# Patient Record
Sex: FEMALE | Race: WHITE | NOT HISPANIC OR LATINO | URBAN - METROPOLITAN AREA
[De-identification: names, ages, dates, MRNs, and addresses within clinical notes are randomized per-mention and may not be internally consistent; named-entity substitution may affect disease eponyms.]

---

## 2016-06-28 RX ORDER — QUETIAPINE FUMARATE 200 MG/1
1 TABLET, FILM COATED ORAL
Qty: 0 | Refills: 0 | DISCHARGE
Start: 2016-06-28

## 2018-04-11 ENCOUNTER — EMERGENCY (EMERGENCY)
Facility: HOSPITAL | Age: 48
LOS: 1 days | End: 2018-04-11
Payer: MEDICARE

## 2018-04-11 PROCEDURE — 12002 RPR S/N/AX/GEN/TRNK2.6-7.5CM: CPT

## 2018-04-11 PROCEDURE — 72125 CT NECK SPINE W/O DYE: CPT | Mod: 26

## 2018-04-11 PROCEDURE — 70450 CT HEAD/BRAIN W/O DYE: CPT | Mod: 26

## 2018-04-11 PROCEDURE — 99284 EMERGENCY DEPT VISIT MOD MDM: CPT | Mod: 25

## 2020-06-09 ENCOUNTER — EMERGENCY (EMERGENCY)
Facility: HOSPITAL | Age: 50
LOS: 1 days | End: 2020-06-09
Payer: MEDICAID

## 2020-06-09 ENCOUNTER — INPATIENT (INPATIENT)
Facility: HOSPITAL | Age: 50
LOS: 9 days | Discharge: HOME | DRG: 896 | End: 2020-06-19
Attending: INTERNAL MEDICINE | Admitting: HOSPITALIST
Payer: MEDICARE

## 2020-06-09 VITALS
HEART RATE: 143 BPM | SYSTOLIC BLOOD PRESSURE: 168 MMHG | RESPIRATION RATE: 28 BRPM | OXYGEN SATURATION: 100 % | DIASTOLIC BLOOD PRESSURE: 112 MMHG

## 2020-06-09 DIAGNOSIS — G40.901 EPILEPSY, UNSPECIFIED, NOT INTRACTABLE, WITH STATUS EPILEPTICUS: ICD-10-CM

## 2020-06-09 LAB
ALBUMIN SERPL ELPH-MCNC: 3.6 G/DL — SIGNIFICANT CHANGE UP (ref 3.3–5.2)
ALBUMIN SERPL ELPH-MCNC: 4.1 G/DL — SIGNIFICANT CHANGE UP (ref 3.3–5.2)
ALP SERPL-CCNC: 141 U/L — HIGH (ref 40–120)
ALP SERPL-CCNC: 154 U/L — HIGH (ref 40–120)
ALT FLD-CCNC: 50 U/L — HIGH
ALT FLD-CCNC: 56 U/L — HIGH
AMPHET UR-MCNC: NEGATIVE — SIGNIFICANT CHANGE UP
ANION GAP SERPL CALC-SCNC: 18 MMOL/L — HIGH (ref 5–17)
ANION GAP SERPL CALC-SCNC: 25 MMOL/L — HIGH (ref 5–17)
ANISOCYTOSIS BLD QL: SLIGHT — SIGNIFICANT CHANGE UP
APAP SERPL-MCNC: <7.5 UG/ML — LOW (ref 10–26)
APPEARANCE UR: CLEAR — SIGNIFICANT CHANGE UP
AST SERPL-CCNC: 167 U/L — HIGH
AST SERPL-CCNC: 168 U/L — HIGH
BACTERIA # UR AUTO: ABNORMAL
BARBITURATES UR SCN-MCNC: NEGATIVE — SIGNIFICANT CHANGE UP
BASOPHILS # BLD AUTO: 0.14 K/UL — SIGNIFICANT CHANGE UP (ref 0–0.2)
BASOPHILS NFR BLD AUTO: 0.9 % — SIGNIFICANT CHANGE UP (ref 0–2)
BENZODIAZ UR-MCNC: NEGATIVE — SIGNIFICANT CHANGE UP
BILIRUB SERPL-MCNC: 0.5 MG/DL — SIGNIFICANT CHANGE UP (ref 0.4–2)
BILIRUB SERPL-MCNC: 0.6 MG/DL — SIGNIFICANT CHANGE UP (ref 0.4–2)
BILIRUB UR-MCNC: NEGATIVE — SIGNIFICANT CHANGE UP
BUN SERPL-MCNC: 13 MG/DL — SIGNIFICANT CHANGE UP (ref 8–20)
BUN SERPL-MCNC: 15 MG/DL — SIGNIFICANT CHANGE UP (ref 8–20)
CALCIUM SERPL-MCNC: 8.2 MG/DL — LOW (ref 8.6–10.2)
CALCIUM SERPL-MCNC: 9.6 MG/DL — SIGNIFICANT CHANGE UP (ref 8.6–10.2)
CHLORIDE SERPL-SCNC: 86 MMOL/L — LOW (ref 98–107)
CHLORIDE SERPL-SCNC: 91 MMOL/L — LOW (ref 98–107)
CK SERPL-CCNC: 89 U/L — SIGNIFICANT CHANGE UP (ref 25–170)
CO2 SERPL-SCNC: 17 MMOL/L — LOW (ref 22–29)
CO2 SERPL-SCNC: 19 MMOL/L — LOW (ref 22–29)
COCAINE METAB.OTHER UR-MCNC: POSITIVE
COLOR SPEC: YELLOW — SIGNIFICANT CHANGE UP
CREAT SERPL-MCNC: 1 MG/DL — SIGNIFICANT CHANGE UP (ref 0.5–1.3)
CREAT SERPL-MCNC: 1.28 MG/DL — SIGNIFICANT CHANGE UP (ref 0.5–1.3)
DIFF PNL FLD: ABNORMAL
EOSINOPHIL # BLD AUTO: 0.42 K/UL — SIGNIFICANT CHANGE UP (ref 0–0.5)
EOSINOPHIL NFR BLD AUTO: 2.7 % — SIGNIFICANT CHANGE UP (ref 0–6)
EPI CELLS # UR: ABNORMAL
ETHANOL SERPL-MCNC: <10 MG/DL — SIGNIFICANT CHANGE UP
GLUCOSE SERPL-MCNC: 205 MG/DL — HIGH (ref 70–99)
GLUCOSE SERPL-MCNC: 216 MG/DL — HIGH (ref 70–99)
GLUCOSE UR QL: 100 MG/DL
HCG SERPL-ACNC: <4 MIU/ML — SIGNIFICANT CHANGE UP
HCT VFR BLD CALC: 35.6 % — SIGNIFICANT CHANGE UP (ref 34.5–45)
HGB BLD-MCNC: 11.2 G/DL — LOW (ref 11.5–15.5)
HYALINE CASTS # UR AUTO: ABNORMAL /LPF
KETONES UR-MCNC: ABNORMAL
LEUKOCYTE ESTERASE UR-ACNC: ABNORMAL
LYMPHOCYTES # BLD AUTO: 34.2 % — SIGNIFICANT CHANGE UP (ref 13–44)
LYMPHOCYTES # BLD AUTO: 5.34 K/UL — HIGH (ref 1–3.3)
MACROCYTES BLD QL: SLIGHT — SIGNIFICANT CHANGE UP
MANUAL SMEAR VERIFICATION: SIGNIFICANT CHANGE UP
MCHC RBC-ENTMCNC: 28 PG — SIGNIFICANT CHANGE UP (ref 27–34)
MCHC RBC-ENTMCNC: 31.5 GM/DL — LOW (ref 32–36)
MCV RBC AUTO: 89 FL — SIGNIFICANT CHANGE UP (ref 80–100)
METHADONE UR-MCNC: NEGATIVE — SIGNIFICANT CHANGE UP
MONOCYTES # BLD AUTO: 1.12 K/UL — HIGH (ref 0–0.9)
MONOCYTES NFR BLD AUTO: 7.2 % — SIGNIFICANT CHANGE UP (ref 2–14)
NEUTROPHILS # BLD AUTO: 8.59 K/UL — HIGH (ref 1.8–7.4)
NEUTROPHILS NFR BLD AUTO: 55 % — SIGNIFICANT CHANGE UP (ref 43–77)
NITRITE UR-MCNC: NEGATIVE — SIGNIFICANT CHANGE UP
OPIATES UR-MCNC: NEGATIVE — SIGNIFICANT CHANGE UP
OVALOCYTES BLD QL SMEAR: SLIGHT — SIGNIFICANT CHANGE UP
PCP SPEC-MCNC: SIGNIFICANT CHANGE UP
PCP UR-MCNC: NEGATIVE — SIGNIFICANT CHANGE UP
PH UR: 6 — SIGNIFICANT CHANGE UP (ref 5–8)
PLAT MORPH BLD: NORMAL — SIGNIFICANT CHANGE UP
PLATELET # BLD AUTO: 285 K/UL — SIGNIFICANT CHANGE UP (ref 150–400)
POIKILOCYTOSIS BLD QL AUTO: SLIGHT — SIGNIFICANT CHANGE UP
POLYCHROMASIA BLD QL SMEAR: SLIGHT — SIGNIFICANT CHANGE UP
POTASSIUM SERPL-MCNC: 3.3 MMOL/L — LOW (ref 3.5–5.3)
POTASSIUM SERPL-MCNC: 6.1 MMOL/L — CRITICAL HIGH (ref 3.5–5.3)
POTASSIUM SERPL-SCNC: 3.3 MMOL/L — LOW (ref 3.5–5.3)
POTASSIUM SERPL-SCNC: 6.1 MMOL/L — CRITICAL HIGH (ref 3.5–5.3)
PROT SERPL-MCNC: 6.6 G/DL — SIGNIFICANT CHANGE UP (ref 6.6–8.7)
PROT SERPL-MCNC: 7.8 G/DL — SIGNIFICANT CHANGE UP (ref 6.6–8.7)
PROT UR-MCNC: 100 MG/DL
RBC # BLD: 4 M/UL — SIGNIFICANT CHANGE UP (ref 3.8–5.2)
RBC # FLD: 21.6 % — HIGH (ref 10.3–14.5)
RBC BLD AUTO: ABNORMAL
RBC CASTS # UR COMP ASSIST: ABNORMAL /HPF (ref 0–4)
SALICYLATES SERPL-MCNC: <0.6 MG/DL — LOW (ref 10–20)
SARS-COV-2 RNA SPEC QL NAA+PROBE: SIGNIFICANT CHANGE UP
SMUDGE CELLS # BLD: PRESENT — SIGNIFICANT CHANGE UP
SODIUM SERPL-SCNC: 128 MMOL/L — LOW (ref 135–145)
SODIUM SERPL-SCNC: 128 MMOL/L — LOW (ref 135–145)
SP GR SPEC: 1.02 — SIGNIFICANT CHANGE UP (ref 1.01–1.02)
THC UR QL: NEGATIVE — SIGNIFICANT CHANGE UP
UROBILINOGEN FLD QL: 1 MG/DL
WBC # BLD: 15.62 K/UL — HIGH (ref 3.8–10.5)
WBC # FLD AUTO: 15.62 K/UL — HIGH (ref 3.8–10.5)
WBC UR QL: ABNORMAL

## 2020-06-09 PROCEDURE — 99223 1ST HOSP IP/OBS HIGH 75: CPT

## 2020-06-09 PROCEDURE — 99222 1ST HOSP IP/OBS MODERATE 55: CPT

## 2020-06-09 PROCEDURE — 99291 CRITICAL CARE FIRST HOUR: CPT | Mod: CS,GC

## 2020-06-09 PROCEDURE — ZZZZZ: CPT

## 2020-06-09 PROCEDURE — 70450 CT HEAD/BRAIN W/O DYE: CPT | Mod: 26

## 2020-06-09 PROCEDURE — 71045 X-RAY EXAM CHEST 1 VIEW: CPT | Mod: 26

## 2020-06-09 RX ORDER — SODIUM CHLORIDE 9 MG/ML
1000 INJECTION INTRAMUSCULAR; INTRAVENOUS; SUBCUTANEOUS ONCE
Refills: 0 | Status: COMPLETED | OUTPATIENT
Start: 2020-06-09 | End: 2020-06-09

## 2020-06-09 RX ORDER — DIAZEPAM 5 MG
10 TABLET ORAL ONCE
Refills: 0 | Status: DISCONTINUED | OUTPATIENT
Start: 2020-06-09 | End: 2020-06-09

## 2020-06-09 RX ORDER — CEFTRIAXONE 500 MG/1
1000 INJECTION, POWDER, FOR SOLUTION INTRAMUSCULAR; INTRAVENOUS ONCE
Refills: 0 | Status: COMPLETED | OUTPATIENT
Start: 2020-06-09 | End: 2020-06-09

## 2020-06-09 RX ORDER — LEVETIRACETAM 250 MG/1
1000 TABLET, FILM COATED ORAL ONCE
Refills: 0 | Status: COMPLETED | OUTPATIENT
Start: 2020-06-09 | End: 2020-06-09

## 2020-06-09 RX ADMIN — CEFTRIAXONE 100 MILLIGRAM(S): 500 INJECTION, POWDER, FOR SOLUTION INTRAMUSCULAR; INTRAVENOUS at 20:20

## 2020-06-09 RX ADMIN — LEVETIRACETAM 1000 MILLIGRAM(S): 250 TABLET, FILM COATED ORAL at 17:48

## 2020-06-09 RX ADMIN — Medication 100 MILLIGRAM(S): at 20:35

## 2020-06-09 RX ADMIN — SODIUM CHLORIDE 1000 MILLILITER(S): 9 INJECTION INTRAMUSCULAR; INTRAVENOUS; SUBCUTANEOUS at 20:20

## 2020-06-09 RX ADMIN — Medication 2 MILLIGRAM(S): at 16:20

## 2020-06-09 RX ADMIN — SODIUM CHLORIDE 1000 MILLILITER(S): 9 INJECTION INTRAMUSCULAR; INTRAVENOUS; SUBCUTANEOUS at 19:14

## 2020-06-09 RX ADMIN — SODIUM CHLORIDE 1000 MILLILITER(S): 9 INJECTION INTRAMUSCULAR; INTRAVENOUS; SUBCUTANEOUS at 17:45

## 2020-06-09 RX ADMIN — Medication 2 MILLIGRAM(S): at 16:19

## 2020-06-09 RX ADMIN — SODIUM CHLORIDE 1000 MILLILITER(S): 9 INJECTION INTRAMUSCULAR; INTRAVENOUS; SUBCUTANEOUS at 16:41

## 2020-06-09 RX ADMIN — Medication 10 MILLIGRAM(S): at 19:45

## 2020-06-09 RX ADMIN — Medication 10 MILLIGRAM(S): at 17:36

## 2020-06-09 RX ADMIN — LEVETIRACETAM 400 MILLIGRAM(S): 250 TABLET, FILM COATED ORAL at 16:25

## 2020-06-09 NOTE — CONSULT NOTE ADULT - SUBJECTIVE AND OBJECTIVE BOX
Patient is a 50y old  Female who presents with a chief complaint of     HPI/BRIEF HOSPITAL COURSE: ***    Events last 24 hours: ***    PAST MEDICAL & SURGICAL HISTORY:  Seizure      Review of Systems:  CONSTITUTIONAL: No fever, chills, or fatigue  EYES: No eye pain, visual disturbances, or discharge  ENMT:  No difficulty hearing, tinnitus, vertigo; No sinus or throat pain  NECK: No pain or stiffness  RESPIRATORY: No cough, wheezing, chills or hemoptysis; No shortness of breath  CARDIOVASCULAR: No chest pain, palpitations, dizziness, or leg swelling  GASTROINTESTINAL: No abdominal or epigastric pain. No nausea, vomiting, or hematemesis; No diarrhea or constipation. No melena or hematochezia.  GENITOURINARY: No dysuria, frequency, hematuria, or incontinence  NEUROLOGICAL: No headaches, memory loss, loss of strength, numbness, or tremors  SKIN: No itching, burning, rashes, or lesions   MUSCULOSKELETAL: No joint pain or swelling; No muscle, back, or extremity pain  PSYCHIATRIC: No depression, anxiety, mood swings, or difficulty sleeping        Physical Examination:    ICU Vital Signs Last 24 Hrs  T(C): 37.4 (09 Jun 2020 17:46), Max: 37.8 (09 Jun 2020 16:29)  T(F): 99.3 (09 Jun 2020 17:46), Max: 100 (09 Jun 2020 16:29)  HR: 111 (09 Jun 2020 19:28) (111 - 143)  BP: 136/98 (09 Jun 2020 19:28) (130/90 - 168/112)  BP(mean): --  ABP: --  ABP(mean): --  RR: 18 (09 Jun 2020 19:28) (18 - 28)  SpO2: 95% (09 Jun 2020 19:28) (94% - 100%)      General: No acute distress during my asse     Neuro: AAO*3, No motor, sensory, or cranial nerve deficit    HEENT: Pupils equal, reactive to light, Oral mucosa    PULM: Clear to auscultation bilaterally, no significant adventitious breath sounds     CVS: Regular rhythm and controlled rate, no murmurs, rubs, or gallops    ABD: Soft, nondistended, nontender, normoactive bowel sounds, no CVA tenderness    EXT: No b/l LE edema, nontender with pedal pulse palpable     SKIN: Warm and well perfused, no acute rashes       Medications:    I&O's Detail        RADIOLOGY/ Microbiology/ Labs: reviewed     I have personally provided  70 minutes of critical care time excluding time spent on separate procedures Patient is a 50y old  Female who presents with a chief complaint of     HPI/BRIEF HOSPITAL COURSE: Information from EMs/ED  49 y/o female with hx of etoh abuse and withdrawal seizure had witnessed seizure (? with a friend) and EMS called-> had 6 more GTC seizure and more in ED-> subsided with 4 mg of Ativan, 10 of Valium and 1 gm of Keppra-> ICU called for eval   No fever, stable O2 and hemodynamics         PAST MEDICAL & SURGICAL HISTORY:  Seizure    Could not obtain ROS due to underlying mentation    Physical Examination:    ICU Vital Signs Last 24 Hrs  T(C): 37.4 (09 Jun 2020 17:46), Max: 37.8 (09 Jun 2020 16:29)  T(F): 99.3 (09 Jun 2020 17:46), Max: 100 (09 Jun 2020 16:29)  HR: 111 (09 Jun 2020 19:28) (111 - 143)  BP: 136/98 (09 Jun 2020 19:28) (130/90 - 168/112)  BP(mean): --  ABP: --  ABP(mean): --  RR: 18 (09 Jun 2020 19:28) (18 - 28)  SpO2: 95% (09 Jun 2020 19:28) (94% - 100%)      General: No acute distress during my assessment     Neuro: post ictal lethargic, opened eyes on verbal commands and withdrawing ext to painful stimuli     HEENT: Pupils equal, reactive to light, lips covered with dry blood, could not assess oral mucosa and tongue    PULM: Clear to auscultation bilaterally, no significant adventitious breath sounds     CVS: Regular rhythm and increased rate, no murmurs, rubs, or gallops    ABD: Soft, nondistended, nontender, normoactive bowel sounds, no CVA tenderness    EXT: No b/l LE edema, nontender with pedal pulse palpable     SKIN: Warm and well perfused, no acute rashes       Medications:    I&O's Detail        RADIOLOGY/ Microbiology/ Labs: reviewed     I have personally provided  70 minutes of critical care time excluding time spent on separate procedures

## 2020-06-09 NOTE — CONSULT NOTE ADULT - ASSESSMENT
1: Acute encephalopathy: post ictal   2: Status epilepticus: mostly secondary to alcohol withdrawal with withdrawal seizure    3: Acute hypoxic resp failure   4: CECIL with metabolic acidosis   5: Hyponatremia   6: AG acidosis   7: Transaminitis   8: Hyperglycemia     Patient seen and examined   Patient is currently post-ictal  Needs admission to step-down unit (does not need MICU for now) for q2 neuro-check, aspiration precautions, c EEG, CIWA monitoring with ativan PRN  CTH wo contrast with no acute pathology   Would c/w Keppra until further Neuro recs/cEEG done   Needs aggressive hydration (got one l in ED, needs 2 more L of bolus IVF), Te[place]ada for 24 hour  q6 CMP, would get NH4, hemoglobin A1C   aspiration, seizure and fall precautions   MVI, Thiamine FA  On supplemental oxygen-> oxygenating and ventilating fine for now 1: Acute encephalopathy: post ictal   2: Status epilepticus: mostly secondary to alcohol withdrawal with withdrawal seizure    3: Acute hypoxic resp failure   4: CECIL with metabolic acidosis   5: Hyponatremia   6: AG acidosis   7: Transaminitis   8: Hyperglycemia     Patient seen and examined   Patient is currently post-ictal  Needs admission to step-down unit (does not need MICU for now) for q2 neuro-check, aspiration precautions, c EEG, CIWA monitoring with ativan PRN  CTH wo contrast with no acute pathology   Would c/w Keppra until further Neuro recs/cEEG done   Needs aggressive hydration (got one l in ED, needs 2 more L of bolus IVF), [place] for 24 hour  q6 CMP, would get NH4, hemoglobin A1C   aspiration, seizure and fall precautions   MVI, Thiamine FA  On supplemental oxygen with VM-> oxygenating and ventilating fine for now with saturation > 95%  POC d/w ED, EEg tech

## 2020-06-09 NOTE — ED ADULT TRIAGE NOTE - CHIEF COMPLAINT QUOTE
BIBA c/o seizures. Patient is actively seizing brought straight to critical with critical team and MD CARRIE Rachel

## 2020-06-09 NOTE — H&P ADULT - PROBLEM SELECTOR PLAN 4
patient and spouse unable to clarify.   will check CTA chest to evaluate   please call patient pharmacy for medication list.

## 2020-06-09 NOTE — ED PROVIDER NOTE - OBJECTIVE STATEMENT
Pt is a 40yo F presenting actively seizing. As per EMS patient was with  and started seizing. 6 seizures reported with no return to baseline in between. Patient unable to provide any history. No family present.
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
## 2020-06-09 NOTE — H&P ADULT - ASSESSMENT
Pharmacy: [facility] : [phone]. Pharmacy: [facility] : [phone].  [facility] medication list  Seroquel 400mg  Lexapro  thiamine 100mg  Escitalopram 10mg   Quetiapine 100mg  Rivaroxaban 15mg (dated 2019, with only 15pills given)  folic acid.     Medical history   Iron Def Anemia  Convulsions  Anxiety  ADHA disorder  Obsessive-comp disorder.

## 2020-06-09 NOTE — H&P ADULT - PROBLEM SELECTOR PLAN 1
suspected secondary to non compliance, patient reports not taking her medication as per her PCP, unable to recall name of medication or last time she took it.   Admit to step down  Q2hr neurological checks  Ativan PRN for seizure like activity  Seizure precautions.   F/U Neurology consult - Dr. Ochoa group notified by ED  Concerns for aspiration pneumonia noted on CXR, will cont Rocephin IV,   Albuterol PRN  Supplemental oxygen as needed suspected secondary to non compliance, patient reports not taking her medication as per her PCP, unable to recall name of medication or last time she took it.   Admit to step down  Q2hr neurological checks  Ativan PRN for seizure like activity  Seizure precautions.   F/U Neurology consult - Dr. Ochoa group notified by ED  Concerns for aspiration pneumonia noted on CXR, will cont Rocephin IV,   Albuterol PRN  Supplemental oxygen as needed  ABG: pending.   Hyponatremia noted, baseline unknown. possible dehydration induced vs ETOH vs medication side effect. status post IV hydration while in ED (3L) repeat Sodium up trending within goal rang. will cont normal saline at 100cc. cont to trend sodium closely.   f/.u urine lytes

## 2020-06-09 NOTE — ED PROVIDER NOTE - PHYSICAL EXAMINATION
General: actively seizing female patient   HEENT: bright red blood around oropharynx  Eyes: Pupils 4mm equal b/l. No scleral icterus.  Neck:. Full ROM.   Cardiac: Tachycardic rate and regular rhythm. No murmurs, rubs, gallops.   Resp: Lungs CTAB. No wheezes, rales or rhonchi.  Abd: Soft, non-distended. No scars, masses, or lesions.  Back: Spine midline.  Skin: No rashes, abrasions, or lacerations.  Neuro: Patient actively seizing, unresponsive.

## 2020-06-09 NOTE — ED PROVIDER NOTE - ATTENDING CONTRIBUTION TO CARE
AJM: pt with status epipilepticus. seizures controlled. returning to baseline, possible substance abuse component vs wd. considerably long postictal period neuro consulted who reccs continued benzos. icu consulted who notes pot is stable for floor. concern fro aspiration pna given cx and hypoxia (satting well on supplemental O2). will give abx and admit to floor

## 2020-06-09 NOTE — H&P ADULT - NSHPPHYSICALEXAM_GEN_ALL_CORE
Vital Signs Last 24 Hrs  T(C): 36.6 (09 Jun 2020 23:45), Max: 37.8 (09 Jun 2020 16:29)  T(F): 97.8 (09 Jun 2020 23:45), Max: 100 (09 Jun 2020 16:29)  HR: 51 (09 Jun 2020 23:45) (51 - 143)  BP: 144/87 (09 Jun 2020 23:45) (130/90 - 168/112)  BP(mean): --  RR: 19 (09 Jun 2020 23:45) (18 - 28)  SpO2: 96% (09 Jun 2020 23:45) (94% - 100%)    Constitutional/General: Well developed, well nourished, vitals reviewed  EYE: PERRL, conjunctiva clear  ENT: Good dentition, oropharynx clear  NECK: No masses, thyroid normal  RESP: CTAB, no wheezes, no rhonchi, normal effort  CV: RRR, normal S1S2, no murmur, 2+ DP pulses, no JVD, no edema  ABDOMEN: Soft, nontender, no HSM  LYMPH: No cervical or supraclavicular adenopathy  SKIN: Warm, dry, no rashes  NEURO: CN II-XII intact grossly, sensation intact  PSYCHIATRIC: Oriented x1, labile mood and affect, post ictal

## 2020-06-09 NOTE — ED PROVIDER NOTE - CLINICAL SUMMARY MEDICAL DECISION MAKING FREE TEXT BOX
Patient presenting in active seizures. Aborted with Ativan. Patient continuing to be post-ictal. Labwork and CTH ordered. Patient to be admitted for continued management and improvement of post-ictal state.

## 2020-06-09 NOTE — ED PROVIDER NOTE - PROGRESS NOTE DETAILS
AJM: HR improving but still tachycardic. afebrile. no more seizure activity but pt is still not at baseline. spoke with dr fenton of [facility] who reccs benzos. icu aware and will see pt for concern for status epilepticus Spoke with , Obi Segura ([phone]) states that patient is Emerita Segura. States that patient drinks 1 bottle of vodka q2d but has not drank in maybe 3 weeks, although is unsure. He rpeorts that he has seen her seize in the past and is not on antiepileptic medications. He states that today they were sitting on a bench, she became unresponsive, leaned over, eyes rolled back, and hands were clenched. AJM:pt requiring O2 support 2/2 hypoxia. cxr notes RLL infiltrate. concern for aspiration. abx ordered. satting well on supplemental \O2 AJM:pt requiring O2 support 2/2 hypoxia. cxr notes RLL infiltrate. concern for aspiration. abx ordered. satting well on supplemental \O2. cleared by ICU for floor

## 2020-06-09 NOTE — ED ADULT NURSE REASSESSMENT NOTE - NS ED NURSE REASSESS COMMENT FT1
MD Garcia at bedside for eval.
Patient becoming more aware of surrounding. O2 in place via NRB @15LPM. SpO2: 100%. Cardiac monitor in place, sinus tach. Saline lock in place, patent, negative s/s phlebitis or infiltration. Indwelling catheter in place, urine draining. All labs sent per order, including COVID 19 nasal swab, Rapid result requested for ICU admission. Remains in critical care area, CCRN monitoring.

## 2020-06-09 NOTE — H&P ADULT - NSHPSOCIALHISTORY_GEN_ALL_CORE
Tobacco abuse: 1.5 pack per day for 20 yrs.   ETOH abuse: 1.5 bottle hard liquor for 30 yrs  Lives with  Dago.

## 2020-06-09 NOTE — ED ADULT NURSE NOTE - OBJECTIVE STATEMENT
EMS reports patient had 6 witnessed seizures while sitting with . Is on anticoagulants, unknown what type. Patient received in critical care area activity seizing, received 2mg Ativan IM by Dr. Madrid, additional 2mg Ativan IVP. Seizure activity ceased. Cardiac monitor in place. Patient had dried blood to mouth, airway patent. O2 in place via NRB. Unknown when last seizure occurred.   Name & : Bhumika Segura 1970

## 2020-06-10 DIAGNOSIS — F10.10 ALCOHOL ABUSE, UNCOMPLICATED: ICD-10-CM

## 2020-06-10 DIAGNOSIS — Z98.890 OTHER SPECIFIED POSTPROCEDURAL STATES: Chronic | ICD-10-CM

## 2020-06-10 DIAGNOSIS — F17.210 NICOTINE DEPENDENCE, CIGARETTES, UNCOMPLICATED: ICD-10-CM

## 2020-06-10 DIAGNOSIS — T40.5X1A POISONING BY COCAINE, ACCIDENTAL (UNINTENTIONAL), INITIAL ENCOUNTER: ICD-10-CM

## 2020-06-10 DIAGNOSIS — R56.9 UNSPECIFIED CONVULSIONS: ICD-10-CM

## 2020-06-10 DIAGNOSIS — J96.01 ACUTE RESPIRATORY FAILURE WITH HYPOXIA: ICD-10-CM

## 2020-06-10 DIAGNOSIS — Z86.711 PERSONAL HISTORY OF PULMONARY EMBOLISM: ICD-10-CM

## 2020-06-10 DIAGNOSIS — G40.901 EPILEPSY, UNSPECIFIED, NOT INTRACTABLE, WITH STATUS EPILEPTICUS: ICD-10-CM

## 2020-06-10 LAB
ANION GAP SERPL CALC-SCNC: 18 MMOL/L — HIGH (ref 5–17)
BASE EXCESS BLDA CALC-SCNC: -4.2 MMOL/L — LOW (ref -2–2)
BLOOD GAS COMMENTS ARTERIAL: SIGNIFICANT CHANGE UP
BUN SERPL-MCNC: 11 MG/DL — SIGNIFICANT CHANGE UP (ref 8–20)
CALCIUM SERPL-MCNC: 8.3 MG/DL — LOW (ref 8.6–10.2)
CHLORIDE SERPL-SCNC: 92 MMOL/L — LOW (ref 98–107)
CO2 SERPL-SCNC: 20 MMOL/L — LOW (ref 22–29)
CREAT SERPL-MCNC: 0.78 MG/DL — SIGNIFICANT CHANGE UP (ref 0.5–1.3)
GAS PNL BLDA: SIGNIFICANT CHANGE UP
GAS PNL BLDA: SIGNIFICANT CHANGE UP
GLUCOSE SERPL-MCNC: 148 MG/DL — HIGH (ref 70–99)
GRAM STN FLD: SIGNIFICANT CHANGE UP
HCO3 BLDA-SCNC: 21 MMOL/L — SIGNIFICANT CHANGE UP (ref 20–26)
HOROWITZ INDEX BLDA+IHG-RTO: SIGNIFICANT CHANGE UP
PCO2 BLDA: 29 MMHG — LOW (ref 35–45)
PH BLDA: 7.43 — SIGNIFICANT CHANGE UP (ref 7.35–7.45)
PO2 BLDA: 84 MMHG — SIGNIFICANT CHANGE UP (ref 83–108)
POTASSIUM SERPL-MCNC: 4 MMOL/L — SIGNIFICANT CHANGE UP (ref 3.5–5.3)
POTASSIUM SERPL-SCNC: 4 MMOL/L — SIGNIFICANT CHANGE UP (ref 3.5–5.3)
SAO2 % BLDA: 97 % — SIGNIFICANT CHANGE UP (ref 95–99)
SODIUM SERPL-SCNC: 130 MMOL/L — LOW (ref 135–145)
SPECIMEN SOURCE: SIGNIFICANT CHANGE UP

## 2020-06-10 PROCEDURE — 71045 X-RAY EXAM CHEST 1 VIEW: CPT | Mod: 26

## 2020-06-10 PROCEDURE — 71045 X-RAY EXAM CHEST 1 VIEW: CPT | Mod: 26,77

## 2020-06-10 PROCEDURE — 71275 CT ANGIOGRAPHY CHEST: CPT | Mod: 26

## 2020-06-10 PROCEDURE — 99223 1ST HOSP IP/OBS HIGH 75: CPT

## 2020-06-10 RX ORDER — QUETIAPINE FUMARATE 200 MG/1
400 TABLET, FILM COATED ORAL AT BEDTIME
Refills: 0 | Status: DISCONTINUED | OUTPATIENT
Start: 2020-06-10 | End: 2020-06-10

## 2020-06-10 RX ORDER — ESCITALOPRAM OXALATE 10 MG/1
10 TABLET, FILM COATED ORAL DAILY
Refills: 0 | Status: DISCONTINUED | OUTPATIENT
Start: 2020-06-10 | End: 2020-06-19

## 2020-06-10 RX ORDER — VANCOMYCIN HCL 1 G
1250 VIAL (EA) INTRAVENOUS EVERY 12 HOURS
Refills: 0 | Status: DISCONTINUED | OUTPATIENT
Start: 2020-06-10 | End: 2020-06-10

## 2020-06-10 RX ORDER — ENOXAPARIN SODIUM 100 MG/ML
30 INJECTION SUBCUTANEOUS DAILY
Refills: 0 | Status: DISCONTINUED | OUTPATIENT
Start: 2020-06-10 | End: 2020-06-13

## 2020-06-10 RX ORDER — PROPOFOL 10 MG/ML
20 INJECTION, EMULSION INTRAVENOUS
Qty: 1000 | Refills: 0 | Status: DISCONTINUED | OUTPATIENT
Start: 2020-06-10 | End: 2020-06-11

## 2020-06-10 RX ORDER — QUETIAPINE FUMARATE 200 MG/1
100 TABLET, FILM COATED ORAL AT BEDTIME
Refills: 0 | Status: DISCONTINUED | OUTPATIENT
Start: 2020-06-10 | End: 2020-06-15

## 2020-06-10 RX ORDER — LEVETIRACETAM 250 MG/1
1000 TABLET, FILM COATED ORAL ONCE
Refills: 0 | Status: COMPLETED | OUTPATIENT
Start: 2020-06-10 | End: 2020-06-10

## 2020-06-10 RX ORDER — FENTANYL CITRATE 50 UG/ML
50 INJECTION INTRAVENOUS
Refills: 0 | Status: DISCONTINUED | OUTPATIENT
Start: 2020-06-10 | End: 2020-06-12

## 2020-06-10 RX ORDER — PIPERACILLIN AND TAZOBACTAM 4; .5 G/20ML; G/20ML
3.38 INJECTION, POWDER, LYOPHILIZED, FOR SOLUTION INTRAVENOUS ONCE
Refills: 0 | Status: COMPLETED | OUTPATIENT
Start: 2020-06-10 | End: 2020-06-10

## 2020-06-10 RX ORDER — THIAMINE MONONITRATE (VIT B1) 100 MG
100 TABLET ORAL DAILY
Refills: 0 | Status: COMPLETED | OUTPATIENT
Start: 2020-06-10 | End: 2020-06-12

## 2020-06-10 RX ORDER — PANTOPRAZOLE SODIUM 20 MG/1
40 TABLET, DELAYED RELEASE ORAL DAILY
Refills: 0 | Status: DISCONTINUED | OUTPATIENT
Start: 2020-06-10 | End: 2020-06-12

## 2020-06-10 RX ORDER — DEXMEDETOMIDINE HYDROCHLORIDE IN 0.9% SODIUM CHLORIDE 4 UG/ML
0.2 INJECTION INTRAVENOUS
Qty: 200 | Refills: 0 | Status: DISCONTINUED | OUTPATIENT
Start: 2020-06-10 | End: 2020-06-12

## 2020-06-10 RX ORDER — NICOTINE POLACRILEX 2 MG
1 GUM BUCCAL DAILY
Refills: 0 | Status: DISCONTINUED | OUTPATIENT
Start: 2020-06-10 | End: 2020-06-19

## 2020-06-10 RX ORDER — SODIUM CHLORIDE 9 MG/ML
1000 INJECTION INTRAMUSCULAR; INTRAVENOUS; SUBCUTANEOUS
Refills: 0 | Status: DISCONTINUED | OUTPATIENT
Start: 2020-06-10 | End: 2020-06-11

## 2020-06-10 RX ORDER — ALBUTEROL 90 UG/1
2 AEROSOL, METERED ORAL EVERY 6 HOURS
Refills: 0 | Status: DISCONTINUED | OUTPATIENT
Start: 2020-06-10 | End: 2020-06-19

## 2020-06-10 RX ORDER — FOLIC ACID 0.8 MG
1 TABLET ORAL DAILY
Refills: 0 | Status: DISCONTINUED | OUTPATIENT
Start: 2020-06-10 | End: 2020-06-13

## 2020-06-10 RX ORDER — SODIUM CHLORIDE 9 MG/ML
1000 INJECTION, SOLUTION INTRAVENOUS ONCE
Refills: 0 | Status: COMPLETED | OUTPATIENT
Start: 2020-06-10 | End: 2020-06-10

## 2020-06-10 RX ORDER — VANCOMYCIN HCL 1 G
VIAL (EA) INTRAVENOUS
Refills: 0 | Status: DISCONTINUED | OUTPATIENT
Start: 2020-06-10 | End: 2020-06-10

## 2020-06-10 RX ORDER — LEVETIRACETAM 250 MG/1
1000 TABLET, FILM COATED ORAL EVERY 12 HOURS
Refills: 0 | Status: DISCONTINUED | OUTPATIENT
Start: 2020-06-11 | End: 2020-06-11

## 2020-06-10 RX ORDER — PIPERACILLIN AND TAZOBACTAM 4; .5 G/20ML; G/20ML
3.38 INJECTION, POWDER, LYOPHILIZED, FOR SOLUTION INTRAVENOUS EVERY 8 HOURS
Refills: 0 | Status: COMPLETED | OUTPATIENT
Start: 2020-06-10 | End: 2020-06-14

## 2020-06-10 RX ORDER — VANCOMYCIN HCL 1 G
1000 VIAL (EA) INTRAVENOUS
Refills: 0 | Status: DISCONTINUED | OUTPATIENT
Start: 2020-06-10 | End: 2020-06-11

## 2020-06-10 RX ORDER — VANCOMYCIN HCL 1 G
1250 VIAL (EA) INTRAVENOUS ONCE
Refills: 0 | Status: COMPLETED | OUTPATIENT
Start: 2020-06-10 | End: 2020-06-10

## 2020-06-10 RX ORDER — CEFTRIAXONE 500 MG/1
1000 INJECTION, POWDER, FOR SOLUTION INTRAMUSCULAR; INTRAVENOUS EVERY 24 HOURS
Refills: 0 | Status: DISCONTINUED | OUTPATIENT
Start: 2020-06-10 | End: 2020-06-10

## 2020-06-10 RX ORDER — CHLORHEXIDINE GLUCONATE 213 G/1000ML
15 SOLUTION TOPICAL EVERY 12 HOURS
Refills: 0 | Status: DISCONTINUED | OUTPATIENT
Start: 2020-06-10 | End: 2020-06-12

## 2020-06-10 RX ADMIN — Medication 1 MILLIGRAM(S): at 11:57

## 2020-06-10 RX ADMIN — PROPOFOL 6 MICROGRAM(S)/KG/MIN: 10 INJECTION, EMULSION INTRAVENOUS at 10:49

## 2020-06-10 RX ADMIN — PANTOPRAZOLE SODIUM 40 MILLIGRAM(S): 20 TABLET, DELAYED RELEASE ORAL at 11:57

## 2020-06-10 RX ADMIN — SODIUM CHLORIDE 100 MILLILITER(S): 9 INJECTION INTRAMUSCULAR; INTRAVENOUS; SUBCUTANEOUS at 06:35

## 2020-06-10 RX ADMIN — Medication 1 PATCH: at 12:08

## 2020-06-10 RX ADMIN — FENTANYL CITRATE 50 MICROGRAM(S): 50 INJECTION INTRAVENOUS at 11:57

## 2020-06-10 RX ADMIN — FENTANYL CITRATE 50 MICROGRAM(S): 50 INJECTION INTRAVENOUS at 14:25

## 2020-06-10 RX ADMIN — LEVETIRACETAM 400 MILLIGRAM(S): 250 TABLET, FILM COATED ORAL at 19:00

## 2020-06-10 RX ADMIN — Medication 166.67 MILLIGRAM(S): at 10:39

## 2020-06-10 RX ADMIN — SODIUM CHLORIDE 2000 MILLILITER(S): 9 INJECTION, SOLUTION INTRAVENOUS at 16:58

## 2020-06-10 RX ADMIN — PROPOFOL 6 MICROGRAM(S)/KG/MIN: 10 INJECTION, EMULSION INTRAVENOUS at 06:34

## 2020-06-10 RX ADMIN — PIPERACILLIN AND TAZOBACTAM 200 GRAM(S): 4; .5 INJECTION, POWDER, LYOPHILIZED, FOR SOLUTION INTRAVENOUS at 10:18

## 2020-06-10 RX ADMIN — ENOXAPARIN SODIUM 30 MILLIGRAM(S): 100 INJECTION SUBCUTANEOUS at 11:57

## 2020-06-10 RX ADMIN — Medication 1 PATCH: at 11:30

## 2020-06-10 RX ADMIN — DEXMEDETOMIDINE HYDROCHLORIDE IN 0.9% SODIUM CHLORIDE 3.25 MICROGRAM(S)/KG/HR: 4 INJECTION INTRAVENOUS at 15:47

## 2020-06-10 RX ADMIN — CHLORHEXIDINE GLUCONATE 15 MILLILITER(S): 213 SOLUTION TOPICAL at 17:45

## 2020-06-10 RX ADMIN — Medication 1 PATCH: at 19:24

## 2020-06-10 RX ADMIN — CHLORHEXIDINE GLUCONATE 15 MILLILITER(S): 213 SOLUTION TOPICAL at 06:34

## 2020-06-10 RX ADMIN — Medication 100 MILLIGRAM(S): at 11:57

## 2020-06-10 RX ADMIN — ESCITALOPRAM OXALATE 10 MILLIGRAM(S): 10 TABLET, FILM COATED ORAL at 12:08

## 2020-06-10 RX ADMIN — Medication 250 MILLIGRAM(S): at 22:27

## 2020-06-10 RX ADMIN — Medication 1 TABLET(S): at 11:57

## 2020-06-10 RX ADMIN — SODIUM CHLORIDE 1000 MILLILITER(S): 9 INJECTION, SOLUTION INTRAVENOUS at 06:34

## 2020-06-10 RX ADMIN — PIPERACILLIN AND TAZOBACTAM 25 GRAM(S): 4; .5 INJECTION, POWDER, LYOPHILIZED, FOR SOLUTION INTRAVENOUS at 17:45

## 2020-06-10 RX ADMIN — PROPOFOL 6 MICROGRAM(S)/KG/MIN: 10 INJECTION, EMULSION INTRAVENOUS at 15:19

## 2020-06-10 RX ADMIN — Medication 2 MILLIGRAM(S): at 00:51

## 2020-06-10 RX ADMIN — QUETIAPINE FUMARATE 100 MILLIGRAM(S): 200 TABLET, FILM COATED ORAL at 22:23

## 2020-06-10 RX ADMIN — Medication 50 MILLIGRAM(S): at 17:44

## 2020-06-10 NOTE — DIETITIAN INITIAL EVALUATION ADULT. - PERTINENT MEDS FT
MEDICATIONS  (STANDING):  chlorhexidine 0.12% Liquid 15 milliLiter(s) Oral Mucosa every 12 hours  enoxaparin Injectable 30 milliGRAM(s) SubCutaneous daily  escitalopram 10 milliGRAM(s) Oral daily  folic acid 1 milliGRAM(s) Oral daily  multivitamin 1 Tablet(s) Oral daily  nicotine - 21 mG/24Hr(s) Patch 1 Patch Transdermal daily  pantoprazole  Injectable 40 milliGRAM(s) IV Push daily  piperacillin/tazobactam IVPB.. 3.375 Gram(s) IV Intermittent every 8 hours  propofol Infusion 20 MICROgram(s)/kG/Min (6 mL/Hr) IV Continuous <Continuous>  QUEtiapine 100 milliGRAM(s) Oral at bedtime  sodium chloride 0.9%. 1000 milliLiter(s) (100 mL/Hr) IV Continuous <Continuous>  thiamine 100 milliGRAM(s) Oral daily  vancomycin  IVPB 1000 milliGRAM(s) IV Intermittent <User Schedule>    MEDICATIONS  (PRN):  ALBUTerol    90 MICROgram(s) HFA Inhaler 2 Puff(s) Inhalation every 6 hours PRN Shortness of Breath and/or Wheezing  fentaNYL    Injectable 50 MICROGram(s) IV Push every 2 hours PRN vent synchrony/dyspnea  LORazepam   Injectable 2 milliGRAM(s) IV Push every 2 hours PRN seizures
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
## 2020-06-10 NOTE — PROGRESS NOTE ADULT - SUBJECTIVE AND OBJECTIVE BOX
Patient and  reports that patient was on Anticoagulation, which she has stopped taking. Patient reports that she has a blood clot in her lungs. Will completed CTA to further evaluate need for AC at this time.   patient currently unable to sign due to current medical condition. Benefit currently outweighs the risk.

## 2020-06-10 NOTE — DIETITIAN INITIAL EVALUATION ADULT. - PERTINENT LABORATORY DATA
06-10 Na130 mmol/L<L> Glu 148 mg/dL<H> K+ 4.0 mmol/L Cr  0.78 mg/dL BUN 11.0 mg/dL Phos n/a   Alb n/a   PAB n/a

## 2020-06-10 NOTE — DIETITIAN INITIAL EVALUATION ADULT. - PROBLEM SELECTOR PLAN 1
suspected secondary to non compliance, patient reports not taking her medication as per her PCP, unable to recall name of medication or last time she took it.   Admit to step down  Q2hr neurological checks  Ativan PRN for seizure like activity  Seizure precautions.   F/U Neurology consult - Dr. Ochoa group notified by ED  Concerns for aspiration pneumonia noted on CXR, will cont Rocephin IV,   Albuterol PRN  Supplemental oxygen as needed  ABG: pending.   Hyponatremia noted, baseline unknown. possible dehydration induced vs ETOH vs medication side effect. status post IV hydration while in ED (3L) repeat Sodium up trending within goal rang. will cont normal saline at 100cc. cont to trend sodium closely.   f/.u urine lytes

## 2020-06-10 NOTE — CONSULT NOTE ADULT - SUBJECTIVE AND OBJECTIVE BOX
[facility]                                     Neurology at [place]                                 Abhinav Nair, & Don                                  [address]                                        [place], [place], [address]                                             [phone]    CC: seizure  HPI:  The patient is a 50y Female who presented with seizures/status epilepticus.  Unable to obtain history as she is intubated and sedated.  She apparently has a history of seizure, not on any AEDs, history of alcohol abuse, reported last drink 3 weeks ago, but not clear how accurate that is, and use of cocaine 3 days prior to admission. Her seizures have stopped.  She had decline in status and was intubated and put on propofol.  There was suspicion for alcohol withdrawal.  Neurology is asked to evaluate.    PAST MEDICAL & SURGICAL HISTORY:  Tobacco dependence  ETOH abuse  Seizure  H/O tracheostomy      MEDICATIONS  (STANDING):  chlorhexidine 0.12% Liquid 15 milliLiter(s) Oral Mucosa every 12 hours  enoxaparin Injectable 30 milliGRAM(s) SubCutaneous daily  escitalopram 10 milliGRAM(s) Oral daily  folic acid 1 milliGRAM(s) Oral daily  multivitamin 1 Tablet(s) Oral daily  nicotine - 21 mG/24Hr(s) Patch 1 Patch Transdermal daily  pantoprazole  Injectable 40 milliGRAM(s) IV Push daily  piperacillin/tazobactam IVPB.. 3.375 Gram(s) IV Intermittent every 8 hours  propofol Infusion 20 MICROgram(s)/kG/Min (6 mL/Hr) IV Continuous <Continuous>  QUEtiapine 100 milliGRAM(s) Oral at bedtime  sodium chloride 0.9%. 1000 milliLiter(s) (100 mL/Hr) IV Continuous <Continuous>  thiamine 100 milliGRAM(s) Oral daily  vancomycin  IVPB 1000 milliGRAM(s) IV Intermittent <User Schedule>    MEDICATIONS  (PRN):  ALBUTerol    90 MICROgram(s) HFA Inhaler 2 Puff(s) Inhalation every 6 hours PRN Shortness of Breath and/or Wheezing  fentaNYL    Injectable 50 MICROGram(s) IV Push every 2 hours PRN vent synchrony/dyspnea  LORazepam   Injectable 2 milliGRAM(s) IV Push every 2 hours PRN seizures      Allergies    Allergy Status Unknown    Intolerances        SOCIAL HISTORY:  (+) tob,   (+) alcohol abuse  (+) drugs-  cocaine    FAMILY HISTORY:  unable to obtain    ROS: intubated, sedated- unable to obtain    Vital Signs Last 24 Hrs  T(C): 36.6 (10 Rui 2020 11:43), Max: 37.8 (09 Jun 2020 16:29)  T(F): 97.8 (10 Rui 2020 11:43), Max: 100 (09 Jun 2020 16:29)  HR: 111 (10 Rui 2020 12:37) (51 - 143)  BP: 103/88 (10 Rui 2020 12:00) (96/18 - 168/112)  BP(mean): 91 (10 Rui 2020 12:00) (34 - 106)  RR: 15 (10 Rui 2020 12:00) (15 - 99)  SpO2: 98% (10 Rui 2020 12:37) (90% - 100%)    Detailed Neurologic Exam:    Mental status: The patient is intubated and sedated.  Unable to assess orientation or language.    Cranial nerves: Pupils equal and react symmetrically to light. There is no visual field deficit to threat. Extraocular motion is full with dolls eyes maneuvers. Nurse reports frequent right gaze preference.  There is no ptosis. Facial sensation is not able to be assessed. Facial musculature is grossly symmetric.  Unable to assess palate, shoulder and tongue.    Motor: There is normal bulk and tone.  There is no tremor.  moves weakly but symmetrically to pinch    Sensation: Grimace to pinch in 4 extremities    Reflexes: muted throughout and plantar responses are flexor.    Cerebellar: unable to assess  dysmetria on finger to nose testing.    Gait: unable to assess due to condition    LABS:                         11.2   15.62 )-----------( 285      ( 09 Jun 2020 16:33 )             35.6       06-10    130<L>  |  92<L>  |  11.0  ----------------------------<  148<H>  4.0   |  20.0<L>  |  0.78    Ca    8.3<L>      10 Rui 2020 00:54    TPro  6.6  /  Alb  3.6  /  TBili  0.5  /  DBili  x   /  AST  167<H>  /  ALT  50<H>  /  AlkPhos  141<H>  06-09    RADIOLOGY & ADDITIONAL STUDIES (independently reviewed unless otherwise noted):  CT head- no acute CVA, mass or blood

## 2020-06-10 NOTE — DIETITIAN INITIAL EVALUATION ADULT. - MALNUTRITION
limited NFPE: mild muscle loss of temples, clavicles, shoulders, mild fat loss of orbitals moderate, acute on chronic
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
## 2020-06-10 NOTE — DIETITIAN INITIAL EVALUATION ADULT. - PROBLEM SELECTOR PLAN 2
patient and family reports last drink was 3 weeks ago.   [facility] protocol

## 2020-06-10 NOTE — DIETITIAN INITIAL EVALUATION ADULT. - ETIOLOGY
related to inability to meet sufficient protein-energy in setting of alcohol abuse with withdrawal seizure and crack pneumonitis

## 2020-06-10 NOTE — DIETITIAN INITIAL EVALUATION ADULT. - ENTERAL
increase tube feeds to goal rate of 60 ml/hr (x20 hrs) to provide 1200 ml, 1800 kcal, 77g protein, 912 ml free water, and >100% of RDIs for vitamins/minerals. Additional free water pre MD discretion.

## 2020-06-10 NOTE — CHART NOTE - NSCHARTNOTEFT_GEN_A_CORE
PRELIMINARY EEG REPORT    EEG reviewed from 15:01 - 17:57  Date: 06-10-20      - Abundant very brief to brief runs of fluctuating or evolving 0.5-1 Hz right frontotemporal (max F4/T8) lateralized periodic discharges with rhythmicity (LPD+R)   - Abundant very brief to brief runs of fluctuating or evolving 1-2 Hz right anterior-mid temporal (F8/T8) lateralized rhythmic delta activity (LRDA).  - Intermittent / continuous focal slowing in the right hemisphere.  - Mild to moderate generalized slowing.  - No seizure seen.  - Diffuse beta activity.    Findings were discussed with Neurology service.     ____________________________________    Ildefonso Connors MD  Attending Physician, [facility] [place] PRELIMINARY EEG REPORT    EEG reviewed from 15:01 - 17:57  Date: 06-10-20      - Abundant very brief to brief runs of fluctuating or evolving 0.5-1 Hz right frontotemporal (max F4/T8) lateralized periodic discharges with rhythmicity (LPD+R)   - Abundant very brief to brief runs of fluctuating or evolving 1-2 Hz right anterior-mid temporal (F8/T8) lateralized rhythmic delta activity (LRDA).  - Mild to moderate generalized slowing.  - No seizure seen.  - Diffuse beta activity.    Findings were discussed with Neurology service.     ____________________________________    Ildefonso Connors MD  Attending Physician, [facility] [place]
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
## 2020-06-10 NOTE — CHART NOTE - NSCHARTNOTEFT_GEN_A_CORE
Upon Nutritional Assessment by the Registered Dietitian your patient was determined to meet criteria / has evidence of the following diagnosis/diagnoses:          [ ]  Mild Protein Calorie Malnutrition        [ x]  Moderate Protein Calorie Malnutrition        [ ] Severe Protein Calorie Malnutrition        [ ] Unspecified Protein Calorie Malnutrition        [ ] Underweight / BMI <19        [ ] Morbid Obesity / BMI > 40    Pt presents at high nutrition risk secondary to malnutrition (moderate, acute on chronic) related to inability to meet sufficient protein-energy in setting of alcohol abuse with withdrawal seizure and crack pneumonitis as evidenced by likely meeting <75% nutrient needs >1 month, mild muscle loss of temples, clavicles, shoulders, mild fat loss of orbitals.    Findings as based on:  •  Comprehensive nutrition assessment and consultation  •  Calorie counts (nutrient intake analysis)  •  Food acceptance and intake status from observations by staff  •  Follow up  •  Patient education  •  Intervention secondary to interdisciplinary rounds  •   concerns      Treatment:    The following has been recommended:  1) Increase tube feeds to goal rate of 60 ml/hr (x20 hrs) to provide 1200 ml, 1800 kcal, 77g protein, 912 ml free water, and >100% of RDIs for vitamins/minerals. Additional free water pre MD discretion.  2) Continue MVI, thiamine, and folic acid supplementation.   3) Obtain daily weights to monitor trends.      PROVIDER Section:     By signing this assessment you are acknowledging and agree with the diagnosis/diagnoses assigned by the Registered Dietitian    Comments:

## 2020-06-11 LAB
ALBUMIN SERPL ELPH-MCNC: 2.3 G/DL — LOW (ref 3.3–5.2)
ALBUMIN SERPL ELPH-MCNC: 2.3 G/DL — LOW (ref 3.3–5.2)
ALP SERPL-CCNC: 85 U/L — SIGNIFICANT CHANGE UP (ref 40–120)
ALP SERPL-CCNC: 85 U/L — SIGNIFICANT CHANGE UP (ref 40–120)
ALT FLD-CCNC: 25 U/L — SIGNIFICANT CHANGE UP
ALT FLD-CCNC: 27 U/L — SIGNIFICANT CHANGE UP
ANION GAP SERPL CALC-SCNC: 11 MMOL/L — SIGNIFICANT CHANGE UP (ref 5–17)
ANION GAP SERPL CALC-SCNC: 12 MMOL/L — SIGNIFICANT CHANGE UP (ref 5–17)
AST SERPL-CCNC: 48 U/L — HIGH
AST SERPL-CCNC: 49 U/L — HIGH
BASOPHILS # BLD AUTO: 0.03 K/UL — SIGNIFICANT CHANGE UP (ref 0–0.2)
BASOPHILS NFR BLD AUTO: 0.3 % — SIGNIFICANT CHANGE UP (ref 0–2)
BILIRUB DIRECT SERPL-MCNC: 0.1 MG/DL — SIGNIFICANT CHANGE UP (ref 0–0.3)
BILIRUB INDIRECT FLD-MCNC: 0.2 MG/DL — SIGNIFICANT CHANGE UP (ref 0.2–1)
BILIRUB SERPL-MCNC: 0.2 MG/DL — LOW (ref 0.4–2)
BILIRUB SERPL-MCNC: 0.3 MG/DL — LOW (ref 0.4–2)
BUN SERPL-MCNC: 9 MG/DL — SIGNIFICANT CHANGE UP (ref 8–20)
BUN SERPL-MCNC: 9 MG/DL — SIGNIFICANT CHANGE UP (ref 8–20)
CALCIUM SERPL-MCNC: 7.2 MG/DL — LOW (ref 8.6–10.2)
CALCIUM SERPL-MCNC: 7.2 MG/DL — LOW (ref 8.6–10.2)
CHLORIDE SERPL-SCNC: 95 MMOL/L — LOW (ref 98–107)
CHLORIDE SERPL-SCNC: 99 MMOL/L — SIGNIFICANT CHANGE UP (ref 98–107)
CO2 SERPL-SCNC: 19 MMOL/L — LOW (ref 22–29)
CO2 SERPL-SCNC: 20 MMOL/L — LOW (ref 22–29)
CREAT SERPL-MCNC: 0.68 MG/DL — SIGNIFICANT CHANGE UP (ref 0.5–1.3)
CREAT SERPL-MCNC: 0.7 MG/DL — SIGNIFICANT CHANGE UP (ref 0.5–1.3)
EOSINOPHIL # BLD AUTO: 0.07 K/UL — SIGNIFICANT CHANGE UP (ref 0–0.5)
EOSINOPHIL NFR BLD AUTO: 0.7 % — SIGNIFICANT CHANGE UP (ref 0–6)
GAS PNL BLDA: SIGNIFICANT CHANGE UP
GLUCOSE SERPL-MCNC: 146 MG/DL — HIGH (ref 70–99)
GLUCOSE SERPL-MCNC: 148 MG/DL — HIGH (ref 70–99)
HCT VFR BLD CALC: 25.6 % — LOW (ref 34.5–45)
HGB BLD-MCNC: 8.8 G/DL — LOW (ref 11.5–15.5)
IMM GRANULOCYTES NFR BLD AUTO: 0.5 % — SIGNIFICANT CHANGE UP (ref 0–1.5)
LYMPHOCYTES # BLD AUTO: 1.54 K/UL — SIGNIFICANT CHANGE UP (ref 1–3.3)
LYMPHOCYTES # BLD AUTO: 14.4 % — SIGNIFICANT CHANGE UP (ref 13–44)
MAGNESIUM SERPL-MCNC: 1.1 MG/DL — LOW (ref 1.6–2.6)
MAGNESIUM SERPL-MCNC: 1.1 MG/DL — LOW (ref 1.8–2.6)
MCHC RBC-ENTMCNC: 28.9 PG — SIGNIFICANT CHANGE UP (ref 27–34)
MCHC RBC-ENTMCNC: 34.4 GM/DL — SIGNIFICANT CHANGE UP (ref 32–36)
MCV RBC AUTO: 84.2 FL — SIGNIFICANT CHANGE UP (ref 80–100)
MONOCYTES # BLD AUTO: 0.52 K/UL — SIGNIFICANT CHANGE UP (ref 0–0.9)
MONOCYTES NFR BLD AUTO: 4.8 % — SIGNIFICANT CHANGE UP (ref 2–14)
NEUTROPHILS # BLD AUTO: 8.52 K/UL — HIGH (ref 1.8–7.4)
NEUTROPHILS NFR BLD AUTO: 79.3 % — HIGH (ref 43–77)
PHOSPHATE SERPL-MCNC: 1.7 MG/DL — LOW (ref 2.4–4.7)
PHOSPHATE SERPL-MCNC: 1.9 MG/DL — LOW (ref 2.4–4.7)
PLATELET # BLD AUTO: 172 K/UL — SIGNIFICANT CHANGE UP (ref 150–400)
POTASSIUM SERPL-MCNC: 3 MMOL/L — LOW (ref 3.5–5.3)
POTASSIUM SERPL-MCNC: 3.3 MMOL/L — LOW (ref 3.5–5.3)
POTASSIUM SERPL-SCNC: 3 MMOL/L — LOW (ref 3.5–5.3)
POTASSIUM SERPL-SCNC: 3.3 MMOL/L — LOW (ref 3.5–5.3)
PROT SERPL-MCNC: 5 G/DL — LOW (ref 6.6–8.7)
PROT SERPL-MCNC: 5.2 G/DL — LOW (ref 6.6–8.7)
RBC # BLD: 3.04 M/UL — LOW (ref 3.8–5.2)
RBC # FLD: 21 % — HIGH (ref 10.3–14.5)
SARS-COV-2 RNA SPEC QL NAA+PROBE: SIGNIFICANT CHANGE UP
SODIUM SERPL-SCNC: 127 MMOL/L — LOW (ref 135–145)
SODIUM SERPL-SCNC: 129 MMOL/L — LOW (ref 135–145)
WBC # BLD: 10.73 K/UL — HIGH (ref 3.8–10.5)
WBC # FLD AUTO: 10.73 K/UL — HIGH (ref 3.8–10.5)

## 2020-06-11 PROCEDURE — 99232 SBSQ HOSP IP/OBS MODERATE 35: CPT

## 2020-06-11 PROCEDURE — 99291 CRITICAL CARE FIRST HOUR: CPT

## 2020-06-11 PROCEDURE — 95720 EEG PHY/QHP EA INCR W/VEEG: CPT

## 2020-06-11 RX ORDER — SODIUM CHLORIDE 9 MG/ML
500 INJECTION INTRAMUSCULAR; INTRAVENOUS; SUBCUTANEOUS ONCE
Refills: 0 | Status: COMPLETED | OUTPATIENT
Start: 2020-06-11 | End: 2020-06-11

## 2020-06-11 RX ORDER — LEVETIRACETAM 250 MG/1
1500 TABLET, FILM COATED ORAL
Refills: 0 | Status: DISCONTINUED | OUTPATIENT
Start: 2020-06-11 | End: 2020-06-11

## 2020-06-11 RX ORDER — SODIUM CHLORIDE 9 MG/ML
500 INJECTION, SOLUTION INTRAVENOUS ONCE
Refills: 0 | Status: DISCONTINUED | OUTPATIENT
Start: 2020-06-11 | End: 2020-06-11

## 2020-06-11 RX ORDER — POTASSIUM CHLORIDE 20 MEQ
40 PACKET (EA) ORAL ONCE
Refills: 0 | Status: COMPLETED | OUTPATIENT
Start: 2020-06-11 | End: 2020-06-11

## 2020-06-11 RX ORDER — MAGNESIUM SULFATE 500 MG/ML
2 VIAL (ML) INJECTION ONCE
Refills: 0 | Status: COMPLETED | OUTPATIENT
Start: 2020-06-11 | End: 2020-06-11

## 2020-06-11 RX ORDER — NOREPINEPHRINE BITARTRATE/D5W 8 MG/250ML
0.05 PLASTIC BAG, INJECTION (ML) INTRAVENOUS
Qty: 8 | Refills: 0 | Status: DISCONTINUED | OUTPATIENT
Start: 2020-06-11 | End: 2020-06-12

## 2020-06-11 RX ORDER — IPRATROPIUM/ALBUTEROL SULFATE 18-103MCG
3 AEROSOL WITH ADAPTER (GRAM) INHALATION EVERY 6 HOURS
Refills: 0 | Status: DISCONTINUED | OUTPATIENT
Start: 2020-06-11 | End: 2020-06-14

## 2020-06-11 RX ORDER — LEVETIRACETAM 250 MG/1
1500 TABLET, FILM COATED ORAL EVERY 12 HOURS
Refills: 0 | Status: DISCONTINUED | OUTPATIENT
Start: 2020-06-11 | End: 2020-06-17

## 2020-06-11 RX ORDER — POTASSIUM PHOSPHATE, MONOBASIC POTASSIUM PHOSPHATE, DIBASIC 236; 224 MG/ML; MG/ML
30 INJECTION, SOLUTION INTRAVENOUS ONCE
Refills: 0 | Status: COMPLETED | OUTPATIENT
Start: 2020-06-11 | End: 2020-06-11

## 2020-06-11 RX ORDER — ACETYLCYSTEINE 200 MG/ML
4 VIAL (ML) MISCELLANEOUS EVERY 6 HOURS
Refills: 0 | Status: DISCONTINUED | OUTPATIENT
Start: 2020-06-11 | End: 2020-06-13

## 2020-06-11 RX ORDER — LEVETIRACETAM 250 MG/1
1000 TABLET, FILM COATED ORAL
Refills: 0 | Status: DISCONTINUED | OUTPATIENT
Start: 2020-06-11 | End: 2020-06-11

## 2020-06-11 RX ADMIN — LEVETIRACETAM 400 MILLIGRAM(S): 250 TABLET, FILM COATED ORAL at 17:17

## 2020-06-11 RX ADMIN — Medication 2 MILLIGRAM(S): at 15:20

## 2020-06-11 RX ADMIN — PIPERACILLIN AND TAZOBACTAM 25 GRAM(S): 4; .5 INJECTION, POWDER, LYOPHILIZED, FOR SOLUTION INTRAVENOUS at 09:18

## 2020-06-11 RX ADMIN — CHLORHEXIDINE GLUCONATE 15 MILLILITER(S): 213 SOLUTION TOPICAL at 17:17

## 2020-06-11 RX ADMIN — Medication 50 MILLIGRAM(S): at 01:18

## 2020-06-11 RX ADMIN — Medication 2 MILLIGRAM(S): at 18:43

## 2020-06-11 RX ADMIN — PIPERACILLIN AND TAZOBACTAM 25 GRAM(S): 4; .5 INJECTION, POWDER, LYOPHILIZED, FOR SOLUTION INTRAVENOUS at 17:17

## 2020-06-11 RX ADMIN — Medication 1 MILLIGRAM(S): at 11:18

## 2020-06-11 RX ADMIN — Medication 1 TABLET(S): at 11:18

## 2020-06-11 RX ADMIN — LEVETIRACETAM 400 MILLIGRAM(S): 250 TABLET, FILM COATED ORAL at 06:20

## 2020-06-11 RX ADMIN — PIPERACILLIN AND TAZOBACTAM 25 GRAM(S): 4; .5 INJECTION, POWDER, LYOPHILIZED, FOR SOLUTION INTRAVENOUS at 02:06

## 2020-06-11 RX ADMIN — DEXMEDETOMIDINE HYDROCHLORIDE IN 0.9% SODIUM CHLORIDE 3.25 MICROGRAM(S)/KG/HR: 4 INJECTION INTRAVENOUS at 08:11

## 2020-06-11 RX ADMIN — Medication 3 MILLILITER(S): at 20:22

## 2020-06-11 RX ADMIN — POTASSIUM PHOSPHATE, MONOBASIC POTASSIUM PHOSPHATE, DIBASIC 83.33 MILLIMOLE(S): 236; 224 INJECTION, SOLUTION INTRAVENOUS at 08:10

## 2020-06-11 RX ADMIN — PANTOPRAZOLE SODIUM 40 MILLIGRAM(S): 20 TABLET, DELAYED RELEASE ORAL at 11:18

## 2020-06-11 RX ADMIN — FENTANYL CITRATE 50 MICROGRAM(S): 50 INJECTION INTRAVENOUS at 20:32

## 2020-06-11 RX ADMIN — PROPOFOL 6 MICROGRAM(S)/KG/MIN: 10 INJECTION, EMULSION INTRAVENOUS at 01:10

## 2020-06-11 RX ADMIN — Medication 50 GRAM(S): at 06:38

## 2020-06-11 RX ADMIN — Medication 1 PATCH: at 11:18

## 2020-06-11 RX ADMIN — Medication 6.09 MICROGRAM(S)/KG/MIN: at 13:57

## 2020-06-11 RX ADMIN — Medication 50 MILLIGRAM(S): at 06:20

## 2020-06-11 RX ADMIN — Medication 1 PATCH: at 07:25

## 2020-06-11 RX ADMIN — SODIUM CHLORIDE 1000 MILLILITER(S): 9 INJECTION INTRAMUSCULAR; INTRAVENOUS; SUBCUTANEOUS at 05:30

## 2020-06-11 RX ADMIN — Medication 2 MILLIGRAM(S): at 21:13

## 2020-06-11 RX ADMIN — Medication 250 MILLIGRAM(S): at 09:18

## 2020-06-11 RX ADMIN — Medication 40 MILLIEQUIVALENT(S): at 06:38

## 2020-06-11 RX ADMIN — Medication 100 MILLIGRAM(S): at 11:18

## 2020-06-11 RX ADMIN — Medication 1 PATCH: at 20:32

## 2020-06-11 RX ADMIN — ESCITALOPRAM OXALATE 10 MILLIGRAM(S): 10 TABLET, FILM COATED ORAL at 11:18

## 2020-06-11 RX ADMIN — Medication 1 MILLIGRAM(S): at 11:00

## 2020-06-11 RX ADMIN — CHLORHEXIDINE GLUCONATE 15 MILLILITER(S): 213 SOLUTION TOPICAL at 06:20

## 2020-06-11 RX ADMIN — Medication 50 MILLIGRAM(S): at 11:18

## 2020-06-11 RX ADMIN — ENOXAPARIN SODIUM 30 MILLIGRAM(S): 100 INJECTION SUBCUTANEOUS at 11:18
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
## 2020-06-11 NOTE — PROGRESS NOTE ADULT - SUBJECTIVE AND OBJECTIVE BOX
[facility]                                     Neurology at [place]                                 Abhinav Nair, & Don                                  [address]                                        [place], [place], [address]                                             [phone]    CC: seizure  HPI:  The patient is a 50y Female who presented with seizures/status epilepticus.  Unable to obtain history as she is intubated and sedated.  She apparently has a history of seizure, not on any AEDs, history of alcohol abuse, reported last drink 3 weeks ago, but not clear how accurate that is, and use of cocaine 3 days prior to admission. Her seizures have stopped.  She had decline in status and was intubated and put on propofol.  There was suspicion for alcohol withdrawal.  Neurology is asked to evaluate.    Interval history: On cEEG, has epileptogenic focus, keppra started and loaded, propofol d/c, on precedex    ROS neurology: unable to obtain    MEDICATIONS  (STANDING):  chlordiazePOXIDE 50 milliGRAM(s) Oral every 6 hours  chlorhexidine 0.12% Liquid 15 milliLiter(s) Oral Mucosa every 12 hours  dexMEDEtomidine Infusion 0.2 MICROgram(s)/kG/Hr (3.25 mL/Hr) IV Continuous <Continuous>  enoxaparin Injectable 30 milliGRAM(s) SubCutaneous daily  escitalopram 10 milliGRAM(s) Oral daily  folic acid 1 milliGRAM(s) Oral daily  levETIRAcetam  Solution 1000 milliGRAM(s) Oral two times a day  multivitamin 1 Tablet(s) Oral daily  nicotine - 21 mG/24Hr(s) Patch 1 Patch Transdermal daily  pantoprazole  Injectable 40 milliGRAM(s) IV Push daily  piperacillin/tazobactam IVPB.. 3.375 Gram(s) IV Intermittent every 8 hours  QUEtiapine 100 milliGRAM(s) Oral at bedtime  thiamine 100 milliGRAM(s) Oral daily    MEDICATIONS  (PRN):  ALBUTerol    90 MICROgram(s) HFA Inhaler 2 Puff(s) Inhalation every 6 hours PRN Shortness of Breath and/or Wheezing  fentaNYL    Injectable 50 MICROGram(s) IV Push every 2 hours PRN vent synchrony/dyspnea  LORazepam   Injectable 2 milliGRAM(s) IV Push every 2 hours PRN seizures/agitation/withdrawal      Vital Signs Last 24 Hrs  T(C): 37 (11 Jun 2020 12:14), Max: 37.1 (11 Jun 2020 00:00)  T(F): 98.6 (11 Jun 2020 12:14), Max: 98.7 (11 Jun 2020 00:00)  HR: 84 (11 Jun 2020 13:19) (84 - 115)  BP: 78/58 (11 Jun 2020 13:19) (68/49 - 121/82)  BP(mean): 63 (11 Jun 2020 13:19) (53 - 93)  RR: 29 (11 Jun 2020 13:19) (22 - 50)  SpO2: 99% (11 Jun 2020 13:19) (88% - 100%)    Detailed neuro exam    Mental status: The patient is intubated and sedated.  Unable to assess orientation or language.    Cranial nerves: Pupils equal and react symmetrically to light. There is no blinkto threat. Extraocular motion is full with dolls eyes maneuvers.  There is no ptosis. Facial sensation is not able to be assessed. Facial musculature is grossly symmetric.  Unable to assess palate, shoulder and tongue.    Motor: There is normal bulk and tone.  There is no tremor.  moves weakly but symmetrically to pinch    Sensation: Grimace to pinch in 4 extremities    Reflexes: muted throughout and plantar responses are flexor.    Cerebellar: unable to assess  dysmetria on finger to nose testing.    Gait: unable to assess due to condition    LABS:                                      8.8    10.73 )-----------( 172      ( 11 Jun 2020 04:56 )             25.6     06-11    127<L>  |  95<L>  |  9.0  ----------------------------<  146<H>  3.0<L>   |  20.0<L>  |  0.70    Ca    7.2<L>      11 Jun 2020 04:56  Phos  1.7     06-11  Mg     1.1     06-11    TPro  5.0<L>  /  Alb  2.3<L>  /  TBili  0.2<L>  /  DBili  x   /  AST  48<H>  /  ALT  25  /  AlkPhos  85  06-11    LIVER FUNCTIONS - ( 11 Jun 2020 04:56 )  Alb: 2.3 g/dL / Pro: 5.0 g/dL / ALK PHOS: 85 U/L / ALT: 25 U/L / AST: 48 U/L / GGT: x           _____________________________________________________________  EEG SUMMARY/CLASSIFICATION    Abnormal EEG in a sedated patient.  - Near continuous interchanging between fluctuating or evolving runs of 0.5-1 Hz right frontotemporal (max F4/T8) lateralized periodic discharges with rhythmicity (LPD+R) and 1-2 Hz right anterior-mid temporal (F8/T8) lateralized rhythmic delta activity (LRDA).  - Moderate generalized slowing.    _____________________________________________________________  EEG IMPRESSION/CLINICAL CORRELATE    Abnormal EEG study.  1. Potential epileptogenic focus in the right frontotemporal region.   2. Moderate nonspecific diffuse or multifocal cerebral dysfunction.   3. No seizure seen.  4. Diffuse excess beta activity may be seen with medication use such as benzodiazepines or barbiturates.    _____________________________________________________________    RADIOLOGY & ADDITIONAL STUDIES (independently reviewed unless otherwise noted):  CT head- no acute CVA, mass or blood

## 2020-06-11 NOTE — EEG REPORT - NS EEG TEXT BOX
[facility]   COMPREHENSIVE EPILEPSY CENTER   REPORT OF LONG-TERM VIDEO EEG     [facility]: [address], [place] [address], Ph#: [phone]  [facility]: [id number] [place] [address], Ph#: [phone]  [facility]: [address] [place] [address], Ph#: [phone]    Patient Name: JAVIER WOODRUFF  Age and : 50y ([birth year])  MRN #: [id number]  Location: [place] [id number]  Referring Physician: Ct White    Start Time/Date: 15:01 on 06-10-20  End Time/Date: 08:00 on 20  Duration: 17hr    _____________________________________________________________  STUDY INFORMATION    EEG Recording Technique:  The patient underwent continuous Video-EEG monitoring, using Telemetry System hardware on the XLTek Digital System. EEG and video data were stored on a computer hard drive with important events saved in digital archive files. The material was reviewed by a physician (electroencephalographer / epileptologist) on a daily basis. Kieran and seizure detection algorithms were utilized and reviewed. An EEG Technician attended to the patient, and was available throughout daytime work hours.  The epilepsy center neurologist was available in person or on call 24-hours per day.    EEG Placement and Labeling of Electrodes:  The EEG was performed utilizing 20 channel referential EEG connections (coronal over temporal over parasagittal montage) using all standard 10-20 electrode placements with EKG, with additional electrodes placed in the inferior temporal region using the modified 10-10 montage electrode placements for elective admissions, or if deemed necessary. Recording was at a sampling rate of 256 samples per second per channel. Time synchronized digital video recording was done simultaneously with EEG recording. A low light infrared camera was used for low light recording.     _____________________________________________________________  HISTORY    Patient is a 50y old  Female who presents with a chief complaint of Status epilepticus (10 Rui 2020 12:42)      PERTINENT MEDICATION:  levETIRAcetam  IVPB 1000 milliGRAM(s) IV Intermittent every 12 hours  propofol Infusion 20 MICROgram(s)/kG/Min (6 mL/Hr) IV Continuous <Continuous>    _____________________________________________________________  STUDY INTERPRETATION    Findings: The background was near continuous with intermittent 1-3s of diffuse attenuation, spontaneously variable and reactive. No posterior dominant rhythm seen.    Background Slowing:  Background predominantly consisted of theta, delta and faster activities.    Focal Slowing:   None were present.    Sleep Background:  Stage II sleep transients were not recorded.    Other Non-Epileptiform Findings:  Diffuse beta activity.    Interictal Epileptiform Activity:   Near continuous interchanging between fluctuating or evolving runs of 0.5-1 Hz right frontotemporal (max F4/T8) lateralized periodic discharges with rhythmicity (LPD+R) and 1-2 Hz right anterior-mid temporal (F8/T8) lateralized rhythmic delta activity (LRDA).    Events:  Clinical events: None recorded.  Seizures: None recorded.    Activation Procedures:   Hyperventilation was not performed.    Photic stimulation was not performed.     Artifacts:  Intermittent myogenic and movement artifacts were noted.    ECG:  The heart rate on single channel ECG was predominantly between 80-90 BPM.    _____________________________________________________________  EEG SUMMARY/CLASSIFICATION    Abnormal EEG in a sedated patient.  - Near continuous interchanging between fluctuating or evolving runs of 0.5-1 Hz right frontotemporal (max F4/T8) lateralized periodic discharges with rhythmicity (LPD+R) and 1-2 Hz right anterior-mid temporal (F8/T8) lateralized rhythmic delta activity (LRDA).  - Moderate generalized slowing.    _____________________________________________________________  EEG IMPRESSION/CLINICAL CORRELATE    Abnormal EEG study.  1. Potential epileptogenic focus in the right frontotemporal region.   2. Moderate nonspecific diffuse or multifocal cerebral dysfunction.   3. No seizure seen.  4. Diffuse excess beta activity may be seen with medication use such as benzodiazepines or barbiturates.    _____________________________________________________________    Ildefonso Connors MD  Attending Physician, [facility] Epilepsy [place]

## 2020-06-12 LAB
ALBUMIN SERPL ELPH-MCNC: 2.7 G/DL — LOW (ref 3.3–5.2)
ALP SERPL-CCNC: 94 U/L — SIGNIFICANT CHANGE UP (ref 40–120)
ALT FLD-CCNC: 24 U/L — SIGNIFICANT CHANGE UP
ANION GAP SERPL CALC-SCNC: 12 MMOL/L — SIGNIFICANT CHANGE UP (ref 5–17)
AST SERPL-CCNC: 48 U/L — HIGH
BILIRUB SERPL-MCNC: 0.6 MG/DL — SIGNIFICANT CHANGE UP (ref 0.4–2)
BUN SERPL-MCNC: 7 MG/DL — LOW (ref 8–20)
CALCIUM SERPL-MCNC: 7.6 MG/DL — LOW (ref 8.6–10.2)
CHLORIDE SERPL-SCNC: 101 MMOL/L — SIGNIFICANT CHANGE UP (ref 98–107)
CO2 SERPL-SCNC: 19 MMOL/L — LOW (ref 22–29)
CREAT SERPL-MCNC: 0.52 MG/DL — SIGNIFICANT CHANGE UP (ref 0.5–1.3)
CULTURE RESULTS: SIGNIFICANT CHANGE UP
GLUCOSE SERPL-MCNC: 70 MG/DL — SIGNIFICANT CHANGE UP (ref 70–99)
MAGNESIUM SERPL-MCNC: 1.6 MG/DL — SIGNIFICANT CHANGE UP (ref 1.6–2.6)
MRSA PCR RESULT.: DETECTED
PHOSPHATE SERPL-MCNC: 2.4 MG/DL — SIGNIFICANT CHANGE UP (ref 2.4–4.7)
POTASSIUM SERPL-MCNC: 4 MMOL/L — SIGNIFICANT CHANGE UP (ref 3.5–5.3)
POTASSIUM SERPL-SCNC: 4 MMOL/L — SIGNIFICANT CHANGE UP (ref 3.5–5.3)
PROT SERPL-MCNC: 5.3 G/DL — LOW (ref 6.6–8.7)
S AUREUS DNA NOSE QL NAA+PROBE: DETECTED
SODIUM SERPL-SCNC: 132 MMOL/L — LOW (ref 135–145)
SPECIMEN SOURCE: SIGNIFICANT CHANGE UP

## 2020-06-12 PROCEDURE — 95720 EEG PHY/QHP EA INCR W/VEEG: CPT

## 2020-06-12 PROCEDURE — 99233 SBSQ HOSP IP/OBS HIGH 50: CPT

## 2020-06-12 PROCEDURE — 99232 SBSQ HOSP IP/OBS MODERATE 35: CPT

## 2020-06-12 PROCEDURE — 71045 X-RAY EXAM CHEST 1 VIEW: CPT | Mod: 26,CS

## 2020-06-12 PROCEDURE — 99223 1ST HOSP IP/OBS HIGH 75: CPT

## 2020-06-12 RX ORDER — FUROSEMIDE 40 MG
40 TABLET ORAL
Refills: 0 | Status: COMPLETED | OUTPATIENT
Start: 2020-06-12 | End: 2020-06-15

## 2020-06-12 RX ORDER — ASPIRIN/CALCIUM CARB/MAGNESIUM 324 MG
81 TABLET ORAL DAILY
Refills: 0 | Status: DISCONTINUED | OUTPATIENT
Start: 2020-06-12 | End: 2020-06-19

## 2020-06-12 RX ORDER — FUROSEMIDE 40 MG
40 TABLET ORAL ONCE
Refills: 0 | Status: COMPLETED | OUTPATIENT
Start: 2020-06-12 | End: 2020-06-12

## 2020-06-12 RX ORDER — MUPIROCIN 20 MG/G
1 OINTMENT TOPICAL
Refills: 0 | Status: COMPLETED | OUTPATIENT
Start: 2020-06-12 | End: 2020-06-17

## 2020-06-12 RX ORDER — MAGNESIUM SULFATE 500 MG/ML
4 VIAL (ML) INJECTION ONCE
Refills: 0 | Status: COMPLETED | OUTPATIENT
Start: 2020-06-12 | End: 2020-06-12

## 2020-06-12 RX ORDER — CHLORHEXIDINE GLUCONATE 213 G/1000ML
1 SOLUTION TOPICAL DAILY
Refills: 0 | Status: COMPLETED | OUTPATIENT
Start: 2020-06-12 | End: 2020-06-16

## 2020-06-12 RX ADMIN — Medication 2 MILLIGRAM(S): at 00:03

## 2020-06-12 RX ADMIN — PIPERACILLIN AND TAZOBACTAM 25 GRAM(S): 4; .5 INJECTION, POWDER, LYOPHILIZED, FOR SOLUTION INTRAVENOUS at 10:10

## 2020-06-12 RX ADMIN — Medication 3 MILLILITER(S): at 20:24

## 2020-06-12 RX ADMIN — LEVETIRACETAM 400 MILLIGRAM(S): 250 TABLET, FILM COATED ORAL at 17:11

## 2020-06-12 RX ADMIN — Medication 3 MILLILITER(S): at 15:33

## 2020-06-12 RX ADMIN — Medication 40 MILLIGRAM(S): at 08:52

## 2020-06-12 RX ADMIN — CHLORHEXIDINE GLUCONATE 1 APPLICATION(S): 213 SOLUTION TOPICAL at 12:20

## 2020-06-12 RX ADMIN — Medication 3 MILLILITER(S): at 09:51

## 2020-06-12 RX ADMIN — Medication 4 MILLILITER(S): at 09:50

## 2020-06-12 RX ADMIN — Medication 4 MILLILITER(S): at 15:34

## 2020-06-12 RX ADMIN — CHLORHEXIDINE GLUCONATE 15 MILLILITER(S): 213 SOLUTION TOPICAL at 05:06

## 2020-06-12 RX ADMIN — Medication 3 MILLILITER(S): at 04:05

## 2020-06-12 RX ADMIN — PIPERACILLIN AND TAZOBACTAM 25 GRAM(S): 4; .5 INJECTION, POWDER, LYOPHILIZED, FOR SOLUTION INTRAVENOUS at 02:08

## 2020-06-12 RX ADMIN — Medication 1 PATCH: at 09:00

## 2020-06-12 RX ADMIN — Medication 1 PATCH: at 10:07

## 2020-06-12 RX ADMIN — PIPERACILLIN AND TAZOBACTAM 25 GRAM(S): 4; .5 INJECTION, POWDER, LYOPHILIZED, FOR SOLUTION INTRAVENOUS at 17:11

## 2020-06-12 RX ADMIN — LEVETIRACETAM 400 MILLIGRAM(S): 250 TABLET, FILM COATED ORAL at 05:36

## 2020-06-12 RX ADMIN — Medication 40 MILLIGRAM(S): at 17:11

## 2020-06-12 RX ADMIN — Medication 4 MILLILITER(S): at 20:24

## 2020-06-12 RX ADMIN — Medication 2 MILLIGRAM(S): at 03:50

## 2020-06-12 RX ADMIN — MUPIROCIN 1 APPLICATION(S): 20 OINTMENT TOPICAL at 17:11

## 2020-06-12 RX ADMIN — ENOXAPARIN SODIUM 30 MILLIGRAM(S): 100 INJECTION SUBCUTANEOUS at 12:20

## 2020-06-12 RX ADMIN — Medication 2 MILLIGRAM(S): at 21:08

## 2020-06-12 RX ADMIN — Medication 4 MILLILITER(S): at 04:07

## 2020-06-12 RX ADMIN — Medication 100 GRAM(S): at 08:59

## 2020-06-12 RX ADMIN — Medication 1 PATCH: at 12:20

## 2020-06-12 RX ADMIN — Medication 1 PATCH: at 19:18
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
## 2020-06-12 NOTE — PROGRESS NOTE ADULT - SUBJECTIVE AND OBJECTIVE BOX
[facility]                                        Neu[facility] [place]                                 [facility]                                  [address]                                        [place], [place], [address]                                             [phone]        CC: seizure     HPI:   The patient is a 50y Female who presented with seizures/status epilepticus.  Unable to obtain history as she is intubated and sedated.  She apparently has a history of seizure, not on any AEDs, history of alcohol abuse, reported last drink 3 weeks ago, but not clear how accurate that is, and use of cocaine 3 days prior to admission. Her seizures have stopped.  She had decline in status and was intubated and put on propofol.  There was suspicion for alcohol withdrawal.     Interim history:  Now extubated.     ROS:   Denies headache or dizziness.  Denies chest pain.  Denies shortness of breath.    MEDICATIONS  (STANDING):  acetylcysteine 20%  Inhalation 4 milliLiter(s) Inhalation every 6 hours  albuterol/ipratropium for Nebulization 3 milliLiter(s) Nebulizer every 6 hours  aspirin enteric coated 81 milliGRAM(s) Oral daily  chlorhexidine 0.12% Liquid 15 milliLiter(s) Oral Mucosa every 12 hours  chlorhexidine 2% Cloths 1 Application(s) Topical daily  enoxaparin Injectable 30 milliGRAM(s) SubCutaneous daily  escitalopram 10 milliGRAM(s) Oral daily  folic acid 1 milliGRAM(s) Oral daily  furosemide   Injectable 40 milliGRAM(s) IV Push two times a day  levETIRAcetam  IVPB 1500 milliGRAM(s) IV Intermittent every 12 hours  multivitamin 1 Tablet(s) Oral daily  mupirocin 2% Nasal 1 Application(s) Nasal two times a day  nicotine - 21 mG/24Hr(s) Patch 1 Patch Transdermal daily  piperacillin/tazobactam IVPB.. 3.375 Gram(s) IV Intermittent every 8 hours  QUEtiapine 100 milliGRAM(s) Oral at bedtime  thiamine 100 milliGRAM(s) Oral daily      Vital Signs Last 24 Hrs  T(C): 36.7 (12 Jun 2020 08:00), Max: 37.2 (11 Jun 2020 23:40)  T(F): 98.1 (12 Jun 2020 08:00), Max: 98.9 (11 Jun 2020 23:40)  HR: 81 (12 Jun 2020 11:00) (81 - 116)  BP: 93/72 (12 Jun 2020 11:00) (75/56 - 134/103)  BP(mean): 76 (12 Jun 2020 11:00) (60 - 106)  RR: 35 (12 Jun 2020 11:00) (26 - 50)  SpO2: 96% (12 Jun 2020 11:00) (86% - 100%)    Detailed Neurologic Exam:    Mental status: The patient is awake but slow to answer questions and follow instructions.     Cranial nerves: Pupils equal and react symmetrically to light. There is no visual field deficit to threat. Extraocular motion is full with no nystagmus. There is no ptosis. Facial sensation is intact. Facial musculature is symmetric. Palate elevates symmetrically. Tongue is midline.    Motor: There is normal bulk and tone.  There is no tremor.  Strength symmetric in upper extremities and lower extremities.    Sensation: Grossly intact to light touch and pin.    Reflexes: 2+ throughout and plantar responses are flexor.    Cerebellar: No dysmetria on finger nose testing.    Labs:     06-12    132<L>  |  101  |  7.0<L>  ----------------------------<  70  4.0   |  19.0<L>  |  0.52    Ca    7.6<L>      12 Jun 2020 05:56  Phos  2.4     06-12  Mg     1.6     06-12    TPro  5.3<L>  /  Alb  2.7<L>  /  TBili  0.6  /  DBili  x   /  AST  48<H>  /  ALT  24  /  AlkPhos  94  06-12                            8.8    10.73 )-----------( 172      ( 11 Jun 2020 04:56 )             25.6       EEG with right fronto-temporal discharges.

## 2020-06-12 NOTE — EEG REPORT - NS EEG TEXT BOX
[facility]   COMPREHENSIVE EPILEPSY CENTER   REPORT OF LONG-TERM VIDEO EEG     [facility]: [address], [place] [address], Ph#: [phone]  [facility]: [address] [place] [address], Ph#: [phone]  [facility]: [address] [place] [address], Ph#: [phone]    Patient Name: JAVIER WOODRUFF  Age and : 50y ([birth year])  MRN #: [id number]  Location: [place] [address]  Referring Physician: Ct White    Start Time/Date: 08:00 on 20  End Time/Date: 08:00 on 20  Duration: 24hr    _____________________________________________________________  STUDY INFORMATION    EEG Recording Technique:  The patient underwent continuous Video-EEG monitoring, using Telemetry System hardware on the XLTek Digital System. EEG and video data were stored on a computer hard drive with important events saved in digital archive files. The material was reviewed by a physician (electroencephalographer / epileptologist) on a daily basis. Kieran and seizure detection algorithms were utilized and reviewed. An EEG Technician attended to the patient, and was available throughout daytime work hours.  The epilepsy center neurologist was available in person or on call 24-hours per day.    EEG Placement and Labeling of Electrodes:  The EEG was performed utilizing 20 channel referential EEG connections (coronal over temporal over parasagittal montage) using all standard 10-20 electrode placements with EKG, with additional electrodes placed in the inferior temporal region using the modified 10-10 montage electrode placements for elective admissions, or if deemed necessary. Recording was at a sampling rate of 256 samples per second per channel. Time synchronized digital video recording was done simultaneously with EEG recording. A low light infrared camera was used for low light recording.     _____________________________________________________________  HISTORY    Patient is a 50y old  Female who presents with a chief complaint of Status epilepticus (10 Rui 2020 12:42)      PERTINENT MEDICATION:  levETIRAcetam  IVPB 1500 milliGRAM(s) IV Intermittent every 12 hours    _____________________________________________________________  STUDY INTERPRETATION    Findings: The background was continuous, spontaneously variable and reactive. No posterior dominant rhythm seen.    Background Slowing:  Background predominantly consisted of theta, delta and faster activities.    Focal Slowing:   None were present.    Sleep Background:  Stage II sleep transients were not recorded.    Other Non-Epileptiform Findings:  Diffuse beta activity.    Interictal Epileptiform Activity:   Near continuous interchanging between fluctuating or evolving runs of 0.5-1 Hz right frontotemporal (max F4/T8) lateralized periodic discharges with rhythmicity (LPD+R) and 1-1.5 Hz right anterior-mid temporal (F8/T8) lateralized rhythmic delta activity (LRDA).    Events:  Clinical events: None recorded.  Seizures: None recorded.    Activation Procedures:   Hyperventilation was not performed.    Photic stimulation was not performed.     Artifacts:  Intermittent myogenic and movement artifacts were noted.    ECG:  The heart rate on single channel ECG was predominantly between 80-90 BPM.    _____________________________________________________________  EEG SUMMARY/CLASSIFICATION    Abnormal EEG in a sedated patient.  - Near continuous interchanging between fluctuating or evolving runs of 0.5-1 Hz right frontotemporal (max F4/T8) lateralized periodic discharges with rhythmicity (LPD+R) and 1-1.5 Hz right anterior-mid temporal (F8/T8) lateralized rhythmic delta activity (LRDA).  - Moderate generalized slowing.    _____________________________________________________________  EEG IMPRESSION/CLINICAL CORRELATE    Abnormal EEG study.  1. Potential epileptogenic focus in the right frontotemporal region.   2. Moderate nonspecific diffuse or multifocal cerebral dysfunction.   3. No seizure seen.  4. Diffuse excess beta activity may be seen with medication use such as benzodiazepines or barbiturates.    _____________________________________________________________    Ildefonso Connors MD  Attending Physician, [facility] Epilepsy [place]
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
## 2020-06-12 NOTE — CHART NOTE - NSCHARTNOTEFT_GEN_A_CORE
Source: Patient [ ]  Family [ ]   other [ x]    Current Diet: Diet, NPO with Tube Feed:   Tube Feeding Modality: Orogastric  Jevity 1.5 Devon  Continuous  Starting Tube Feed Rate {mL per Hour}: 20  Increase Tube Feed Rate by (mL): 10     Every 6 hours  Until Goal Tube Feed Rate (mL per Hour): 50  Tube Feed Duration (in Hours): 24  Tube Feed Start Time: 10:00 (06-10-20 @ 09:45)    Enteral /Parenteral Nutrition: Tube feeds not running as pt extubated.    Current Weight:   (6/12) 162 lbs  (6/10) 143.3 lbs  ? accuracy of weights, no edema documented, continue to trend and maintain strict Is&Os     Pertinent Medications: MEDICATIONS  (STANDING):  acetylcysteine 20%  Inhalation 4 milliLiter(s) Inhalation every 6 hours  albuterol/ipratropium for Nebulization 3 milliLiter(s) Nebulizer every 6 hours  chlorhexidine 0.12% Liquid 15 milliLiter(s) Oral Mucosa every 12 hours  dexMEDEtomidine Infusion 0.2 MICROgram(s)/kG/Hr (3.25 mL/Hr) IV Continuous <Continuous>  enoxaparin Injectable 30 milliGRAM(s) SubCutaneous daily  escitalopram 10 milliGRAM(s) Oral daily  folic acid 1 milliGRAM(s) Oral daily  furosemide   Injectable 40 milliGRAM(s) IV Push once  levETIRAcetam  IVPB 1500 milliGRAM(s) IV Intermittent every 12 hours  magnesium sulfate  IVPB 4 Gram(s) IV Intermittent once  multivitamin 1 Tablet(s) Oral daily  nicotine - 21 mG/24Hr(s) Patch 1 Patch Transdermal daily  norepinephrine Infusion 0.05 MICROgram(s)/kG/Min (6.09 mL/Hr) IV Continuous <Continuous>  pantoprazole  Injectable 40 milliGRAM(s) IV Push daily  piperacillin/tazobactam IVPB.. 3.375 Gram(s) IV Intermittent every 8 hours  QUEtiapine 100 milliGRAM(s) Oral at bedtime  thiamine 100 milliGRAM(s) Oral daily    MEDICATIONS  (PRN):  ALBUTerol    90 MICROgram(s) HFA Inhaler 2 Puff(s) Inhalation every 6 hours PRN Shortness of Breath and/or Wheezing  fentaNYL    Injectable 50 MICROGram(s) IV Push every 2 hours PRN vent synchrony/dyspnea  LORazepam   Injectable 2 milliGRAM(s) IV Push every 2 hours PRN seizures/agitation/withdrawal    Pertinent Labs: CBC Full  -  ( 11 Jun 2020 04:56 )  WBC Count : 10.73 K/uL  RBC Count : 3.04 M/uL  Hemoglobin : 8.8 g/dL  Hematocrit : 25.6 %  Platelet Count - Automated : 172 K/uL  Mean Cell Volume : 84.2 fl  Mean Cell Hemoglobin : 28.9 pg  Mean Cell Hemoglobin Concentration : 34.4 gm/dL  Auto Neutrophil # : 8.52 K/uL  Auto Lymphocyte # : 1.54 K/uL  Auto Monocyte # : 0.52 K/uL  Auto Eosinophil # : 0.07 K/uL  Auto Basophil # : 0.03 K/uL  Auto Neutrophil % : 79.3 %  Auto Lymphocyte % : 14.4 %  Auto Monocyte % : 4.8 %  Auto Eosinophil % : 0.7 %  Auto Basophil % : 0.3 %    06-12 Na132 mmol/L<L> Glu 70 mg/dL K+ 4.0 mmol/L Cr  0.52 mg/dL BUN 7.0 mg/dL<L> Phos 2.4 mg/dL Alb 2.7 g/dL<L> PAB n/a       Skin: No breakdown noted    Nutrition focused physical exam previously conducted - found signs of malnutrition [ ]absent [x ]present    Subcutaneous fat loss: [x ] Orbital fat pads region, [ ]Buccal fat region, [ ]Triceps region,  [ ]Ribs region    Muscle wasting: [x ]Temples region, [x ]Clavicle region, [ x]Shoulder region, [ ]Scapula region, [ ]Interosseous region,  [ ]thigh region, [ ]Calf region    Estimated Needs:   [ x] no change since previous assessment  [ ] recalculated:     Current Nutrition Diagnosis: Pt remains at high nutrition risk secondary to malnutrition (moderate, acute on chronic) related to inability to meet sufficient protein-energy in setting of alcohol abuse with withdrawal seizure and crack pneumonitis as evidenced by likely meeting <75% nutrient needs >1 month, mild muscle loss of temples, clavicles, shoulders, mild fat loss of orbitals. Pt now extubated per documentation. NPO at this time. Last BM 6/11 per documentation.    Recommendations:   1) SLP swallow evaluation as medically feasible.  2) Continue MVI, thiamine, and folic acid supplementation.  3) Obtain daily weights to monitor trends.    Monitoring and Evaluation:  [ ] PO intake [ ] Tolerance to diet prescription [X] Weights  [X] Follow up per protocol [X] Labs
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
## 2020-06-13 LAB
A1C WITH ESTIMATED AVERAGE GLUCOSE RESULT: 4.6 % — SIGNIFICANT CHANGE UP (ref 4–5.6)
ANION GAP SERPL CALC-SCNC: 14 MMOL/L — SIGNIFICANT CHANGE UP (ref 5–17)
BUN SERPL-MCNC: 6 MG/DL — LOW (ref 8–20)
CALCIUM SERPL-MCNC: 8.8 MG/DL — SIGNIFICANT CHANGE UP (ref 8.6–10.2)
CHLORIDE SERPL-SCNC: 98 MMOL/L — SIGNIFICANT CHANGE UP (ref 98–107)
CHOLEST SERPL-MCNC: 137 MG/DL — SIGNIFICANT CHANGE UP (ref 110–199)
CO2 SERPL-SCNC: 24 MMOL/L — SIGNIFICANT CHANGE UP (ref 22–29)
CREAT SERPL-MCNC: 0.61 MG/DL — SIGNIFICANT CHANGE UP (ref 0.5–1.3)
ESTIMATED AVERAGE GLUCOSE: 85 MG/DL — SIGNIFICANT CHANGE UP (ref 68–114)
GLUCOSE BLDC GLUCOMTR-MCNC: 84 MG/DL — SIGNIFICANT CHANGE UP (ref 70–99)
GLUCOSE SERPL-MCNC: 71 MG/DL — SIGNIFICANT CHANGE UP (ref 70–99)
HCT VFR BLD CALC: 30.6 % — LOW (ref 34.5–45)
HDLC SERPL-MCNC: 54 MG/DL — SIGNIFICANT CHANGE UP
HGB BLD-MCNC: 10.1 G/DL — LOW (ref 11.5–15.5)
LIPID PNL WITH DIRECT LDL SERPL: 68 MG/DL — SIGNIFICANT CHANGE UP
MAGNESIUM SERPL-MCNC: 1.4 MG/DL — LOW (ref 1.6–2.6)
MCHC RBC-ENTMCNC: 28.4 PG — SIGNIFICANT CHANGE UP (ref 27–34)
MCHC RBC-ENTMCNC: 33 GM/DL — SIGNIFICANT CHANGE UP (ref 32–36)
MCV RBC AUTO: 86 FL — SIGNIFICANT CHANGE UP (ref 80–100)
PHOSPHATE SERPL-MCNC: 3 MG/DL — SIGNIFICANT CHANGE UP (ref 2.4–4.7)
PLATELET # BLD AUTO: 221 K/UL — SIGNIFICANT CHANGE UP (ref 150–400)
POTASSIUM SERPL-MCNC: 3 MMOL/L — LOW (ref 3.5–5.3)
POTASSIUM SERPL-SCNC: 3 MMOL/L — LOW (ref 3.5–5.3)
RBC # BLD: 3.56 M/UL — LOW (ref 3.8–5.2)
RBC # FLD: 22.3 % — HIGH (ref 10.3–14.5)
SODIUM SERPL-SCNC: 136 MMOL/L — SIGNIFICANT CHANGE UP (ref 135–145)
TOTAL CHOLESTEROL/HDL RATIO MEASUREMENT: 3 RATIO — LOW (ref 3.3–7.1)
TRIGL SERPL-MCNC: 75 MG/DL — SIGNIFICANT CHANGE UP (ref 10–200)
WBC # BLD: 6.34 K/UL — SIGNIFICANT CHANGE UP (ref 3.8–10.5)
WBC # FLD AUTO: 6.34 K/UL — SIGNIFICANT CHANGE UP (ref 3.8–10.5)

## 2020-06-13 PROCEDURE — 99233 SBSQ HOSP IP/OBS HIGH 50: CPT

## 2020-06-13 PROCEDURE — 70551 MRI BRAIN STEM W/O DYE: CPT | Mod: 26

## 2020-06-13 RX ORDER — FOLIC ACID 0.8 MG
1 TABLET ORAL DAILY
Refills: 0 | Status: DISCONTINUED | OUTPATIENT
Start: 2020-06-13 | End: 2020-06-15

## 2020-06-13 RX ORDER — CARVEDILOL PHOSPHATE 80 MG/1
3.12 CAPSULE, EXTENDED RELEASE ORAL EVERY 12 HOURS
Refills: 0 | Status: DISCONTINUED | OUTPATIENT
Start: 2020-06-13 | End: 2020-06-16

## 2020-06-13 RX ORDER — PANTOPRAZOLE SODIUM 20 MG/1
40 TABLET, DELAYED RELEASE ORAL ONCE
Refills: 0 | Status: COMPLETED | OUTPATIENT
Start: 2020-06-13 | End: 2020-06-13

## 2020-06-13 RX ORDER — THIAMINE MONONITRATE (VIT B1) 100 MG
200 TABLET ORAL DAILY
Refills: 0 | Status: DISCONTINUED | OUTPATIENT
Start: 2020-06-13 | End: 2020-06-15

## 2020-06-13 RX ORDER — MAGNESIUM SULFATE 500 MG/ML
2 VIAL (ML) INJECTION ONCE
Refills: 0 | Status: COMPLETED | OUTPATIENT
Start: 2020-06-13 | End: 2020-06-13

## 2020-06-13 RX ORDER — LOSARTAN POTASSIUM 100 MG/1
25 TABLET, FILM COATED ORAL DAILY
Refills: 0 | Status: DISCONTINUED | OUTPATIENT
Start: 2020-06-13 | End: 2020-06-16

## 2020-06-13 RX ORDER — POTASSIUM CHLORIDE 20 MEQ
10 PACKET (EA) ORAL
Refills: 0 | Status: COMPLETED | OUTPATIENT
Start: 2020-06-13 | End: 2020-06-13

## 2020-06-13 RX ORDER — ENOXAPARIN SODIUM 100 MG/ML
40 INJECTION SUBCUTANEOUS DAILY
Refills: 0 | Status: DISCONTINUED | OUTPATIENT
Start: 2020-06-13 | End: 2020-06-19

## 2020-06-13 RX ADMIN — Medication 100 MILLIEQUIVALENT(S): at 06:29

## 2020-06-13 RX ADMIN — Medication 3 MILLILITER(S): at 14:51

## 2020-06-13 RX ADMIN — LEVETIRACETAM 400 MILLIGRAM(S): 250 TABLET, FILM COATED ORAL at 06:21

## 2020-06-13 RX ADMIN — Medication 40 MILLIGRAM(S): at 17:29

## 2020-06-13 RX ADMIN — Medication 3 MILLILITER(S): at 20:34

## 2020-06-13 RX ADMIN — Medication 100 MILLIEQUIVALENT(S): at 09:40

## 2020-06-13 RX ADMIN — PANTOPRAZOLE SODIUM 40 MILLIGRAM(S): 20 TABLET, DELAYED RELEASE ORAL at 08:56

## 2020-06-13 RX ADMIN — Medication 2 MILLIGRAM(S): at 05:57

## 2020-06-13 RX ADMIN — Medication 100 MILLIEQUIVALENT(S): at 08:21

## 2020-06-13 RX ADMIN — ENOXAPARIN SODIUM 40 MILLIGRAM(S): 100 INJECTION SUBCUTANEOUS at 11:20

## 2020-06-13 RX ADMIN — Medication 1 PATCH: at 19:00

## 2020-06-13 RX ADMIN — PIPERACILLIN AND TAZOBACTAM 25 GRAM(S): 4; .5 INJECTION, POWDER, LYOPHILIZED, FOR SOLUTION INTRAVENOUS at 08:45

## 2020-06-13 RX ADMIN — Medication 1 PATCH: at 11:07

## 2020-06-13 RX ADMIN — MUPIROCIN 1 APPLICATION(S): 20 OINTMENT TOPICAL at 05:56

## 2020-06-13 RX ADMIN — CHLORHEXIDINE GLUCONATE 1 APPLICATION(S): 213 SOLUTION TOPICAL at 11:20

## 2020-06-13 RX ADMIN — Medication 1 PATCH: at 08:20

## 2020-06-13 RX ADMIN — Medication 40 MILLIGRAM(S): at 06:13

## 2020-06-13 RX ADMIN — Medication 1 MILLIGRAM(S): at 11:19

## 2020-06-13 RX ADMIN — Medication 2 MILLIGRAM(S): at 11:19

## 2020-06-13 RX ADMIN — Medication 2 MILLIGRAM(S): at 05:09

## 2020-06-13 RX ADMIN — MUPIROCIN 1 APPLICATION(S): 20 OINTMENT TOPICAL at 17:44

## 2020-06-13 RX ADMIN — Medication 1 PATCH: at 11:19

## 2020-06-13 RX ADMIN — Medication 3 MILLILITER(S): at 09:25

## 2020-06-13 RX ADMIN — LEVETIRACETAM 400 MILLIGRAM(S): 250 TABLET, FILM COATED ORAL at 17:29

## 2020-06-13 RX ADMIN — Medication 50 GRAM(S): at 06:28

## 2020-06-13 RX ADMIN — PIPERACILLIN AND TAZOBACTAM 25 GRAM(S): 4; .5 INJECTION, POWDER, LYOPHILIZED, FOR SOLUTION INTRAVENOUS at 02:15

## 2020-06-13 RX ADMIN — PIPERACILLIN AND TAZOBACTAM 25 GRAM(S): 4; .5 INJECTION, POWDER, LYOPHILIZED, FOR SOLUTION INTRAVENOUS at 17:29

## 2020-06-13 RX ADMIN — Medication 200 MILLIGRAM(S): at 11:20

## 2020-06-13 RX ADMIN — Medication 4 MILLILITER(S): at 09:27
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
## 2020-06-13 NOTE — PROGRESS NOTE ADULT - SUBJECTIVE AND OBJECTIVE BOX
seen for multifocal pneumonia, seizures    lethargic as received ativan pre mri but responsive  no acute complaints  ros negative gen weakness    MEDICATIONS  (STANDING):  albuterol/ipratropium for Nebulization 3 milliLiter(s) Nebulizer every 6 hours  aspirin enteric coated 81 milliGRAM(s) Oral daily  carvedilol 3.125 milliGRAM(s) Oral every 12 hours  chlorhexidine 2% Cloths 1 Application(s) Topical daily  enoxaparin Injectable 40 milliGRAM(s) SubCutaneous daily  escitalopram 10 milliGRAM(s) Oral daily  folic acid Injectable 1 milliGRAM(s) IV Push daily  furosemide   Injectable 40 milliGRAM(s) IV Push two times a day  levETIRAcetam  IVPB 1500 milliGRAM(s) IV Intermittent every 12 hours  losartan 25 milliGRAM(s) Oral daily  multivitamin 1 Tablet(s) Oral daily  mupirocin 2% Nasal 1 Application(s) Nasal two times a day  nicotine - 21 mG/24Hr(s) Patch 1 Patch Transdermal daily  piperacillin/tazobactam IVPB.. 3.375 Gram(s) IV Intermittent every 8 hours  QUEtiapine 100 milliGRAM(s) Oral at bedtime  thiamine Injectable 200 milliGRAM(s) IV Push daily    MEDICATIONS  (PRN):  ALBUTerol    90 MICROgram(s) HFA Inhaler 2 Puff(s) Inhalation every 6 hours PRN Shortness of Breath and/or Wheezing  LORazepam   Injectable 2 milliGRAM(s) IV Push every 2 hours PRN CIWA >8      Vital Signs Last 24 Hrs  T(C): 36.8 (13 Jun 2020 11:21), Max: 36.8 (12 Jun 2020 16:20)  T(F): 98.2 (13 Jun 2020 11:21), Max: 98.2 (12 Jun 2020 16:20)  HR: 77 (13 Jun 2020 13:00) (68 - 87)  BP: 107/79 (13 Jun 2020 13:00) (102/75 - 118/86)  BP(mean): 85 (13 Jun 2020 13:00) (80 - 100)  RR: 23 (13 Jun 2020 13:00) (12 - 35)  SpO2: 100% (13 Jun 2020 13:00) (94% - 100%)    PHYSICAL EXAM:    GENERAL: NAD  CHEST/LUNG: crackles at bases with poor breath sounds  HEART: Regular rate and rhythm; S1 S2  ABDOMEN: Soft, Nontender, Bowel sounds present  EXTREMITIES: no edema   NERVOUS SYSTEM:  Alert & Oriented X3 gen weakness, nonfocal    LABS:                        10.1   6.34  )-----------( 221      ( 13 Jun 2020 04:07 )             30.6     06-13    136  |  98  |  6.0<L>  ----------------------------<  71  3.0<L>   |  24.0  |  0.61    Ca    8.8      13 Jun 2020 04:07  Phos  3.0     06-13  Mg     1.4     06-13    TPro  5.3<L>  /  Alb  2.7<L>  /  TBili  0.6  /  DBili  x   /  AST  48<H>  /  ALT  24  /  AlkPhos  94  06-12          CAPILLARY BLOOD GLUCOSE            RADIOLOGY & ADDITIONAL TESTS:

## 2020-06-13 NOTE — SWALLOW BEDSIDE ASSESSMENT ADULT - SLP GENERAL OBSERVATIONS
pt received awake in bed, lethargic appearing, grossly 0x3, however reduced cognition with inconsistent command following, questionable historian, slow responses to questions,  low vocal volume and mildly hoarse vocal quality, +02 via nc, Spo2 100%, RR 18 baseline and throughout,RN Angela present at bedside, pain reported as 0/10 pre/post evaluation

## 2020-06-13 NOTE — PROGRESS NOTE ADULT - ASSESSMENT
50y Female who is followed by neurology because of seizure    Seizure  Possible alcohol withdrawal- continue ativan prn/CIWA, propofol.  History of head trauma, reported seizure history not on antiseizure drug previously.  EEG shows epileptogenic focus in right frontotemporal region. ? sequela of prior head trauma.  MRI negative for any acute pathology.   Continue Keppra 1500 mg bid.    Alcohol/cocaine abuse  Cessation advised.    Case discussed with MICU team (Dr Stapleton attending) and with Dr Booker.

## 2020-06-13 NOTE — SWALLOW BEDSIDE ASSESSMENT ADULT - ORAL PREPARATORY PHASE
reduced attention to bolus, varied oral holding requiring cues to improve attention and bolus manipuation/Reduced oral grading reduced attention to bolus/Reduced oral grading/Lateral loss of bolus
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
## 2020-06-13 NOTE — PROGRESS NOTE ADULT - SUBJECTIVE AND OBJECTIVE BOX
[facility]                                                       [facility] [place]                                                      Office: [address] [place] [address]                                                       Telephone: [phone]. Fax:[phone]    Subjective: Patient minimally responsive to verbal stimuli. Does not answer questions.    TELE: NSR    MEDICATIONS  (STANDING):  albuterol/ipratropium for Nebulization 3 milliLiter(s) Nebulizer every 6 hours  aspirin enteric coated 81 milliGRAM(s) Oral daily  carvedilol 3.125 milliGRAM(s) Oral every 12 hours  chlorhexidine 2% Cloths 1 Application(s) Topical daily  enoxaparin Injectable 40 milliGRAM(s) SubCutaneous daily  escitalopram 10 milliGRAM(s) Oral daily  folic acid Injectable 1 milliGRAM(s) IV Push daily  furosemide   Injectable 40 milliGRAM(s) IV Push two times a day  levETIRAcetam  IVPB 1500 milliGRAM(s) IV Intermittent every 12 hours  losartan 25 milliGRAM(s) Oral daily  multivitamin 1 Tablet(s) Oral daily  mupirocin 2% Nasal 1 Application(s) Nasal two times a day  nicotine - 21 mG/24Hr(s) Patch 1 Patch Transdermal daily  piperacillin/tazobactam IVPB.. 3.375 Gram(s) IV Intermittent every 8 hours  QUEtiapine 100 milliGRAM(s) Oral at bedtime  thiamine Injectable 200 milliGRAM(s) IV Push daily    MEDICATIONS  (PRN):  ALBUTerol    90 MICROgram(s) HFA Inhaler 2 Puff(s) Inhalation every 6 hours PRN Shortness of Breath and/or Wheezing  LORazepam   Injectable 2 milliGRAM(s) IV Push every 2 hours PRN CIWA >8    Allergies  Allergy Status Unknown      Vital Signs Last 24 Hrs  T(C): 36.9 (13 Jun 2020 16:04), Max: 36.9 (13 Jun 2020 16:04)  T(F): 98.4 (13 Jun 2020 16:04), Max: 98.4 (13 Jun 2020 16:04)  HR: 73 (13 Jun 2020 17:00) (68 - 82)  BP: 104/75 (13 Jun 2020 17:00) (103/91 - 118/86)  BP(mean): 85 (13 Jun 2020 13:00) (83 - 100)  RR: 24 (13 Jun 2020 17:00) (18 - 35)  SpO2: 100% (13 Jun 2020 17:00) (95% - 100%)    Physical Exam:  Constitutional: NAD, asleep, not cooperative with answering questions  Cardiovascular: +S1S2 RRR  Pulmonary: CTA b/l anteriorly, unlabored  GI: soft NTND +BS  Extremities: no pedal edema  Neuro: non focal, BARRETT x4    LABS:                        10.1   6.34  )-----------( 221      ( 13 Jun 2020 04:07 )             30.6     06-13    136  |  98  |  6.0<L>  ----------------------------<  71  3.0<L>   |  24.0  |  0.61    Ca    8.8      13 Jun 2020 04:07  Phos  3.0     06-13  Mg     1.4     06-13    TPro  5.3<L>  /  Alb  2.7<L>  /  TBili  0.6  /  DBili  x   /  AST  48<H>  /  ALT  24  /  AlkPhos  94  06-12

## 2020-06-13 NOTE — SWALLOW BEDSIDE ASSESSMENT ADULT - PHARYNGEAL PHASE
+report of globus sensation/Delayed pharyngeal swallow/Wet vocal quality post oral intake/Delayed cough post oral intake Delayed pharyngeal swallow/Wet vocal quality post oral intake/Delayed cough post oral intake
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
## 2020-06-13 NOTE — PROGRESS NOTE ADULT - SUBJECTIVE AND OBJECTIVE BOX
[facility]                                        Neurology at [place]                                 Abhinav Nair, & Don                                  [address]                                        [place], [place], [address]                                             [phone]        CC: seizure     HPI:   The patient is a 50y Female who presented with seizures/status epilepticus.  Unable to obtain history as she is intubated and sedated.  She apparently has a history of seizure, not on any AEDs, history of alcohol abuse, reported last drink 3 weeks ago, but not clear how accurate that is, and use of cocaine 3 days prior to admission. Her seizures have stopped.  She had decline in status and was intubated and put on propofol.  There was suspicion for alcohol withdrawal.     Interim history:  Remains on 4 Cuca.   Awaiting medical bed.     ROS:   Denies headache or dizziness.  Denies chest pain.  Denies shortness of breath.    MEDICATIONS  (STANDING):  albuterol/ipratropium for Nebulization 3 milliLiter(s) Nebulizer every 6 hours  aspirin enteric coated 81 milliGRAM(s) Oral daily  carvedilol 3.125 milliGRAM(s) Oral every 12 hours  chlorhexidine 2% Cloths 1 Application(s) Topical daily  enoxaparin Injectable 40 milliGRAM(s) SubCutaneous daily  escitalopram 10 milliGRAM(s) Oral daily  folic acid Injectable 1 milliGRAM(s) IV Push daily  furosemide   Injectable 40 milliGRAM(s) IV Push two times a day  levETIRAcetam  IVPB 1500 milliGRAM(s) IV Intermittent every 12 hours  losartan 25 milliGRAM(s) Oral daily  multivitamin 1 Tablet(s) Oral daily  mupirocin 2% Nasal 1 Application(s) Nasal two times a day  nicotine - 21 mG/24Hr(s) Patch 1 Patch Transdermal daily  piperacillin/tazobactam IVPB.. 3.375 Gram(s) IV Intermittent every 8 hours  QUEtiapine 100 milliGRAM(s) Oral at bedtime  thiamine Injectable 200 milliGRAM(s) IV Push daily      Vital Signs Last 24 Hrs  T(C): 36.8 (13 Jun 2020 11:21), Max: 36.8 (12 Jun 2020 16:20)  T(F): 98.2 (13 Jun 2020 11:21), Max: 98.2 (12 Jun 2020 16:20)  HR: 77 (13 Jun 2020 13:00) (68 - 87)  BP: 107/79 (13 Jun 2020 13:00) (102/75 - 118/86)  BP(mean): 85 (13 Jun 2020 13:00) (80 - 100)  RR: 23 (13 Jun 2020 13:00) (12 - 35)  SpO2: 100% (13 Jun 2020 13:00) (94% - 100%)    Detailed Neurologic Exam:    Mental status: The patient is awake and alert. There is no aphasia. There is no dysarthria.     Cranial nerves: Pupils equal and react symmetrically to light. There is no visual field deficit to threat. Extraocular motion is full with no nystagmus. There is no ptosis. Facial sensation is intact. Facial musculature is symmetric. Palate elevates symmetrically. Tongue is midline.    Motor: There is normal bulk and tone.  There is no tremor.  Strength grossly 5/5 bilaterally.    Sensation: Grossly intact to light touch and pin.    Reflexes: 2+ throughout and plantar responses are flexor.    Cerebellar: No dysmetria on finger nose testing.    Labs:     06-13    136  |  98  |  6.0<L>  ----------------------------<  71  3.0<L>   |  24.0  |  0.61    Ca    8.8      13 Jun 2020 04:07  Phos  3.0     06-13  Mg     1.4     06-13    TPro  5.3<L>  /  Alb  2.7<L>  /  TBili  0.6  /  DBili  x   /  AST  48<H>  /  ALT  24  /  AlkPhos  94  06-12                            10.1   6.34  )-----------( 221      ( 13 Jun 2020 04:07 )             30.6       Rad:   MRI brain images reviewed (and concur with report): There is no acute pathology.
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
## 2020-06-13 NOTE — SWALLOW BEDSIDE ASSESSMENT ADULT - ORAL PHASE
Decreased anterior-posterior movement of the bolus/Delayed oral transit time suspect posterior loss negatively impacted by reduced cognition/attention to bolus/Decreased anterior-posterior movement of the bolus

## 2020-06-13 NOTE — SWALLOW BEDSIDE ASSESSMENT ADULT - SWALLOW EVAL: DIAGNOSIS
Moderate oral dysphagia negatively impacted by reduced cognition and marked by reduced overall attention to bolus, intermittent oral holding and overall poor bolus formation/posterior propulsion. Suspect pharyngeal dysphagia with administered consistencies with overt s/s aspiration post swallow.

## 2020-06-14 LAB
ANION GAP SERPL CALC-SCNC: 19 MMOL/L — HIGH (ref 5–17)
BASOPHILS # BLD AUTO: 0.03 K/UL — SIGNIFICANT CHANGE UP (ref 0–0.2)
BASOPHILS NFR BLD AUTO: 0.4 % — SIGNIFICANT CHANGE UP (ref 0–2)
BUN SERPL-MCNC: 5 MG/DL — LOW (ref 8–20)
CALCIUM SERPL-MCNC: 8.7 MG/DL — SIGNIFICANT CHANGE UP (ref 8.6–10.2)
CHLORIDE SERPL-SCNC: 93 MMOL/L — LOW (ref 98–107)
CO2 SERPL-SCNC: 24 MMOL/L — SIGNIFICANT CHANGE UP (ref 22–29)
CREAT SERPL-MCNC: 0.65 MG/DL — SIGNIFICANT CHANGE UP (ref 0.5–1.3)
EOSINOPHIL # BLD AUTO: 0.26 K/UL — SIGNIFICANT CHANGE UP (ref 0–0.5)
EOSINOPHIL NFR BLD AUTO: 3.4 % — SIGNIFICANT CHANGE UP (ref 0–6)
GLUCOSE SERPL-MCNC: 83 MG/DL — SIGNIFICANT CHANGE UP (ref 70–99)
HCT VFR BLD CALC: 32.9 % — LOW (ref 34.5–45)
HGB BLD-MCNC: 10.6 G/DL — LOW (ref 11.5–15.5)
IMM GRANULOCYTES NFR BLD AUTO: 0.4 % — SIGNIFICANT CHANGE UP (ref 0–1.5)
LYMPHOCYTES # BLD AUTO: 1.05 K/UL — SIGNIFICANT CHANGE UP (ref 1–3.3)
LYMPHOCYTES # BLD AUTO: 13.9 % — SIGNIFICANT CHANGE UP (ref 13–44)
MAGNESIUM SERPL-MCNC: 1.1 MG/DL — LOW (ref 1.6–2.6)
MCHC RBC-ENTMCNC: 28.2 PG — SIGNIFICANT CHANGE UP (ref 27–34)
MCHC RBC-ENTMCNC: 32.2 GM/DL — SIGNIFICANT CHANGE UP (ref 32–36)
MCV RBC AUTO: 87.5 FL — SIGNIFICANT CHANGE UP (ref 80–100)
MONOCYTES # BLD AUTO: 1.21 K/UL — HIGH (ref 0–0.9)
MONOCYTES NFR BLD AUTO: 16 % — HIGH (ref 2–14)
NEUTROPHILS # BLD AUTO: 4.96 K/UL — SIGNIFICANT CHANGE UP (ref 1.8–7.4)
NEUTROPHILS NFR BLD AUTO: 65.9 % — SIGNIFICANT CHANGE UP (ref 43–77)
PHOSPHATE SERPL-MCNC: 4.1 MG/DL — SIGNIFICANT CHANGE UP (ref 2.4–4.7)
PLATELET # BLD AUTO: 248 K/UL — SIGNIFICANT CHANGE UP (ref 150–400)
POTASSIUM SERPL-MCNC: 3.2 MMOL/L — LOW (ref 3.5–5.3)
POTASSIUM SERPL-SCNC: 3.2 MMOL/L — LOW (ref 3.5–5.3)
RBC # BLD: 3.76 M/UL — LOW (ref 3.8–5.2)
RBC # FLD: 22.5 % — HIGH (ref 10.3–14.5)
SODIUM SERPL-SCNC: 136 MMOL/L — SIGNIFICANT CHANGE UP (ref 135–145)
WBC # BLD: 7.54 K/UL — SIGNIFICANT CHANGE UP (ref 3.8–10.5)
WBC # FLD AUTO: 7.54 K/UL — SIGNIFICANT CHANGE UP (ref 3.8–10.5)

## 2020-06-14 PROCEDURE — 99232 SBSQ HOSP IP/OBS MODERATE 35: CPT

## 2020-06-14 RX ORDER — DEXTROSE MONOHYDRATE, SODIUM CHLORIDE, AND POTASSIUM CHLORIDE 50; .745; 4.5 G/1000ML; G/1000ML; G/1000ML
1000 INJECTION, SOLUTION INTRAVENOUS
Refills: 0 | Status: DISCONTINUED | OUTPATIENT
Start: 2020-06-14 | End: 2020-06-14

## 2020-06-14 RX ORDER — POTASSIUM CHLORIDE 20 MEQ
40 PACKET (EA) ORAL ONCE
Refills: 0 | Status: COMPLETED | OUTPATIENT
Start: 2020-06-14 | End: 2020-06-15

## 2020-06-14 RX ORDER — POTASSIUM CHLORIDE 20 MEQ
10 PACKET (EA) ORAL
Refills: 0 | Status: COMPLETED | OUTPATIENT
Start: 2020-06-14 | End: 2020-06-14

## 2020-06-14 RX ORDER — IPRATROPIUM/ALBUTEROL SULFATE 18-103MCG
3 AEROSOL WITH ADAPTER (GRAM) INHALATION EVERY 6 HOURS
Refills: 0 | Status: DISCONTINUED | OUTPATIENT
Start: 2020-06-14 | End: 2020-06-19

## 2020-06-14 RX ORDER — MAGNESIUM SULFATE 500 MG/ML
2 VIAL (ML) INJECTION EVERY 4 HOURS
Refills: 0 | Status: COMPLETED | OUTPATIENT
Start: 2020-06-14 | End: 2020-06-14

## 2020-06-14 RX ADMIN — Medication 40 MILLIGRAM(S): at 05:44

## 2020-06-14 RX ADMIN — Medication 3 MILLILITER(S): at 03:30

## 2020-06-14 RX ADMIN — Medication 1 PATCH: at 11:55

## 2020-06-14 RX ADMIN — Medication 50 GRAM(S): at 08:00

## 2020-06-14 RX ADMIN — Medication 3 MILLILITER(S): at 07:45

## 2020-06-14 RX ADMIN — ENOXAPARIN SODIUM 40 MILLIGRAM(S): 100 INJECTION SUBCUTANEOUS at 14:53

## 2020-06-14 RX ADMIN — PIPERACILLIN AND TAZOBACTAM 25 GRAM(S): 4; .5 INJECTION, POWDER, LYOPHILIZED, FOR SOLUTION INTRAVENOUS at 15:34

## 2020-06-14 RX ADMIN — Medication 81 MILLIGRAM(S): at 14:51

## 2020-06-14 RX ADMIN — Medication 50 GRAM(S): at 10:26

## 2020-06-14 RX ADMIN — Medication 200 MILLIGRAM(S): at 14:59

## 2020-06-14 RX ADMIN — Medication 1 PATCH: at 19:30

## 2020-06-14 RX ADMIN — Medication 100 MILLIEQUIVALENT(S): at 11:25

## 2020-06-14 RX ADMIN — Medication 1 TABLET(S): at 11:55

## 2020-06-14 RX ADMIN — LEVETIRACETAM 400 MILLIGRAM(S): 250 TABLET, FILM COATED ORAL at 17:05

## 2020-06-14 RX ADMIN — Medication 100 MILLIEQUIVALENT(S): at 14:59

## 2020-06-14 RX ADMIN — CHLORHEXIDINE GLUCONATE 1 APPLICATION(S): 213 SOLUTION TOPICAL at 14:51

## 2020-06-14 RX ADMIN — Medication 100 MILLIEQUIVALENT(S): at 14:21

## 2020-06-14 RX ADMIN — MUPIROCIN 1 APPLICATION(S): 20 OINTMENT TOPICAL at 17:07

## 2020-06-14 RX ADMIN — PIPERACILLIN AND TAZOBACTAM 25 GRAM(S): 4; .5 INJECTION, POWDER, LYOPHILIZED, FOR SOLUTION INTRAVENOUS at 23:04

## 2020-06-14 RX ADMIN — PIPERACILLIN AND TAZOBACTAM 25 GRAM(S): 4; .5 INJECTION, POWDER, LYOPHILIZED, FOR SOLUTION INTRAVENOUS at 00:53

## 2020-06-14 RX ADMIN — LEVETIRACETAM 400 MILLIGRAM(S): 250 TABLET, FILM COATED ORAL at 05:45

## 2020-06-14 RX ADMIN — Medication 1 MILLIGRAM(S): at 14:53

## 2020-06-14 RX ADMIN — QUETIAPINE FUMARATE 100 MILLIGRAM(S): 200 TABLET, FILM COATED ORAL at 23:04

## 2020-06-14 RX ADMIN — MUPIROCIN 1 APPLICATION(S): 20 OINTMENT TOPICAL at 05:45

## 2020-06-14 RX ADMIN — ESCITALOPRAM OXALATE 10 MILLIGRAM(S): 10 TABLET, FILM COATED ORAL at 14:53
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
## 2020-06-14 NOTE — PROGRESS NOTE ADULT - SUBJECTIVE AND OBJECTIVE BOX
seen for pneumonia, resp failure, seizures    no events/complaints.  calm, more awake per RN  still impulsive per 1:1  ROS limited due to lethargy but negative for sob/chest pain/abd symptoms    MEDICATIONS  (STANDING):  aspirin enteric coated 81 milliGRAM(s) Oral daily  carvedilol 3.125 milliGRAM(s) Oral every 12 hours  chlorhexidine 2% Cloths 1 Application(s) Topical daily  enoxaparin Injectable 40 milliGRAM(s) SubCutaneous daily  escitalopram 10 milliGRAM(s) Oral daily  folic acid Injectable 1 milliGRAM(s) IV Push daily  furosemide   Injectable 40 milliGRAM(s) IV Push two times a day  levETIRAcetam  IVPB 1500 milliGRAM(s) IV Intermittent every 12 hours  losartan 25 milliGRAM(s) Oral daily  multivitamin 1 Tablet(s) Oral daily  mupirocin 2% Nasal 1 Application(s) Nasal two times a day  nicotine - 21 mG/24Hr(s) Patch 1 Patch Transdermal daily  piperacillin/tazobactam IVPB.. 3.375 Gram(s) IV Intermittent every 8 hours  potassium chloride    Tablet ER 40 milliEquivalent(s) Oral once  potassium chloride  10 mEq/100 mL IVPB 10 milliEquivalent(s) IV Intermittent every 1 hour  QUEtiapine 100 milliGRAM(s) Oral at bedtime  thiamine Injectable 200 milliGRAM(s) IV Push daily    MEDICATIONS  (PRN):  ALBUTerol    90 MICROgram(s) HFA Inhaler 2 Puff(s) Inhalation every 6 hours PRN Shortness of Breath and/or Wheezing  albuterol/ipratropium for Nebulization 3 milliLiter(s) Nebulizer every 6 hours PRN Shortness of Breath and/or Wheezing  LORazepam   Injectable 2 milliGRAM(s) IV Push every 2 hours PRN CIWA >8      Allergies    Allergy Status Unknown    Vital Signs Last 24 Hrs  T(C): 36.4 (14 Jun 2020 10:01), Max: 36.9 (13 Jun 2020 16:04)  T(F): 97.6 (14 Jun 2020 10:01), Max: 98.4 (13 Jun 2020 16:04)  HR: 89 (14 Jun 2020 10:01) (70 - 89)  BP: 92/63 (14 Jun 2020 10:01) (92/63 - 116/85)  BP(mean): 85 (13 Jun 2020 13:00) (85 - 85)  RR: 20 (14 Jun 2020 10:01) (20 - 26)  SpO2: 100% (14 Jun 2020 10:01) (95% - 100%)    PHYSICAL EXAM:    GENERAL: NAD  CHEST/LUNG: dec bs at bases  HEART: Regular rate and rhythm; S1 S2  ABDOMEN: Soft, Nontender, Bowel sounds present  EXTREMITIES:  no edema   NERVOUS SYSTEM:  awake, alert oriented x2 self/place. gen weakness    LABS:                        10.6   7.54  )-----------( 248      ( 14 Jun 2020 05:41 )             32.9     06-14    136  |  93<L>  |  5.0<L>  ----------------------------<  83  3.2<L>   |  24.0  |  0.65    Ca    8.7      14 Jun 2020 05:41  Phos  4.1     06-14  Mg     1.1     06-14            CAPILLARY BLOOD GLUCOSE      POCT Blood Glucose.: 84 mg/dL (13 Jun 2020 15:11)        RADIOLOGY & ADDITIONAL TESTS:
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
## 2020-06-15 DIAGNOSIS — F10.14 ALCOHOL ABUSE WITH ALCOHOL-INDUCED MOOD DISORDER: ICD-10-CM

## 2020-06-15 LAB
ANION GAP SERPL CALC-SCNC: 11 MMOL/L — SIGNIFICANT CHANGE UP (ref 5–17)
BUN SERPL-MCNC: 6 MG/DL — LOW (ref 8–20)
CALCIUM SERPL-MCNC: 9.2 MG/DL — SIGNIFICANT CHANGE UP (ref 8.6–10.2)
CHLORIDE SERPL-SCNC: 94 MMOL/L — LOW (ref 98–107)
CO2 SERPL-SCNC: 27 MMOL/L — SIGNIFICANT CHANGE UP (ref 22–29)
CREAT SERPL-MCNC: 0.7 MG/DL — SIGNIFICANT CHANGE UP (ref 0.5–1.3)
GLUCOSE SERPL-MCNC: 76 MG/DL — SIGNIFICANT CHANGE UP (ref 70–99)
MAGNESIUM SERPL-MCNC: 1.8 MG/DL — SIGNIFICANT CHANGE UP (ref 1.6–2.6)
PHOSPHATE SERPL-MCNC: 3.4 MG/DL — SIGNIFICANT CHANGE UP (ref 2.4–4.7)
POTASSIUM SERPL-MCNC: 4.2 MMOL/L — SIGNIFICANT CHANGE UP (ref 3.5–5.3)
POTASSIUM SERPL-SCNC: 4.2 MMOL/L — SIGNIFICANT CHANGE UP (ref 3.5–5.3)
SODIUM SERPL-SCNC: 132 MMOL/L — LOW (ref 135–145)

## 2020-06-15 PROCEDURE — 99232 SBSQ HOSP IP/OBS MODERATE 35: CPT

## 2020-06-15 PROCEDURE — 90792 PSYCH DIAG EVAL W/MED SRVCS: CPT

## 2020-06-15 RX ORDER — PIPERACILLIN AND TAZOBACTAM 4; .5 G/20ML; G/20ML
3.38 INJECTION, POWDER, LYOPHILIZED, FOR SOLUTION INTRAVENOUS EVERY 8 HOURS
Refills: 0 | Status: DISCONTINUED | OUTPATIENT
Start: 2020-06-15 | End: 2020-06-17

## 2020-06-15 RX ORDER — FOLIC ACID 0.8 MG
1 TABLET ORAL DAILY
Refills: 0 | Status: DISCONTINUED | OUTPATIENT
Start: 2020-06-15 | End: 2020-06-19

## 2020-06-15 RX ORDER — THIAMINE MONONITRATE (VIT B1) 100 MG
100 TABLET ORAL DAILY
Refills: 0 | Status: DISCONTINUED | OUTPATIENT
Start: 2020-06-15 | End: 2020-06-19

## 2020-06-15 RX ORDER — QUETIAPINE FUMARATE 200 MG/1
50 TABLET, FILM COATED ORAL AT BEDTIME
Refills: 0 | Status: DISCONTINUED | OUTPATIENT
Start: 2020-06-15 | End: 2020-06-19

## 2020-06-15 RX ADMIN — Medication 40 MILLIEQUIVALENT(S): at 22:35

## 2020-06-15 RX ADMIN — Medication 81 MILLIGRAM(S): at 07:57

## 2020-06-15 RX ADMIN — LEVETIRACETAM 400 MILLIGRAM(S): 250 TABLET, FILM COATED ORAL at 05:19

## 2020-06-15 RX ADMIN — MUPIROCIN 1 APPLICATION(S): 20 OINTMENT TOPICAL at 05:19

## 2020-06-15 RX ADMIN — QUETIAPINE FUMARATE 50 MILLIGRAM(S): 200 TABLET, FILM COATED ORAL at 22:35

## 2020-06-15 RX ADMIN — Medication 1 MILLIGRAM(S): at 07:57

## 2020-06-15 RX ADMIN — ESCITALOPRAM OXALATE 10 MILLIGRAM(S): 10 TABLET, FILM COATED ORAL at 07:57

## 2020-06-15 RX ADMIN — Medication 100 MILLIGRAM(S): at 16:55

## 2020-06-15 RX ADMIN — PIPERACILLIN AND TAZOBACTAM 25 GRAM(S): 4; .5 INJECTION, POWDER, LYOPHILIZED, FOR SOLUTION INTRAVENOUS at 22:35

## 2020-06-15 RX ADMIN — LEVETIRACETAM 400 MILLIGRAM(S): 250 TABLET, FILM COATED ORAL at 16:51

## 2020-06-15 RX ADMIN — Medication 1 TABLET(S): at 07:58

## 2020-06-15 RX ADMIN — MUPIROCIN 1 APPLICATION(S): 20 OINTMENT TOPICAL at 16:54

## 2020-06-15 RX ADMIN — Medication 1 PATCH: at 08:00

## 2020-06-15 RX ADMIN — PIPERACILLIN AND TAZOBACTAM 25 GRAM(S): 4; .5 INJECTION, POWDER, LYOPHILIZED, FOR SOLUTION INTRAVENOUS at 15:58

## 2020-06-15 RX ADMIN — Medication 1 PATCH: at 07:57

## 2020-06-15 RX ADMIN — ENOXAPARIN SODIUM 40 MILLIGRAM(S): 100 INJECTION SUBCUTANEOUS at 22:35

## 2020-06-15 RX ADMIN — CHLORHEXIDINE GLUCONATE 1 APPLICATION(S): 213 SOLUTION TOPICAL at 09:00

## 2020-06-15 RX ADMIN — CARVEDILOL PHOSPHATE 3.12 MILLIGRAM(S): 80 CAPSULE, EXTENDED RELEASE ORAL at 05:19

## 2020-06-15 NOTE — PHYSICAL THERAPY INITIAL EVALUATION ADULT - GAIT DEVIATIONS NOTED, PT EVAL
lack of fluidity, reaching for support, lost of balance with turn to the right in sagittal plane, delay stepping reaction noted/decreased rose/decreased velocity of limb motion/decreased step length

## 2020-06-15 NOTE — OCCUPATIONAL THERAPY INITIAL EVALUATION ADULT - ADDITIONAL COMMENTS
Pt reports living with her  however unable to provide information on social history, prior level of function or prior living environment. Pt reports crawling on knees around house at home.

## 2020-06-15 NOTE — PHYSICAL THERAPY INITIAL EVALUATION ADULT - PATIENT PROFILE REVIEW, REHAB EVAL
Problem: Pressure Injury, Risk for  Intervention: Initiate/maintain additional skin protection/pressure relieving interventions for at risk patients with potential/actual tissue perfusion compromise  Initiate fluid immersion therapy bed, prophylactic sacral tissue compression prevention dressing (Mepilex), oxygen tubing protector, mobility assist device, and protection/pressure/off-loading devices during high risk periods incuding prevention prior to a procedure/surgery.  Transition to other suitable mattress as soon as able to mobilize patient.    08/30/17 1715 08/30/17 2030   OTHER   Skin Protection/Pressure Redistribution Devices Heel off loading with pillow(s) --    Devices and Equipment   Specialty Mattress/Bed --  Mattress-Stage IV equivalent - non-powered      08/30/17 2030 08/30/17 2135   OTHER   Skin Protection/Pressure Redistribution Devices --  Chair cushion - foam/gel;Heel off loading with pillow(s)   Devices and Equipment   Specialty Mattress/Bed Mattress-Stage IV equivalent - non-powered --          Problem: VTE, Risk for  Intervention: # Implement/maintain early mobilization  Early mobilization is recommended including HOB up or dangle for 20 minutes during the first 24 hours; Progress activity 2-4 times/day each subsequent day, unless contraindicated.    08/30/17 2030 08/30/17 2135   Mobility   Head of Bed Elevation 45 degrees --    Range of Motion --  Active;All extremities   Activity and Safety   Activity Ambulated in room --    Positioning Independent --          Problem: At Risk for Falls  Intervention: # Implement Fall Risk signage/clothing used by unit  Initiate unit-specific measures to identify patients who are at risk for falls    08/30/17 2135   OTHER   # Implement Fall Risk signage/clothing used by unit Done     Intervention: # Risk for falls interventions  Selectively initiate interventions based on patient-specific fall risks.      08/30/17 2030 08/30/17 2135   Risk for Falls  yes Interventions   Patient's Personal Risk Factors Problems with walking or moving;Potential for overestimating ability --    Mobility and Gait Measures Use gait belt --    Elimination Risk Interventions Utilize visual cues (e.g. yeild sign);Urinal at bedside;Offer toileting with every interaction (patient able to identify need) --    Toileting Schedule --  Toileting during night as needed   Safety Measures   Environmental Safety Measures Maintained Done --    Patient Specific Safety Measures in Use Safe lighting as appropriate --        Goal: # Takes action to control fall-related risks  Outcome: Outcome Met, Continue evaluating goal progress toward completion   08/30/17 2135   OTHER   Participates in Fall Prevention Activities Yes       Problem: Postoperative Care  Goal: Vital signs are maintained within parameters   08/30/17 2031   Vitals   /53   Pulse 86   Resp 18

## 2020-06-15 NOTE — BEHAVIORAL HEALTH ASSESSMENT NOTE - NSBHCHARTREVIEWVS_PSY_A_CORE FT
ICU Vital Signs Last 24 Hrs  T(C): 36.4 (15 Rui 2020 08:00), Max: 36.7 (14 Jun 2020 22:58)  T(F): 97.5 (15 Rui 2020 08:00), Max: 98.1 (14 Jun 2020 22:58)  HR: 72 (15 Rui 2020 08:00) (70 - 90)  BP: 90/63 (15 Rui 2020 08:00) (88/64 - 101/70)  BP(mean): --  ABP: --  ABP(mean): --  RR: 18 (15 Rui 2020 08:00) (18 - 18)  SpO2: 99% (15 Rui 2020 08:00) (99% - 100%)

## 2020-06-15 NOTE — PHYSICAL THERAPY INITIAL EVALUATION ADULT - IMPAIRMENTS FOUND, PT EVAL
cognitive impairment/gait, locomotion, and balance/muscle strength/neuromotor development and sensory integration

## 2020-06-15 NOTE — PROGRESS NOTE ADULT - SUBJECTIVE AND OBJECTIVE BOX
seen for etoh withdrawal, pneumonia, encephalopathy    still impulsiveness, trying to get out of bed, weak on feet  1:1 at bedside  ros limited but negative for chest pain/sob/abd pain.    MEDICATIONS  (STANDING):  aspirin enteric coated 81 milliGRAM(s) Oral daily  carvedilol 3.125 milliGRAM(s) Oral every 12 hours  chlorhexidine 2% Cloths 1 Application(s) Topical daily  enoxaparin Injectable 40 milliGRAM(s) SubCutaneous daily  escitalopram 10 milliGRAM(s) Oral daily  folic acid 1 milliGRAM(s) Oral daily  levETIRAcetam  IVPB 1500 milliGRAM(s) IV Intermittent every 12 hours  losartan 25 milliGRAM(s) Oral daily  multivitamin 1 Tablet(s) Oral daily  mupirocin 2% Nasal 1 Application(s) Nasal two times a day  nicotine - 21 mG/24Hr(s) Patch 1 Patch Transdermal daily  piperacillin/tazobactam IVPB.. 3.375 Gram(s) IV Intermittent every 8 hours  potassium chloride    Tablet ER 40 milliEquivalent(s) Oral once  QUEtiapine 50 milliGRAM(s) Oral at bedtime  thiamine 100 milliGRAM(s) Oral daily    MEDICATIONS  (PRN):  ALBUTerol    90 MICROgram(s) HFA Inhaler 2 Puff(s) Inhalation every 6 hours PRN Shortness of Breath and/or Wheezing  albuterol/ipratropium for Nebulization 3 milliLiter(s) Nebulizer every 6 hours PRN Shortness of Breath and/or Wheezing  LORazepam   Injectable 2 milliGRAM(s) IV Push every 2 hours PRN CIWA >8      Allergies    Allergy Status Unknown      Vital Signs Last 24 Hrs  T(C): 36.4 (15 Uri 2020 08:00), Max: 36.7 (14 Jun 2020 22:58)  T(F): 97.5 (15 Rui 2020 08:00), Max: 98.1 (14 Jun 2020 22:58)  HR: 72 (15 Rui 2020 08:00) (70 - 90)  BP: 90/63 (15 Rui 2020 08:00) (88/64 - 101/70)  BP(mean): --  RR: 18 (15 Rui 2020 08:00) (18 - 18)  SpO2: 99% (15 Rui 2020 08:00) (99% - 100%)    PHYSICAL EXAM:    GENERAL: NAD  CHEST/LUNG: dec bs at bases  HEART: Regular rate and rhythm; S1 S2  ABDOMEN: Soft,  Bowel sounds present  EXTREMITIES:  no edema   NERVOUS SYSTEM:  Alert & Oriented X1 self (year 2012, states shes  at [place]) nonfocal neuro.    LABS:                        10.6   7.54  )-----------( 248      ( 14 Jun 2020 05:41 )             32.9     06-15    132<L>  |  94<L>  |  6.0<L>  ----------------------------<  76  4.2   |  27.0  |  0.70    Ca    9.2      15 Rui 2020 05:39  Phos  3.4     06-15  Mg     1.8     06-15            CAPILLARY BLOOD GLUCOSE            RADIOLOGY & ADDITIONAL TESTS:

## 2020-06-15 NOTE — PROGRESS NOTE ADULT - SUBJECTIVE AND OBJECTIVE BOX
[facility]                                        Neurology at [place]                                 [facility]hinav [facility]air [facility]                                  [address]                                        [place], [place], [address]                                             [phone]        CC: seizure     HPI:   The patient is a 50y Female who presented with seizures/status epilepticus.  Unable to obtain history as she is intubated and sedated.  She apparently has a history of seizure, not on any AEDs, history of alcohol abuse, reported last drink 3 weeks ago, but not clear how accurate that is, and use of cocaine 3 days prior to admission. Her seizures have stopped.  She had decline in status and was intubated and put on propofol.  There was suspicion for alcohol withdrawal.     Interim history:  Now on 4 [place].   No further seizures.     ROS:   Denies headache or dizziness.  Denies chest pain.  Denies shortness of breath.    MEDICATIONS  (STANDING):  aspirin enteric coated 81 milliGRAM(s) Oral daily  carvedilol 3.125 milliGRAM(s) Oral every 12 hours  chlorhexidine 2% Cloths 1 Application(s) Topical daily  enoxaparin Injectable 40 milliGRAM(s) SubCutaneous daily  escitalopram 10 milliGRAM(s) Oral daily  folic acid Injectable 1 milliGRAM(s) IV Push daily  levETIRAcetam  IVPB 1500 milliGRAM(s) IV Intermittent every 12 hours  losartan 25 milliGRAM(s) Oral daily  multivitamin 1 Tablet(s) Oral daily  mupirocin 2% Nasal 1 Application(s) Nasal two times a day  nicotine - 21 mG/24Hr(s) Patch 1 Patch Transdermal daily  potassium chloride    Tablet ER 40 milliEquivalent(s) Oral once  QUEtiapine 100 milliGRAM(s) Oral at bedtime  thiamine Injectable 200 milliGRAM(s) IV Push daily      Vital Signs Last 24 Hrs  T(C): 36.4 (15 Rui 2020 08:00), Max: 36.7 (14 Jun 2020 22:58)  T(F): 97.5 (15 Rui 2020 08:00), Max: 98.1 (14 Jun 2020 22:58)  HR: 72 (15 Rui 2020 08:00) (70 - 90)  BP: 90/63 (15 Rui 2020 08:00) (88/64 - 101/70)  RR: 18 (15 Rui 2020 08:00) (18 - 21)  SpO2: 99% (15 Rui 2020 08:00) (87% - 100%)    Detailed Neurologic Exam:    Mental status: The patient awakens to voice. There is no aphasia. There is no dysarthria.     Cranial nerves: Pupils equal and react symmetrically to light. There is no visual field deficit to threat. Extraocular motion is full with no nystagmus. There is no ptosis. Facial sensation is intact. Facial musculature is symmetric. Palate elevates symmetrically. Tongue is midline.    Motor: There is normal bulk and tone.  There is no tremor.  Strength grossly 5/5 bilaterally.    Sensation: Grossly intact to light touch and pin.    Reflexes: 2+ throughout and plantar responses are flexor.    Cerebellar: No dysmetria on finger nose testing.    Labs:     06-15    132<L>  |  94<L>  |  6.0<L>  ----------------------------<  76  4.2   |  27.0  |  0.70    Ca    9.2      15 Rui 2020 05:39  Phos  3.4     06-15  Mg     1.8     06-15                              10.6   7.54  )-----------( 248      ( 14 Jun 2020 05:41 )             32.9
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
## 2020-06-15 NOTE — PHYSICAL THERAPY INITIAL EVALUATION ADULT - ADDITIONAL COMMENTS
as per pt. :lives with , unable to provide information about the entrance, independent with functional mobility, reports crawling around the house, at times; unknown  use of assistive device, and social support upon D/C home,  pt is not reliable historian

## 2020-06-15 NOTE — PROGRESS NOTE ADULT - ASSESSMENT
50y Female who is followed by neurology because of seizure    Seizure  Possible alcohol withdrawal- continue ativan prn/CIWA, propofol.  History of head trauma, reported seizure history not on antiseizure drug previously.  EEG shows epileptogenic focus in right frontotemporal region. ? sequela of prior head trauma.  MRI negative for any acute pathology.   Continue Keppra 1500 mg bid.    Alcohol/cocaine abuse  Cessation advised.    No further specific neurologic recommendations. Will be available as needed.     Discussed with Dr Booker.
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
## 2020-06-15 NOTE — PHYSICAL THERAPY INITIAL EVALUATION ADULT - DISCHARGE DISPOSITION, PT EVAL
rehab disposition recommendation may be change base on patient  functional progress/home w/ assist/home w/ home PT

## 2020-06-15 NOTE — OCCUPATIONAL THERAPY INITIAL EVALUATION ADULT - NS ASR FOLLOW COMMAND OT EVAL
pt requires increased time and repetition to process commands of tasks; pt with decreased attention requiring cues to redirect to task; pt requires cues to sequence and problem solve tasks/mobility/50% of the time/able to follow single-step instructions/unable to follow multi-step instructions

## 2020-06-15 NOTE — BEHAVIORAL HEALTH ASSESSMENT NOTE - NSBHCHARTREVIEWLAB_PSY_A_CORE FT
Basic Metabolic Panel in AM (06.15.20 @ 05:39)    Sodium, Serum: 132 mmol/L    Potassium, Serum: 4.2 mmol/L    Chloride, Serum: 94 mmol/L    Carbon Dioxide, Serum: 27.0 mmol/L    Anion Gap, Serum: 11 mmol/L    Blood Urea Nitrogen, Serum: 6.0 mg/dL    Creatinine, Serum: 0.70 mg/dL    Glucose, Serum: 76: Reference Range for Glucose has been amended as of 1/21/2020 mg/dL    Calcium, Total Serum: 9.2 mg/dL    eGFR if Non : 100: Interpretative comment  The units for eGFR are mL/min/1.73M2 (normalized body surface area). The  eGFR is calculated from a serum creatinine using the CKD-EPI equation.  Other variables required for calculation are race, age and sex. Among  patients with chronic kidney disease (CKD), the eGFR is useful in  determining the stage of disease according to KDOQI CKD classification.  All eGFR results are reported numerically with the following  interpretation.          GFR                    With                 Without     (ml/min/1.73 m2)    Kidney Damage       Kidney Damage        >= 90                    Stage 1                     Normal        60-89                    Stage 2                     Decreased GFR        30-59     Stage 3                     Stage 3        15-29                    Stage 4                     Stage 4        < 15                      Stage 5                     Stage 5  Each stage of CKD assumes that the associated GFR level has been in  effect for at least 3 months. Determination of stages one and two (with  eGFR > 59 ml/min/m2) requires estimation of kidney damage for at least 3  months as defined by structural or functional abnormalities.  Limitations: All estimates of GFR will be less accurate for patients at  extremes of muscle mass (including but not limited to frail elderly,  critically ill, or cancer patients), those with unusual diets, and those  with conditions associated with reduced secretion or extrarenal  elimination of creatinine. The eGFR equation is not recommended for use  in patients with unstable creatinine levels. mL/min/1.73M2    eGFR if African American: 116 mL/min/1.73M2

## 2020-06-15 NOTE — OCCUPATIONAL THERAPY INITIAL EVALUATION ADULT - GENERAL OBSERVATIONS, REHAB EVAL
Received pt seated in bedside chair, +IV lock, +telemetry, +4LNCO2, +Primafit, 1:1 supervision present. Pt agrees to OT.

## 2020-06-15 NOTE — PROGRESS NOTE ADULT - SUBJECTIVE AND OBJECTIVE BOX
[facility]                                                               Office: [address] [address]                                                              Telephone: [phone]. Fax:[phone]                                                                             PROGRESS NOTE  Reason for follow up: New HFrEF  Overnight: No new events.  Update: Patient with increased impulsivity, trying to get out of bed despite weakness. Patient currently on 1:1. [facility] deferred today due to patient's mental status. Will re-evaluate tomorrow. Patient states she still feels a little bit short of breath, but it is improving. Saturating well on nasal cannula.     Review of symptoms:   Cardiac:  No chest pain. + dyspnea. No palpitations.  Respiratory: + cough. + dyspnea  Gastrointestinal: No diarrhea. No abdominal pain. No bleeding.     Past medical history: No updates.   	  Vitals:  T(C): 36.4 (06-15-20 @ 08:00), Max: 36.7 (06-14-20 @ 22:58)  HR: 72 (06-15-20 @ 08:00) (70 - 90)  BP: 90/63 (06-15-20 @ 08:00) (88/64 - 101/70)  RR: 18 (06-15-20 @ 08:00) (18 - 18)  SpO2: 99% (06-15-20 @ 08:00) (99% - 100%)  Wt(kg): --  I&O's Summary    14 Jun 2020 07:01  -  15 Rui 2020 07:00  --------------------------------------------------------  IN: 858 mL / OUT: 100 mL / NET: 758 mL      Weight (kg): 70.3 (06-12 @ 12:00)      PHYSICAL EXAM:  Appearance: Comfortable. No acute distress  HEENT:  Head and neck: Atraumatic. Normocephalic.  Normal oral mucosa, PERRL, Neck is supple. No JVD, No carotid bruit.   Neurologic: A&Ox 3, no focal deficits. EOMI, Cranial nerves are intact.  Lymphatic: No cervical lymphadenopathy  Cardiovascular: Normal S1 S2, No murmur, rubs/gallops. No JVD, No edema  Respiratory: Lungs clear to auscultation  Gastrointestinal:  Soft, Non-tender, + BS  Lower Extremities: No edema  Psychiatry: Patient is calm. No agitation. Mood & affect appropriate  Skin: No rashes/ecchymoses/cyanosis/ulcers visualized on the face, hands or feet.      CURRENT MEDICATIONS:  carvedilol 3.125 milliGRAM(s) Oral every 12 hours  losartan 25 milliGRAM(s) Oral daily    piperacillin/tazobactam IVPB..  escitalopram  levETIRAcetam  IVPB  QUEtiapine  aspirin enteric coated  chlorhexidine 2% Cloths  enoxaparin Injectable  folic acid  multivitamin  mupirocin 2% Nasal  potassium chloride    Tablet ER  thiamine      DIAGNOSTIC TESTING:  [ ] Echocardiogram:   < from: TTE Echo Complete w/o Contrast w/ Doppler (06.10.20 @ 21:49) >     PHYSICIAN INTERPRETATION:  Left Ventricle: The left ventricular internal cavity size is normal.  Global LV systolic function was moderately to severely decreased. Left ventricular ejection fraction, by visual estimation, is 25 to 30%. Spectral Doppler shows impaired relaxation pattern of left ventricular myocardial filling (Grade I diastolic dysfunction).       LV Wall Scoring:  The mid and apical inferior wall, basal and mid inferolateral wall, apical  septal segment, and apex are akinetic.    Right Ventricle: The right ventricular size is normal. RV systolic function is normal.  Left Atrium: Normal left atrial size.  Right Atrium: Normal right atrial size.  Pericardium: There is no evidence of pericardial effusion.  Mitral Valve: Thickening of the anterior and posterior mitral valve leaflets. There is mild mitral annular calcification. Trace mitral valve regurgitation is seen.  Tricuspid Valve: Mild tricuspid regurgitation is visualized.  Aortic Valve: The aortic valve was not well visualized. Sclerotic aortic valve with normal opening. Mild aortic valve regurgitation is seen.  Pulmonic Valve: The pulmonic valve was not well visualized.  Aorta: The aortic root is normal in size and structure.  Pulmonary Artery: The pulmonary artery is not well seen.  Venous: Patient on Mechanical ventilation unable to assess Right Atrial pressure.       Summary:   1. Left ventricular ejection fraction, by visual estimation, is 25 to 30%.   2. Technically difficult study.  3. Moderately to severely decreased global left ventricular systolic function.   4. Mid and apical inferior wall, basal and mid inferolateral wall, apical septal segment, and apex are abnormal as described above.   5. Spectral Doppler shows impaired relaxation pattern of left ventricular myocardial filling (Grade I diastolic dysfunction).   6. Mild mitral annular calcification.   7. Thickening of the anterior and posterior mitral valve leaflets.   8. Trace mitral valve regurgitation.   9. Mild aortic regurgitation.  10. Sclerotic aortic valve with normal opening.    MD Jazmine Electronically signed on 6/11/2020 at 9:28:20 AM    < end of copied text >    [ ]  Catheterization: Pending    OTHER: 	      LABS:	 	                            10.6   7.54  )-----------( 248      ( 14 Jun 2020 05:41 )             32.9     06-15    132<L>  |  94<L>  |  6.0<L>  ----------------------------<  76  4.2   |  27.0  |  0.70    Ca    9.2      15 Rui 2020 05:39  Phos  3.4     06-15  Mg     1.8     06-15      proBNP:   Lipid Profile: Date: 06-13 @ 04:07  Total cholesterol 137; Direct LDL: 68; HDL: 54; Triglycerides:75    HgA1c:   TSH:       TELEMETRY: Reviewed  SR
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
## 2020-06-15 NOTE — OCCUPATIONAL THERAPY INITIAL EVALUATION ADULT - PLANNED THERAPY INTERVENTIONS, OT EVAL
toilet/ADL retraining/balance training/bed mobility training/motor coordination training/neuromuscular re-education/parent/caregiver training.../strengthening/transfer training

## 2020-06-15 NOTE — OCCUPATIONAL THERAPY INITIAL EVALUATION ADULT - SPECIAL TRAINING, OT EVAL
Functional mobility with moderate assistance due to decreased strength and decreased balance/coordination; cues for foot placement, upright postural control and safe negotiation of environment/obstacles. Pt requires increased time and cues for safety throughout mobility.

## 2020-06-15 NOTE — PHYSICAL THERAPY INITIAL EVALUATION ADULT - PRECAUTIONS/LIMITATIONS, REHAB EVAL
seizure/cardiac precautions/fall precautions seizure, 4 l via NC/cardiac precautions/fall precautions/oxygen therapy device and L/min
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
## 2020-06-15 NOTE — BEHAVIORAL HEALTH ASSESSMENT NOTE - NSBHCHARTREVIEWINVESTIGATE_PSY_A_CORE FT
Ventricular Rate 84 BPM    Atrial Rate 84 BPM    P-R Interval 164 ms    QRS Duration 72 ms    Q-T Interval 428 ms    QTC Calculation(Bezet) 505 ms    P Axis 48 degrees    R Axis 14 degrees    T Axis 171 degrees    Diagnosis Line Normal sinus rhythm  T wave abnormality, consider lateral ischemia  Prolonged QT  Abnormal ECG    Confirmed by Gamal Moore ([id number]) on 6/12/2020 8:00:10 PM

## 2020-06-15 NOTE — OCCUPATIONAL THERAPY INITIAL EVALUATION ADULT - PERTINENT HX OF CURRENT PROBLEM, REHAB EVAL
Pt presents to ED actively seizing with reports of multiple back to back seizures prior to arrival with no return to baseline in between seizures. Hospital course involved acute respiratory failure requiring intubation, multifocal pneumonia and new onset cardiomyopathy. Pt (+) cocaine.

## 2020-06-15 NOTE — OCCUPATIONAL THERAPY INITIAL EVALUATION ADULT - ORIENTATION, REHAB EVAL
pt grossly to time and reports it is "July 2020" however pt unable to identify place/location and reports "being home" and "at her in-laws"/person/time

## 2020-06-16 LAB
ANION GAP SERPL CALC-SCNC: 12 MMOL/L — SIGNIFICANT CHANGE UP (ref 5–17)
BUN SERPL-MCNC: 6 MG/DL — LOW (ref 8–20)
CALCIUM SERPL-MCNC: 9.2 MG/DL — SIGNIFICANT CHANGE UP (ref 8.6–10.2)
CHLORIDE SERPL-SCNC: 97 MMOL/L — LOW (ref 98–107)
CO2 SERPL-SCNC: 25 MMOL/L — SIGNIFICANT CHANGE UP (ref 22–29)
CREAT SERPL-MCNC: 0.57 MG/DL — SIGNIFICANT CHANGE UP (ref 0.5–1.3)
GLUCOSE SERPL-MCNC: 72 MG/DL — SIGNIFICANT CHANGE UP (ref 70–99)
HCT VFR BLD CALC: 33.3 % — LOW (ref 34.5–45)
HGB BLD-MCNC: 10.9 G/DL — LOW (ref 11.5–15.5)
MAGNESIUM SERPL-MCNC: 1.5 MG/DL — LOW (ref 1.8–2.6)
MCHC RBC-ENTMCNC: 28.5 PG — SIGNIFICANT CHANGE UP (ref 27–34)
MCHC RBC-ENTMCNC: 32.7 GM/DL — SIGNIFICANT CHANGE UP (ref 32–36)
MCV RBC AUTO: 87.2 FL — SIGNIFICANT CHANGE UP (ref 80–100)
PHOSPHATE SERPL-MCNC: 3.4 MG/DL — SIGNIFICANT CHANGE UP (ref 2.4–4.7)
PLATELET # BLD AUTO: 361 K/UL — SIGNIFICANT CHANGE UP (ref 150–400)
POTASSIUM SERPL-MCNC: 3.5 MMOL/L — SIGNIFICANT CHANGE UP (ref 3.5–5.3)
POTASSIUM SERPL-SCNC: 3.5 MMOL/L — SIGNIFICANT CHANGE UP (ref 3.5–5.3)
RBC # BLD: 3.82 M/UL — SIGNIFICANT CHANGE UP (ref 3.8–5.2)
RBC # FLD: 22.4 % — HIGH (ref 10.3–14.5)
SODIUM SERPL-SCNC: 134 MMOL/L — LOW (ref 135–145)
WBC # BLD: 8.73 K/UL — SIGNIFICANT CHANGE UP (ref 3.8–10.5)
WBC # FLD AUTO: 8.73 K/UL — SIGNIFICANT CHANGE UP (ref 3.8–10.5)

## 2020-06-16 PROCEDURE — 93010 ELECTROCARDIOGRAM REPORT: CPT

## 2020-06-16 PROCEDURE — 99232 SBSQ HOSP IP/OBS MODERATE 35: CPT

## 2020-06-16 PROCEDURE — 99233 SBSQ HOSP IP/OBS HIGH 50: CPT

## 2020-06-16 RX ORDER — MAGNESIUM SULFATE 500 MG/ML
2 VIAL (ML) INJECTION EVERY 4 HOURS
Refills: 0 | Status: COMPLETED | OUTPATIENT
Start: 2020-06-16 | End: 2020-06-16

## 2020-06-16 RX ORDER — ACETAMINOPHEN 500 MG
650 TABLET ORAL ONCE
Refills: 0 | Status: COMPLETED | OUTPATIENT
Start: 2020-06-16 | End: 2020-06-16

## 2020-06-16 RX ORDER — HALOPERIDOL DECANOATE 100 MG/ML
3 INJECTION INTRAMUSCULAR EVERY 6 HOURS
Refills: 0 | Status: DISCONTINUED | OUTPATIENT
Start: 2020-06-16 | End: 2020-06-19

## 2020-06-16 RX ORDER — CARVEDILOL PHOSPHATE 80 MG/1
3.12 CAPSULE, EXTENDED RELEASE ORAL EVERY 12 HOURS
Refills: 0 | Status: DISCONTINUED | OUTPATIENT
Start: 2020-06-16 | End: 2020-06-19

## 2020-06-16 RX ORDER — QUETIAPINE FUMARATE 200 MG/1
50 TABLET, FILM COATED ORAL DAILY
Refills: 0 | Status: DISCONTINUED | OUTPATIENT
Start: 2020-06-16 | End: 2020-06-19

## 2020-06-16 RX ADMIN — Medication 1 PATCH: at 17:25

## 2020-06-16 RX ADMIN — Medication 50 GRAM(S): at 10:49

## 2020-06-16 RX ADMIN — HALOPERIDOL DECANOATE 3 MILLIGRAM(S): 100 INJECTION INTRAMUSCULAR at 17:50

## 2020-06-16 RX ADMIN — Medication 2 MILLIGRAM(S): at 11:09

## 2020-06-16 RX ADMIN — LEVETIRACETAM 400 MILLIGRAM(S): 250 TABLET, FILM COATED ORAL at 17:24

## 2020-06-16 RX ADMIN — Medication 1 TABLET(S): at 09:10

## 2020-06-16 RX ADMIN — CHLORHEXIDINE GLUCONATE 1 APPLICATION(S): 213 SOLUTION TOPICAL at 09:11

## 2020-06-16 RX ADMIN — Medication 100 MILLIGRAM(S): at 09:10

## 2020-06-16 RX ADMIN — CARVEDILOL PHOSPHATE 3.12 MILLIGRAM(S): 80 CAPSULE, EXTENDED RELEASE ORAL at 21:45

## 2020-06-16 RX ADMIN — MUPIROCIN 1 APPLICATION(S): 20 OINTMENT TOPICAL at 09:10

## 2020-06-16 RX ADMIN — PIPERACILLIN AND TAZOBACTAM 25 GRAM(S): 4; .5 INJECTION, POWDER, LYOPHILIZED, FOR SOLUTION INTRAVENOUS at 21:45

## 2020-06-16 RX ADMIN — Medication 650 MILLIGRAM(S): at 21:45

## 2020-06-16 RX ADMIN — PIPERACILLIN AND TAZOBACTAM 25 GRAM(S): 4; .5 INJECTION, POWDER, LYOPHILIZED, FOR SOLUTION INTRAVENOUS at 05:49

## 2020-06-16 RX ADMIN — ESCITALOPRAM OXALATE 10 MILLIGRAM(S): 10 TABLET, FILM COATED ORAL at 09:11

## 2020-06-16 RX ADMIN — LEVETIRACETAM 400 MILLIGRAM(S): 250 TABLET, FILM COATED ORAL at 05:49

## 2020-06-16 RX ADMIN — Medication 81 MILLIGRAM(S): at 09:11

## 2020-06-16 RX ADMIN — Medication 1 PATCH: at 19:02

## 2020-06-16 RX ADMIN — MUPIROCIN 1 APPLICATION(S): 20 OINTMENT TOPICAL at 17:25

## 2020-06-16 RX ADMIN — QUETIAPINE FUMARATE 50 MILLIGRAM(S): 200 TABLET, FILM COATED ORAL at 21:44

## 2020-06-16 RX ADMIN — Medication 1 PATCH: at 09:10

## 2020-06-16 RX ADMIN — Medication 1 MILLIGRAM(S): at 09:11
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
## 2020-06-16 NOTE — CHART NOTE - NSCHARTNOTEFT_GEN_A_CORE
Source: Patient [ ]  Family [ ]   other [x ] pt currently sleeping- spoke with 1:1    Current Diet: Diet, Dysphagia 1 Pureed-Nectar Consistency Fluid:   DASH/TLC {Sodium & Cholesteral Restricted} (06-14-20 @ 11:53)    PO intake: Pt with decreased po intake per 1:1    Current Weight:   (6/16) 148 lbs  (6/12) 162 lbs  (6/10) 143.3 lbs    % Weight Change - question accuracy of wts, will continue to monitor.  No edema noted.    Pertinent Medications: MEDICATIONS  (STANDING):  aspirin enteric coated 81 milliGRAM(s) Oral daily  carvedilol 3.125 milliGRAM(s) Oral every 12 hours  enoxaparin Injectable 40 milliGRAM(s) SubCutaneous daily  escitalopram 10 milliGRAM(s) Oral daily  folic acid 1 milliGRAM(s) Oral daily  levETIRAcetam  IVPB 1500 milliGRAM(s) IV Intermittent every 12 hours  losartan 25 milliGRAM(s) Oral daily  magnesium sulfate  IVPB 2 Gram(s) IV Intermittent every 4 hours  multivitamin 1 Tablet(s) Oral daily  mupirocin 2% Nasal 1 Application(s) Nasal two times a day  nicotine - 21 mG/24Hr(s) Patch 1 Patch Transdermal daily  piperacillin/tazobactam IVPB.. 3.375 Gram(s) IV Intermittent every 8 hours  QUEtiapine 50 milliGRAM(s) Oral at bedtime  thiamine 100 milliGRAM(s) Oral daily    MEDICATIONS  (PRN):  ALBUTerol    90 MICROgram(s) HFA Inhaler 2 Puff(s) Inhalation every 6 hours PRN Shortness of Breath and/or Wheezing  albuterol/ipratropium for Nebulization 3 milliLiter(s) Nebulizer every 6 hours PRN Shortness of Breath and/or Wheezing  LORazepam   Injectable 2 milliGRAM(s) IV Push every 2 hours PRN CIWA >8    Pertinent Labs: CBC Full  -  ( 16 Jun 2020 06:15 )  WBC Count : 8.73 K/uL  RBC Count : 3.82 M/uL  Hemoglobin : 10.9 g/dL  Hematocrit : 33.3 %  Platelet Count - Automated : 361 K/uL  Mean Cell Volume : 87.2 fl  Mean Cell Hemoglobin : 28.5 pg  Mean Cell Hemoglobin Concentration : 32.7 gm/dL  06-16 Na134 mmol/L<L> Glu 72 mg/dL K+ 3.5 mmol/L Cr  0.57 mg/dL BUN 6.0 mg/dL<L> Phos 3.4 mg/dL Alb n/a   PAB n/a       Skin: no skin breakdown noted    Nutrition focused physical exam previously conducted - found signs of malnutrition [ ]absent [x ]present    Subcutaneous fat loss: [x ] Orbital fat pads region, [ ]Buccal fat region, [ ]Triceps region,  [ ]Ribs region    Muscle wasting: [ x]Temples region, [x ]Clavicle region, [ ]Shoulder region, [ ]Scapula region, [ ]Interosseous region,  [ ]thigh region, [ ]Calf region    Current Nutrition Diagnosis: Pt remains at high nutrition risk secondary to moderate protein calorie malnutrition (acute on chronic) related to inability to meet sufficient protein-energy in setting of alcohol abuse with withdrawal seizure and crack pneumonitis as evidenced by likely meeting <75% nutrient needs >1 month, mild muscle wasting and fat loss.   Pt now receiving puree with nectar per SLP recommendation with poor po intake at this time.    Recommendations:   1. RX: Ensure TID (nectar)  2. Continue with diet consistency per SLP  3. Continue with MVI, Thiamine and Folic Acid daily  4. Monitor daily wts     Monitoring and Evaluation:   [x ] PO intake [x ] Tolerance to diet prescription [X] Weights  [X] Follow up per protocol [X] Labs:

## 2020-06-16 NOTE — PROGRESS NOTE ADULT - SUBJECTIVE AND OBJECTIVE BOX
seen for seizures, pna    calm this am  per 1:1 and rn still with impulsiveness and agitation.  ros negative    MEDICATIONS  (STANDING):  aspirin enteric coated 81 milliGRAM(s) Oral daily  carvedilol 3.125 milliGRAM(s) Oral every 12 hours  enoxaparin Injectable 40 milliGRAM(s) SubCutaneous daily  escitalopram 10 milliGRAM(s) Oral daily  folic acid 1 milliGRAM(s) Oral daily  levETIRAcetam  IVPB 1500 milliGRAM(s) IV Intermittent every 12 hours  losartan 25 milliGRAM(s) Oral daily  magnesium sulfate  IVPB 2 Gram(s) IV Intermittent every 4 hours  multivitamin 1 Tablet(s) Oral daily  mupirocin 2% Nasal 1 Application(s) Nasal two times a day  nicotine - 21 mG/24Hr(s) Patch 1 Patch Transdermal daily  piperacillin/tazobactam IVPB.. 3.375 Gram(s) IV Intermittent every 8 hours  QUEtiapine 50 milliGRAM(s) Oral at bedtime  thiamine 100 milliGRAM(s) Oral daily    MEDICATIONS  (PRN):  ALBUTerol    90 MICROgram(s) HFA Inhaler 2 Puff(s) Inhalation every 6 hours PRN Shortness of Breath and/or Wheezing  albuterol/ipratropium for Nebulization 3 milliLiter(s) Nebulizer every 6 hours PRN Shortness of Breath and/or Wheezing      Allergies    Allergy Status Unknown      Vital Signs Last 24 Hrs  T(C): 36.7 (16 Jun 2020 09:00), Max: 36.9 (16 Jun 2020 05:43)  T(F): 98 (16 Jun 2020 09:00), Max: 98.4 (16 Jun 2020 05:43)  HR: 78 (16 Jun 2020 09:00) (74 - 80)  BP: 90/61 (16 Jun 2020 09:00) (90/61 - 109/76)  BP(mean): 71 (16 Jun 2020 09:00) (71 - 71)  RR: 18 (16 Jun 2020 09:00) (18 - 20)  SpO2: 100% (16 Jun 2020 09:00) (95% - 100%)    PHYSICAL EXAM:    GENERAL: NAD  CHEST/LUNG: Clear to percussion bilaterally  HEART: Regular rate and rhythm; S1 S2  ABDOMEN: Soft,  Bowel sounds present  EXTREMITIES: no edema   NERVOUS SYSTEM:  Alert & Oriented X3, nonfocal    LABS:                        10.9   8.73  )-----------( 361      ( 16 Jun 2020 06:15 )             33.3     06-16    134<L>  |  97<L>  |  6.0<L>  ----------------------------<  72  3.5   |  25.0  |  0.57    Ca    9.2      16 Jun 2020 06:15  Phos  3.4     06-16  Mg     1.5     06-16            CAPILLARY BLOOD GLUCOSE            RADIOLOGY & ADDITIONAL TESTS:
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
## 2020-06-16 NOTE — PROGRESS NOTE ADULT - ATTENDING COMMENTS
cc time spent titrating vent settings, IV sedatives, oxygen, antiepileptics, and other life sustaining therapies
No acute intervention at this time.   Patient not a candidate for cardiac cath at this time.   Continue medical management. Will continue to reassess for angiogram to assess for underlying CAD. (low likelihood)
Tushar Brunner MD  Clinical Cardiac Electrophysiology
Will reassess patient tomorrow for cardiac cath.   Relative hypotension- no further uptitration of meds.   Will follow.

## 2020-06-16 NOTE — PROGRESS NOTE ADULT - SUBJECTIVE AND OBJECTIVE BOX
[facility]                                                               Office: [address] [address]                                                              Telephone: [phone]. Fax:[phone]                                                                             PROGRESS NOTE  Reason for follow up: New HFrEF  Overnight: No new events.   Update: Patient still on 1:1, trying to remove her IV, and had an episode of agitation overnight with some ST. Patient's LHC deferred again today due to mental status at this time. Patient complaining of shortness of breath and nausea, but hasn't eaten this morning. Lungs are clear today, saturating well on NC.     Review of symptoms:   Cardiac:  No chest pain. + dyspnea. No palpitations.  Respiratory: No cough. + dyspnea  Gastrointestinal: No diarrhea. No abdominal pain. No bleeding.     Past medical history: No updates.   	  Vitals:  T(C): 36.7 (06-16-20 @ 09:00), Max: 36.9 (06-16-20 @ 05:43)  HR: 78 (06-16-20 @ 09:00) (74 - 80)  BP: 90/61 (06-16-20 @ 09:00) (90/61 - 109/76)  RR: 18 (06-16-20 @ 09:00) (18 - 20)  SpO2: 100% (06-16-20 @ 09:00) (95% - 100%)  Wt(kg): --  I&O's Summary    15 Rui 2020 07:01  -  16 Jun 2020 07:00  --------------------------------------------------------  IN: 100 mL / OUT: 0 mL / NET: 100 mL      Weight (kg): 70.3 (06-12 @ 12:00)    PHYSICAL EXAM:  Appearance: Comfortable. No acute distress  HEENT:  Head and neck: Atraumatic. Normocephalic.  Normal oral mucosa, PERRL, Neck is supple. No JVD, No carotid bruit.   Neurologic: A&Ox 3, no focal deficits. EOMI, Cranial nerves are intact.  Lymphatic: No cervical lymphadenopathy  Cardiovascular: Normal S1 S2, No murmur, rubs/gallops. No JVD, No edema  Respiratory: Lungs clear to auscultation  Gastrointestinal:  Soft, Non-tender, + BS  Lower Extremities: No edema  Psychiatry: Patient is calm. No agitation. Mood & affect appropriate  Skin: No rashes/ecchymoses/cyanosis/ulcers visualized on the face, hands or feet.      CURRENT MEDICATIONS:  carvedilol 3.125 milliGRAM(s) Oral every 12 hours  losartan 25 milliGRAM(s) Oral daily    piperacillin/tazobactam IVPB..  escitalopram  levETIRAcetam  IVPB  QUEtiapine  aspirin enteric coated  enoxaparin Injectable  folic acid  magnesium sulfate  IVPB  multivitamin  mupirocin 2% Nasal  thiamine      DIAGNOSTIC TESTING:  [ ] Echocardiogram:   < from: TTE Echo Complete w/o Contrast w/ Doppler (06.10.20 @ 21:49) >     PHYSICIAN INTERPRETATION:  Left Ventricle: The left ventricular internal cavity size is normal.  Global LV systolic function was moderately to severely decreased. Left ventricular ejection fraction, by visual estimation, is 25 to 30%. Spectral Doppler shows impaired relaxation pattern of left ventricular myocardial filling (Grade I diastolic dysfunction).       LV Wall Scoring:  The mid and apical inferior wall, basal and mid inferolateral wall, apical  septal segment, and apex are akinetic.    Right Ventricle: The right ventricular size is normal. RV systolic function is normal.  Left Atrium: Normal left atrial size.  Right Atrium: Normal right atrial size.  Pericardium: There is no evidence of pericardial effusion.  Mitral Valve: Thickening of the anterior and posterior mitral valve leaflets. There is mild mitral annular calcification. Trace mitral valve regurgitation is seen.  Tricuspid Valve: Mild tricuspid regurgitation is visualized.  Aortic Valve: The aortic valve was not well visualized. Sclerotic aortic valve with normal opening. Mild aortic valve regurgitation is seen.  Pulmonic Valve: The pulmonic valve was not well visualized.  Aorta: The aortic root is normal in size and structure.  Pulmonary Artery: The pulmonary artery is not well seen.  Venous: Patient on Mechanical ventilation unable to assess Right Atrial pressure.       Summary:   1. Left ventricular ejection fraction, by visual estimation, is 25 to 30%.   2. Technically difficult study.  3. Moderately to severely decreased global left ventricular systolic function.   4. Mid and apical inferior wall, basal and mid inferolateral wall, apical septal segment, and apex are abnormal as described above.   5. Spectral Doppler shows impaired relaxation pattern of left ventricular myocardial filling (Grade I diastolic dysfunction).   6. Mild mitral annular calcification.   7. Thickening of the anterior and posterior mitral valve leaflets.   8. Trace mitral valve regurgitation.   9. Mild aortic regurgitation.  10. Sclerotic aortic valve with normal opening.    MD Jazmine Electronically signed on 6/11/2020 at 9:28:20 AM    < end of copied text >    [ ]  Catheterization: Pending    OTHER: 	      LABS:	 	                            10.9   8.73  )-----------( 361      ( 16 Jun 2020 06:15 )             33.3     06-16    134<L>  |  97<L>  |  6.0<L>  ----------------------------<  72  3.5   |  25.0  |  0.57    Ca    9.2      16 Jun 2020 06:15  Phos  3.4     06-16  Mg     1.5     06-16      proBNP:   Lipid Profile: Date: 06-13 @ 04:07  Total cholesterol 137; Direct LDL: 68; HDL: 54; Triglycerides:75    HgA1c:   TSH:       TELEMETRY: Reviewed  SR

## 2020-06-17 LAB
ANION GAP SERPL CALC-SCNC: 14 MMOL/L — SIGNIFICANT CHANGE UP (ref 5–17)
BUN SERPL-MCNC: 6 MG/DL — LOW (ref 8–20)
CALCIUM SERPL-MCNC: 9.8 MG/DL — SIGNIFICANT CHANGE UP (ref 8.6–10.2)
CHLORIDE SERPL-SCNC: 98 MMOL/L — SIGNIFICANT CHANGE UP (ref 98–107)
CO2 SERPL-SCNC: 24 MMOL/L — SIGNIFICANT CHANGE UP (ref 22–29)
CREAT SERPL-MCNC: 0.81 MG/DL — SIGNIFICANT CHANGE UP (ref 0.5–1.3)
GLUCOSE SERPL-MCNC: 77 MG/DL — SIGNIFICANT CHANGE UP (ref 70–99)
HCT VFR BLD CALC: 32.4 % — LOW (ref 34.5–45)
HGB BLD-MCNC: 10.5 G/DL — LOW (ref 11.5–15.5)
MAGNESIUM SERPL-MCNC: 1.5 MG/DL — LOW (ref 1.6–2.6)
MCHC RBC-ENTMCNC: 28.5 PG — SIGNIFICANT CHANGE UP (ref 27–34)
MCHC RBC-ENTMCNC: 32.4 GM/DL — SIGNIFICANT CHANGE UP (ref 32–36)
MCV RBC AUTO: 87.8 FL — SIGNIFICANT CHANGE UP (ref 80–100)
PHOSPHATE SERPL-MCNC: 4.5 MG/DL — SIGNIFICANT CHANGE UP (ref 2.4–4.7)
PLATELET # BLD AUTO: 439 K/UL — HIGH (ref 150–400)
POTASSIUM SERPL-MCNC: 4.1 MMOL/L — SIGNIFICANT CHANGE UP (ref 3.5–5.3)
POTASSIUM SERPL-SCNC: 4.1 MMOL/L — SIGNIFICANT CHANGE UP (ref 3.5–5.3)
RBC # BLD: 3.69 M/UL — LOW (ref 3.8–5.2)
RBC # FLD: 22.4 % — HIGH (ref 10.3–14.5)
SODIUM SERPL-SCNC: 136 MMOL/L — SIGNIFICANT CHANGE UP (ref 135–145)
WBC # BLD: 6.18 K/UL — SIGNIFICANT CHANGE UP (ref 3.8–10.5)
WBC # FLD AUTO: 6.18 K/UL — SIGNIFICANT CHANGE UP (ref 3.8–10.5)

## 2020-06-17 PROCEDURE — 99232 SBSQ HOSP IP/OBS MODERATE 35: CPT

## 2020-06-17 RX ORDER — PIPERACILLIN AND TAZOBACTAM 4; .5 G/20ML; G/20ML
3.38 INJECTION, POWDER, LYOPHILIZED, FOR SOLUTION INTRAVENOUS EVERY 8 HOURS
Refills: 0 | Status: DISCONTINUED | OUTPATIENT
Start: 2020-06-17 | End: 2020-06-18

## 2020-06-17 RX ORDER — LEVETIRACETAM 250 MG/1
1500 TABLET, FILM COATED ORAL
Refills: 0 | Status: DISCONTINUED | OUTPATIENT
Start: 2020-06-17 | End: 2020-06-19

## 2020-06-17 RX ORDER — LEVOFLOXACIN 5 MG/ML
750 INJECTION, SOLUTION INTRAVENOUS EVERY 24 HOURS
Refills: 0 | Status: DISCONTINUED | OUTPATIENT
Start: 2020-06-17 | End: 2020-06-17

## 2020-06-17 RX ORDER — MAGNESIUM SULFATE 500 MG/ML
2 VIAL (ML) INJECTION ONCE
Refills: 0 | Status: COMPLETED | OUTPATIENT
Start: 2020-06-17 | End: 2020-06-17

## 2020-06-17 RX ORDER — MAGNESIUM OXIDE 400 MG ORAL TABLET 241.3 MG
400 TABLET ORAL
Refills: 0 | Status: COMPLETED | OUTPATIENT
Start: 2020-06-17 | End: 2020-06-19

## 2020-06-17 RX ADMIN — PIPERACILLIN AND TAZOBACTAM 25 GRAM(S): 4; .5 INJECTION, POWDER, LYOPHILIZED, FOR SOLUTION INTRAVENOUS at 21:56

## 2020-06-17 RX ADMIN — ESCITALOPRAM OXALATE 10 MILLIGRAM(S): 10 TABLET, FILM COATED ORAL at 12:37

## 2020-06-17 RX ADMIN — CARVEDILOL PHOSPHATE 3.12 MILLIGRAM(S): 80 CAPSULE, EXTENDED RELEASE ORAL at 21:56

## 2020-06-17 RX ADMIN — LEVOFLOXACIN 750 MILLIGRAM(S): 5 INJECTION, SOLUTION INTRAVENOUS at 16:13

## 2020-06-17 RX ADMIN — ENOXAPARIN SODIUM 40 MILLIGRAM(S): 100 INJECTION SUBCUTANEOUS at 12:36

## 2020-06-17 RX ADMIN — Medication 100 MILLIGRAM(S): at 12:37

## 2020-06-17 RX ADMIN — Medication 1 PATCH: at 19:01

## 2020-06-17 RX ADMIN — Medication 1 PATCH: at 12:36

## 2020-06-17 RX ADMIN — Medication 50 GRAM(S): at 08:53

## 2020-06-17 RX ADMIN — Medication 1 PATCH: at 09:00

## 2020-06-17 RX ADMIN — Medication 1 MILLIGRAM(S): at 12:36

## 2020-06-17 RX ADMIN — LEVETIRACETAM 1500 MILLIGRAM(S): 250 TABLET, FILM COATED ORAL at 16:13

## 2020-06-17 RX ADMIN — MAGNESIUM OXIDE 400 MG ORAL TABLET 400 MILLIGRAM(S): 241.3 TABLET ORAL at 16:13

## 2020-06-17 RX ADMIN — PIPERACILLIN AND TAZOBACTAM 25 GRAM(S): 4; .5 INJECTION, POWDER, LYOPHILIZED, FOR SOLUTION INTRAVENOUS at 06:02

## 2020-06-17 RX ADMIN — QUETIAPINE FUMARATE 50 MILLIGRAM(S): 200 TABLET, FILM COATED ORAL at 21:56

## 2020-06-17 RX ADMIN — Medication 81 MILLIGRAM(S): at 12:37

## 2020-06-17 RX ADMIN — QUETIAPINE FUMARATE 50 MILLIGRAM(S): 200 TABLET, FILM COATED ORAL at 12:36

## 2020-06-17 RX ADMIN — Medication 1 PATCH: at 08:56

## 2020-06-17 RX ADMIN — MUPIROCIN 1 APPLICATION(S): 20 OINTMENT TOPICAL at 06:01

## 2020-06-17 RX ADMIN — LEVETIRACETAM 400 MILLIGRAM(S): 250 TABLET, FILM COATED ORAL at 06:00

## 2020-06-17 RX ADMIN — CARVEDILOL PHOSPHATE 3.12 MILLIGRAM(S): 80 CAPSULE, EXTENDED RELEASE ORAL at 08:53

## 2020-06-17 RX ADMIN — Medication 1 TABLET(S): at 12:37
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
## 2020-06-17 NOTE — PROCEDURE NOTE - NSPOSTCAREGUIDE_GEN_A_CORE
Verbal/written post procedure instructions were given to patient/caregiver/Instructed patient/caregiver to follow-up with primary care physician/Instructed patient/caregiver regarding signs and symptoms of infection/Keep the cast/splint/dressing clean and dry/Care for catheter as per unit/ICU protocols
Verbal/written post procedure instructions were given to patient/caregiver

## 2020-06-17 NOTE — PROGRESS NOTE ADULT - SUBJECTIVE AND OBJECTIVE BOX
seen for etoh, seizures, pneumonia, cardiomyopathy    agitated yesterday  no events/complaints  calm per rn and 1:1  ros negative     MEDICATIONS  (STANDING):  aspirin enteric coated 81 milliGRAM(s) Oral daily  carvedilol 3.125 milliGRAM(s) Oral every 12 hours  enoxaparin Injectable 40 milliGRAM(s) SubCutaneous daily  escitalopram 10 milliGRAM(s) Oral daily  folic acid 1 milliGRAM(s) Oral daily  levETIRAcetam  IVPB 1500 milliGRAM(s) IV Intermittent every 12 hours  magnesium oxide 400 milliGRAM(s) Oral two times a day with meals  multivitamin 1 Tablet(s) Oral daily  nicotine - 21 mG/24Hr(s) Patch 1 Patch Transdermal daily  piperacillin/tazobactam IVPB.. 3.375 Gram(s) IV Intermittent every 8 hours  QUEtiapine 50 milliGRAM(s) Oral at bedtime  QUEtiapine 50 milliGRAM(s) Oral daily  thiamine 100 milliGRAM(s) Oral daily    MEDICATIONS  (PRN):  ALBUTerol    90 MICROgram(s) HFA Inhaler 2 Puff(s) Inhalation every 6 hours PRN Shortness of Breath and/or Wheezing  albuterol/ipratropium for Nebulization 3 milliLiter(s) Nebulizer every 6 hours PRN Shortness of Breath and/or Wheezing  haloperidol    Injectable 3 milliGRAM(s) IntraMuscular every 6 hours PRN Agitation      Allergies    Allergy Status Unknown    Vital Signs Last 24 Hrs  T(C): 37.4 (17 Jun 2020 09:15), Max: 37.4 (17 Jun 2020 09:15)  T(F): 99.4 (17 Jun 2020 09:15), Max: 99.4 (17 Jun 2020 09:15)  HR: 85 (17 Jun 2020 09:15) (74 - 85)  BP: 111/78 (17 Jun 2020 09:15) (99/66 - 118/88)  BP(mean): --  RR: 18 (17 Jun 2020 09:15) (16 - 18)  SpO2: 98% (17 Jun 2020 09:15) (98% - 98%)    PHYSICAL EXAM:    GENERAL: NAD  CHEST/LUNG: Clear to percussion bilaterall  HEART: Regular rate and rhythm; S1 S2  ABDOMEN: Soft, Nontender,  Bowel sounds present  EXTREMITIES:  no edema   NERVOUS SYSTEM:  Alert & Oriented X3, Motor Strength 5/5 B/L upper and lower extremities  PSYCH: normal mood, appropriate response.    LABS:                        10.5   6.18  )-----------( 439      ( 17 Jun 2020 06:30 )             32.4     06-17    136  |  98  |  6.0<L>  ----------------------------<  77  4.1   |  24.0  |  0.81    Ca    9.8      17 Jun 2020 06:30  Phos  4.5     06-17  Mg     1.5     06-17            CAPILLARY BLOOD GLUCOSE            RADIOLOGY & ADDITIONAL TESTS:

## 2020-06-17 NOTE — PROCEDURE NOTE - NSPROCDETAILS_GEN_ALL_CORE
patient pre-oxygenated, tube inserted, placement confirmed/connected to ventilator
location identified, draped/prepped, sterile technique used/sterile dressing applied/sterile technique, catheter placed/supine position/ultrasound guidance/ultrasound assessment
nasogastric/audible air bolus/placement confirmed by auscultation/gastric secretions aspirated, placement confirmed

## 2020-06-18 ENCOUNTER — TRANSCRIPTION ENCOUNTER (OUTPATIENT)
Age: 50
End: 2020-06-18

## 2020-06-18 LAB
ANION GAP SERPL CALC-SCNC: 17 MMOL/L — SIGNIFICANT CHANGE UP (ref 5–17)
BUN SERPL-MCNC: 5 MG/DL — LOW (ref 8–20)
CALCIUM SERPL-MCNC: 9.6 MG/DL — SIGNIFICANT CHANGE UP (ref 8.6–10.2)
CHLORIDE SERPL-SCNC: 99 MMOL/L — SIGNIFICANT CHANGE UP (ref 98–107)
CO2 SERPL-SCNC: 21 MMOL/L — LOW (ref 22–29)
CREAT SERPL-MCNC: 0.8 MG/DL — SIGNIFICANT CHANGE UP (ref 0.5–1.3)
GLUCOSE SERPL-MCNC: 72 MG/DL — SIGNIFICANT CHANGE UP (ref 70–99)
MAGNESIUM SERPL-MCNC: 1.3 MG/DL — LOW (ref 1.6–2.6)
POTASSIUM SERPL-MCNC: 4.2 MMOL/L — SIGNIFICANT CHANGE UP (ref 3.5–5.3)
POTASSIUM SERPL-SCNC: 4.2 MMOL/L — SIGNIFICANT CHANGE UP (ref 3.5–5.3)
SODIUM SERPL-SCNC: 137 MMOL/L — SIGNIFICANT CHANGE UP (ref 135–145)

## 2020-06-18 PROCEDURE — 93458 L HRT ARTERY/VENTRICLE ANGIO: CPT | Mod: 26

## 2020-06-18 PROCEDURE — 99152 MOD SED SAME PHYS/QHP 5/>YRS: CPT

## 2020-06-18 PROCEDURE — 99239 HOSP IP/OBS DSCHRG MGMT >30: CPT

## 2020-06-18 PROCEDURE — 99232 SBSQ HOSP IP/OBS MODERATE 35: CPT

## 2020-06-18 RX ORDER — MAGNESIUM SULFATE 500 MG/ML
2 VIAL (ML) INJECTION EVERY 4 HOURS
Refills: 0 | Status: COMPLETED | OUTPATIENT
Start: 2020-06-18 | End: 2020-06-18

## 2020-06-18 RX ORDER — LEVETIRACETAM 250 MG/1
2 TABLET, FILM COATED ORAL
Qty: 120 | Refills: 0
Start: 2020-06-18 | End: 2020-07-17

## 2020-06-18 RX ORDER — LISINOPRIL 2.5 MG/1
1 TABLET ORAL
Qty: 30 | Refills: 0
Start: 2020-06-18 | End: 2020-07-17

## 2020-06-18 RX ORDER — CARVEDILOL PHOSPHATE 80 MG/1
1 CAPSULE, EXTENDED RELEASE ORAL
Qty: 60 | Refills: 0
Start: 2020-06-18 | End: 2020-07-17

## 2020-06-18 RX ADMIN — PIPERACILLIN AND TAZOBACTAM 25 GRAM(S): 4; .5 INJECTION, POWDER, LYOPHILIZED, FOR SOLUTION INTRAVENOUS at 05:36

## 2020-06-18 RX ADMIN — Medication 1 TABLET(S): at 16:45

## 2020-06-18 RX ADMIN — Medication 50 GRAM(S): at 16:45

## 2020-06-18 RX ADMIN — CARVEDILOL PHOSPHATE 3.12 MILLIGRAM(S): 80 CAPSULE, EXTENDED RELEASE ORAL at 21:55

## 2020-06-18 RX ADMIN — Medication 100 MILLIGRAM(S): at 13:56

## 2020-06-18 RX ADMIN — MAGNESIUM OXIDE 400 MG ORAL TABLET 400 MILLIGRAM(S): 241.3 TABLET ORAL at 17:08

## 2020-06-18 RX ADMIN — Medication 50 GRAM(S): at 17:36

## 2020-06-18 RX ADMIN — MAGNESIUM OXIDE 400 MG ORAL TABLET 400 MILLIGRAM(S): 241.3 TABLET ORAL at 08:25

## 2020-06-18 RX ADMIN — QUETIAPINE FUMARATE 50 MILLIGRAM(S): 200 TABLET, FILM COATED ORAL at 21:55

## 2020-06-18 RX ADMIN — Medication 1 PATCH: at 08:25

## 2020-06-18 RX ADMIN — LEVETIRACETAM 1500 MILLIGRAM(S): 250 TABLET, FILM COATED ORAL at 09:55

## 2020-06-18 RX ADMIN — Medication 1 MILLIGRAM(S): at 17:08

## 2020-06-18 RX ADMIN — ESCITALOPRAM OXALATE 10 MILLIGRAM(S): 10 TABLET, FILM COATED ORAL at 13:56

## 2020-06-18 RX ADMIN — Medication 81 MILLIGRAM(S): at 10:39

## 2020-06-18 RX ADMIN — LEVETIRACETAM 1500 MILLIGRAM(S): 250 TABLET, FILM COATED ORAL at 17:34

## 2020-06-18 RX ADMIN — CARVEDILOL PHOSPHATE 3.12 MILLIGRAM(S): 80 CAPSULE, EXTENDED RELEASE ORAL at 09:56

## 2020-06-18 RX ADMIN — Medication 1 PATCH: at 13:57
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
## 2020-06-18 NOTE — PROGRESS NOTE ADULT - SUBJECTIVE AND OBJECTIVE BOX
seen for pna, seizures, cardiomyopathy    no acute complaints/events  ros negative     MEDICATIONS  (STANDING):  aspirin enteric coated 81 milliGRAM(s) Oral daily  carvedilol 3.125 milliGRAM(s) Oral every 12 hours  enoxaparin Injectable 40 milliGRAM(s) SubCutaneous daily  escitalopram 10 milliGRAM(s) Oral daily  folic acid 1 milliGRAM(s) Oral daily  levETIRAcetam 1500 milliGRAM(s) Oral two times a day  magnesium oxide 400 milliGRAM(s) Oral two times a day with meals  magnesium sulfate  IVPB 2 Gram(s) IV Intermittent every 4 hours  multivitamin 1 Tablet(s) Oral daily  nicotine - 21 mG/24Hr(s) Patch 1 Patch Transdermal daily  piperacillin/tazobactam IVPB.. 3.375 Gram(s) IV Intermittent every 8 hours  QUEtiapine 50 milliGRAM(s) Oral at bedtime  QUEtiapine 50 milliGRAM(s) Oral daily  thiamine 100 milliGRAM(s) Oral daily    MEDICATIONS  (PRN):  ALBUTerol    90 MICROgram(s) HFA Inhaler 2 Puff(s) Inhalation every 6 hours PRN Shortness of Breath and/or Wheezing  albuterol/ipratropium for Nebulization 3 milliLiter(s) Nebulizer every 6 hours PRN Shortness of Breath and/or Wheezing  haloperidol    Injectable 3 milliGRAM(s) IntraMuscular every 6 hours PRN Agitation      Allergies    Allergy Status Unknown        Vital Signs Last 24 Hrs  T(C): 36.6 (18 Jun 2020 10:23), Max: 36.8 (17 Jun 2020 19:30)  T(F): 97.8 (18 Jun 2020 10:23), Max: 98.3 (17 Jun 2020 19:30)  HR: 68 (18 Jun 2020 10:23) (68 - 98)  BP: 122/85 (18 Jun 2020 10:23) (104/67 - 122/85)  BP(mean): --  RR: 16 (18 Jun 2020 10:23) (16 - 18)  SpO2: 98% (18 Jun 2020 10:23) (96% - 99%)    PHYSICAL EXAM:    GENERAL: NAD  CHEST/LUNG: Clear to percussion bilaterally  HEART: Regular rate and rhythm; S1 S2  ABDOMEN: Soft, Nontender, Bowel sounds present  EXTREMITIES:  no edema   NERVOUS SYSTEM:  Alert & Oriented X3, Motor Strength 5/5 B/L upper and lower extremities  PSYCH: normal mood, appropriate response.    LABS:                        10.5   6.18  )-----------( 439      ( 17 Jun 2020 06:30 )             32.4     06-18    137  |  99  |  5.0<L>  ----------------------------<  72  4.2   |  21.0<L>  |  0.80    Ca    9.6      18 Jun 2020 09:29  Phos  4.5     06-17  Mg     1.3     06-18            CAPILLARY BLOOD GLUCOSE            RADIOLOGY & ADDITIONAL TESTS:

## 2020-06-18 NOTE — DISCHARGE NOTE PROVIDER - CARE PROVIDER_API CALL
Gilberto Vasquez  CARDIOVASCULAR DISEASE  [address] [place] [address]  Phone: [phone]  Fax: [phone]  Follow Up Time:     Prabhu Ochoa  NEUROLOGY  [address] [place] [address]  Phone: [phone]  Fax: [phone]  Follow Up Time:

## 2020-06-18 NOTE — DISCHARGE NOTE PROVIDER - CARE PROVIDERS DIRECT ADDRESSES
[email or web address]

## 2020-06-18 NOTE — DISCHARGE NOTE PROVIDER - NSDCMRMEDTOKEN_GEN_ALL_CORE_FT
escitalopram 10 mg oral tablet: 1 tab(s) orally once a day  haloperidol 5 mg oral tablet: 1 tab(s) orally once a day  QUEtiapine 100 mg oral tablet: 1 tab(s) orally once a day (at bedtime)  Xarelto 20 mg oral tablet: 1 tab(s) orally once a day carvedilol 3.125 mg oral tablet: 1 tab(s) orally every 12 hours  escitalopram 10 mg oral tablet: 1 tab(s) orally once a day  levETIRAcetam 750 mg oral tablet: 2 tab(s) orally 2 times a day  lisinopril 10 mg oral tablet: 1 tab(s) orally once a day   Multiple Vitamins oral tablet: 1 tab(s) orally once a day  QUEtiapine 100 mg oral tablet: 1 tab(s) orally once a day (at bedtime)
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
## 2020-06-18 NOTE — DISCHARGE NOTE PROVIDER - NSDCCPCAREPLAN_GEN_ALL_CORE_FT
PRINCIPAL DISCHARGE DIAGNOSIS  Diagnosis: Status epilepticus  Assessment and Plan of Treatment: resolved  continue seizure medications as prescribed  follow up with neurology in 1-2 weeks and PMD within 1 week.      SECONDARY DISCHARGE DIAGNOSES  Diagnosis: Nonischemic cardiomyopathy  Assessment and Plan of Treatment: medical management  follow up with cardiology in 1 week    Diagnosis: Cocaine use  Assessment and Plan of Treatment: avoid use    Diagnosis: Multifocal pneumonia  Assessment and Plan of Treatment: resolved after antibiotics    Diagnosis: ETOH abuse  Assessment and Plan of Treatment: avoid use    Diagnosis: Acute respiratory failure with hypoxemia  Assessment and Plan of Treatment: resolved

## 2020-06-18 NOTE — PROGRESS NOTE ADULT - SUBJECTIVE AND OBJECTIVE BOX
[employer] CARDIOLOGY-[facility]                                                               Office: [address] [address]                                                              Telephone: [phone]. Fax:[phone]                                                                             PROGRESS NOTE  Reason for follow up: New HFrEF  Overnight: No new events.   Update: Patient's mental status greatly improved, patient is now A&O x 3. Patient is eager to get her cath done as well. Will add on for today. Patient still with some dyspnea, but states it is overall improving.       Review of symptoms:   Cardiac:  No chest pain.  + dyspnea. No palpitations.  Respiratory: No cough.  + dyspnea  Gastrointestinal: No diarrhea. No abdominal pain. No bleeding.     Past medical history: No updates.   	  Vitals:  T(C): 36.8 (06-18-20 @ 08:00), Max: 37.4 (06-17-20 @ 09:15)  HR: 69 (06-18-20 @ 08:00) (69 - 98)  BP: 119/86 (06-18-20 @ 08:00) (104/67 - 119/86)  RR: 18 (06-18-20 @ 08:00) (18 - 18)  SpO2: 99% (06-17-20 @ 23:32) (96% - 99%)  Wt(kg): --  I&O's Summary          PHYSICAL EXAM:  Appearance: Comfortable. No acute distress  HEENT:  Head and neck: Atraumatic. Normocephalic.  Normal oral mucosa, PERRL, Neck is supple. No JVD, No carotid bruit.   Neurologic: A&Ox 3, no focal deficits. EOMI, Cranial nerves are intact.  Lymphatic: No cervical lymphadenopathy  Cardiovascular: Normal S1 S2, No murmur, rubs/gallops. No JVD, No edema  Respiratory: Coarse rhonchi bilaterally, improved with coughing.   Gastrointestinal:  Soft, Non-tender, + BS  Lower Extremities: No edema  Psychiatry: Patient is calm. No agitation. Mood & affect appropriate  Skin: No rashes/ecchymoses/cyanosis/ulcers visualized on the face, hands or feet.      CURRENT MEDICATIONS:  carvedilol 3.125 milliGRAM(s) Oral every 12 hours    piperacillin/tazobactam IVPB..  escitalopram  levETIRAcetam  QUEtiapine  QUEtiapine  aspirin enteric coated  enoxaparin Injectable  folic acid  magnesium oxide  multivitamin  thiamine      DIAGNOSTIC TESTING:  < from: TTE Echo Complete w/o Contrast w/ Doppler (06.10.20 @ 21:49) >     PHYSICIAN INTERPRETATION:  Left Ventricle: The left ventricular internal cavity size is normal.  Global LV systolic function was moderately to severely decreased. Left ventricular ejection fraction, by visual estimation, is 25 to 30%. Spectral Doppler shows impaired relaxation pattern of left ventricular myocardial filling (Grade I diastolic dysfunction).       LV Wall Scoring:  The mid and apical inferior wall, basal and mid inferolateral wall, apical  septal segment, and apex are akinetic.    Right Ventricle: The right ventricular size is normal. RV systolic function is normal.  Left Atrium: Normal left atrial size.  Right Atrium: Normal right atrial size.  Pericardium: There is no evidence of pericardial effusion.  Mitral Valve: Thickening of the anterior and posterior mitral valve leaflets. There is mild mitral annular calcification. Trace mitral valve regurgitation is seen.  Tricuspid Valve: Mild tricuspid regurgitation is visualized.  Aortic Valve: The aortic valve was not well visualized. Sclerotic aortic valve with normal opening. Mild aortic valve regurgitation is seen.  Pulmonic Valve: The pulmonic valve was not well visualized.  Aorta: The aortic root is normal in size and structure.  Pulmonary Artery: The pulmonary artery is not well seen.  Venous: Patient on Mechanical ventilation unable to assess Right Atrial pressure.       Summary:   1. Left ventricular ejection fraction, by visual estimation, is 25 to 30%.   2. Technically difficult study.  3. Moderately to severely decreased global left ventricular systolic function.   4. Mid and apical inferior wall, basal and mid inferolateral wall, apical septal segment, and apex are abnormal as described above.   5. Spectral Doppler shows impaired relaxation pattern of left ventricular myocardial filling (Grade I diastolic dysfunction).   6. Mild mitral annular calcification.   7. Thickening of the anterior and posterior mitral valve leaflets.   8. Trace mitral valve regurgitation.   9. Mild aortic regurgitation.  10. Sclerotic aortic valve with normal opening.    MD Jazmine Electronically signed on 6/11/2020 at 9:28:20 AM    < end of copied text >    [ ]  Catheterization: Pending    OTHER: 	      LABS:	 	                            10.5   6.18  )-----------( 439      ( 17 Jun 2020 06:30 )             32.4     06-17    136  |  98  |  6.0<L>  ----------------------------<  77  4.1   |  24.0  |  0.81    Ca    9.8      17 Jun 2020 06:30  Phos  4.5     06-17  Mg     1.5     06-17      proBNP:   Lipid Profile: Date: 06-13 @ 04:07  Total cholesterol 137; Direct LDL: 68; HDL: 54; Triglycerides:75    HgA1c:   TSH:
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
## 2020-06-19 ENCOUNTER — TRANSCRIPTION ENCOUNTER (OUTPATIENT)
Age: 50
End: 2020-06-19

## 2020-06-19 VITALS
DIASTOLIC BLOOD PRESSURE: 89 MMHG | OXYGEN SATURATION: 95 % | SYSTOLIC BLOOD PRESSURE: 124 MMHG | TEMPERATURE: 98 F | HEART RATE: 67 BPM | RESPIRATION RATE: 19 BRPM

## 2020-06-19 LAB
ANION GAP SERPL CALC-SCNC: 13 MMOL/L — SIGNIFICANT CHANGE UP (ref 5–17)
BUN SERPL-MCNC: 7 MG/DL — LOW (ref 8–20)
CALCIUM SERPL-MCNC: 9.7 MG/DL — SIGNIFICANT CHANGE UP (ref 8.6–10.2)
CHLORIDE SERPL-SCNC: 97 MMOL/L — LOW (ref 98–107)
CO2 SERPL-SCNC: 20 MMOL/L — LOW (ref 22–29)
CREAT SERPL-MCNC: 0.81 MG/DL — SIGNIFICANT CHANGE UP (ref 0.5–1.3)
GLUCOSE SERPL-MCNC: 97 MG/DL — SIGNIFICANT CHANGE UP (ref 70–99)
HCT VFR BLD CALC: 33.1 % — LOW (ref 34.5–45)
HGB BLD-MCNC: 10.5 G/DL — LOW (ref 11.5–15.5)
MAGNESIUM SERPL-MCNC: 1.8 MG/DL — SIGNIFICANT CHANGE UP (ref 1.6–2.6)
MCHC RBC-ENTMCNC: 28.1 PG — SIGNIFICANT CHANGE UP (ref 27–34)
MCHC RBC-ENTMCNC: 31.7 GM/DL — LOW (ref 32–36)
MCV RBC AUTO: 88.5 FL — SIGNIFICANT CHANGE UP (ref 80–100)
PLATELET # BLD AUTO: 398 K/UL — SIGNIFICANT CHANGE UP (ref 150–400)
POTASSIUM SERPL-MCNC: 4.8 MMOL/L — SIGNIFICANT CHANGE UP (ref 3.5–5.3)
POTASSIUM SERPL-SCNC: 4.8 MMOL/L — SIGNIFICANT CHANGE UP (ref 3.5–5.3)
RBC # BLD: 3.74 M/UL — LOW (ref 3.8–5.2)
RBC # FLD: 22.5 % — HIGH (ref 10.3–14.5)
SODIUM SERPL-SCNC: 130 MMOL/L — LOW (ref 135–145)
WBC # BLD: 5.89 K/UL — SIGNIFICANT CHANGE UP (ref 3.8–10.5)
WBC # FLD AUTO: 5.89 K/UL — SIGNIFICANT CHANGE UP (ref 3.8–10.5)

## 2020-06-19 PROCEDURE — 83605 ASSAY OF LACTIC ACID: CPT

## 2020-06-19 PROCEDURE — 93306 TTE W/DOPPLER COMPLETE: CPT

## 2020-06-19 PROCEDURE — 97530 THERAPEUTIC ACTIVITIES: CPT

## 2020-06-19 PROCEDURE — 80053 COMPREHEN METABOLIC PANEL: CPT

## 2020-06-19 PROCEDURE — 84295 ASSAY OF SERUM SODIUM: CPT

## 2020-06-19 PROCEDURE — 97163 PT EVAL HIGH COMPLEX 45 MIN: CPT

## 2020-06-19 PROCEDURE — 80061 LIPID PANEL: CPT

## 2020-06-19 PROCEDURE — 99239 HOSP IP/OBS DSCHRG MGMT >30: CPT

## 2020-06-19 PROCEDURE — 97535 SELF CARE MNGMENT TRAINING: CPT

## 2020-06-19 PROCEDURE — 87070 CULTURE OTHR SPECIMN AEROBIC: CPT

## 2020-06-19 PROCEDURE — 94002 VENT MGMT INPAT INIT DAY: CPT

## 2020-06-19 PROCEDURE — 95714 VEEG EA 12-26 HR UNMNTR: CPT

## 2020-06-19 PROCEDURE — C1887: CPT

## 2020-06-19 PROCEDURE — C1894: CPT

## 2020-06-19 PROCEDURE — U0003: CPT

## 2020-06-19 PROCEDURE — 97116 GAIT TRAINING THERAPY: CPT

## 2020-06-19 PROCEDURE — 93005 ELECTROCARDIOGRAM TRACING: CPT

## 2020-06-19 PROCEDURE — 92526 ORAL FUNCTION THERAPY: CPT

## 2020-06-19 PROCEDURE — 71045 X-RAY EXAM CHEST 1 VIEW: CPT

## 2020-06-19 PROCEDURE — 96375 TX/PRO/DX INJ NEW DRUG ADDON: CPT | Mod: XU

## 2020-06-19 PROCEDURE — 87635 SARS-COV-2 COVID-19 AMP PRB: CPT

## 2020-06-19 PROCEDURE — 84100 ASSAY OF PHOSPHORUS: CPT

## 2020-06-19 PROCEDURE — 99285 EMERGENCY DEPT VISIT HI MDM: CPT

## 2020-06-19 PROCEDURE — 83036 HEMOGLOBIN GLYCOSYLATED A1C: CPT

## 2020-06-19 PROCEDURE — 92610 EVALUATE SWALLOWING FUNCTION: CPT

## 2020-06-19 PROCEDURE — 82435 ASSAY OF BLOOD CHLORIDE: CPT

## 2020-06-19 PROCEDURE — 36415 COLL VENOUS BLD VENIPUNCTURE: CPT

## 2020-06-19 PROCEDURE — 87641 MR-STAPH DNA AMP PROBE: CPT

## 2020-06-19 PROCEDURE — 80307 DRUG TEST PRSMV CHEM ANLYZR: CPT

## 2020-06-19 PROCEDURE — 82962 GLUCOSE BLOOD TEST: CPT

## 2020-06-19 PROCEDURE — 99291 CRITICAL CARE FIRST HOUR: CPT | Mod: 25

## 2020-06-19 PROCEDURE — 99152 MOD SED SAME PHYS/QHP 5/>YRS: CPT

## 2020-06-19 PROCEDURE — 97112 NEUROMUSCULAR REEDUCATION: CPT

## 2020-06-19 PROCEDURE — 87640 STAPH A DNA AMP PROBE: CPT

## 2020-06-19 PROCEDURE — 94760 N-INVAS EAR/PLS OXIMETRY 1: CPT

## 2020-06-19 PROCEDURE — 94640 AIRWAY INHALATION TREATMENT: CPT

## 2020-06-19 PROCEDURE — 82550 ASSAY OF CK (CPK): CPT

## 2020-06-19 PROCEDURE — 82803 BLOOD GASES ANY COMBINATION: CPT

## 2020-06-19 PROCEDURE — 80076 HEPATIC FUNCTION PANEL: CPT

## 2020-06-19 PROCEDURE — 80048 BASIC METABOLIC PNL TOTAL CA: CPT

## 2020-06-19 PROCEDURE — 97167 OT EVAL HIGH COMPLEX 60 MIN: CPT

## 2020-06-19 PROCEDURE — 96365 THER/PROPH/DIAG IV INF INIT: CPT | Mod: XU

## 2020-06-19 PROCEDURE — 94003 VENT MGMT INPAT SUBQ DAY: CPT

## 2020-06-19 PROCEDURE — 85027 COMPLETE CBC AUTOMATED: CPT

## 2020-06-19 PROCEDURE — 84132 ASSAY OF SERUM POTASSIUM: CPT

## 2020-06-19 PROCEDURE — 71275 CT ANGIOGRAPHY CHEST: CPT

## 2020-06-19 PROCEDURE — 96376 TX/PRO/DX INJ SAME DRUG ADON: CPT | Mod: XU

## 2020-06-19 PROCEDURE — 93458 L HRT ARTERY/VENTRICLE ANGIO: CPT

## 2020-06-19 PROCEDURE — 83735 ASSAY OF MAGNESIUM: CPT

## 2020-06-19 PROCEDURE — 82330 ASSAY OF CALCIUM: CPT

## 2020-06-19 PROCEDURE — 97110 THERAPEUTIC EXERCISES: CPT

## 2020-06-19 PROCEDURE — 85014 HEMATOCRIT: CPT

## 2020-06-19 PROCEDURE — 82947 ASSAY GLUCOSE BLOOD QUANT: CPT

## 2020-06-19 PROCEDURE — 81001 URINALYSIS AUTO W/SCOPE: CPT

## 2020-06-19 PROCEDURE — 51702 INSERT TEMP BLADDER CATH: CPT

## 2020-06-19 PROCEDURE — C1769: CPT

## 2020-06-19 PROCEDURE — 70551 MRI BRAIN STEM W/O DYE: CPT

## 2020-06-19 PROCEDURE — 96372 THER/PROPH/DIAG INJ SC/IM: CPT | Mod: XU

## 2020-06-19 RX ADMIN — Medication 81 MILLIGRAM(S): at 11:42

## 2020-06-19 RX ADMIN — QUETIAPINE FUMARATE 50 MILLIGRAM(S): 200 TABLET, FILM COATED ORAL at 11:42

## 2020-06-19 RX ADMIN — CARVEDILOL PHOSPHATE 3.12 MILLIGRAM(S): 80 CAPSULE, EXTENDED RELEASE ORAL at 11:41

## 2020-06-19 RX ADMIN — Medication 1 TABLET(S): at 11:42

## 2020-06-19 RX ADMIN — Medication 100 MILLIGRAM(S): at 11:42

## 2020-06-19 RX ADMIN — LEVETIRACETAM 1500 MILLIGRAM(S): 250 TABLET, FILM COATED ORAL at 05:43

## 2020-06-19 RX ADMIN — ESCITALOPRAM OXALATE 10 MILLIGRAM(S): 10 TABLET, FILM COATED ORAL at 11:42

## 2020-06-19 NOTE — DISCHARGE NOTE NURSING/CASE MANAGEMENT/SOCIAL WORK - PATIENT PORTAL LINK FT
You can access the FollowMyHealth Patient Portal offered by [facility] by registering at the following website: [email or web address]. By joining [employer]’s FollowMyHealth portal, you will also be able to view your health information using other applications (apps) compatible with our system.

## 2020-06-19 NOTE — PROGRESS NOTE ADULT - SUBJECTIVE AND OBJECTIVE BOX
Department of Cardiology                                                                  [facility] [address] [facility]                                                            Telephone: [phone]. Fax:[phone]                                                                           Cardiac Catheterization Note       Subjective:  50y  Female who had a left heart catheterization which showed:   Cardiac Cath Lab - 6/18/20   CORONARY VESSELS:    LM: Normal.   LAD: Normal.  Circumflex: Normal.   RCA: Normal.               PAST MEDICAL & SURGICAL HISTORY:  Tobacco dependence  ETOH abuse  Seizure  H/O tracheostomy    )    HEALTH ISSUES - PROBLEM Dx:  Alcohol abuse with alcohol-induced mood disorder  Crack pneumonitis: Crack pneumonitis  Acute respiratory failure with hypoxemia: Acute respiratory failure with hypoxemia  Seizure: Seizure  History of pulmonary embolism: History of pulmonary embolism  Smoking greater than 20 pack years: Smoking greater than 20 pack years  ETOH abuse: ETOH abuse  Status epilepticus: Status epilepticus        ROS:  General: No fatigue, no fevers/chills  Respiratory: No dyspnea, no cough, no wheeze  CV: No chest pain, no palpitations  Abd: No nausea  Neuro: No headache, no dizziness  Allergy Status Unknown      Objective:  Vital Signs Last 24 Hrs  T(C): 36.6 (19 Jun 2020 07:16), Max: 36.7 (18 Jun 2020 15:12)  T(F): 97.9 (19 Jun 2020 07:16), Max: 98.1 (18 Jun 2020 15:12)  HR: 94 (19 Jun 2020 07:16) (18 - 94)  BP: 109/72 (19 Jun 2020 07:16) (109/72 - 136/88)    RR: 19 (19 Jun 2020 07:16) (15 - 19)  SpO2: 94% (19 Jun 2020 07:16) (91% - 98%)    CM: SR  Neuro: A&OX3, CN 2-12 intact  HEENT: NC, AT  Lungs: CTA B/L  CV: S1, S2, no murmur, RRR  Abd: Soft  Extremity: Right radial band: no bleeding, fingers warm with good cap refil  EKG:                         10.5   5.89  )-----------( 398      ( 19 Jun 2020 07:50 )             33.1     06-19    130<L>  |  97<L>  |  7.0<L>  ----------------------------<  97  4.8   |  20.0<L>  |  0.81    Ca    9.7      19 Jun 2020 07:50  Mg     1.8     06-19          Neuro: Awake and alert   Cath site R radial site + pp no evidence of bleeding fingers warm and mobile good cappillary refill   a/p Post procedure orders and observations   Wrist precautions

## 2020-06-19 NOTE — PROGRESS NOTE ADULT - REASON FOR ADMISSION
Status epilepticus
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
## 2020-07-02 PROBLEM — Z00.00 ENCOUNTER FOR PREVENTIVE HEALTH EXAMINATION: Status: ACTIVE | Noted: 2020-07-02

## 2021-05-10 ENCOUNTER — EMERGENCY (EMERGENCY)
Facility: HOSPITAL | Age: 51
LOS: 1 days | Discharge: HOME | End: 2021-05-10
Attending: EMERGENCY MEDICINE
Payer: MEDICARE

## 2021-05-10 VITALS
RESPIRATION RATE: 20 BRPM | HEIGHT: 66 IN | TEMPERATURE: 98 F | SYSTOLIC BLOOD PRESSURE: 107 MMHG | DIASTOLIC BLOOD PRESSURE: 76 MMHG | OXYGEN SATURATION: 96 % | WEIGHT: 132.06 LBS | HEART RATE: 71 BPM

## 2021-05-10 VITALS
HEART RATE: 70 BPM | TEMPERATURE: 99 F | OXYGEN SATURATION: 97 % | SYSTOLIC BLOOD PRESSURE: 129 MMHG | DIASTOLIC BLOOD PRESSURE: 90 MMHG

## 2021-05-10 DIAGNOSIS — Z98.890 OTHER SPECIFIED POSTPROCEDURAL STATES: Chronic | ICD-10-CM

## 2021-05-10 LAB
ALBUMIN SERPL ELPH-MCNC: 4.2 G/DL — SIGNIFICANT CHANGE UP (ref 3.3–5.2)
ALP SERPL-CCNC: 126 U/L — HIGH (ref 40–120)
ALT FLD-CCNC: 18 U/L — SIGNIFICANT CHANGE UP
ANION GAP SERPL CALC-SCNC: 12 MMOL/L — SIGNIFICANT CHANGE UP (ref 5–17)
APPEARANCE UR: CLEAR — SIGNIFICANT CHANGE UP
APTT BLD: 26.2 SEC — LOW (ref 27.5–35.5)
AST SERPL-CCNC: 26 U/L — SIGNIFICANT CHANGE UP
BACTERIA # UR AUTO: ABNORMAL
BASOPHILS # BLD AUTO: 0.02 K/UL — SIGNIFICANT CHANGE UP (ref 0–0.2)
BASOPHILS NFR BLD AUTO: 0.4 % — SIGNIFICANT CHANGE UP (ref 0–2)
BILIRUB SERPL-MCNC: 0.3 MG/DL — LOW (ref 0.4–2)
BILIRUB UR-MCNC: NEGATIVE — SIGNIFICANT CHANGE UP
BUN SERPL-MCNC: 30 MG/DL — HIGH (ref 8–20)
CALCIUM SERPL-MCNC: 10.2 MG/DL — SIGNIFICANT CHANGE UP (ref 8.6–10.2)
CHLORIDE SERPL-SCNC: 101 MMOL/L — SIGNIFICANT CHANGE UP (ref 98–107)
CO2 SERPL-SCNC: 22 MMOL/L — SIGNIFICANT CHANGE UP (ref 22–29)
COLOR SPEC: YELLOW — SIGNIFICANT CHANGE UP
CREAT SERPL-MCNC: 0.79 MG/DL — SIGNIFICANT CHANGE UP (ref 0.5–1.3)
DIFF PNL FLD: ABNORMAL
EOSINOPHIL # BLD AUTO: 0 K/UL — SIGNIFICANT CHANGE UP (ref 0–0.5)
EOSINOPHIL NFR BLD AUTO: 0 % — SIGNIFICANT CHANGE UP (ref 0–6)
EPI CELLS # UR: ABNORMAL
ETHANOL SERPL-MCNC: <10 MG/DL — SIGNIFICANT CHANGE UP (ref 0–9)
GLUCOSE SERPL-MCNC: 84 MG/DL — SIGNIFICANT CHANGE UP (ref 70–99)
GLUCOSE UR QL: NEGATIVE MG/DL — SIGNIFICANT CHANGE UP
HCT VFR BLD CALC: 35.7 % — SIGNIFICANT CHANGE UP (ref 34.5–45)
HGB BLD-MCNC: 11.7 G/DL — SIGNIFICANT CHANGE UP (ref 11.5–15.5)
IMM GRANULOCYTES NFR BLD AUTO: 0 % — SIGNIFICANT CHANGE UP (ref 0–1.5)
INR BLD: 0.99 RATIO — SIGNIFICANT CHANGE UP (ref 0.88–1.16)
KETONES UR-MCNC: NEGATIVE — SIGNIFICANT CHANGE UP
LEUKOCYTE ESTERASE UR-ACNC: ABNORMAL
LYMPHOCYTES # BLD AUTO: 1.41 K/UL — SIGNIFICANT CHANGE UP (ref 1–3.3)
LYMPHOCYTES # BLD AUTO: 29.6 % — SIGNIFICANT CHANGE UP (ref 13–44)
MCHC RBC-ENTMCNC: 30.9 PG — SIGNIFICANT CHANGE UP (ref 27–34)
MCHC RBC-ENTMCNC: 32.8 GM/DL — SIGNIFICANT CHANGE UP (ref 32–36)
MCV RBC AUTO: 94.2 FL — SIGNIFICANT CHANGE UP (ref 80–100)
MONOCYTES # BLD AUTO: 0.42 K/UL — SIGNIFICANT CHANGE UP (ref 0–0.9)
MONOCYTES NFR BLD AUTO: 8.8 % — SIGNIFICANT CHANGE UP (ref 2–14)
NEUTROPHILS # BLD AUTO: 2.92 K/UL — SIGNIFICANT CHANGE UP (ref 1.8–7.4)
NEUTROPHILS NFR BLD AUTO: 61.2 % — SIGNIFICANT CHANGE UP (ref 43–77)
NITRITE UR-MCNC: NEGATIVE — SIGNIFICANT CHANGE UP
PH UR: 6 — SIGNIFICANT CHANGE UP (ref 5–8)
PLATELET # BLD AUTO: 255 K/UL — SIGNIFICANT CHANGE UP (ref 150–400)
POTASSIUM SERPL-MCNC: 4 MMOL/L — SIGNIFICANT CHANGE UP (ref 3.5–5.3)
POTASSIUM SERPL-SCNC: 4 MMOL/L — SIGNIFICANT CHANGE UP (ref 3.5–5.3)
PROT SERPL-MCNC: 7.8 G/DL — SIGNIFICANT CHANGE UP (ref 6.6–8.7)
PROT UR-MCNC: 15 MG/DL
PROTHROM AB SERPL-ACNC: 11.5 SEC — SIGNIFICANT CHANGE UP (ref 10.6–13.6)
RBC # BLD: 3.79 M/UL — LOW (ref 3.8–5.2)
RBC # FLD: 13.7 % — SIGNIFICANT CHANGE UP (ref 10.3–14.5)
RBC CASTS # UR COMP ASSIST: ABNORMAL /HPF (ref 0–4)
SODIUM SERPL-SCNC: 135 MMOL/L — SIGNIFICANT CHANGE UP (ref 135–145)
SP GR SPEC: 1.02 — SIGNIFICANT CHANGE UP (ref 1.01–1.02)
UROBILINOGEN FLD QL: NEGATIVE MG/DL — SIGNIFICANT CHANGE UP
WBC # BLD: 4.77 K/UL — SIGNIFICANT CHANGE UP (ref 3.8–10.5)
WBC # FLD AUTO: 4.77 K/UL — SIGNIFICANT CHANGE UP (ref 3.8–10.5)
WBC UR QL: ABNORMAL

## 2021-05-10 PROCEDURE — 80307 DRUG TEST PRSMV CHEM ANLYZR: CPT

## 2021-05-10 PROCEDURE — 85025 COMPLETE CBC W/AUTO DIFF WBC: CPT

## 2021-05-10 PROCEDURE — 70450 CT HEAD/BRAIN W/O DYE: CPT

## 2021-05-10 PROCEDURE — 99284 EMERGENCY DEPT VISIT MOD MDM: CPT

## 2021-05-10 PROCEDURE — 93010 ELECTROCARDIOGRAM REPORT: CPT

## 2021-05-10 PROCEDURE — 81001 URINALYSIS AUTO W/SCOPE: CPT

## 2021-05-10 PROCEDURE — 85730 THROMBOPLASTIN TIME PARTIAL: CPT

## 2021-05-10 PROCEDURE — 80053 COMPREHEN METABOLIC PANEL: CPT

## 2021-05-10 PROCEDURE — 85610 PROTHROMBIN TIME: CPT

## 2021-05-10 PROCEDURE — 93005 ELECTROCARDIOGRAM TRACING: CPT

## 2021-05-10 PROCEDURE — 70450 CT HEAD/BRAIN W/O DYE: CPT | Mod: 26,MH

## 2021-05-10 PROCEDURE — 36415 COLL VENOUS BLD VENIPUNCTURE: CPT

## 2021-05-10 PROCEDURE — 72125 CT NECK SPINE W/O DYE: CPT | Mod: 26,MH

## 2021-05-10 PROCEDURE — 72125 CT NECK SPINE W/O DYE: CPT

## 2021-05-10 RX ORDER — CEFTRIAXONE 500 MG/1
1000 INJECTION, POWDER, FOR SOLUTION INTRAMUSCULAR; INTRAVENOUS ONCE
Refills: 0 | Status: DISCONTINUED | OUTPATIENT
Start: 2021-05-10 | End: 2021-05-10

## 2021-05-10 RX ORDER — CEPHALEXIN 500 MG
1 CAPSULE ORAL
Qty: 14 | Refills: 0
Start: 2021-05-10 | End: 2021-05-16

## 2021-05-10 RX ORDER — CEPHALEXIN 500 MG
500 CAPSULE ORAL ONCE
Refills: 0 | Status: DISCONTINUED | OUTPATIENT
Start: 2021-05-10 | End: 2021-05-15

## 2021-05-10 NOTE — ED PROVIDER NOTE - CLINICAL SUMMARY MEDICAL DECISION MAKING FREE TEXT BOX
pt with fall and chronic confusion. + eoth use. will obtain labs ua, ct head. likely needs SW consult to ensure proper home care has  seems to be having trouble caring for her at home.

## 2021-05-10 NOTE — ED PROVIDER NOTE - SKIN, MLM
Skin normal color for race, warm, dry and intact. No evidence of rash. + hematoma to left forehead. no skin necrosis
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
## 2021-05-10 NOTE — ED PROVIDER NOTE - PATIENT PORTAL LINK FT
You can access the FollowMyHealth Patient Portal offered by [facility] by registering at the following website: [email or web address]. By joining [employer]’s FollowMyHealth portal, you will also be able to view your health information using other applications (apps) compatible with our system.
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
## 2021-05-10 NOTE — ED PROVIDER NOTE - PROGRESS NOTE DETAILS
AJM: pt feeling improved. walking well in ED.  spoke with PMD and has figured out home meds. will add ABX for uti. pt requesting paper rx. will dc All results discussed with the patient, and a copy of results has been provided. Patient is comfortable with dc plan for home. Opportunity for questions was provided and all questions have been addressed. Patient understands when to return to ED if symptoms worsen.

## 2021-05-29 ENCOUNTER — INPATIENT (INPATIENT)
Facility: HOSPITAL | Age: 51
LOS: 10 days | Discharge: HOME | DRG: 579 | End: 2021-06-09
Attending: SURGERY | Admitting: SURGERY
Payer: MEDICARE

## 2021-05-29 DIAGNOSIS — Z98.890 OTHER SPECIFIED POSTPROCEDURAL STATES: Chronic | ICD-10-CM

## 2021-05-29 PROCEDURE — 99291 CRITICAL CARE FIRST HOUR: CPT | Mod: 25

## 2021-05-29 PROCEDURE — 31500 INSERT EMERGENCY AIRWAY: CPT

## 2021-05-29 PROCEDURE — 99292 CRITICAL CARE ADDL 30 MIN: CPT | Mod: 25

## 2021-05-29 RX ORDER — KETAMINE HYDROCHLORIDE 100 MG/ML
100 INJECTION INTRAMUSCULAR; INTRAVENOUS ONCE
Refills: 0 | Status: DISCONTINUED | OUTPATIENT
Start: 2021-05-29 | End: 2021-05-29
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
## 2021-05-30 VITALS — HEART RATE: 67 BPM | DIASTOLIC BLOOD PRESSURE: 51 MMHG | OXYGEN SATURATION: 100 % | SYSTOLIC BLOOD PRESSURE: 94 MMHG

## 2021-05-30 DIAGNOSIS — S01.01XA LACERATION WITHOUT FOREIGN BODY OF SCALP, INITIAL ENCOUNTER: ICD-10-CM

## 2021-05-30 DIAGNOSIS — Z93.1 GASTROSTOMY STATUS: Chronic | ICD-10-CM

## 2021-05-30 DIAGNOSIS — R09.89 OTHER SPECIFIED SYMPTOMS AND SIGNS INVOLVING THE CIRCULATORY AND RESPIRATORY SYSTEMS: ICD-10-CM

## 2021-05-30 DIAGNOSIS — Z98.890 OTHER SPECIFIED POSTPROCEDURAL STATES: Chronic | ICD-10-CM

## 2021-05-30 LAB
ABO RH CONFIRMATION: SIGNIFICANT CHANGE UP
ALBUMIN SERPL ELPH-MCNC: 4.2 G/DL — SIGNIFICANT CHANGE UP (ref 3.3–5.2)
ALP SERPL-CCNC: 87 U/L — SIGNIFICANT CHANGE UP (ref 40–120)
ALT FLD-CCNC: 9 U/L — SIGNIFICANT CHANGE UP
AMPHET UR-MCNC: NEGATIVE — SIGNIFICANT CHANGE UP
ANION GAP SERPL CALC-SCNC: 18 MMOL/L — HIGH (ref 5–17)
ANION GAP SERPL CALC-SCNC: 8 MMOL/L — SIGNIFICANT CHANGE UP (ref 5–17)
ANISOCYTOSIS BLD QL: SLIGHT — SIGNIFICANT CHANGE UP
APTT BLD: 22.8 SEC — LOW (ref 27.5–35.5)
AST SERPL-CCNC: 18 U/L — SIGNIFICANT CHANGE UP
BARBITURATES UR SCN-MCNC: NEGATIVE — SIGNIFICANT CHANGE UP
BASOPHILS # BLD AUTO: 0 K/UL — SIGNIFICANT CHANGE UP (ref 0–0.2)
BASOPHILS NFR BLD AUTO: 0 % — SIGNIFICANT CHANGE UP (ref 0–2)
BENZODIAZ UR-MCNC: POSITIVE
BILIRUB SERPL-MCNC: 0.2 MG/DL — LOW (ref 0.4–2)
BLD GP AB SCN SERPL QL: SIGNIFICANT CHANGE UP
BUN SERPL-MCNC: 11.2 MG/DL — SIGNIFICANT CHANGE UP (ref 8–20)
BUN SERPL-MCNC: 14 MG/DL — SIGNIFICANT CHANGE UP (ref 8–20)
CALCIUM SERPL-MCNC: 8.5 MG/DL — LOW (ref 8.6–10.2)
CALCIUM SERPL-MCNC: 9.7 MG/DL — SIGNIFICANT CHANGE UP (ref 8.6–10.2)
CHLORIDE SERPL-SCNC: 102 MMOL/L — SIGNIFICANT CHANGE UP (ref 98–107)
CHLORIDE SERPL-SCNC: 94 MMOL/L — LOW (ref 98–107)
CO2 SERPL-SCNC: 22 MMOL/L — SIGNIFICANT CHANGE UP (ref 22–29)
CO2 SERPL-SCNC: 23 MMOL/L — SIGNIFICANT CHANGE UP (ref 22–29)
COCAINE METAB.OTHER UR-MCNC: POSITIVE
CREAT SERPL-MCNC: 0.69 MG/DL — SIGNIFICANT CHANGE UP (ref 0.5–1.3)
CREAT SERPL-MCNC: 0.76 MG/DL — SIGNIFICANT CHANGE UP (ref 0.5–1.3)
EOSINOPHIL # BLD AUTO: 0 K/UL — SIGNIFICANT CHANGE UP (ref 0–0.5)
EOSINOPHIL NFR BLD AUTO: 0 % — SIGNIFICANT CHANGE UP (ref 0–6)
ETHANOL SERPL-MCNC: 267 MG/DL — HIGH (ref 0–9)
GAS PNL BLDA: SIGNIFICANT CHANGE UP
GIANT PLATELETS BLD QL SMEAR: PRESENT — SIGNIFICANT CHANGE UP
GLUCOSE SERPL-MCNC: 100 MG/DL — HIGH (ref 70–99)
GLUCOSE SERPL-MCNC: 93 MG/DL — SIGNIFICANT CHANGE UP (ref 70–99)
HCT VFR BLD CALC: 38.3 % — SIGNIFICANT CHANGE UP (ref 34.5–45)
HGB BLD-MCNC: 12.9 G/DL — SIGNIFICANT CHANGE UP (ref 11.5–15.5)
INR BLD: 1.01 RATIO — SIGNIFICANT CHANGE UP (ref 0.88–1.16)
LYMPHOCYTES # BLD AUTO: 18.4 % — SIGNIFICANT CHANGE UP (ref 13–44)
LYMPHOCYTES # BLD AUTO: 2.97 K/UL — SIGNIFICANT CHANGE UP (ref 1–3.3)
MACROCYTES BLD QL: SLIGHT — SIGNIFICANT CHANGE UP
MAGNESIUM SERPL-MCNC: 1.3 MG/DL — LOW (ref 1.6–2.6)
MANUAL SMEAR VERIFICATION: SIGNIFICANT CHANGE UP
MCHC RBC-ENTMCNC: 30.3 PG — SIGNIFICANT CHANGE UP (ref 27–34)
MCHC RBC-ENTMCNC: 33.7 GM/DL — SIGNIFICANT CHANGE UP (ref 32–36)
MCV RBC AUTO: 89.9 FL — SIGNIFICANT CHANGE UP (ref 80–100)
METHADONE UR-MCNC: NEGATIVE — SIGNIFICANT CHANGE UP
MONOCYTES # BLD AUTO: 0.56 K/UL — SIGNIFICANT CHANGE UP (ref 0–0.9)
MONOCYTES NFR BLD AUTO: 3.5 % — SIGNIFICANT CHANGE UP (ref 2–14)
NEUTROPHILS # BLD AUTO: 12.61 K/UL — HIGH (ref 1.8–7.4)
NEUTROPHILS NFR BLD AUTO: 78.1 % — HIGH (ref 43–77)
OPIATES UR-MCNC: NEGATIVE — SIGNIFICANT CHANGE UP
PCP SPEC-MCNC: SIGNIFICANT CHANGE UP
PCP UR-MCNC: NEGATIVE — SIGNIFICANT CHANGE UP
PHOSPHATE SERPL-MCNC: 2.2 MG/DL — LOW (ref 2.4–4.7)
PLAT MORPH BLD: NORMAL — SIGNIFICANT CHANGE UP
PLATELET # BLD AUTO: 131 K/UL — LOW (ref 150–400)
POLYCHROMASIA BLD QL SMEAR: SLIGHT — SIGNIFICANT CHANGE UP
POTASSIUM SERPL-MCNC: 4.4 MMOL/L — SIGNIFICANT CHANGE UP (ref 3.5–5.3)
POTASSIUM SERPL-MCNC: 5 MMOL/L — SIGNIFICANT CHANGE UP (ref 3.5–5.3)
POTASSIUM SERPL-SCNC: 4.4 MMOL/L — SIGNIFICANT CHANGE UP (ref 3.5–5.3)
POTASSIUM SERPL-SCNC: 5 MMOL/L — SIGNIFICANT CHANGE UP (ref 3.5–5.3)
PROT SERPL-MCNC: 7.6 G/DL — SIGNIFICANT CHANGE UP (ref 6.6–8.7)
PROTHROM AB SERPL-ACNC: 11.7 SEC — SIGNIFICANT CHANGE UP (ref 10.6–13.6)
RBC # BLD: 4.26 M/UL — SIGNIFICANT CHANGE UP (ref 3.8–5.2)
RBC # FLD: 14 % — SIGNIFICANT CHANGE UP (ref 10.3–14.5)
RBC BLD AUTO: ABNORMAL
SARS-COV-2 RNA SPEC QL NAA+PROBE: SIGNIFICANT CHANGE UP
SODIUM SERPL-SCNC: 133 MMOL/L — LOW (ref 135–145)
SODIUM SERPL-SCNC: 134 MMOL/L — LOW (ref 135–145)
THC UR QL: NEGATIVE — SIGNIFICANT CHANGE UP
WBC # BLD: 16.14 K/UL — HIGH (ref 3.8–10.5)
WBC # FLD AUTO: 16.14 K/UL — HIGH (ref 3.8–10.5)

## 2021-05-30 PROCEDURE — 70450 CT HEAD/BRAIN W/O DYE: CPT | Mod: 26

## 2021-05-30 PROCEDURE — 72125 CT NECK SPINE W/O DYE: CPT | Mod: 26

## 2021-05-30 PROCEDURE — 71045 X-RAY EXAM CHEST 1 VIEW: CPT | Mod: 26

## 2021-05-30 PROCEDURE — 70486 CT MAXILLOFACIAL W/O DYE: CPT | Mod: 26

## 2021-05-30 PROCEDURE — 74177 CT ABD & PELVIS W/CONTRAST: CPT | Mod: 26

## 2021-05-30 PROCEDURE — 71260 CT THORAX DX C+: CPT | Mod: 26

## 2021-05-30 RX ORDER — SODIUM CHLORIDE 9 MG/ML
1000 INJECTION INTRAMUSCULAR; INTRAVENOUS; SUBCUTANEOUS ONCE
Refills: 0 | Status: COMPLETED | OUTPATIENT
Start: 2021-05-30 | End: 2021-05-30

## 2021-05-30 RX ORDER — DEXMEDETOMIDINE HYDROCHLORIDE IN 0.9% SODIUM CHLORIDE 4 UG/ML
0.3 INJECTION INTRAVENOUS
Qty: 200 | Refills: 0 | Status: DISCONTINUED | OUTPATIENT
Start: 2021-05-30 | End: 2021-05-30

## 2021-05-30 RX ORDER — CHLORHEXIDINE GLUCONATE 213 G/1000ML
15 SOLUTION TOPICAL EVERY 12 HOURS
Refills: 0 | Status: DISCONTINUED | OUTPATIENT
Start: 2021-05-30 | End: 2021-05-30

## 2021-05-30 RX ORDER — ETOMIDATE 2 MG/ML
20 INJECTION INTRAVENOUS ONCE
Refills: 0 | Status: COMPLETED | OUTPATIENT
Start: 2021-05-30 | End: 2021-05-30

## 2021-05-30 RX ORDER — MAGNESIUM SULFATE 500 MG/ML
2 VIAL (ML) INJECTION
Refills: 0 | Status: COMPLETED | OUTPATIENT
Start: 2021-05-30 | End: 2021-05-30

## 2021-05-30 RX ORDER — CEFAZOLIN SODIUM 1 G
VIAL (EA) INJECTION
Refills: 0 | Status: DISCONTINUED | OUTPATIENT
Start: 2021-05-30 | End: 2021-05-30

## 2021-05-30 RX ORDER — MIDAZOLAM HYDROCHLORIDE 1 MG/ML
2 INJECTION, SOLUTION INTRAMUSCULAR; INTRAVENOUS ONCE
Refills: 0 | Status: DISCONTINUED | OUTPATIENT
Start: 2021-05-30 | End: 2021-05-30

## 2021-05-30 RX ORDER — ROCURONIUM BROMIDE 10 MG/ML
50 VIAL (ML) INTRAVENOUS ONCE
Refills: 0 | Status: COMPLETED | OUTPATIENT
Start: 2021-05-30 | End: 2021-05-30

## 2021-05-30 RX ORDER — TETANUS TOXOID, REDUCED DIPHTHERIA TOXOID AND ACELLULAR PERTUSSIS VACCINE, ADSORBED 5; 2.5; 8; 8; 2.5 [IU]/.5ML; [IU]/.5ML; UG/.5ML; UG/.5ML; UG/.5ML
0.5 SUSPENSION INTRAMUSCULAR ONCE
Refills: 0 | Status: COMPLETED | OUTPATIENT
Start: 2021-05-30 | End: 2021-05-30

## 2021-05-30 RX ORDER — KETAMINE HYDROCHLORIDE 100 MG/ML
100 INJECTION INTRAMUSCULAR; INTRAVENOUS ONCE
Refills: 0 | Status: DISCONTINUED | OUTPATIENT
Start: 2021-05-30 | End: 2021-05-30

## 2021-05-30 RX ORDER — SODIUM CHLORIDE 9 MG/ML
1000 INJECTION, SOLUTION INTRAVENOUS ONCE
Refills: 0 | Status: COMPLETED | OUTPATIENT
Start: 2021-05-30 | End: 2021-05-30

## 2021-05-30 RX ORDER — PROPOFOL 10 MG/ML
10 INJECTION, EMULSION INTRAVENOUS
Qty: 1000 | Refills: 0 | Status: DISCONTINUED | OUTPATIENT
Start: 2021-05-30 | End: 2021-05-30

## 2021-05-30 RX ORDER — CHLORHEXIDINE GLUCONATE 213 G/1000ML
1 SOLUTION TOPICAL DAILY
Refills: 0 | Status: DISCONTINUED | OUTPATIENT
Start: 2021-05-30 | End: 2021-05-30

## 2021-05-30 RX ORDER — QUETIAPINE FUMARATE 200 MG/1
200 TABLET, FILM COATED ORAL AT BEDTIME
Refills: 0 | Status: DISCONTINUED | OUTPATIENT
Start: 2021-05-30 | End: 2021-05-31

## 2021-05-30 RX ORDER — CEFAZOLIN SODIUM 1 G
2000 VIAL (EA) INJECTION EVERY 8 HOURS
Refills: 0 | Status: DISCONTINUED | OUTPATIENT
Start: 2021-05-30 | End: 2021-05-30

## 2021-05-30 RX ORDER — ENOXAPARIN SODIUM 100 MG/ML
40 INJECTION SUBCUTANEOUS DAILY
Refills: 0 | Status: DISCONTINUED | OUTPATIENT
Start: 2021-05-30 | End: 2021-06-09

## 2021-05-30 RX ORDER — QUETIAPINE FUMARATE 200 MG/1
100 TABLET, FILM COATED ORAL DAILY
Refills: 0 | Status: DISCONTINUED | OUTPATIENT
Start: 2021-05-30 | End: 2021-05-31

## 2021-05-30 RX ORDER — CEFAZOLIN SODIUM 1 G
2000 VIAL (EA) INJECTION ONCE
Refills: 0 | Status: COMPLETED | OUTPATIENT
Start: 2021-05-30 | End: 2021-05-30

## 2021-05-30 RX ORDER — FENTANYL CITRATE 50 UG/ML
50 INJECTION INTRAVENOUS ONCE
Refills: 0 | Status: DISCONTINUED | OUTPATIENT
Start: 2021-05-30 | End: 2021-05-30

## 2021-05-30 RX ORDER — SODIUM CHLORIDE 9 MG/ML
1000 INJECTION INTRAMUSCULAR; INTRAVENOUS; SUBCUTANEOUS
Refills: 0 | Status: DISCONTINUED | OUTPATIENT
Start: 2021-05-30 | End: 2021-05-30

## 2021-05-30 RX ORDER — FENTANYL CITRATE 50 UG/ML
1 INJECTION INTRAVENOUS
Qty: 2500 | Refills: 0 | Status: DISCONTINUED | OUTPATIENT
Start: 2021-05-30 | End: 2021-05-30

## 2021-05-30 RX ORDER — CALCIUM GLUCONATE 100 MG/ML
2 VIAL (ML) INTRAVENOUS ONCE
Refills: 0 | Status: COMPLETED | OUTPATIENT
Start: 2021-05-30 | End: 2021-05-30

## 2021-05-30 RX ADMIN — CHLORHEXIDINE GLUCONATE 15 MILLILITER(S): 213 SOLUTION TOPICAL at 06:56

## 2021-05-30 RX ADMIN — SODIUM CHLORIDE 1000 MILLILITER(S): 9 INJECTION INTRAMUSCULAR; INTRAVENOUS; SUBCUTANEOUS at 01:01

## 2021-05-30 RX ADMIN — FENTANYL CITRATE 50 MICROGRAM(S): 50 INJECTION INTRAVENOUS at 02:55

## 2021-05-30 RX ADMIN — Medication 100 MILLIGRAM(S): at 02:32

## 2021-05-30 RX ADMIN — Medication 200 GRAM(S): at 04:45

## 2021-05-30 RX ADMIN — TETANUS TOXOID, REDUCED DIPHTHERIA TOXOID AND ACELLULAR PERTUSSIS VACCINE, ADSORBED 0.5 MILLILITER(S): 5; 2.5; 8; 8; 2.5 SUSPENSION INTRAMUSCULAR at 03:06

## 2021-05-30 RX ADMIN — Medication 62.5 MILLIMOLE(S): at 08:33

## 2021-05-30 RX ADMIN — Medication 50 MILLIGRAM(S): at 01:44

## 2021-05-30 RX ADMIN — KETAMINE HYDROCHLORIDE 100 MILLIGRAM(S): 100 INJECTION INTRAMUSCULAR; INTRAVENOUS at 01:43

## 2021-05-30 RX ADMIN — QUETIAPINE FUMARATE 200 MILLIGRAM(S): 200 TABLET, FILM COATED ORAL at 21:02

## 2021-05-30 RX ADMIN — FENTANYL CITRATE 50 MICROGRAM(S): 50 INJECTION INTRAVENOUS at 03:10

## 2021-05-30 RX ADMIN — Medication 50 GRAM(S): at 11:39

## 2021-05-30 RX ADMIN — MIDAZOLAM HYDROCHLORIDE 2 MILLIGRAM(S): 1 INJECTION, SOLUTION INTRAMUSCULAR; INTRAVENOUS at 01:43

## 2021-05-30 RX ADMIN — CHLORHEXIDINE GLUCONATE 1 APPLICATION(S): 213 SOLUTION TOPICAL at 11:39

## 2021-05-30 RX ADMIN — ENOXAPARIN SODIUM 40 MILLIGRAM(S): 100 INJECTION SUBCUTANEOUS at 11:39

## 2021-05-30 RX ADMIN — PROPOFOL 4.2 MICROGRAM(S)/KG/MIN: 10 INJECTION, EMULSION INTRAVENOUS at 02:02

## 2021-05-30 RX ADMIN — QUETIAPINE FUMARATE 100 MILLIGRAM(S): 200 TABLET, FILM COATED ORAL at 15:08

## 2021-05-30 RX ADMIN — SODIUM CHLORIDE 125 MILLILITER(S): 9 INJECTION INTRAMUSCULAR; INTRAVENOUS; SUBCUTANEOUS at 03:15

## 2021-05-30 RX ADMIN — Medication 50 GRAM(S): at 06:55

## 2021-05-30 RX ADMIN — Medication 50 MILLIGRAM(S): at 01:45

## 2021-05-30 RX ADMIN — SODIUM CHLORIDE 1000 MILLILITER(S): 9 INJECTION INTRAMUSCULAR; INTRAVENOUS; SUBCUTANEOUS at 01:45

## 2021-05-30 RX ADMIN — PROPOFOL 4.2 MICROGRAM(S)/KG/MIN: 10 INJECTION, EMULSION INTRAVENOUS at 02:45

## 2021-05-30 RX ADMIN — SODIUM CHLORIDE 125 MILLILITER(S): 9 INJECTION INTRAMUSCULAR; INTRAVENOUS; SUBCUTANEOUS at 11:40

## 2021-05-30 RX ADMIN — SODIUM CHLORIDE 1000 MILLILITER(S): 9 INJECTION INTRAMUSCULAR; INTRAVENOUS; SUBCUTANEOUS at 02:01

## 2021-05-30 RX ADMIN — MIDAZOLAM HYDROCHLORIDE 2 MILLIGRAM(S): 1 INJECTION, SOLUTION INTRAMUSCULAR; INTRAVENOUS at 01:44

## 2021-05-30 RX ADMIN — SODIUM CHLORIDE 1000 MILLILITER(S): 9 INJECTION, SOLUTION INTRAVENOUS at 03:37

## 2021-05-30 RX ADMIN — ETOMIDATE 20 MILLIGRAM(S): 2 INJECTION INTRAVENOUS at 01:44

## 2021-05-30 RX ADMIN — FENTANYL CITRATE 7 MICROGRAM(S)/KG/HR: 50 INJECTION INTRAVENOUS at 03:00

## 2021-05-30 NOTE — ED PROVIDER NOTE - CRITICAL CARE ATTENDING CONTRIBUTION TO CARE
I have personally provided __80_ minutes of critical care time exclusive of time spent on separately billable procedures. Time includes review of laboratory data, radiology results, discussion with consultants, and monitoring for potential decompensation. Interventions were performed as documented above
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
## 2021-05-30 NOTE — SBIRT NOTE ADULT - NSSBIRTALCPASSREFTXDET_GEN_A_CORE
gave substance abuse counseling to pt. Pt reports she was involved with AA but stopped due to the pandemic. Pt told worker she has the support of her sponsor that she speaks to daily. Pt admits to wanting to get better.  Pt declined referral to substance abuse treatment at this time. Worker encouraged pt to look into virtual AA meetings (due to the pandemic); pt stated they are not the same.  Reports 20 years of sobriety but has relapsed a few times within the last 10 years.

## 2021-05-30 NOTE — PATIENT PROFILE ADULT - NSPRESCRALCAMT_GEN_A_NUR
Pt. is intubated & sedated, unable to answer questions at this time. Pt. is intubated & sedated, unable to answer questions at this time./5 or 6

## 2021-05-30 NOTE — ED PROVIDER NOTE - CARE PLAN
Principal Discharge DX:	Laceration of scalp   Principal Discharge DX:	Laceration of scalp  Secondary Diagnosis:	Respiratory failure  Secondary Diagnosis:	Alcohol intoxication   Principal Discharge DX:	Laceration of scalp  Secondary Diagnosis:	Respiratory failure  Secondary Diagnosis:	Alcohol intoxication  Secondary Diagnosis:	Agitation requiring sedation protocol

## 2021-05-30 NOTE — H&P ADULT - NSICDXPASTMEDICALHX_GEN_ALL_CORE_FT
PAST MEDICAL HISTORY:  ETOH abuse     HLD (hyperlipidemia)     Hypothyroid     TBI (traumatic brain injury)

## 2021-05-30 NOTE — PATIENT PROFILE ADULT - NSPRESCRALCFREQ_GEN_A_NUR
Pt. is intubated & sedated, unable to answer questions at this time, but ETOH level high above 200's Pt. is intubated & sedated, unable to answer questions at this time, but ETOH level high above 200's/2-3 times a week
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
## 2021-05-30 NOTE — CHART NOTE - NSCHARTNOTEFT_GEN_A_CORE
Tertiary survey performed now that pt is extubated and off sedation.      A&O x3, pt can not recall events leading up to her admission.  Per pt "I think I had a seizure".  Pt endorses she does not remember what antiseizure medication she takes at home because she stopped taking it now that she  has returned to drinking ETOH.  Per pt memory, she "has a few seizures a week".  She has not seen her Neurologist recently.    Pt c/p left shoulder pain.  Left ant shoulder with TTP, worse tenderness with palpation to proximal humerus.  Some pain with supination of left hand, however no LROM.    Left elbow, wrist and hand WNL    RUE no effusions or ecchymosis, FROM without pain.    BL LE AFROM without pain, no effusions or ecchymosis noted    Abd soft, NTND    Ant chest wall without tenderness.      XR left shoulder and humerus ordered.  SW ordered for SBIRT and VOV
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
## 2021-05-30 NOTE — H&P ADULT - NSHPLABSRESULTS_GEN_ALL_CORE
LABS:                        12.9   16.14 )-----------( 131      ( 30 May 2021 00:19 )             38.3     05-30    134<L>  |  94<L>  |  11.2  ----------------------------<  93  5.0   |  22.0  |  0.76    Ca    9.7      30 May 2021 00:19    TPro  7.6  /  Alb  4.2  /  TBili  0.2<L>  /  DBili  x   /  AST  18  /  ALT  9   /  AlkPhos  87  05-30    PT/INR - ( 30 May 2021 00:19 )   PT: 11.7 sec;   INR: 1.01 ratio         PTT - ( 30 May 2021 00:19 )  PTT:22.8 sec

## 2021-05-30 NOTE — SBIRT NOTE ADULT - NSSBIRTDRGBRIEFINTDET_GEN_A_CORE
Pt at first declined any other substance use with worker. Pt then admitted to doing "1 line of coke" before admission. Pt reports she does not use substances other than ETOH daily.
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
## 2021-05-31 DIAGNOSIS — S01.01XA LACERATION WITHOUT FOREIGN BODY OF SCALP, INITIAL ENCOUNTER: ICD-10-CM

## 2021-05-31 LAB
ANION GAP SERPL CALC-SCNC: 9 MMOL/L — SIGNIFICANT CHANGE UP (ref 5–17)
BUN SERPL-MCNC: 11.7 MG/DL — SIGNIFICANT CHANGE UP (ref 8–20)
CALCIUM SERPL-MCNC: 8.9 MG/DL — SIGNIFICANT CHANGE UP (ref 8.6–10.2)
CHLORIDE SERPL-SCNC: 107 MMOL/L — SIGNIFICANT CHANGE UP (ref 98–107)
CO2 SERPL-SCNC: 24 MMOL/L — SIGNIFICANT CHANGE UP (ref 22–29)
COVID-19 SPIKE DOMAIN AB INTERP: POSITIVE
COVID-19 SPIKE DOMAIN ANTIBODY RESULT: >250 U/ML — HIGH
CREAT SERPL-MCNC: 0.72 MG/DL — SIGNIFICANT CHANGE UP (ref 0.5–1.3)
GLUCOSE SERPL-MCNC: 84 MG/DL — SIGNIFICANT CHANGE UP (ref 70–99)
HCT VFR BLD CALC: 25.6 % — LOW (ref 34.5–45)
HGB BLD-MCNC: 9.2 G/DL — LOW (ref 11.5–15.5)
MAGNESIUM SERPL-MCNC: 1.8 MG/DL — SIGNIFICANT CHANGE UP (ref 1.6–2.6)
MCHC RBC-ENTMCNC: 33.2 PG — SIGNIFICANT CHANGE UP (ref 27–34)
MCHC RBC-ENTMCNC: 35.9 GM/DL — SIGNIFICANT CHANGE UP (ref 32–36)
MCV RBC AUTO: 92.4 FL — SIGNIFICANT CHANGE UP (ref 80–100)
PHOSPHATE SERPL-MCNC: 2.6 MG/DL — SIGNIFICANT CHANGE UP (ref 2.4–4.7)
PLATELET # BLD AUTO: 206 K/UL — SIGNIFICANT CHANGE UP (ref 150–400)
POTASSIUM SERPL-MCNC: 4.1 MMOL/L — SIGNIFICANT CHANGE UP (ref 3.5–5.3)
POTASSIUM SERPL-SCNC: 4.1 MMOL/L — SIGNIFICANT CHANGE UP (ref 3.5–5.3)
RBC # BLD: 2.77 M/UL — LOW (ref 3.8–5.2)
RBC # FLD: 14.4 % — SIGNIFICANT CHANGE UP (ref 10.3–14.5)
SARS-COV-2 IGG+IGM SERPL QL IA: >250 U/ML — HIGH
SARS-COV-2 IGG+IGM SERPL QL IA: POSITIVE
SODIUM SERPL-SCNC: 140 MMOL/L — SIGNIFICANT CHANGE UP (ref 135–145)
WBC # BLD: 6.79 K/UL — SIGNIFICANT CHANGE UP (ref 3.8–10.5)
WBC # FLD AUTO: 6.79 K/UL — SIGNIFICANT CHANGE UP (ref 3.8–10.5)

## 2021-05-31 PROCEDURE — 95720 EEG PHY/QHP EA INCR W/VEEG: CPT

## 2021-05-31 PROCEDURE — 99221 1ST HOSP IP/OBS SF/LOW 40: CPT

## 2021-05-31 PROCEDURE — 99231 SBSQ HOSP IP/OBS SF/LOW 25: CPT

## 2021-05-31 PROCEDURE — 99222 1ST HOSP IP/OBS MODERATE 55: CPT

## 2021-05-31 RX ORDER — QUETIAPINE FUMARATE 200 MG/1
50 TABLET, FILM COATED ORAL EVERY 6 HOURS
Refills: 0 | Status: DISCONTINUED | OUTPATIENT
Start: 2021-05-31 | End: 2021-06-09

## 2021-05-31 RX ORDER — BACITRACIN ZINC 500 UNIT/G
1 OINTMENT IN PACKET (EA) TOPICAL DAILY
Refills: 0 | Status: DISCONTINUED | OUTPATIENT
Start: 2021-05-31 | End: 2021-06-09

## 2021-05-31 RX ORDER — TRAZODONE HCL 50 MG
50 TABLET ORAL AT BEDTIME
Refills: 0 | Status: DISCONTINUED | OUTPATIENT
Start: 2021-05-31 | End: 2021-06-09

## 2021-05-31 RX ORDER — QUETIAPINE FUMARATE 200 MG/1
200 TABLET, FILM COATED ORAL
Refills: 0 | Status: DISCONTINUED | OUTPATIENT
Start: 2021-05-31 | End: 2021-06-09

## 2021-05-31 RX ADMIN — Medication 50 MILLIGRAM(S): at 21:38

## 2021-05-31 RX ADMIN — QUETIAPINE FUMARATE 100 MILLIGRAM(S): 200 TABLET, FILM COATED ORAL at 12:19

## 2021-05-31 RX ADMIN — Medication 1 APPLICATION(S): at 12:19

## 2021-05-31 RX ADMIN — ENOXAPARIN SODIUM 40 MILLIGRAM(S): 100 INJECTION SUBCUTANEOUS at 12:19

## 2021-05-31 RX ADMIN — Medication 1 PATCH: at 12:19

## 2021-05-31 RX ADMIN — QUETIAPINE FUMARATE 200 MILLIGRAM(S): 200 TABLET, FILM COATED ORAL at 21:38

## 2021-05-31 RX ADMIN — Medication 1 PATCH: at 20:14

## 2021-05-31 NOTE — CHART NOTE - NSCHARTNOTEFT_GEN_A_CORE
EEG preliminary read (not final) on the initial recording hour(s) = x 2     No seizures recorded.  Right posterior quadrant repetitive spiking noted intermittently without evolution indicating risk of focal onset seizures.  Final report to follow tomorrow morning after completion of study.    [facility] EEG Reading Room Ph#: [phone]  Epilepsy Answering Service after 5PM and before 8:30AM: Ph#: [phone]
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
## 2021-05-31 NOTE — PROGRESS NOTE ADULT - ATTENDING COMMENTS
The patient was seen and examined  Events noted  No new problems  Neurologically okay  Tox screen reviewed--will start a low-dose catapres patch  Wound looks okay  Will apply bacitracin  Psychiatry consult pending  Neurology work-up  DVT prophylaxis    Discharge planning

## 2021-06-01 ENCOUNTER — TRANSCRIPTION ENCOUNTER (OUTPATIENT)
Age: 51
End: 2021-06-01

## 2021-06-01 PROCEDURE — 73060 X-RAY EXAM OF HUMERUS: CPT | Mod: 26,LT

## 2021-06-01 PROCEDURE — 99233 SBSQ HOSP IP/OBS HIGH 50: CPT

## 2021-06-01 PROCEDURE — 73030 X-RAY EXAM OF SHOULDER: CPT | Mod: 26,LT

## 2021-06-01 PROCEDURE — 99232 SBSQ HOSP IP/OBS MODERATE 35: CPT

## 2021-06-01 RX ORDER — LEVETIRACETAM 250 MG/1
1000 TABLET, FILM COATED ORAL ONCE
Refills: 0 | Status: COMPLETED | OUTPATIENT
Start: 2021-06-01 | End: 2021-06-01

## 2021-06-01 RX ORDER — POLYETHYLENE GLYCOL 3350 17 G/17G
17 POWDER, FOR SOLUTION ORAL DAILY
Refills: 0 | Status: DISCONTINUED | OUTPATIENT
Start: 2021-06-01 | End: 2021-06-09

## 2021-06-01 RX ORDER — ACETAMINOPHEN 500 MG
975 TABLET ORAL EVERY 8 HOURS
Refills: 0 | Status: DISCONTINUED | OUTPATIENT
Start: 2021-06-01 | End: 2021-06-09

## 2021-06-01 RX ORDER — LEVETIRACETAM 250 MG/1
750 TABLET, FILM COATED ORAL
Refills: 0 | Status: DISCONTINUED | OUTPATIENT
Start: 2021-06-01 | End: 2021-06-09

## 2021-06-01 RX ORDER — SENNA PLUS 8.6 MG/1
2 TABLET ORAL AT BEDTIME
Refills: 0 | Status: DISCONTINUED | OUTPATIENT
Start: 2021-06-01 | End: 2021-06-09

## 2021-06-01 RX ADMIN — QUETIAPINE FUMARATE 200 MILLIGRAM(S): 200 TABLET, FILM COATED ORAL at 17:50

## 2021-06-01 RX ADMIN — Medication 975 MILLIGRAM(S): at 23:05

## 2021-06-01 RX ADMIN — Medication 975 MILLIGRAM(S): at 12:38

## 2021-06-01 RX ADMIN — Medication 50 MILLIGRAM(S): at 22:05

## 2021-06-01 RX ADMIN — Medication 1 PATCH: at 17:51

## 2021-06-01 RX ADMIN — Medication 1 APPLICATION(S): at 12:28

## 2021-06-01 RX ADMIN — Medication 975 MILLIGRAM(S): at 22:05

## 2021-06-01 RX ADMIN — Medication 1 PATCH: at 08:02

## 2021-06-01 RX ADMIN — LEVETIRACETAM 750 MILLIGRAM(S): 250 TABLET, FILM COATED ORAL at 17:50

## 2021-06-01 RX ADMIN — ENOXAPARIN SODIUM 40 MILLIGRAM(S): 100 INJECTION SUBCUTANEOUS at 12:28

## 2021-06-01 RX ADMIN — LEVETIRACETAM 400 MILLIGRAM(S): 250 TABLET, FILM COATED ORAL at 12:28

## 2021-06-01 RX ADMIN — QUETIAPINE FUMARATE 200 MILLIGRAM(S): 200 TABLET, FILM COATED ORAL at 05:40
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
## 2021-06-01 NOTE — PHYSICAL THERAPY INITIAL EVALUATION ADULT - ADDITIONAL COMMENTS
Pt lives in an Apt with 0  steps to enter and 0 stairs inside.  Pt owns medical equipment: SAC, RW, W/C  Pt lives with: Spouse  Someone is always available to provide assist.

## 2021-06-01 NOTE — DISCHARGE NOTE PROVIDER - NSDCACTIVITY_GEN_ALL_CORE
Showering allowed Do not drive or operate machinery/Showering allowed/Do not make important decisions/No heavy lifting/straining
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
## 2021-06-01 NOTE — PROGRESS NOTE ADULT - SUBJECTIVE AND OBJECTIVE BOX
HPI/OVERNIGHT EVENTS: NAEON. Evaluated by Behavioral Health, who provided recommendations. Undergoing vEEG study, with prelim report showing right posterior quadrant spikes without progression. Patient feels well overall and has no new complaints, although she continues to note episodes of anxiety. Pain controlled. Tolerating PO, voiding and passing flatus + BMs without issue. No f/c/n/v, chest pain, SOB, urinary symptoms, hematochezia, melena.    MEDICATIONS  (STANDING):  BACItracin   Ointment 1 Application(s) Topical daily  cloNIDine Patch 0.1 mG/24Hr(s) 1 patch Transdermal every 7 days  enoxaparin Injectable 40 milliGRAM(s) SubCutaneous daily  QUEtiapine 200 milliGRAM(s) Oral two times a day  traZODone 50 milliGRAM(s) Oral at bedtime    MEDICATIONS  (PRN):  QUEtiapine 50 milliGRAM(s) Oral every 6 hours PRN Agitation/Anxiety      Vital Signs Last 24 Hrs  T(C): 36.9 (31 May 2021 22:32), Max: 37 (31 May 2021 18:05)  T(F): 98.5 (31 May 2021 22:32), Max: 98.6 (31 May 2021 18:05)  HR: 78 (31 May 2021 22:32) (60 - 78)  BP: 113/75 (31 May 2021 22:32) (102/65 - 138/99)  BP(mean): --  RR: 18 (31 May 2021 22:32) (18 - 18)  SpO2: 96% (31 May 2021 22:32) (93% - 98%)    Gen: patient laying in bed, A&Ox3, NAD  HEENT: 20cm scalp laceration with suture sites c/d/i. Kerlix wrap on head in place with no strikethrough. PERRL b/l  Pulm: regular respiratory rate, no distress  CV: RRR  GI: Abdomen soft, NTND, without rebound or guarding  Neurological: no gross sensory / motor deficits  Psych: Appropriate affect, anxious mood  Ext: Warm, pink, no edema or lesions noted      I&O's Detail    30 May 2021 07:01  -  31 May 2021 07:00  --------------------------------------------------------  IN:    Dexmedetomidine: 24.9 mL    IV PiggyBack: 250 mL    Oral Fluid: 720 mL    sodium chloride 0.9%: 1000 mL  Total IN: 1994.9 mL    OUT:    Indwelling Catheter - Urethral (mL): 1075 mL    Voided (mL): 1400 mL  Total OUT: 2475 mL    Total NET: -480.1 mL      31 May 2021 07:01  -  01 Jun 2021 00:54  --------------------------------------------------------  IN:    Oral Fluid: 480 mL  Total IN: 480 mL    OUT:    Voided (mL): 700 mL  Total OUT: 700 mL    Total NET: -220 mL          LABS:                        9.2    6.79  )-----------( 206      ( 31 May 2021 07:58 )             25.6     05-31    140  |  107  |  11.7  ----------------------------<  84  4.1   |  24.0  |  0.72    Ca    8.9      31 May 2021 07:58  Phos  2.6     05-31  Mg     1.8     05-31

## 2021-06-01 NOTE — OCCUPATIONAL THERAPY INITIAL EVALUATION ADULT - SPECIAL TRAINING, OT EVAL
Functional mobility around room and bathroom with supervision with no assistive device due to decreased strength, decreased postural control and decreased balance; cues for foot placement, body positioning and safe negotiation on enviu environment/obstacles. Pt requires increased time and verbal/tactile cues throughout mobility for safety.

## 2021-06-01 NOTE — DISCHARGE NOTE PROVIDER - CARE PROVIDER_API CALL
Leona Henderson (DO)  EEGEpilepsy; Neurology  [address] [place] [address]  Phone: [phone]  Fax: [phone]  Follow Up Time: 1 month

## 2021-06-01 NOTE — PROGRESS NOTE ADULT - ATTENDING COMMENTS
seen and examined 06-01-21 @ 1015    c/o right frontal headache    dressing removed from scalp  no evidence of wound infection    scalp laceration  -f/u social work for polysubstance abuse and to assure safe discharge    seizure disorder  -f/u neurology    left shoulder pain  -entire left shoulder and proximal humerus are seen on CT chest (axial images), and there is no fracture seen and examined 06-01-21 @ 1015    c/o right frontal headache    dressing removed from scalp  no evidence of wound infection    scalp laceration  -ok to remove sutures this weekend    polysubstance abuse  -f/u social work to assure safe discharge    seizure disorder  -f/u neurology    left shoulder pain  -entire left shoulder and proximal humerus are seen on CT chest (axial images), and there is no fracture
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
## 2021-06-01 NOTE — DISCHARGE NOTE PROVIDER - NSDCMRMEDTOKEN_GEN_ALL_CORE_FT
acetaminophen 325 mg oral tablet: 3 tab(s) orally every 8 hours, As needed, Mild Pain (1 - 3)  bacitracin 500 units/g topical ointment: 1 application topically once a day  bisacodyl 10 mg rectal suppository: 1 suppository(ies) rectal once a day, As Needed for constipation  cloNIDine 0.1 mg/24 hr transdermal film, extended release: 1 patch transdermal every 7 days  levETIRAcetam 750 mg oral tablet: 1 tab(s) orally 2 times a day  polyethylene glycol 3350 oral powder for reconstitution: 17 gram(s) orally once a day, As needed, Constipation  QUEtiapine 200 mg oral tablet: 1 tab(s) orally 2 times a day  QUEtiapine 50 mg oral tablet: 1 tab(s) orally every 6 hours, As needed, Agitation/Anxiety  senna oral tablet: 2 tab(s) orally once a day (at bedtime)  traZODone 50 mg oral tablet: 1 tab(s) orally once a day (at bedtime)   acetaminophen 325 mg oral tablet: 3 tab(s) orally every 8 hours, As needed, Mild Pain (1 - 3)  bacitracin 500 units/g topical ointment: 1 application topically once a day  bisacodyl 10 mg rectal suppository: 1 suppository(ies) rectal once a day, As Needed for constipation  cloNIDine 0.1 mg/24 hr transdermal film, extended release: 1 patch transdermal every 7 days  levETIRAcetam 750 mg oral tablet: 1 tab(s) orally 2 times a day  ocular lubricant ophthalmic solution: 2 drop(s) to each affected eye 3 times a day, As needed, Dry Eyes  polyethylene glycol 3350 oral powder for reconstitution: 17 gram(s) orally once a day, As needed, Constipation  QUEtiapine 200 mg oral tablet: 1 tab(s) orally 2 times a day  QUEtiapine 50 mg oral tablet: 1 tab(s) orally every 6 hours, As needed, Agitation/Anxiety  senna oral tablet: 2 tab(s) orally once a day (at bedtime)  traZODone 50 mg oral tablet: 1 tab(s) orally once a day (at bedtime)
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
## 2021-06-01 NOTE — DISCHARGE NOTE PROVIDER - NSFOLLOWUPCLINICS_GEN_ALL_ED_FT
[facility] Acute Care Surgery  Acute Care Surgery  [address] [place] [address]  Phone: [phone]  Fax:

## 2021-06-01 NOTE — OCCUPATIONAL THERAPY INITIAL EVALUATION ADULT - ASSISTIVE DEVICE FOR TRANSFER: SIT/STAND, REHAB EVAL
71 y.o. male with past medical history significant for HTN, DM, NVST, and s/p ICD who presents from work via EMS with chief complaint of facial asymmetry. Pt reports onset this morning of R sided facial droop. Pt exhibits decreased sensation to R side of face. Per EMS, pt reports he stopped taking his BP medication approximately \"1 week ago\" because he \"felt better\". Per EMS, pt also reports he did not take his insulin this morning because \"he forgot\"; pt's BGL was 512 en route. Pt reports previous history of symptoms and states, \"this happened to me in November 2018 and I had to be admitted\". Pt reports he \"did not have a stroke\" then, but was diagnosed with brugada syndrome and had defibrillator placed while admitted. Pt denies taking blood thinners. Pt denies any chest pain, SOB or gait problem. There are no other acute medical concerns at this time. Old Chart Review:  Pt was last admitted to hospital from 11/18/18 to 11/22/18 for syncope and dizziness. Pt's EKG suggested possible Brugada syndrome. Pt's troponin was negative, cardiac MRI revealed L ventricular EF of 60% w/ LVH, and echo revelead EF of 55-60% w/ an increase in wall thickness. Cardic cath report reveleave a midly calcified left main. EP studies showed pt had easily inducible tach/lab which resulted in pt having defibrillator placed. Pt was discharged home and recommended to f/u with cardiology w/in 1-2 weeks. Social hx: Non smoker; No EtOH use    PCP: Klever Kumar MD    Note written by Holger Luz, as dictated by pAoorva Starr MD 12:15 PM        The history is provided by the patient, medical records and the EMS personnel. No  was used.         Past Medical History:   Diagnosis Date    Diabetes ([address]     Hypertension     ICD (implantable cardioverter-defibrillator) in place     NSVT (nonsustained ventricular tachycardia) ([address] 11/21/2018    EPS 11/21/2018 VT/VF        Past Surgical History:   Procedure Laterality Date    COLONOSCOPY  1/2/2015         HX HEART CATHETERIZATION      HX HEENT      l cararact removal         Family History:   Problem Relation Age of Onset    Diabetes Mother     Hypertension Mother     Stroke Mother        Social History     Socioeconomic History    Marital status: SINGLE     Spouse name: Not on file    Number of children: 0    Years of education: Not on file    Highest education level: Not on file   Social Needs    Financial resource strain: Not on file    Food insecurity - worry: Not on file    Food insecurity - inability: Not on file   Sept[employer] needs - medical: Not on file   [employer] needs - non-medical: Not on file   Occupational History    Occupation: [employer]   Tobacco Use    Smoking status: Never Smoker    Smokeless tobacco: Never Used   Substance and Sexual Activity    Alcohol use: No    Drug use: No    Sexual activity: Yes     Partners: Female     Birth control/protection: None   Other Topics Concern    Not on file   Social History Narrative    Not on file         ALLERGIES: Patient has no known allergies. Review of Systems   Constitutional: Negative for activity change, chills and fever. HENT: Negative for nosebleeds, sore throat, trouble swallowing and voice change. Eyes: Negative for visual disturbance. Respiratory: Negative for shortness of breath. Cardiovascular: Negative for chest pain and palpitations. Gastrointestinal: Negative for abdominal pain, constipation, diarrhea and nausea. Genitourinary: Negative for difficulty urinating, dysuria, hematuria and urgency. Musculoskeletal: Negative for back pain, gait problem, neck pain and neck stiffness. Skin: Negative for color change. Allergic/Immunologic: Negative for immunocompromised state. Neurological: Positive for facial asymmetry. Negative for dizziness, seizures, syncope, weakness, light-headedness, numbness and headaches.    Psychiatric/Behavioral: Negative for no assistive device behavioral problems, confusion, hallucinations, self-injury and suicidal ideas. All other systems reviewed and are negative. There were no vitals filed for this visit. Physical Exam   Constitutional: He is oriented to person, place, and time. He appears well-developed and well-nourished. No distress. HENT:   Head: Normocephalic and atraumatic. Eyes: Pupils are equal, round, and reactive to light. Neck: Normal range of motion. Neck supple. Cardiovascular: Normal rate, regular rhythm and normal heart sounds. Exam reveals no gallop and no friction rub. No murmur heard. Pulmonary/Chest: Effort normal and breath sounds normal. No respiratory distress. He has no wheezes. Abdominal: Soft. Bowel sounds are normal. He exhibits no distension. There is no tenderness. There is no rebound and no guarding. Musculoskeletal: Normal range of motion. Neurological: He is alert and oriented to person, place, and time. A sensory deficit is present. Pt exhibits right sided facial droop with decreased sensation. Skin: Skin is warm. No rash noted. He is not diaphoretic. Psychiatric: He has a normal mood and affect. His behavior is normal. Judgment and thought content normal.   Nursing note and vitals reviewed. Note written by Holger Fish, as dictated by Niko Skinner MD 12:15 PM    MDM     This is a 30-year-old male with past medical history, review of systems, physical exam as above, presenting with complaints of onset right-sided facial droop, approximately 6 PM yesterday evening. Patient denies other focal weakness, also endorses right facial decrease sensation. EMS reports the patient also has ST changes on his EKG, initiating a STEMI alert. On arrival patient awake, alert, with physical exam findings as above, concerning for acute infarction.  Cardiology at bedside cancels STEMI alert, plan proceed with a UA showing for CVA versus TIA, including CT imaging, and consultation with tele-neurology. We will make a disposition based the patient's diagnostics and response to therapy. Procedures  PROGRESS NOTE:  12:20 PM  Cath Lab with pt; Prior to pt's arrival Code S and Code STEMI called. 12:02  Dr. Reed Crum and Dr. Lee at bedside. 12:04  Dr. Loli Mckenzie at bedside. 12:09  Dr. Loli Mckenzie canceled code STEMI.    1:37 PM  Was informed that pt has to be transferred to [facility] for admission for MRI.     1:42 PM  Talked with family about transferring pt; family agrees with care plan. CONSULT NOTE:  12:25 PM Jeferson Rodriguez MD spoke with Dr. Natalia Holcomb, Consult for [facility] neurology. Discussed available diagnostic tests and clinical findings. Dr. Natalia Holcomb will see and evaluate the pt. 12:59 PM Jeferson Rodriguez MD spoke with Dr. Natalia Holcomb, Consult for [facility] neurology. Discussed available diagnostic tests and clinical findings. Dr. Natalia Holcomb suspects bells palsy and recommends pt get MRI in ED to avoid admission. 2:00 PM Jeferson Rodriguez MD spoke with Dr. Franki Edgar at [facility]. Discussed available diagnostic tests and clinical findings. Dr. Franki Edgar will accept pt at [facility] ED.    2:38 PM Jeferson Rodriguez MD spoke with Radiology Department at [facility]. Pt has appointment for MRI tomorrow afternoon. 2:43 PM Jeferson Rodriguez MD communicated with Dr. Cash Fish, Consult for Hospitalist via [facility] Text. Discussed available diagnostic tests and clinical findings. Dr. Cash Fish accepted admission to [facility].     3:30 PM  Jeferson Rodriguez MD have spent 60 minutes of critical care time involved in lab review, consultations with specialist, family decision-making, and documentation. During this entire length of time I was immediately available to the patient. Critical Care:   The reason for providing this level of medical care for this critically ill patient was due a critical illness that impaired one or more vital organ systems such that there was a high probability of imminent or life threatening deterioration in the patients condition. This care involved high complexity decision making to assess, manipulate, and support vital system functions, to treat this degreee vital organ system failure and to prevent further life threatening deterioration of the patients condition.
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
## 2021-06-01 NOTE — OCCUPATIONAL THERAPY INITIAL EVALUATION ADULT - ADDITIONAL COMMENTS
Pt lives in first floor, single level apartment with no [address] and no steps inside. Bathroom has bathtub with doors. Pt owns RW, cane, wheelchair. Pt is right handed. Pt does not drive. Pt's  is able and available to assist upon discharge.

## 2021-06-01 NOTE — DISCHARGE NOTE PROVIDER - NSDCCPCAREPLAN_GEN_ALL_CORE_FT
PRINCIPAL DISCHARGE DIAGNOSIS  Diagnosis: Laceration of scalp  Assessment and Plan of Treatment: Follow up: Please call and make an appointment with the [facility] 10-14 days after discharge so that wound can be re-evaluated and sutures removed at that time. Also, please call and make an appointment with your primary care physician as per your usual schedule.   Activity: May return to normal activities as tolerated.  Diet: May continue regular diet.  Medications: Please take all medications listed on your discharge paperwork as prescribed. Tylenol for pain relief, as needed.  Wound Care: Please, keep wound site clean and dry. You may shower, but do not bathe. ** WOUND SHOULD BE RE-EVALUATED IN APPROXIMATLEY 2 WEEKS AT THE SURGERY CLINIC and sutures likely will be removed at that time ** Continue to place bacitracin on your wounds, daily.   Patient is advised to RETURN TO THE EMERGENCY DEPARTMENT for any of the following - worsening pain, fever/chills, nausea/vomiting, altered mental status, chest pain, shortness of breath, or any other new / worsening symptom.      SECONDARY DISCHARGE DIAGNOSES  Diagnosis: Seizure disorder  Assessment and Plan of Treatment:      PRINCIPAL DISCHARGE DIAGNOSIS  Diagnosis: Seizure disorder  Assessment and Plan of Treatment: Follow up: Please call and make an appointment with the epilepsy physician Dr. Henderson ONE MONTH after discharge. Also, please call and make an appointment with your primary care physician as per your usual schedule.   Activity: No driving. Please adhere to seizure precautions as you were instructed by Dr. Henderson.  Diet: May continue regular diet. Refrain from all alcohol use.  Medications: Please take all medications listed on your discharge paperwork as prescribed. Tylenol for pain relief.   Patient is advised to RETURN TO THE EMERGENCY DEPARTMENT for any of the following - worsening pain, fever/chills, nausea/vomiting, altered mental status, chest pain, shortness of breath, or any other new / worsening symptom.      SECONDARY DISCHARGE DIAGNOSES  Diagnosis: Laceration of scalp  Assessment and Plan of Treatment: Please, keep wound site clean and dry. You may shower, but do not bathe. ** Sutures on your scalp should be removed between the dates of 6/9-6/13. ** This can be done by your primary care physician or at the Acute [facility] Surgery Clinic - call and make a follow-up appointment within these dates if your primary physician is unable to see you / unable to evaluate wound & remove sutures. Bacitracin to assist with wound healing.    Diagnosis: Anxiety  Assessment and Plan of Treatment: Please continue medications that were prescribed by the psychiatrist and outpatient follow-up.     PRINCIPAL DISCHARGE DIAGNOSIS  Diagnosis: Seizure disorder  Assessment and Plan of Treatment: Follow up: Please call and make an appointment with the epilepsy physician Dr. Henderson ONE MONTH after discharge. Also, please call and make an appointment with your primary care physician as per your usual schedule.   Activity: No driving. Please adhere to seizure precautions as you were instructed by Dr. Henderson.  Diet: May continue regular diet. Refrain from all alcohol use.  Medications: Please take all medications listed on your discharge paperwork as prescribed. Tylenol for pain relief.   Patient is advised to RETURN TO THE EMERGENCY DEPARTMENT for any of the following - worsening pain, fever/chills, nausea/vomiting, altered mental status, chest pain, shortness of breath, or any other new / worsening symptom.      SECONDARY DISCHARGE DIAGNOSES  Diagnosis: Laceration of scalp  Assessment and Plan of Treatment: Please, keep wound site clean and dry. You may shower, but do not bathe. ** Sutures on your scalp should be removed approximately 1 WEEK AFTER DISCHARGE ** This can be done by your primary care physician or at the Acute [facility] Surgery Clinic - call and make a follow-up appointment within these dates if your primary physician is unable to see you / unable to evaluate wound & remove sutures. Bacitracin to assist with wound healing.    Diagnosis: Anxiety  Assessment and Plan of Treatment: Please continue medications that were prescribed by the psychiatrist and outpatient follow-up.

## 2021-06-01 NOTE — PHYSICAL THERAPY INITIAL EVALUATION ADULT - WORK/LEISURE ACTIVITY, REHAB EVAL
independent
Normal rate, regular rhythm.  Heart sounds S1, S2.  No murmurs, rubs or gallops. PAin not reproducible.

## 2021-06-02 PROCEDURE — 70551 MRI BRAIN STEM W/O DYE: CPT | Mod: 26

## 2021-06-02 PROCEDURE — 99232 SBSQ HOSP IP/OBS MODERATE 35: CPT

## 2021-06-02 PROCEDURE — 76377 3D RENDER W/INTRP POSTPROCES: CPT | Mod: 26

## 2021-06-02 PROCEDURE — 99233 SBSQ HOSP IP/OBS HIGH 50: CPT

## 2021-06-02 RX ADMIN — LEVETIRACETAM 750 MILLIGRAM(S): 250 TABLET, FILM COATED ORAL at 05:27

## 2021-06-02 RX ADMIN — SENNA PLUS 2 TABLET(S): 8.6 TABLET ORAL at 21:34

## 2021-06-02 RX ADMIN — QUETIAPINE FUMARATE 200 MILLIGRAM(S): 200 TABLET, FILM COATED ORAL at 17:32

## 2021-06-02 RX ADMIN — ENOXAPARIN SODIUM 40 MILLIGRAM(S): 100 INJECTION SUBCUTANEOUS at 11:54

## 2021-06-02 RX ADMIN — Medication 50 MILLIGRAM(S): at 21:34

## 2021-06-02 RX ADMIN — Medication 1 PATCH: at 07:51

## 2021-06-02 RX ADMIN — Medication 975 MILLIGRAM(S): at 21:36

## 2021-06-02 RX ADMIN — Medication 1 PATCH: at 19:56

## 2021-06-02 RX ADMIN — Medication 1 APPLICATION(S): at 11:54

## 2021-06-02 RX ADMIN — LEVETIRACETAM 750 MILLIGRAM(S): 250 TABLET, FILM COATED ORAL at 17:31

## 2021-06-02 RX ADMIN — QUETIAPINE FUMARATE 50 MILLIGRAM(S): 200 TABLET, FILM COATED ORAL at 17:31

## 2021-06-02 RX ADMIN — QUETIAPINE FUMARATE 200 MILLIGRAM(S): 200 TABLET, FILM COATED ORAL at 05:27

## 2021-06-02 RX ADMIN — Medication 975 MILLIGRAM(S): at 22:36
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
## 2021-06-02 NOTE — CHART NOTE - NSCHARTNOTEFT_GEN_A_CORE
Pt adm though ICU, now downgraded and tolerating regular diet. RD to f/u with full assessment per protocol.

## 2021-06-02 NOTE — PROGRESS NOTE ADULT - ATTENDING COMMENTS
seen and examined 06-02-21 @ 0800    c/o right frontal headache    scalp/forehead laceration sutured without evidence of wound infection    scalp laceration  -ok to remove sutures this weekend    polysubstance abuse  -f/u social work to assure safe discharge    seizure disorder  -MRI brain    left shoulder pain  -entire left shoulder and proximal humerus are seen on CT chest (axial images), and there is no fracture  -X-ray left shoulder / humerus (6/1) - no fracture
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
## 2021-06-02 NOTE — CHART NOTE - NSCHARTNOTESELECT_GEN_ALL_CORE
EEG prelim
Event Note
Nutrition Services
Transfer Note
transfer note sicu to floor/Event Note
Event Note
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
## 2021-06-03 PROCEDURE — 99232 SBSQ HOSP IP/OBS MODERATE 35: CPT

## 2021-06-03 RX ORDER — LEVETIRACETAM 250 MG/1
1 TABLET, FILM COATED ORAL
Qty: 20 | Refills: 0
Start: 2021-06-03

## 2021-06-03 RX ADMIN — QUETIAPINE FUMARATE 200 MILLIGRAM(S): 200 TABLET, FILM COATED ORAL at 17:22

## 2021-06-03 RX ADMIN — Medication 50 MILLIGRAM(S): at 21:51

## 2021-06-03 RX ADMIN — Medication 1 PATCH: at 08:01

## 2021-06-03 RX ADMIN — ENOXAPARIN SODIUM 40 MILLIGRAM(S): 100 INJECTION SUBCUTANEOUS at 11:20

## 2021-06-03 RX ADMIN — QUETIAPINE FUMARATE 200 MILLIGRAM(S): 200 TABLET, FILM COATED ORAL at 05:11

## 2021-06-03 RX ADMIN — QUETIAPINE FUMARATE 50 MILLIGRAM(S): 200 TABLET, FILM COATED ORAL at 17:21

## 2021-06-03 RX ADMIN — LEVETIRACETAM 750 MILLIGRAM(S): 250 TABLET, FILM COATED ORAL at 17:21

## 2021-06-03 RX ADMIN — Medication 1 APPLICATION(S): at 11:20

## 2021-06-03 RX ADMIN — SENNA PLUS 2 TABLET(S): 8.6 TABLET ORAL at 21:51

## 2021-06-03 RX ADMIN — LEVETIRACETAM 750 MILLIGRAM(S): 250 TABLET, FILM COATED ORAL at 05:11
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
## 2021-06-03 NOTE — PROGRESS NOTE ADULT - ATTENDING COMMENTS
seen and examined 06-03-21 @ 0900    scalp/forehead laceration sutured without evidence of wound infection    scalp laceration  -ok to remove sutures this weekend    polysubstance abuse  -f/u social work to assure safe discharge    seizure disorder  -f/u neurology    left shoulder pain  -entire left shoulder and proximal humerus are seen on CT chest (axial images), and there is no fracture  -X-ray left shoulder / humerus (6/1) - no fracture

## 2021-06-04 LAB
ANION GAP SERPL CALC-SCNC: 11 MMOL/L — SIGNIFICANT CHANGE UP (ref 5–17)
ANISOCYTOSIS BLD QL: SLIGHT — SIGNIFICANT CHANGE UP
BASOPHILS # BLD AUTO: 0.07 K/UL — SIGNIFICANT CHANGE UP (ref 0–0.2)
BASOPHILS NFR BLD AUTO: 1.7 % — SIGNIFICANT CHANGE UP (ref 0–2)
BUN SERPL-MCNC: 15.6 MG/DL — SIGNIFICANT CHANGE UP (ref 8–20)
CALCIUM SERPL-MCNC: 9.3 MG/DL — SIGNIFICANT CHANGE UP (ref 8.6–10.2)
CHLORIDE SERPL-SCNC: 96 MMOL/L — LOW (ref 98–107)
CO2 SERPL-SCNC: 21 MMOL/L — LOW (ref 22–29)
CREAT SERPL-MCNC: 0.84 MG/DL — SIGNIFICANT CHANGE UP (ref 0.5–1.3)
EOSINOPHIL # BLD AUTO: 0.11 K/UL — SIGNIFICANT CHANGE UP (ref 0–0.5)
EOSINOPHIL NFR BLD AUTO: 2.6 % — SIGNIFICANT CHANGE UP (ref 0–6)
GIANT PLATELETS BLD QL SMEAR: PRESENT — SIGNIFICANT CHANGE UP
GLUCOSE SERPL-MCNC: 95 MG/DL — SIGNIFICANT CHANGE UP (ref 70–99)
HCT VFR BLD CALC: 30.3 % — LOW (ref 34.5–45)
HGB BLD-MCNC: 10 G/DL — LOW (ref 11.5–15.5)
LYMPHOCYTES # BLD AUTO: 1.39 K/UL — SIGNIFICANT CHANGE UP (ref 1–3.3)
LYMPHOCYTES # BLD AUTO: 33.6 % — SIGNIFICANT CHANGE UP (ref 13–44)
MACROCYTES BLD QL: SLIGHT — SIGNIFICANT CHANGE UP
MAGNESIUM SERPL-MCNC: 1.5 MG/DL — LOW (ref 1.6–2.6)
MANUAL SMEAR VERIFICATION: SIGNIFICANT CHANGE UP
MCHC RBC-ENTMCNC: 30.5 PG — SIGNIFICANT CHANGE UP (ref 27–34)
MCHC RBC-ENTMCNC: 33 GM/DL — SIGNIFICANT CHANGE UP (ref 32–36)
MCV RBC AUTO: 92.4 FL — SIGNIFICANT CHANGE UP (ref 80–100)
MONOCYTES # BLD AUTO: 0.39 K/UL — SIGNIFICANT CHANGE UP (ref 0–0.9)
MONOCYTES NFR BLD AUTO: 9.5 % — SIGNIFICANT CHANGE UP (ref 2–14)
NEUTROPHILS # BLD AUTO: 2.18 K/UL — SIGNIFICANT CHANGE UP (ref 1.8–7.4)
NEUTROPHILS NFR BLD AUTO: 52.6 % — SIGNIFICANT CHANGE UP (ref 43–77)
OVALOCYTES BLD QL SMEAR: SLIGHT — SIGNIFICANT CHANGE UP
PHOSPHATE SERPL-MCNC: 3.6 MG/DL — SIGNIFICANT CHANGE UP (ref 2.4–4.7)
PLAT MORPH BLD: ABNORMAL
PLATELET # BLD AUTO: SIGNIFICANT CHANGE UP K/UL (ref 150–400)
PLATELET CLUMP BLD QL SMEAR: SLIGHT
POIKILOCYTOSIS BLD QL AUTO: SLIGHT — SIGNIFICANT CHANGE UP
POLYCHROMASIA BLD QL SMEAR: SLIGHT — SIGNIFICANT CHANGE UP
POTASSIUM SERPL-MCNC: 5 MMOL/L — SIGNIFICANT CHANGE UP (ref 3.5–5.3)
POTASSIUM SERPL-SCNC: 5 MMOL/L — SIGNIFICANT CHANGE UP (ref 3.5–5.3)
RBC # BLD: 3.28 M/UL — LOW (ref 3.8–5.2)
RBC # FLD: 14.4 % — SIGNIFICANT CHANGE UP (ref 10.3–14.5)
RBC BLD AUTO: ABNORMAL
SMUDGE CELLS # BLD: PRESENT — SIGNIFICANT CHANGE UP
SODIUM SERPL-SCNC: 128 MMOL/L — LOW (ref 135–145)
WBC # BLD: 4.15 K/UL — SIGNIFICANT CHANGE UP (ref 3.8–10.5)
WBC # FLD AUTO: 4.15 K/UL — SIGNIFICANT CHANGE UP (ref 3.8–10.5)

## 2021-06-04 PROCEDURE — 99232 SBSQ HOSP IP/OBS MODERATE 35: CPT

## 2021-06-04 RX ADMIN — ENOXAPARIN SODIUM 40 MILLIGRAM(S): 100 INJECTION SUBCUTANEOUS at 12:26

## 2021-06-04 RX ADMIN — QUETIAPINE FUMARATE 200 MILLIGRAM(S): 200 TABLET, FILM COATED ORAL at 05:24

## 2021-06-04 RX ADMIN — LEVETIRACETAM 750 MILLIGRAM(S): 250 TABLET, FILM COATED ORAL at 05:24

## 2021-06-04 RX ADMIN — LEVETIRACETAM 750 MILLIGRAM(S): 250 TABLET, FILM COATED ORAL at 18:19

## 2021-06-04 RX ADMIN — SENNA PLUS 2 TABLET(S): 8.6 TABLET ORAL at 22:55

## 2021-06-04 RX ADMIN — Medication 50 MILLIGRAM(S): at 22:55

## 2021-06-04 RX ADMIN — Medication 1 APPLICATION(S): at 13:39

## 2021-06-04 RX ADMIN — QUETIAPINE FUMARATE 200 MILLIGRAM(S): 200 TABLET, FILM COATED ORAL at 18:19

## 2021-06-04 NOTE — PROGRESS NOTE ADULT - SUBJECTIVE AND OBJECTIVE BOX
INTERVAL HPI/OVERNIGHT EVENTS: " my stiches hurt", no other complaints or events        MEDICATIONS  (STANDING):  BACItracin   Ointment 1 Application(s) Topical daily  cloNIDine Patch 0.1 mG/24Hr(s) 1 patch Transdermal every 7 days  enoxaparin Injectable 40 milliGRAM(s) SubCutaneous daily  levETIRAcetam 750 milliGRAM(s) Oral two times a day  QUEtiapine 200 milliGRAM(s) Oral two times a day  senna 2 Tablet(s) Oral at bedtime  traZODone 50 milliGRAM(s) Oral at bedtime    MEDICATIONS  (PRN):  acetaminophen   Tablet .. 975 milliGRAM(s) Oral every 8 hours PRN Mild Pain (1 - 3)  polyethylene glycol 3350 17 Gram(s) Oral daily PRN Constipation  QUEtiapine 50 milliGRAM(s) Oral every 6 hours PRN Agitation/Anxiety      Vital Signs Last 24 Hrs  T(C): 36.7 (03 Jun 2021 19:28), Max: 37 (03 Jun 2021 17:09)  T(F): 98.1 (03 Jun 2021 19:28), Max: 98.6 (03 Jun 2021 17:09)  HR: 76 (03 Jun 2021 19:28) (62 - 76)  BP: 98/62 (03 Jun 2021 19:28) (92/65 - 102/65)  BP(mean): --  RR: 18 (03 Jun 2021 19:28) (18 - 18)  SpO2: 95% (03 Jun 2021 19:28) (95% - 100%)    PHYSICAL EXAM:      Constitutional: NAD    Respiratory: no accessory muscle use or conversational dyspnea     Cardiovascular: S1S2    Gastrointestinal: soft, NT/ND    Extremities: BARRETT          I&O's Detail      LABS:                RADIOLOGY & ADDITIONAL STUDIES:

## 2021-06-04 NOTE — PROGRESS NOTE ADULT - ATTENDING COMMENTS
seen and examined 06-04-21 @ 6912    she no longer desires inpatient rehab for chemical dependence    scalp/forehead laceration sutured without evidence of wound infection    scalp laceration  -ok to remove sutures this weekend    polysubstance abuse  -f/u social work to assure safe discharge    seizure disorder  -f/u neurology    left shoulder pain  -entire left shoulder and proximal humerus are seen on CT chest (axial images), and there is no fracture  -X-ray left shoulder / humerus (6/1) - no fracture

## 2021-06-04 NOTE — PROGRESS NOTE ADULT - ASSESSMENT
51F PMHx EtOH abuse and seizure disorder, admitted with extensive scalp lac of unclear cause    - continue bacitracin to scalp wound  - appreciate Neuro input, cont Keppra and NO DRIVING on discharge until follow up with Neuro  - await inpatient alcohol rehab placement

## 2021-06-05 DIAGNOSIS — F10.10 ALCOHOL ABUSE, UNCOMPLICATED: ICD-10-CM

## 2021-06-05 DIAGNOSIS — G40.909 EPILEPSY, UNSPECIFIED, NOT INTRACTABLE, WITHOUT STATUS EPILEPTICUS: ICD-10-CM

## 2021-06-05 DIAGNOSIS — S01.01XA LACERATION WITHOUT FOREIGN BODY OF SCALP, INITIAL ENCOUNTER: ICD-10-CM

## 2021-06-05 PROCEDURE — 99231 SBSQ HOSP IP/OBS SF/LOW 25: CPT | Mod: GC

## 2021-06-05 RX ORDER — LACTULOSE 10 G/15ML
SOLUTION ORAL
Refills: 0 | Status: DISCONTINUED | OUTPATIENT
Start: 2021-06-05 | End: 2021-06-09

## 2021-06-05 RX ORDER — MULTIVIT WITH MIN/MFOLATE/K2 340-15/3 G
1 POWDER (GRAM) ORAL ONCE
Refills: 0 | Status: COMPLETED | OUTPATIENT
Start: 2021-06-05 | End: 2021-06-05

## 2021-06-05 RX ORDER — LACTULOSE 10 G/15ML
20 SOLUTION ORAL DAILY
Refills: 0 | Status: DISCONTINUED | OUTPATIENT
Start: 2021-06-06 | End: 2021-06-09

## 2021-06-05 RX ORDER — LACTULOSE 10 G/15ML
20 SOLUTION ORAL ONCE
Refills: 0 | Status: COMPLETED | OUTPATIENT
Start: 2021-06-05 | End: 2021-06-05

## 2021-06-05 RX ADMIN — Medication 10 MILLIGRAM(S): at 23:29

## 2021-06-05 RX ADMIN — Medication 1 BOTTLE: at 17:14

## 2021-06-05 RX ADMIN — LEVETIRACETAM 750 MILLIGRAM(S): 250 TABLET, FILM COATED ORAL at 17:14

## 2021-06-05 RX ADMIN — Medication 1 APPLICATION(S): at 12:08

## 2021-06-05 RX ADMIN — SENNA PLUS 2 TABLET(S): 8.6 TABLET ORAL at 23:29

## 2021-06-05 RX ADMIN — QUETIAPINE FUMARATE 200 MILLIGRAM(S): 200 TABLET, FILM COATED ORAL at 05:36

## 2021-06-05 RX ADMIN — ENOXAPARIN SODIUM 40 MILLIGRAM(S): 100 INJECTION SUBCUTANEOUS at 12:08

## 2021-06-05 RX ADMIN — Medication 50 MILLIGRAM(S): at 23:28

## 2021-06-05 RX ADMIN — QUETIAPINE FUMARATE 50 MILLIGRAM(S): 200 TABLET, FILM COATED ORAL at 23:29

## 2021-06-05 RX ADMIN — QUETIAPINE FUMARATE 200 MILLIGRAM(S): 200 TABLET, FILM COATED ORAL at 17:14

## 2021-06-05 RX ADMIN — LEVETIRACETAM 750 MILLIGRAM(S): 250 TABLET, FILM COATED ORAL at 05:36

## 2021-06-05 RX ADMIN — QUETIAPINE FUMARATE 50 MILLIGRAM(S): 200 TABLET, FILM COATED ORAL at 12:09

## 2021-06-05 RX ADMIN — LACTULOSE 20 GRAM(S): 10 SOLUTION ORAL at 23:29
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
## 2021-06-05 NOTE — PROGRESS NOTE ADULT - ASSESSMENT
51F PMHx EtOH abuse and seizure disorder, admitted with extensive scalp lac of unclear cause    - continue bacitracin to scalp wound  - appreciate Neuro input, cont Keppra 750 BID and NO DRIVING on discharge until follow up with Neuro in 1 month  - regular diet  - pulmonary toliet   - await inpatient alcohol rehab placement vs home with assist

## 2021-06-05 NOTE — PROGRESS NOTE ADULT - ATTENDING COMMENTS
Agree with above assessment.  The patient was seen and examined by me.  The patient is with no complaints.  Scalp wound is clean with sutures intact and no signs of infection.  Continue Keppra for seizure disorder.  Awaiting rehab placement.  Trauma stable.

## 2021-06-06 PROCEDURE — 99231 SBSQ HOSP IP/OBS SF/LOW 25: CPT | Mod: GC

## 2021-06-06 RX ADMIN — LEVETIRACETAM 750 MILLIGRAM(S): 250 TABLET, FILM COATED ORAL at 05:28

## 2021-06-06 RX ADMIN — LACTULOSE 20 GRAM(S): 10 SOLUTION ORAL at 13:22

## 2021-06-06 RX ADMIN — SENNA PLUS 2 TABLET(S): 8.6 TABLET ORAL at 22:24

## 2021-06-06 RX ADMIN — ENOXAPARIN SODIUM 40 MILLIGRAM(S): 100 INJECTION SUBCUTANEOUS at 13:24

## 2021-06-06 RX ADMIN — QUETIAPINE FUMARATE 200 MILLIGRAM(S): 200 TABLET, FILM COATED ORAL at 05:28

## 2021-06-06 RX ADMIN — POLYETHYLENE GLYCOL 3350 17 GRAM(S): 17 POWDER, FOR SOLUTION ORAL at 05:28

## 2021-06-06 RX ADMIN — Medication 10 MILLIGRAM(S): at 22:24

## 2021-06-06 RX ADMIN — Medication 1 APPLICATION(S): at 13:25

## 2021-06-06 RX ADMIN — LEVETIRACETAM 750 MILLIGRAM(S): 250 TABLET, FILM COATED ORAL at 18:29

## 2021-06-06 RX ADMIN — QUETIAPINE FUMARATE 200 MILLIGRAM(S): 200 TABLET, FILM COATED ORAL at 18:30

## 2021-06-06 RX ADMIN — Medication 50 MILLIGRAM(S): at 22:23

## 2021-06-06 RX ADMIN — QUETIAPINE FUMARATE 50 MILLIGRAM(S): 200 TABLET, FILM COATED ORAL at 08:59

## 2021-06-06 NOTE — PROGRESS NOTE ADULT - ATTENDING COMMENTS
Agree with above assessment.  The patient was seen and examined by me.  The patient is without new complaints.  The patient is with no signs of infection of the scalp wound, sutures remain intact and clean.  Awaiting discharge planning for rehab placement. Trauma stable.

## 2021-06-06 NOTE — PROGRESS NOTE ADULT - PROBLEM SELECTOR PLAN 1
local wound care  psych to clarify home medication regimen  neurology consult to verify appropriate seizure medication regimen  regular diet  CIWA for etoh abuse  f/u LUE x-ray 2/2 to complaints of pain   monitor labs   DVT ppx  PT/OT for dispo
local wound care  suture removal 6/9
continue Keppra  will not be able to drive at present given seizure history

## 2021-06-06 NOTE — PROGRESS NOTE ADULT - SUBJECTIVE AND OBJECTIVE BOX
no acute events overnight. tolerating diet      MEDICATIONS  (STANDING):  BACItracin   Ointment 1 Application(s) Topical daily  bisacodyl Suppository 10 milliGRAM(s) Rectal daily  cloNIDine Patch 0.1 mG/24Hr(s) 1 patch Transdermal every 7 days  enoxaparin Injectable 40 milliGRAM(s) SubCutaneous daily  lactulose Syrup      lactulose Syrup 20 Gram(s) Oral daily  levETIRAcetam 750 milliGRAM(s) Oral two times a day  QUEtiapine 200 milliGRAM(s) Oral two times a day  senna 2 Tablet(s) Oral at bedtime  traZODone 50 milliGRAM(s) Oral at bedtime    MEDICATIONS  (PRN):  acetaminophen   Tablet .. 975 milliGRAM(s) Oral every 8 hours PRN Mild Pain (1 - 3)  polyethylene glycol 3350 17 Gram(s) Oral daily PRN Constipation  QUEtiapine 50 milliGRAM(s) Oral every 6 hours PRN Agitation/Anxiety      Vital Signs Last 24 Hrs  T(C): 36.8 (06 Jun 2021 05:00), Max: 37.1 (05 Jun 2021 20:00)  T(F): 98.2 (06 Jun 2021 05:00), Max: 98.7 (05 Jun 2021 20:00)  HR: 67 (06 Jun 2021 05:00) (64 - 85)  BP: 108/64 (06 Jun 2021 05:00) (98/62 - 115/79)  BP(mean): --  RR: 17 (06 Jun 2021 05:00) (17 - 18)  SpO2: 100% (06 Jun 2021 05:00) (98% - 100%)    Physical Exam:    Neurological:  No sensory/motor deficits    HEENT: PERRLA, EOMI, no drainage or redness    Neck: No bruits; no thyromegaly or nodules,  No JVD    Back: Normal spine flexure, No CVA tenderness, No deformity or limitation of movement    Respiratory: Breath Sounds equal & clear to auscultation, no accessory muscle use    Cardiovascular: Regular rate & rhythm, normal S1, S2; no murmurs, gallops or rubs    Gastrointestinal: Soft, non-tender, normal bowel sounds    Extremities: No peripheral edema, No cyanosis, clubbing     Vascular: Equal and normal pulses: 2+ peripheral pulses throughout    Musculoskeletal: No joint pain, swelling or deformity; no limitation of movement    Skin: No rashes      I&O's Detail      LABS:                        10.0   4.15  )-----------( CLUMPED    ( 04 Jun 2021 12:42 )             30.3     06-04    128<L>  |  96<L>  |  15.6  ----------------------------<  95  5.0   |  21.0<L>  |  0.84    Ca    9.3      04 Jun 2021 12:42  Phos  3.6     06-04  Mg     1.5     06-04            RADIOLOGY & ADDITIONAL STUDIES: no acute events overnight. tolerating diet  pain controlled  denies headaches, fevers, chills, chest pain, shortness of breath     MEDICATIONS  (STANDING):  BACItracin   Ointment 1 Application(s) Topical daily  bisacodyl Suppository 10 milliGRAM(s) Rectal daily  cloNIDine Patch 0.1 mG/24Hr(s) 1 patch Transdermal every 7 days  enoxaparin Injectable 40 milliGRAM(s) SubCutaneous daily  lactulose Syrup      lactulose Syrup 20 Gram(s) Oral daily  levETIRAcetam 750 milliGRAM(s) Oral two times a day  QUEtiapine 200 milliGRAM(s) Oral two times a day  senna 2 Tablet(s) Oral at bedtime  traZODone 50 milliGRAM(s) Oral at bedtime    MEDICATIONS  (PRN):  acetaminophen   Tablet .. 975 milliGRAM(s) Oral every 8 hours PRN Mild Pain (1 - 3)  polyethylene glycol 3350 17 Gram(s) Oral daily PRN Constipation  QUEtiapine 50 milliGRAM(s) Oral every 6 hours PRN Agitation/Anxiety      Vital Signs Last 24 Hrs  T(C): 36.8 (06 Jun 2021 05:00), Max: 37.1 (05 Jun 2021 20:00)  T(F): 98.2 (06 Jun 2021 05:00), Max: 98.7 (05 Jun 2021 20:00)  HR: 67 (06 Jun 2021 05:00) (64 - 85)  BP: 108/64 (06 Jun 2021 05:00) (98/62 - 115/79)  BP(mean): --  RR: 17 (06 Jun 2021 05:00) (17 - 18)  SpO2: 100% (06 Jun 2021 05:00) (98% - 100%)    Physical Exam:    general: no acute distress, aox3  HEENT: normocephalic, well healing extensive scalp laceration. no cellulitis/ecchymosis. no drainage    Respiratory: Breath Sounds equal & clear to auscultation, no accessory muscle use    Cardiovascular: Regular rate & rhythm, normal S1, S2; no murmurs, gallops or rubs    Gastrointestinal: Soft, non-tender, normal bowel sounds    Extremities: No peripheral edema, No cyanosis, clubbing     Vascular: Equal and normal pulses: 2+ peripheral pulses throughout    Musculoskeletal: No joint pain, swelling or deformity; no limitation of movement    Skin: No rashes      I&O's Detail      LABS:                        10.0   4.15  )-----------( CLUMPED    ( 04 Jun 2021 12:42 )             30.3     06-04    128<L>  |  96<L>  |  15.6  ----------------------------<  95  5.0   |  21.0<L>  |  0.84    Ca    9.3      04 Jun 2021 12:42  Phos  3.6     06-04  Mg     1.5     06-04            RADIOLOGY & ADDITIONAL STUDIES:

## 2021-06-06 NOTE — PROGRESS NOTE ADULT - ASSESSMENT
56 year old female with etoh abuse and seizure disorder with extensive scalp laceration    etoh abuse  -no evidence of withdrawals  -awaiting alcohol rehab placement    seizure disorder  -continue keppra 750mg BID  -unable to drive upon discharge until follow up with neurologist    continue regular diet  no activitiy restrictions  dvt ppx

## 2021-06-07 LAB — SARS-COV-2 RNA SPEC QL NAA+PROBE: SIGNIFICANT CHANGE UP

## 2021-06-07 RX ORDER — QUETIAPINE FUMARATE 200 MG/1
1 TABLET, FILM COATED ORAL
Qty: 0 | Refills: 0 | DISCHARGE
Start: 2021-06-07

## 2021-06-07 RX ORDER — SENNA PLUS 8.6 MG/1
2 TABLET ORAL
Qty: 0 | Refills: 0 | DISCHARGE
Start: 2021-06-07

## 2021-06-07 RX ORDER — LEVETIRACETAM 250 MG/1
1 TABLET, FILM COATED ORAL
Qty: 0 | Refills: 0 | DISCHARGE
Start: 2021-06-07

## 2021-06-07 RX ORDER — TRAZODONE HCL 50 MG
200 TABLET ORAL
Qty: 0 | Refills: 0 | DISCHARGE
Start: 2021-06-07

## 2021-06-07 RX ORDER — POLYETHYLENE GLYCOL 3350 17 G/17G
17 POWDER, FOR SOLUTION ORAL
Qty: 0 | Refills: 0 | DISCHARGE
Start: 2021-06-07

## 2021-06-07 RX ORDER — ACETAMINOPHEN 500 MG
3 TABLET ORAL
Qty: 0 | Refills: 0 | DISCHARGE
Start: 2021-06-07

## 2021-06-07 RX ORDER — TRAZODONE HCL 50 MG
1 TABLET ORAL
Qty: 0 | Refills: 0 | DISCHARGE
Start: 2021-06-07

## 2021-06-07 RX ORDER — BACITRACIN ZINC 500 UNIT/G
1 OINTMENT IN PACKET (EA) TOPICAL
Qty: 0 | Refills: 0 | DISCHARGE
Start: 2021-06-07

## 2021-06-07 RX ADMIN — Medication 1 PATCH: at 13:33

## 2021-06-07 RX ADMIN — LEVETIRACETAM 750 MILLIGRAM(S): 250 TABLET, FILM COATED ORAL at 05:55

## 2021-06-07 RX ADMIN — POLYETHYLENE GLYCOL 3350 17 GRAM(S): 17 POWDER, FOR SOLUTION ORAL at 13:32

## 2021-06-07 RX ADMIN — Medication 1 PATCH: at 13:34

## 2021-06-07 RX ADMIN — QUETIAPINE FUMARATE 200 MILLIGRAM(S): 200 TABLET, FILM COATED ORAL at 17:15

## 2021-06-07 RX ADMIN — SENNA PLUS 2 TABLET(S): 8.6 TABLET ORAL at 21:40

## 2021-06-07 RX ADMIN — LEVETIRACETAM 750 MILLIGRAM(S): 250 TABLET, FILM COATED ORAL at 17:15

## 2021-06-07 RX ADMIN — Medication 1 APPLICATION(S): at 13:31

## 2021-06-07 RX ADMIN — Medication 1 PATCH: at 19:40

## 2021-06-07 RX ADMIN — QUETIAPINE FUMARATE 200 MILLIGRAM(S): 200 TABLET, FILM COATED ORAL at 05:55

## 2021-06-07 RX ADMIN — Medication 50 MILLIGRAM(S): at 21:40

## 2021-06-07 RX ADMIN — Medication 1 PATCH: at 14:48

## 2021-06-07 RX ADMIN — Medication 10 MILLIGRAM(S): at 13:31

## 2021-06-07 RX ADMIN — ENOXAPARIN SODIUM 40 MILLIGRAM(S): 100 INJECTION SUBCUTANEOUS at 13:32

## 2021-06-07 RX ADMIN — LACTULOSE 20 GRAM(S): 10 SOLUTION ORAL at 13:33

## 2021-06-07 NOTE — PROGRESS NOTE ADULT - ATTENDING COMMENTS
Patient was seen on 6/7/2021.  Afebrile with stable vital signs.  Scalp laceration is healing well.  No complaints.  Neurology note appreciated.  Patient to be continued on Keppra.  DC planning.

## 2021-06-07 NOTE — PROGRESS NOTE ADULT - SUBJECTIVE AND OBJECTIVE BOX
Subjective/Overnight event: Evaluated at bedside found laying down in no distress AM. No overnight events.  Patient doing well. Still awaiting placement on alcohol rehab facility. Other alternatives found for patient. Denies any fever/chills, nausea/vomiting, dizziness, SOB chest pain, constipation/diarrhea, weakness, numbness.       MEDICATIONS  (STANDING):  BACItracin   Ointment 1 Application(s) Topical daily  bisacodyl Suppository 10 milliGRAM(s) Rectal daily  cloNIDine Patch 0.1 mG/24Hr(s) 1 patch Transdermal every 7 days  enoxaparin Injectable 40 milliGRAM(s) SubCutaneous daily  lactulose Syrup      lactulose Syrup 20 Gram(s) Oral daily  levETIRAcetam 750 milliGRAM(s) Oral two times a day  QUEtiapine 200 milliGRAM(s) Oral two times a day  senna 2 Tablet(s) Oral at bedtime  traZODone 50 milliGRAM(s) Oral at bedtime    MEDICATIONS  (PRN):  acetaminophen   Tablet .. 975 milliGRAM(s) Oral every 8 hours PRN Mild Pain (1 - 3)  polyethylene glycol 3350 17 Gram(s) Oral daily PRN Constipation  QUEtiapine 50 milliGRAM(s) Oral every 6 hours PRN Agitation/Anxiety      Vital Signs:  Vital Signs Last 24 Hrs  T(C): 36.5 (07 Jun 2021 05:06), Max: 36.8 (06 Jun 2021 17:23)  T(F): 97.7 (07 Jun 2021 05:06), Max: 98.3 (06 Jun 2021 17:23)  HR: 77 (07 Jun 2021 05:06) (64 - 77)  BP: 99/66 (07 Jun 2021 05:06) (91/54 - 101/63)  BP(mean): --  RR: 18 (07 Jun 2021 05:06) (18 - 19)  SpO2: 96% (07 Jun 2021 05:06) (96% - 98%)    Physical Examination:  General: alert, oriented x 3 in no acute distress   CV: normal S1/S2, RRR, no gallops, murmurs or rubs   Lungs: clear to auscultation b/l, symmetric chest movement, no rales, rhonchi or wheezing   Abdomen: soft, non-tender, non-distended, +BS  EXT: sensation intact, full ROM
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
## 2021-06-08 RX ADMIN — Medication 1 ENEMA: at 10:48

## 2021-06-08 RX ADMIN — LACTULOSE 20 GRAM(S): 10 SOLUTION ORAL at 12:42

## 2021-06-08 RX ADMIN — SENNA PLUS 2 TABLET(S): 8.6 TABLET ORAL at 21:16

## 2021-06-08 RX ADMIN — Medication 50 MILLIGRAM(S): at 21:19

## 2021-06-08 RX ADMIN — QUETIAPINE FUMARATE 50 MILLIGRAM(S): 200 TABLET, FILM COATED ORAL at 21:16

## 2021-06-08 RX ADMIN — LEVETIRACETAM 750 MILLIGRAM(S): 250 TABLET, FILM COATED ORAL at 17:48

## 2021-06-08 RX ADMIN — POLYETHYLENE GLYCOL 3350 17 GRAM(S): 17 POWDER, FOR SOLUTION ORAL at 12:43

## 2021-06-08 RX ADMIN — Medication 10 MILLIGRAM(S): at 12:42

## 2021-06-08 RX ADMIN — Medication 1 PATCH: at 19:44

## 2021-06-08 RX ADMIN — LEVETIRACETAM 750 MILLIGRAM(S): 250 TABLET, FILM COATED ORAL at 05:59

## 2021-06-08 RX ADMIN — Medication 1 PATCH: at 07:20

## 2021-06-08 RX ADMIN — ENOXAPARIN SODIUM 40 MILLIGRAM(S): 100 INJECTION SUBCUTANEOUS at 12:42

## 2021-06-08 RX ADMIN — QUETIAPINE FUMARATE 200 MILLIGRAM(S): 200 TABLET, FILM COATED ORAL at 05:59

## 2021-06-08 RX ADMIN — QUETIAPINE FUMARATE 200 MILLIGRAM(S): 200 TABLET, FILM COATED ORAL at 17:48

## 2021-06-08 NOTE — DIETITIAN INITIAL EVALUATION ADULT. - OTHER INFO
51F PMHx EtOH abuse and seizure disorder, admitted with extensive scalp lac of unclear cause. Awaiting ETOH rehab facility.

## 2021-06-08 NOTE — PROGRESS NOTE ADULT - ASSESSMENT
51F PMHx EtOH abuse and seizure disorder, admitted with extensive scalp lac of unclear cause    - continue bacitracin to scalp wound  - Neuro input, cont Keppra 750 BID and NO DRIVING on discharge until follow up with Neuro in 1 month  - regular diet  - pulmonary toliet   - await inpatient alcohol rehab placement vs home with assist

## 2021-06-08 NOTE — DIETITIAN INITIAL EVALUATION ADULT. - ORAL INTAKE PTA/DIET HISTORY
Pt on a regular diet. Pt eating good. No weight loss noted. Pt c/o constipation so afraid to eat at the moment. Likes to eat small meals throughout day. Encouraged high fiber foods.

## 2021-06-08 NOTE — PROGRESS NOTE ADULT - SUBJECTIVE AND OBJECTIVE BOX
SUBJECTIVE/24 hour events:   Evaluated at bedside found laying down in no distress AM. No overnight events.  Patient doing well. Still awaiting placement on alcohol rehab facility. Other alternatives found for patient. Denies any fever/chills, nausea/vomiting, dizziness, SOB chest pain, constipation/diarrhea, weakness, numbness.         Vital Signs Last 24 Hrs  T(C): 36.7 (08 Jun 2021 04:07), Max: 36.7 (07 Jun 2021 16:24)  T(F): 98.1 (08 Jun 2021 04:07), Max: 98.1 (08 Jun 2021 04:07)  HR: 71 (08 Jun 2021 04:07) (63 - 76)  BP: 104/67 (08 Jun 2021 04:07) (104/67 - 115/76)  BP(mean): --  RR: 18 (08 Jun 2021 04:07) (18 - 20)  SpO2: 96% (08 Jun 2021 04:07) (96% - 100%)  Drug Dosing Weight  Height (cm): 167.6 (30 May 2021 02:45)  Weight (kg): 66.451 (30 May 2021 02:45)  BMI (kg/m2): 23.7 (30 May 2021 02:45)  BSA (m2): 1.75 (30 May 2021 02:45)  I&O's Detail    Allergies    Allergy Status Unknown    Intolerances                ROS:    PHYSICAL EXAM:    Constitutional: resting comfortably     Respiratory: in no respiratory distress, no dyspnea, no supplemental o2 needed at this time    Gastrointestinal: abdomen soft, non-tender, atraumatic     Genitourinary: voiding spontaneously     Neurological: A&OX3, GCS 15    Skin: scalp laceration with sutures well seated, no purulent drainage, no active bleeding           MEDICATIONS  (STANDING):  BACItracin   Ointment 1 Application(s) Topical daily  bisacodyl Suppository 10 milliGRAM(s) Rectal daily  cloNIDine Patch 0.1 mG/24Hr(s) 1 patch Transdermal every 7 days  enoxaparin Injectable 40 milliGRAM(s) SubCutaneous daily  lactulose Syrup      lactulose Syrup 20 Gram(s) Oral daily  levETIRAcetam 750 milliGRAM(s) Oral two times a day  QUEtiapine 200 milliGRAM(s) Oral two times a day  senna 2 Tablet(s) Oral at bedtime  traZODone 50 milliGRAM(s) Oral at bedtime    MEDICATIONS  (PRN):  acetaminophen   Tablet .. 975 milliGRAM(s) Oral every 8 hours PRN Mild Pain (1 - 3)  artificial tears (preservative free) Ophthalmic Solution 2 Drop(s) Both EYES three times a day PRN Dry Eyes  polyethylene glycol 3350 17 Gram(s) Oral daily PRN Constipation  QUEtiapine 50 milliGRAM(s) Oral every 6 hours PRN Agitation/Anxiety      RADIOLOGY STUDIES:    CULTURES:

## 2021-06-08 NOTE — PROGRESS NOTE ADULT - ATTENDING COMMENTS
Patient was seen on 6/8/2021.  Afebrile with stable vital signs.  No complaints.  PT/OT.  Discharge planning.

## 2021-06-09 ENCOUNTER — TRANSCRIPTION ENCOUNTER (OUTPATIENT)
Age: 51
End: 2021-06-09

## 2021-06-09 VITALS
SYSTOLIC BLOOD PRESSURE: 120 MMHG | TEMPERATURE: 98 F | RESPIRATION RATE: 16 BRPM | OXYGEN SATURATION: 100 % | HEART RATE: 62 BPM | DIASTOLIC BLOOD PRESSURE: 85 MMHG

## 2021-06-09 PROCEDURE — 99239 HOSP IP/OBS DSCHRG MGMT >30: CPT

## 2021-06-09 RX ADMIN — LEVETIRACETAM 750 MILLIGRAM(S): 250 TABLET, FILM COATED ORAL at 05:05

## 2021-06-09 RX ADMIN — QUETIAPINE FUMARATE 200 MILLIGRAM(S): 200 TABLET, FILM COATED ORAL at 05:10

## 2021-06-09 RX ADMIN — Medication 1 PATCH: at 07:41

## 2021-06-09 RX ADMIN — QUETIAPINE FUMARATE 50 MILLIGRAM(S): 200 TABLET, FILM COATED ORAL at 13:06

## 2021-06-09 NOTE — PROGRESS NOTE ADULT - PROVIDER SPECIALTY LIST ADULT
Epilepsy
Trauma Surgery
Epilepsy
Trauma Surgery
Trauma Surgery
Epilepsy
Trauma Surgery
Trauma Surgery

## 2021-06-09 NOTE — PROGRESS NOTE ADULT - ATTENDING COMMENTS
Patient was seen on 6/9/2021.  Afebrile with stable vital signs.  No complaints.  Forehead sutures removed.  PT/OT.  Discharge Alcohol rehab facility.

## 2021-06-09 NOTE — PROGRESS NOTE ADULT - ASSESSMENT
51F PMHx EtOH abuse and seizure disorder, admitted with extensive scalp lac of unclear cause    - continue bacitracin to scalp wound  - Neuro input, cont Keppra 750 BID and NO DRIVING on discharge until follow up with Neuro in 1 month  - regular diet  - pulmonary toliet   - Dispo Pending bed on [place]

## 2021-06-09 NOTE — PROGRESS NOTE ADULT - NSICDXPILOT_GEN_ALL_CORE
[place]

## 2021-06-09 NOTE — DISCHARGE NOTE NURSING/CASE MANAGEMENT/SOCIAL WORK - PATIENT PORTAL LINK FT
You can access the FollowMyHealth Patient Portal offered by [facility] by registering at the following website: [email or web address]. By joining [employer]’s FollowMyHealth portal, you will also be able to view your health information using other applications (apps) compatible with our system.

## 2021-07-15 ENCOUNTER — APPOINTMENT (OUTPATIENT)
Dept: TRAUMA SURGERY | Facility: CLINIC | Age: 51
End: 2021-07-15

## 2021-08-11 ENCOUNTER — APPOINTMENT (OUTPATIENT)
Dept: NEUROLOGY | Facility: CLINIC | Age: 51
End: 2021-08-11

## 2021-10-04 ENCOUNTER — APPOINTMENT (OUTPATIENT)
Dept: NEUROLOGY | Facility: CLINIC | Age: 51
End: 2021-10-04

## 2021-11-15 ENCOUNTER — APPOINTMENT (OUTPATIENT)
Dept: NEUROLOGY | Facility: CLINIC | Age: 51
End: 2021-11-15
Payer: MEDICARE

## 2021-11-15 VITALS
BODY MASS INDEX: 20.49 KG/M2 | HEIGHT: 65 IN | SYSTOLIC BLOOD PRESSURE: 142 MMHG | WEIGHT: 123 LBS | HEART RATE: 79 BPM | OXYGEN SATURATION: 95 % | TEMPERATURE: 98 F | DIASTOLIC BLOOD PRESSURE: 94 MMHG

## 2021-11-15 DIAGNOSIS — Z86.59 PERSONAL HISTORY OF OTHER MENTAL AND BEHAVIORAL DISORDERS: ICD-10-CM

## 2021-11-15 DIAGNOSIS — F32.A DEPRESSION, UNSPECIFIED: ICD-10-CM

## 2021-11-15 DIAGNOSIS — Z83.3 FAMILY HISTORY OF DIABETES MELLITUS: ICD-10-CM

## 2021-11-15 DIAGNOSIS — Z87.898 PERSONAL HISTORY OF OTHER SPECIFIED CONDITIONS: ICD-10-CM

## 2021-11-15 DIAGNOSIS — R56.9 UNSPECIFIED CONVULSIONS: ICD-10-CM

## 2021-11-15 DIAGNOSIS — F17.200 NICOTINE DEPENDENCE, UNSPECIFIED, UNCOMPLICATED: ICD-10-CM

## 2021-11-15 DIAGNOSIS — Z83.438 FAMILY HISTORY OF OTHER DISORDER OF LIPOPROTEIN METABOLISM AND OTHER LIPIDEMIA: ICD-10-CM

## 2021-11-15 DIAGNOSIS — Z78.9 OTHER SPECIFIED HEALTH STATUS: ICD-10-CM

## 2021-11-15 PROCEDURE — 99215 OFFICE O/P EST HI 40 MIN: CPT

## 2021-12-03 ENCOUNTER — LABORATORY RESULT (OUTPATIENT)
Age: 51
End: 2021-12-03

## 2021-12-03 ENCOUNTER — APPOINTMENT (OUTPATIENT)
Dept: DISASTER EMERGENCY | Facility: CLINIC | Age: 51
End: 2021-12-03

## 2021-12-20 ENCOUNTER — INPATIENT (INPATIENT)
Facility: HOSPITAL | Age: 51
LOS: 1 days | Discharge: HOME | DRG: 101 | End: 2021-12-22
Attending: FAMILY MEDICINE | Admitting: FAMILY MEDICINE
Payer: MEDICARE

## 2021-12-20 VITALS
SYSTOLIC BLOOD PRESSURE: 132 MMHG | TEMPERATURE: 98 F | RESPIRATION RATE: 18 BRPM | HEART RATE: 69 BPM | DIASTOLIC BLOOD PRESSURE: 82 MMHG | OXYGEN SATURATION: 99 %

## 2021-12-20 DIAGNOSIS — R56.9 UNSPECIFIED CONVULSIONS: ICD-10-CM

## 2021-12-20 DIAGNOSIS — Z98.890 OTHER SPECIFIED POSTPROCEDURAL STATES: Chronic | ICD-10-CM

## 2021-12-20 DIAGNOSIS — Z93.1 GASTROSTOMY STATUS: Chronic | ICD-10-CM

## 2021-12-20 LAB
ALBUMIN SERPL ELPH-MCNC: 4.2 G/DL — SIGNIFICANT CHANGE UP (ref 3.3–5.2)
ALP SERPL-CCNC: 67 U/L — SIGNIFICANT CHANGE UP (ref 40–120)
ALT FLD-CCNC: 57 U/L — HIGH
AMPHET UR-MCNC: NEGATIVE — SIGNIFICANT CHANGE UP
ANION GAP SERPL CALC-SCNC: 13 MMOL/L — SIGNIFICANT CHANGE UP (ref 5–17)
ANISOCYTOSIS BLD QL: SLIGHT — SIGNIFICANT CHANGE UP
AST SERPL-CCNC: 80 U/L — HIGH
BARBITURATES UR SCN-MCNC: NEGATIVE — SIGNIFICANT CHANGE UP
BASOPHILS # BLD AUTO: 0.03 K/UL — SIGNIFICANT CHANGE UP (ref 0–0.2)
BASOPHILS NFR BLD AUTO: 0.7 % — SIGNIFICANT CHANGE UP (ref 0–2)
BENZODIAZ UR-MCNC: NEGATIVE — SIGNIFICANT CHANGE UP
BILIRUB SERPL-MCNC: 0.4 MG/DL — SIGNIFICANT CHANGE UP (ref 0.4–2)
BUN SERPL-MCNC: 16.8 MG/DL — SIGNIFICANT CHANGE UP (ref 8–20)
CALCIUM SERPL-MCNC: 9 MG/DL — SIGNIFICANT CHANGE UP (ref 8.6–10.2)
CHLORIDE SERPL-SCNC: 96 MMOL/L — LOW (ref 98–107)
CO2 SERPL-SCNC: 22 MMOL/L — SIGNIFICANT CHANGE UP (ref 22–29)
COCAINE METAB.OTHER UR-MCNC: NEGATIVE — SIGNIFICANT CHANGE UP
CREAT SERPL-MCNC: 0.72 MG/DL — SIGNIFICANT CHANGE UP (ref 0.5–1.3)
EOSINOPHIL # BLD AUTO: 0 K/UL — SIGNIFICANT CHANGE UP (ref 0–0.5)
EOSINOPHIL NFR BLD AUTO: 0 % — SIGNIFICANT CHANGE UP (ref 0–6)
GLUCOSE SERPL-MCNC: 100 MG/DL — HIGH (ref 70–99)
HCT VFR BLD CALC: 27.5 % — LOW (ref 34.5–45)
HGB BLD-MCNC: 8.7 G/DL — LOW (ref 11.5–15.5)
HYPOCHROMIA BLD QL: SIGNIFICANT CHANGE UP
IMM GRANULOCYTES NFR BLD AUTO: 0.5 % — SIGNIFICANT CHANGE UP (ref 0–1.5)
INR BLD: 1.01 RATIO — SIGNIFICANT CHANGE UP (ref 0.88–1.16)
LYMPHOCYTES # BLD AUTO: 1.27 K/UL — SIGNIFICANT CHANGE UP (ref 1–3.3)
LYMPHOCYTES # BLD AUTO: 30.2 % — SIGNIFICANT CHANGE UP (ref 13–44)
MACROCYTES BLD QL: SLIGHT — SIGNIFICANT CHANGE UP
MAGNESIUM SERPL-MCNC: 1.6 MG/DL — SIGNIFICANT CHANGE UP (ref 1.6–2.6)
MANUAL SMEAR VERIFICATION: SIGNIFICANT CHANGE UP
MCHC RBC-ENTMCNC: 23.3 PG — LOW (ref 27–34)
MCHC RBC-ENTMCNC: 31.6 GM/DL — LOW (ref 32–36)
MCV RBC AUTO: 73.7 FL — LOW (ref 80–100)
METHADONE UR-MCNC: NEGATIVE — SIGNIFICANT CHANGE UP
MICROCYTES BLD QL: SLIGHT — SIGNIFICANT CHANGE UP
MONOCYTES # BLD AUTO: 0.58 K/UL — SIGNIFICANT CHANGE UP (ref 0–0.9)
MONOCYTES NFR BLD AUTO: 13.8 % — SIGNIFICANT CHANGE UP (ref 2–14)
NEUTROPHILS # BLD AUTO: 2.3 K/UL — SIGNIFICANT CHANGE UP (ref 1.8–7.4)
NEUTROPHILS NFR BLD AUTO: 54.8 % — SIGNIFICANT CHANGE UP (ref 43–77)
OPIATES UR-MCNC: NEGATIVE — SIGNIFICANT CHANGE UP
OVALOCYTES BLD QL SMEAR: SLIGHT — SIGNIFICANT CHANGE UP
PCP SPEC-MCNC: SIGNIFICANT CHANGE UP
PCP UR-MCNC: NEGATIVE — SIGNIFICANT CHANGE UP
PLAT MORPH BLD: NORMAL — SIGNIFICANT CHANGE UP
PLATELET # BLD AUTO: 304 K/UL — SIGNIFICANT CHANGE UP (ref 150–400)
POIKILOCYTOSIS BLD QL AUTO: SLIGHT — SIGNIFICANT CHANGE UP
POLYCHROMASIA BLD QL SMEAR: SLIGHT — SIGNIFICANT CHANGE UP
POTASSIUM SERPL-MCNC: 4.4 MMOL/L — SIGNIFICANT CHANGE UP (ref 3.5–5.3)
POTASSIUM SERPL-SCNC: 4.4 MMOL/L — SIGNIFICANT CHANGE UP (ref 3.5–5.3)
PROT SERPL-MCNC: 7.4 G/DL — SIGNIFICANT CHANGE UP (ref 6.6–8.7)
PROTHROM AB SERPL-ACNC: 11.7 SEC — SIGNIFICANT CHANGE UP (ref 10.6–13.6)
RBC # BLD: 3.73 M/UL — LOW (ref 3.8–5.2)
RBC # FLD: 21.8 % — HIGH (ref 10.3–14.5)
RBC BLD AUTO: ABNORMAL
SODIUM SERPL-SCNC: 131 MMOL/L — LOW (ref 135–145)
TARGETS BLD QL SMEAR: SLIGHT — SIGNIFICANT CHANGE UP
THC UR QL: NEGATIVE — SIGNIFICANT CHANGE UP
WBC # BLD: 4.2 K/UL — SIGNIFICANT CHANGE UP (ref 3.8–10.5)
WBC # FLD AUTO: 4.2 K/UL — SIGNIFICANT CHANGE UP (ref 3.8–10.5)

## 2021-12-20 PROCEDURE — 95720 EEG PHY/QHP EA INCR W/VEEG: CPT

## 2021-12-20 PROCEDURE — 99222 1ST HOSP IP/OBS MODERATE 55: CPT

## 2021-12-20 RX ORDER — ACETAMINOPHEN 500 MG
650 TABLET ORAL EVERY 6 HOURS
Refills: 0 | Status: DISCONTINUED | OUTPATIENT
Start: 2021-12-20 | End: 2021-12-22

## 2021-12-20 RX ORDER — ENOXAPARIN SODIUM 100 MG/ML
40 INJECTION SUBCUTANEOUS DAILY
Refills: 0 | Status: DISCONTINUED | OUTPATIENT
Start: 2021-12-20 | End: 2021-12-22

## 2021-12-20 RX ORDER — INFLUENZA VIRUS VACCINE 15; 15; 15; 15 UG/.5ML; UG/.5ML; UG/.5ML; UG/.5ML
0.5 SUSPENSION INTRAMUSCULAR ONCE
Refills: 0 | Status: COMPLETED | OUTPATIENT
Start: 2021-12-20 | End: 2021-12-20

## 2021-12-20 RX ORDER — NICOTINE POLACRILEX 2 MG
1 GUM BUCCAL DAILY
Refills: 0 | Status: DISCONTINUED | OUTPATIENT
Start: 2021-12-20 | End: 2021-12-22

## 2021-12-20 RX ORDER — ONDANSETRON 8 MG/1
4 TABLET, FILM COATED ORAL EVERY 6 HOURS
Refills: 0 | Status: DISCONTINUED | OUTPATIENT
Start: 2021-12-20 | End: 2021-12-22

## 2021-12-20 RX ORDER — CALAMINE AND ZINC OXIDE AND PHENOL 160; 10 MG/ML; MG/ML
1 LOTION TOPICAL EVERY 6 HOURS
Refills: 0 | Status: DISCONTINUED | OUTPATIENT
Start: 2021-12-20 | End: 2021-12-22

## 2021-12-20 RX ORDER — BENZTROPINE MESYLATE 1 MG
0.5 TABLET ORAL AT BEDTIME
Refills: 0 | Status: DISCONTINUED | OUTPATIENT
Start: 2021-12-20 | End: 2021-12-22

## 2021-12-20 RX ORDER — QUETIAPINE FUMARATE 200 MG/1
50 TABLET, FILM COATED ORAL AT BEDTIME
Refills: 0 | Status: DISCONTINUED | OUTPATIENT
Start: 2021-12-20 | End: 2021-12-22

## 2021-12-20 RX ORDER — HYDROCORTISONE 1 %
1 OINTMENT (GRAM) TOPICAL EVERY 6 HOURS
Refills: 0 | Status: DISCONTINUED | OUTPATIENT
Start: 2021-12-20 | End: 2021-12-22

## 2021-12-20 RX ORDER — TRAZODONE HCL 50 MG
50 TABLET ORAL AT BEDTIME
Refills: 0 | Status: DISCONTINUED | OUTPATIENT
Start: 2021-12-20 | End: 2021-12-22

## 2021-12-20 RX ADMIN — Medication 50 MILLIGRAM(S): at 21:02

## 2021-12-20 RX ADMIN — ENOXAPARIN SODIUM 40 MILLIGRAM(S): 100 INJECTION SUBCUTANEOUS at 14:13

## 2021-12-20 RX ADMIN — CALAMINE AND ZINC OXIDE AND PHENOL 1 APPLICATION(S): 160; 10 LOTION TOPICAL at 18:40

## 2021-12-20 RX ADMIN — Medication 1 APPLICATION(S): at 18:39

## 2021-12-20 RX ADMIN — QUETIAPINE FUMARATE 50 MILLIGRAM(S): 200 TABLET, FILM COATED ORAL at 21:01

## 2021-12-20 RX ADMIN — Medication 0.5 MILLIGRAM(S): at 21:02

## 2021-12-20 RX ADMIN — Medication 650 MILLIGRAM(S): at 14:13

## 2021-12-20 RX ADMIN — Medication 650 MILLIGRAM(S): at 15:00
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
## 2021-12-20 NOTE — PROVIDER CONTACT NOTE (MEDICATION) - SITUATION
Patient states her skin is itching all over (has been at home prior to admit as well), and she is requesting medication for relief. Patient has been using hydrocortisone cream and calomine lotion. Nursing Attendant assisted with cool compresses with some relief.

## 2021-12-20 NOTE — H&P ADULT - NSHPPHYSICALEXAM_GEN_ALL_CORE
Vital Signs  HR: 74  RR: 16  General: Middle aged female sitting in bed comfortably. No acute distress  HEENT: PERRLA. EOMI. Clear conjunctivae. Moist mucus membrane  Neck: Supple.   Chest: CTA bilaterally - no wheezing, rales or rhonchi.   Heart: Normal S1 & S2 with RRR.   Abdomen: Soft. Non-tender. Non-distended. + BS  Ext: No pedal edema. No calf tenderness   Neuro: Active and alert. No focal deficit. No speech disorder.  Skin: Warm and Dry  Psychiatry: Anxious

## 2021-12-20 NOTE — H&P ADULT - NSICDXFAMILYHX_GEN_ALL_CORE_FT
FAMILY HISTORY:  Mother  Still living? Unknown  Family history of osteoarthritis, Age at diagnosis: Age Unknown
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
## 2021-12-20 NOTE — H&P ADULT - NSHPSOCIALHISTORY_GEN_ALL_CORE
Disabled.  , lives with .  Smokes 2 cigarettes daily for > 10 years.   No alcohol for 1.5 years.  History of cocaine use as teenager. Disabled.  , lives with .  Smokes 2 cigarettes daily for > 10 years.   No alcohol for 1.5 years.  History of cocaine use in past. Denies current use.

## 2021-12-20 NOTE — PATIENT PROFILE ADULT - FALL HARM RISK - HARM RISK INTERVENTIONS
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
## 2021-12-21 PROCEDURE — 99233 SBSQ HOSP IP/OBS HIGH 50: CPT

## 2021-12-21 PROCEDURE — 95720 EEG PHY/QHP EA INCR W/VEEG: CPT

## 2021-12-21 PROCEDURE — 99232 SBSQ HOSP IP/OBS MODERATE 35: CPT

## 2021-12-21 RX ORDER — POLYETHYLENE GLYCOL 3350 17 G/17G
17 POWDER, FOR SOLUTION ORAL DAILY
Refills: 0 | Status: DISCONTINUED | OUTPATIENT
Start: 2021-12-21 | End: 2021-12-22

## 2021-12-21 RX ORDER — VALPROIC ACID (AS SODIUM SALT) 250 MG/5ML
900 SOLUTION, ORAL ORAL ONCE
Refills: 0 | Status: COMPLETED | OUTPATIENT
Start: 2021-12-22 | End: 2021-12-22

## 2021-12-21 RX ORDER — SENNA PLUS 8.6 MG/1
2 TABLET ORAL AT BEDTIME
Refills: 0 | Status: DISCONTINUED | OUTPATIENT
Start: 2021-12-21 | End: 2021-12-22

## 2021-12-21 RX ORDER — DIVALPROEX SODIUM 500 MG/1
500 TABLET, DELAYED RELEASE ORAL
Refills: 0 | Status: DISCONTINUED | OUTPATIENT
Start: 2021-12-22 | End: 2021-12-22

## 2021-12-21 RX ORDER — MULTIVIT WITH MIN/MFOLATE/K2 340-15/3 G
1 POWDER (GRAM) ORAL ONCE
Refills: 0 | Status: COMPLETED | OUTPATIENT
Start: 2021-12-21 | End: 2021-12-21

## 2021-12-21 RX ADMIN — CALAMINE AND ZINC OXIDE AND PHENOL 1 APPLICATION(S): 160; 10 LOTION TOPICAL at 23:05

## 2021-12-21 RX ADMIN — Medication 1 APPLICATION(S): at 05:07

## 2021-12-21 RX ADMIN — CALAMINE AND ZINC OXIDE AND PHENOL 1 APPLICATION(S): 160; 10 LOTION TOPICAL at 00:51

## 2021-12-21 RX ADMIN — QUETIAPINE FUMARATE 50 MILLIGRAM(S): 200 TABLET, FILM COATED ORAL at 21:41

## 2021-12-21 RX ADMIN — Medication 0.5 MILLIGRAM(S): at 21:41

## 2021-12-21 RX ADMIN — Medication 1 APPLICATION(S): at 00:51

## 2021-12-21 RX ADMIN — POLYETHYLENE GLYCOL 3350 17 GRAM(S): 17 POWDER, FOR SOLUTION ORAL at 20:48

## 2021-12-21 RX ADMIN — Medication 50 MILLIGRAM(S): at 21:41

## 2021-12-21 RX ADMIN — Medication 1 APPLICATION(S): at 23:05

## 2021-12-21 RX ADMIN — CALAMINE AND ZINC OXIDE AND PHENOL 1 APPLICATION(S): 160; 10 LOTION TOPICAL at 11:14

## 2021-12-21 RX ADMIN — Medication 1 BOTTLE: at 23:19

## 2021-12-21 RX ADMIN — Medication 1 APPLICATION(S): at 16:32

## 2021-12-21 RX ADMIN — SENNA PLUS 2 TABLET(S): 8.6 TABLET ORAL at 20:48

## 2021-12-21 RX ADMIN — ENOXAPARIN SODIUM 40 MILLIGRAM(S): 100 INJECTION SUBCUTANEOUS at 11:15

## 2021-12-21 RX ADMIN — CALAMINE AND ZINC OXIDE AND PHENOL 1 APPLICATION(S): 160; 10 LOTION TOPICAL at 16:32

## 2021-12-21 RX ADMIN — CALAMINE AND ZINC OXIDE AND PHENOL 1 APPLICATION(S): 160; 10 LOTION TOPICAL at 05:06

## 2021-12-21 RX ADMIN — Medication 1 APPLICATION(S): at 11:14
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
## 2021-12-21 NOTE — PROGRESS NOTE ADULT - SUBJECTIVE AND OBJECTIVE BOX
[facility]                                                                     MD Leona Hardy DO                                              Epilepsy Consult #: [phone]-EPILEPSY ([phone])                                               Office: [address] [place] [address], [address]                                                 Phone: [phone]; Fax: [phone]                            ==============================================    EPILEPSY FOLLOW-UP NOTE      INTERVAL HISTORY:  No event overnight. EEG with right temporal epileptiform discharges but no seizure.    MEDICATIONS:   acetaminophen     Tablet .. 650 milliGRAM(s) Oral every 6 hours PRN Temp greater or equal to 38C (100.4F), Mild Pain (1 - 3), Moderate Pain (4 - 6)  benztropine 0.5 milliGRAM(s) Oral at bedtime  calamine/zinc oxide Lotion 1 Application(s) Topical every 6 hours  enoxaparin Injectable 40 milliGRAM(s) SubCutaneous daily  hydrocortisone 1% Cream 1 Application(s) Topical every 6 hours  influenza   Vaccine 0.5 milliLiter(s) IntraMuscular once  nicotine -   7 mG/24Hr(s) Patch 1 patch Transdermal daily  ondansetron Injectable 4 milliGRAM(s) IV Push every 6 hours PRN Nausea and/or Vomiting  QUEtiapine 50 milliGRAM(s) Oral at bedtime  traZODone 50 milliGRAM(s) Oral at bedtime    LAST 24-HR VITALS:  T(C): 36.6 (12-21-21 @ 05:00), Max: 36.9 (12-20-21 @ 16:54)  HR: 71 (12-21-21 @ 05:00) (67 - 71)  BP: 110/78 (12-21-21 @ 05:00) (110/78 - 143/-)  ABP: --  RR: 18 (12-21-21 @ 05:00) (18 - 18)  SpO2: 97% (12-21-21 @ 05:00) (97% - 98%)  CVP(cm H2O): --    GENERAL PHYSICAL EXAM:  GEN: no distress   HEENT: NCAT, OP clear  EYES: sclera white, conjunctiva clear, no nystagmus  NECK: supple  CV: RRR, no murmur     		  PULM: CTAB, no wheezing  ABD: soft, +BS, NT  EXT: peripheral pulse intact, no cyanosis  MSK: muscle tone and strength normal  SKIN: warm, dry    NEUROLOGICAL EXAM:  Mental Status  Orientation: alert and oriented to person, place, time, and situation   Language: clear and fluent    Cranial Nerves  II: full visual fields intact   III, IV, VI: PERRL, EOMI  V, VII: facial sensation and movement intact and symmetric   VIII: hearing intact   IX, X: uvula midline, soft palate elevates normally   XI: BL shoulder shrug intact   XII: tongue midline    Motor  5/5 BUE and BLE                 Tone and bulk are normal in upper and lower limbs  No pronator drift    Sensation  Intact to light touch and pinprick in all 4 EXTs    Reflex  2+ in BL biceps and patella                                    Plantar responses downward bilaterally    Coordination  Normal FTN bilaterally    Gait  Deferred       LABS:                          8.7    4.20  )-----------( 304      ( 20 Dec 2021 13:00 )             27.5     12-20    131<L>  |  96<L>  |  16.8  ----------------------------<  100<H>  4.4   |  22.0  |  0.72    Ca    9.0      20 Dec 2021 13:00  Mg     1.6     12-20    TPro  7.4  /  Alb  4.2  /  TBili  0.4  /  DBili  x   /  AST  80<H>  /  ALT  57<H>  /  AlkPhos  67  12-20    CAPILLARY BLOOD GLUCOSE        LIVER FUNCTIONS - ( 20 Dec 2021 13:00 )  Alb: 4.2 g/dL / Pro: 7.4 g/dL / ALK PHOS: 67 U/L / ALT: 57 U/L / AST: 80 U/L / GGT: x           PT/INR - ( 20 Dec 2021 13:00 )   PT: 11.7 sec;   INR: 1.01 ratio               OTHER IMAGING AND STUDIES:    non-elective EMU 12/20 - 12/21/21:  EEG Summary:  Abnormal EEG in the awake, drowsy and asleep states.  - Rare right temporal (F8/T8) sharp wave discharges.  - Continuous theta/delta slowing in the right temporal (max F8/T8/F10/T10) region, at times quasi-rhythmic at 3-4 Hz.     Impression/Clinical Correlate:  12/20 – 12/21: No events or seizures were recorded.    Interictal findings suggest potential epileptogenic focus and structural abnormality in the right temporal region.

## 2021-12-21 NOTE — CHART NOTE - NSCHARTNOTEFT_GEN_A_CORE
Patient with complaint of constipation x 3 days, denies N/V  +passing gas  Patient states she feels bloated  Patient ordered for Miralax and senna but patient demanding Mag citrate   Patient is AxOx3   I explained to the patient that is usually reserved for procedures but patient insistent she has taken it before without any issues  1/4 bottle of Mag citrate to be administered at patient's request  RN to notify with any acute changes Patient with complaint of constipation x 3 days, denies N/V  +passing gas  Patient states she feels bloated, abd soft, NT +BS   Patient ordered for Miralax and senna but patient demanding Mag citrate   Patient is AxOx3   I explained to the patient that is usually reserved for procedures but patient insistent she has taken it before without any issues  1/4 bottle of Mag citrate to be administered at patient's request  RN to notify with any acute changes

## 2021-12-22 ENCOUNTER — TRANSCRIPTION ENCOUNTER (OUTPATIENT)
Age: 51
End: 2021-12-22

## 2021-12-22 VITALS
RESPIRATION RATE: 18 BRPM | SYSTOLIC BLOOD PRESSURE: 101 MMHG | OXYGEN SATURATION: 97 % | HEART RATE: 69 BPM | DIASTOLIC BLOOD PRESSURE: 68 MMHG | TEMPERATURE: 98 F

## 2021-12-22 LAB
FERRITIN SERPL-MCNC: 22 NG/ML — SIGNIFICANT CHANGE UP (ref 15–150)
HCT VFR BLD CALC: 29.2 % — LOW (ref 34.5–45)
HGB BLD-MCNC: 9 G/DL — LOW (ref 11.5–15.5)
IRON SATN MFR SERPL: 17 UG/DL — LOW (ref 37–145)
IRON SATN MFR SERPL: 5 % — LOW (ref 14–50)
MCHC RBC-ENTMCNC: 23.3 PG — LOW (ref 27–34)
MCHC RBC-ENTMCNC: 30.8 GM/DL — LOW (ref 32–36)
MCV RBC AUTO: 75.5 FL — LOW (ref 80–100)
PLATELET # BLD AUTO: 359 K/UL — SIGNIFICANT CHANGE UP (ref 150–400)
RBC # BLD: 3.87 M/UL — SIGNIFICANT CHANGE UP (ref 3.8–5.2)
RBC # FLD: 21.8 % — HIGH (ref 10.3–14.5)
TIBC SERPL-MCNC: 370 UG/DL — SIGNIFICANT CHANGE UP (ref 220–430)
TRANSFERRIN SERPL-MCNC: 259 MG/DL — SIGNIFICANT CHANGE UP (ref 192–382)
WBC # BLD: 4.03 K/UL — SIGNIFICANT CHANGE UP (ref 3.8–10.5)
WBC # FLD AUTO: 4.03 K/UL — SIGNIFICANT CHANGE UP (ref 3.8–10.5)

## 2021-12-22 PROCEDURE — 99239 HOSP IP/OBS DSCHRG MGMT >30: CPT | Mod: GC

## 2021-12-22 PROCEDURE — 84466 ASSAY OF TRANSFERRIN: CPT

## 2021-12-22 PROCEDURE — 83540 ASSAY OF IRON: CPT

## 2021-12-22 PROCEDURE — 85027 COMPLETE CBC AUTOMATED: CPT

## 2021-12-22 PROCEDURE — 36415 COLL VENOUS BLD VENIPUNCTURE: CPT

## 2021-12-22 PROCEDURE — 95718 EEG PHYS/QHP 2-12 HR W/VEEG: CPT

## 2021-12-22 PROCEDURE — 85610 PROTHROMBIN TIME: CPT

## 2021-12-22 PROCEDURE — 80053 COMPREHEN METABOLIC PANEL: CPT

## 2021-12-22 PROCEDURE — 95714 VEEG EA 12-26 HR UNMNTR: CPT

## 2021-12-22 PROCEDURE — 80307 DRUG TEST PRSMV CHEM ANLYZR: CPT

## 2021-12-22 PROCEDURE — 85025 COMPLETE CBC W/AUTO DIFF WBC: CPT

## 2021-12-22 PROCEDURE — 99233 SBSQ HOSP IP/OBS HIGH 50: CPT

## 2021-12-22 PROCEDURE — 83735 ASSAY OF MAGNESIUM: CPT

## 2021-12-22 PROCEDURE — 95711 VEEG 2-12 HR UNMONITORED: CPT

## 2021-12-22 PROCEDURE — 95700 EEG CONT REC W/VID EEG TECH: CPT

## 2021-12-22 PROCEDURE — 82728 ASSAY OF FERRITIN: CPT

## 2021-12-22 PROCEDURE — 83550 IRON BINDING TEST: CPT

## 2021-12-22 RX ORDER — LACTULOSE 10 G/15ML
10 SOLUTION ORAL ONCE
Refills: 0 | Status: COMPLETED | OUTPATIENT
Start: 2021-12-22 | End: 2021-12-22

## 2021-12-22 RX ORDER — DIVALPROEX SODIUM 500 MG/1
1 TABLET, DELAYED RELEASE ORAL
Qty: 60 | Refills: 0
Start: 2021-12-22 | End: 2022-01-20

## 2021-12-22 RX ORDER — DIVALPROEX SODIUM 500 MG/1
1 TABLET, DELAYED RELEASE ORAL
Qty: 0 | Refills: 0 | DISCHARGE
Start: 2021-12-22

## 2021-12-22 RX ADMIN — LACTULOSE 10 GRAM(S): 10 SOLUTION ORAL at 08:36

## 2021-12-22 RX ADMIN — CALAMINE AND ZINC OXIDE AND PHENOL 1 APPLICATION(S): 160; 10 LOTION TOPICAL at 05:27

## 2021-12-22 RX ADMIN — Medication 29.5 MILLIGRAM(S): at 05:27

## 2021-12-22 RX ADMIN — Medication 1 APPLICATION(S): at 05:27
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
## 2021-12-22 NOTE — DISCHARGE NOTE PROVIDER - PROVIDER TOKENS
PROVIDER:[TOKEN:[[id number]],FOLLOWUP:[1 week]],FREE:[LAST:[PCP],PHONE:[(   )    -],FAX:[(   )    -],FOLLOWUP:[1 week]] PROVIDER:[TOKEN:[[id number]],FOLLOWUP:[Routine]],FREE:[LAST:[PCP - Dr. Jason],PHONE:[(   )    -],FAX:[(   )    -],FOLLOWUP:[Routine]] FREE:[LAST:[PCP - Dr. Jason],PHONE:[(   )    -],FAX:[(   )    -],FOLLOWUP:[Routine]],PROVIDER:[TOKEN:[[id number]],FOLLOWUP:[Routine]]
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
## 2021-12-22 NOTE — PROGRESS NOTE ADULT - SUBJECTIVE AND OBJECTIVE BOX
[facility]                                                                     MD Leona Hardy DO                                              Epilepsy Consult #: 83-EPILEPSY ([phone])                                               Office: [address] [place] [address], [address]                                                 Phone: [phone]; Fax: [phone]                            ==============================================    EPILEPSY FOLLOW-UP NOTE      INTERVAL HISTORY:  No event or seizure overnight. Loaded with valproic acid this morning.    MEDICATIONS  (STANDING):  benztropine 0.5 milliGRAM(s) Oral at bedtime  calamine/zinc oxide Lotion 1 Application(s) Topical every 6 hours  diVALproex  milliGRAM(s) Oral two times a day  enoxaparin Injectable 40 milliGRAM(s) SubCutaneous daily  hydrocortisone 1% Cream 1 Application(s) Topical every 6 hours  nicotine -   7 mG/24Hr(s) Patch 1 patch Transdermal daily  QUEtiapine 50 milliGRAM(s) Oral at bedtime  traZODone 50 milliGRAM(s) Oral at bedtime    Vital Signs Last 24 Hrs  T(C): 36.3 (22 Dec 2021 04:15), Max: 36.8 (21 Dec 2021 12:17)  T(F): 97.4 (22 Dec 2021 04:15), Max: 98.2 (21 Dec 2021 12:17)  HR: 62 (22 Dec 2021 04:15) (62 - 79)  BP: 102/87 (22 Dec 2021 04:15) (102/87 - 148/69)  BP(mean): --  RR: 16 (22 Dec 2021 04:15) (16 - 19)  SpO2: 97% (22 Dec 2021 04:15) (90% - 98%)    GENERAL PHYSICAL EXAM:  GEN: no distress   HEENT: NCAT, OP clear  EYES: sclera white, conjunctiva clear, no nystagmus  NECK: supple  CV: RRR, no murmur     		  PULM: CTAB, no wheezing  ABD: soft, +BS, NT  EXT: peripheral pulse intact, no cyanosis  MSK: muscle tone and strength normal  SKIN: warm, dry    NEUROLOGICAL EXAM:  Mental Status  Orientation: alert and oriented to person, place, time, and situation   Language: clear and fluent    Cranial Nerves  II: full visual fields intact   III, IV, VI: PERRL, EOMI  V, VII: facial sensation and movement intact and symmetric   VIII: hearing intact   IX, X: uvula midline, soft palate elevates normally   XI: BL shoulder shrug intact   XII: tongue midline    Motor  5/5 BUE and BLE                 Tone and bulk are normal in upper and lower limbs  No pronator drift    Sensation  Intact to light touch and pinprick in all 4 EXTs    Reflex  2+ in BL biceps and patella                                    Plantar responses downward bilaterally    Coordination  Normal FTN bilaterally    Gait  Deferred       LABS:                          8.7    4.20  )-----------( 304      ( 20 Dec 2021 13:00 )             27.5     12-20    131<L>  |  96<L>  |  16.8  ----------------------------<  100<H>  4.4   |  22.0  |  0.72    Ca    9.0      20 Dec 2021 13:00  Mg     1.6     12-20    TPro  7.4  /  Alb  4.2  /  TBili  0.4  /  DBili  x   /  AST  80<H>  /  ALT  57<H>  /  AlkPhos  67  12-20    CAPILLARY BLOOD GLUCOSE        LIVER FUNCTIONS - ( 20 Dec 2021 13:00 )  Alb: 4.2 g/dL / Pro: 7.4 g/dL / ALK PHOS: 67 U/L / ALT: 57 U/L / AST: 80 U/L / GGT: x           PT/INR - ( 20 Dec 2021 13:00 )   PT: 11.7 sec;   INR: 1.01 ratio               OTHER IMAGING AND STUDIES:    non-elective EMU 12/20 - 12/22/21:  EEG Summary:  Abnormal EEG in the awake, drowsy and asleep states.  - Rare right temporal (F8/T8) sharp wave discharges.  - Continuous theta/delta slowing in the right temporal (max F8/T8/F10/T10) region, at times quasi-rhythmic at 3-4 Hz.     Impression/Clinical Correlate:  12/20 – 12/21: No events or seizures were recorded.  12/21 – 12/22: No events or seizures were recorded.    During this EMU admission, no events or seizures were recorded. Interictal findings suggest potential epileptogenic focus and structural abnormality in the right temporal region.

## 2021-12-22 NOTE — DISCHARGE NOTE PROVIDER - NSDCCPCAREPLAN_GEN_ALL_CORE_FT
PRINCIPAL DISCHARGE DIAGNOSIS  Diagnosis: Seizure  Assessment and Plan of Treatment: - cvEEG: No events or seizures were recorded. Interictal findings suggest potential epileptogenic focus and structural abnormality in the right temporal region.  - Continue valproic acid as directed  - Follow up with epilepsy/ Neuro in 1 week       PRINCIPAL DISCHARGE DIAGNOSIS  Diagnosis: Seizure  Assessment and Plan of Treatment: - cvEEG: No events or seizures were recorded. Interictal findings suggest potential epileptogenic focus and structural abnormality in the right temporal region.  - Continue valproic acid as directed  - Follow up with epilepsy in few weeks.       PRINCIPAL DISCHARGE DIAGNOSIS  Diagnosis: Epilepsy  Assessment and Plan of Treatment: - cvEEG: No events or seizures were recorded. Interictal findings suggest potential epileptogenic focus and structural abnormality in the right temporal region.  - Continue valproic acid as directed  - Follow up with epilepsy in few weeks.

## 2021-12-22 NOTE — DISCHARGE NOTE PROVIDER - CARE PROVIDERS DIRECT ADDRESSES
[email or web address],DirectAddress_Unknown ,DirectAddress_Unknown,DirectAddress_Unknown

## 2021-12-22 NOTE — DISCHARGE NOTE PROVIDER - NSDCMRMEDTOKEN_GEN_ALL_CORE_FT
benztropine 0.5 mg oral tablet: 1 tab(s) orally once a day (at bedtime)  divalproex sodium 500 mg oral delayed release tablet: 1 tab(s) orally 2 times a day  QUEtiapine 50 mg oral tablet: 1 tab(s) orally once a day (at bedtime)  traZODone 50 mg oral tablet: 1 tab(s) orally once a day (at bedtime)
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
## 2021-12-22 NOTE — DISCHARGE NOTE NURSING/CASE MANAGEMENT/SOCIAL WORK - PATIENT PORTAL LINK FT
You can access the FollowMyHealth Patient Portal offered by [facility] by registering at the following website: [email or web address]. By joining [employer]’s FollowMyHealth portal, you will also be able to view your health information using other applications (apps) compatible with our system.

## 2021-12-22 NOTE — DISCHARGE NOTE PROVIDER - CARE PROVIDER_API CALL
Ildefonso Connors)  EEGEpilepsy; Neurology  [address] [place] [address]  Phone: [phone]  Fax: [phone]  Follow Up Time: 1 week    PCP,   Phone: (   )    -  Fax: (   )    -  Follow Up Time: 1 week   Ildefonso Connors)  EEGEpilepsy; Neurology  [address] [place] [address]  Phone: [phone]  Fax: [phone]  Follow Up Time: Routine    PCP - Dr. Jason,   Phone: (   )    -  Fax: (   )    -  Follow Up Time: Routine   PCP - Dr. Jason,   Phone: (   )    -  Fax: (   )    -  Follow Up Time: Routine    Leona Henderson (DO)  EEGEpilepsy; Neurology  [address] [place] [id number]  Phone: [phone]  Fax: [phone]  Follow Up Time: Routine

## 2021-12-22 NOTE — DISCHARGE NOTE NURSING/CASE MANAGEMENT/SOCIAL WORK - NSDCPEFALRISK_GEN_ALL_CORE
For information on Fall & Injury Prevention, visit: [email or web address] OR  [email or web address] OR  https://www.cdc.gov/steadi/patient.html

## 2022-02-10 NOTE — PATIENT PROFILE ADULT - DATE OF LAST VACCINATION
[address] [facility]   Department of Internal Medicine   Internal Medicine Residency  MICU Progress Note    Patient:  Alta Paul 78 y.o. female   MRN: [id number]       Date of Service: 2/10/2022    Allergy: Patient has no known allergies. Cc follow ARDS  Subjective     Patient was seen and examined this morning at bedside in no acute distress. Overnight, patient drop in hemoglobin to 6.9, she was transfused 1 unit and hemoglobin has since stabilized. 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 She is for trach and PEG today we will continue to follow mentation moving forward. Objective     TEMPERATURE:  Current - Temp: 100.2 °F (37.9 °C); Max - Temp  Av.2 °F (37.9 °C)  Min: 98.4 °F (36.9 °C)  Max: 101.5 °F (38.6 °C)  RESPIRATIONS RANGE: Resp  Av.3  Min: 21  Max: 34  PULSE RANGE: Pulse  Av.6  Min: 97  Max: 119  BLOOD PRESSURE RANGE:  Systolic (65SOQ), TDV:201 , Min:101 , BSR:612   ; Diastolic (25XXP), TCP:95, Min:54, Max:69    PULSE OXIMETRY RANGE: SpO2  Av.4 %  Min: 83 %  Max: 98 %    I & O - 24hr:    Intake/Output Summary (Last 24 hours) at 2/10/2022 0711  Last data filed at 2/10/2022 0600  Gross per 24 hour   Intake 2986 ml   Output 1710 ml   Net 1276 ml     I/O last 3 completed shifts: In: 5826.9 [I.V.:285.8; GB/EI:1194; IV Piggyback:407.1]  Out: 2665 [Urine:2665] No intake/output data recorded. Weight change: -1 lb 6.4 oz (-0.635 kg)    Physical Exam  Constitutional:       General: She is awake. Appearance: Normal appearance. Interventions: She is intubated. HENT:      Head: Normocephalic. Eyes:      Pupils: Pupils are equal, round, and reactive to light. Cardiovascular:      Rate and Rhythm: Normal rate and regular rhythm. Pulses: Normal pulses. Heart sounds: Normal heart sounds.    Pulmonary:      Effort: Pulmonary effort is normal. She is intubated. Breath sounds: Normal breath sounds. Abdominal:      General: Abdomen is flat. Bowel sounds are normal.      Palpations: Abdomen is soft. Musculoskeletal:         General: Normal range of motion. Cervical back: Normal range of motion. Skin:     General: Skin is warm. Capillary Refill: Capillary refill takes less than 2 seconds. Neurological:      Mental Status: She is alert.          Medications     Continuous Infusions:   sodium chloride      sodium chloride      dextrose       Scheduled Meds:   magnesium sulfate  2,000 mg IntraVENous Once    potassium chloride  10 mEq IntraVENous Q1H    fentanNYL  200 mcg IntraVENous See Admin Instructions    vecuronium  10 mg IntraVENous See Admin Instructions    midazolam  4 mg IntraVENous See Admin Instructions    ceFAZolin  2,000 mg IntraVENous See Admin Instructions    thiamine mononitrate  100 mg Oral Daily    [Held by provider] furosemide  40 mg IntraVENous Daily    lansoprazole  30 mg Per NG tube QAM AC    insulin lispro  0-6 Units SubCUTAneous Q4H    enoxaparin  30 mg SubCUTAneous BID    miconazole nitrate   Topical BID    chlorhexidine  15 mL Mouth/Throat BID    artificial tears   Both Eyes Q4H    ipratropium-albuterol  1 ampule Inhalation Q4H WA    sodium chloride flush  5-40 mL IntraVENous 2 times per day    [Held by provider] vitamin D  2,000 Units Oral Daily     PRN Meds: sodium chloride, sennosides, polyethylene glycol, fentanNYL, sodium chloride flush, sodium chloride, acetaminophen **OR** acetaminophen, glucose, dextrose, glucagon (rDNA), dextrose  Nutrition:   NG/OG tube TF type: Pulmocare/Nephro/Glucerna/Jevity        At rate: ml/h    Labs and Imaging Studies     CBC:   Recent Labs     02/08/22  0506 02/08/22  0506 02/09/22  0417 02/10/22  0410 02/10/22  0508   WBC 17.5*  --  23.1* 22.5*  --    HGB 7.1*   < > 7.2* 6.9* 6.9*   HCT 22.7*   < > 23.4* 21.9* 22.0*   MCV 95.8  --  97.1 95.2  --    PLT s  Humidification Source: Heated wire  Humidification Temp: 37  Humidification Temp Measured: 37  Circuit Condensation: Drained  Mask Type: Full face mask  Mask Size: Medium  Additional Respiratory  Assessments  Pulse: 97  Resp: 23  SpO2: 95 %  Position: Semi-Ocampo's  Humidification Source: Heated wire  Humidification Temp: 37  Circuit Condensation: Drained  Oral Care: Mouth suctioned,Mouthwash,Mouth moisturizer,Suction toothette  Subglottic Suction Done?: No  Airway Type: ET  Airway Size: 7.5  Cuff Pressure (cm H2O):  (MLT)       [REMOVED] Urethral Catheter Double-lumen 16 fr-Output (mL): 130 mL  Urethral Catheter Double-lumen 16 fr-Output (mL): 50 mL  [REMOVED] External Urinary Catheter-Output (mL): 200 mL    Imaging Studies:  XR CHEST PORTABLE   Final Result   1. Overall there is no interval change in the extent of the multifocal   bilateral pneumonia   2. Status post removal of the right internal jugular central venous catheter. RECOMMENDATION:   1.      XR CHEST PORTABLE   Final Result   No interval change in the diffuse multifocal bilateral pneumonia. XR CHEST PORTABLE   Final Result   Increase in density involvement of bilateral pulmonary opacifications right   greater than left of airspace disease/bronchopneumonia from prior         XR CHEST PORTABLE   Final Result   1. There is no interval change in the multifocal bilateral pneumonia   2. There is no pneumothorax or pneumomediastinum. XR CHEST PORTABLE   Final Result   1. Very minimal partial interval clearing of the right lung pneumonia when   compared with the study. 2. No interval change in the left lung pneumonia. 3. There is no pneumothorax. XR CHEST PORTABLE   Final Result   No interval change         XR ABDOMEN FOR NG/OG/NE TUBE PLACEMENT   Final Result   Satisfactory position of the enteric tube.          US DUP LOWER EXTREMITIES BILATERAL VENOUS   Final Result   Within the visualized vessels there is no evidence for deep venous   thrombosis               XR CHEST PORTABLE   Final Result   No significant changes since February 2nd. Persistent bilateral infiltrates. Endotracheal tube in good position. XR CHEST PORTABLE   Final Result   No pneumothorax following line placement. Right greater than left parenchymal infiltrates similar to subtly improved. Continued follow-up recommended. XR ABDOMEN (KUB) (SINGLE AP VIEW)   Final Result   No significant change. Satisfactory position of the enteric tube within the   stomach. No bowel obstruction or acute abdominal disease identified. XR CHEST PORTABLE   Final Result   1. Extensive bilateral multifocal pneumonia. No significant change. 2. Vertical linear shadow over the lateral aspect of the right chest, most   likely skin fold. XR CHEST PORTABLE   Final Result   1. There is no interval change in the multifocal bilateral pneumonia. 2. There is no pneumothorax or pneumomediastinum. XR CHEST PORTABLE   Final Result   Stable to slightly improved aeration of the lungs bilaterally with persistent   patchy bilateral airspace opacities, in keeping with the patient's known   diagnosis of COVID pneumonia. US DUP LOWER EXTREMITIES BILATERAL VENOUS   Final Result   No evidence of DVT in either lower extremity. US DUP UPPER EXTREMITIES BILATERAL VENOUS   Final Result   Occlusive thrombus in the left distal cephalic vein. No evidence of DVT. Critical results were called by Dr. Douglas Kumari to Dr. Vicente Cates On 1/29/2022   at 21:09. Please make a note of the limited nature of the study due to the above   reasons. RECOMMENDATIONS:         XR CHEST PORTABLE   Final Result   No significant interval change, when compared to the previous study performed   1 day earlier. XR CHEST PORTABLE   Final Result   Bilateral airspace disease without appreciable change.          XR ABDOMEN (KUB) (SINGLE AP VIEW)   Final Result   No active process. NG tube in the gastric lumen as before. XR CHEST PORTABLE   Final Result   Progression of bilateral airspace disease remaining midlung predominant in   the periphery of airspace disease bronchopneumonia with increased from prior         XR CHEST PORTABLE   Final Result   Mild decrease in the diffuse left lung infiltrates, when compared to the   previous study performed 1 day earlier. Diffuse right lung infiltrates   without significant interval change. XR CHEST PORTABLE   Final Result   Left internal jugular line tip in the distal SVC. No pneumothorax. The remainder of the exam is essentially stable. .         XR CHEST PORTABLE   Final Result   Stable airspace disease. See above. XR CHEST PORTABLE   Final Result   Bilateral airspace disease which appears mildly progressive. XR CHEST PORTABLE   Final Result   1. Lines and tubes are unchanged in position. 2.  Diffuse bilateral opacities are not significantly changed. Differential   includes infection, pulmonary edema, atelectasis, ARDS, and/or layering   pleural effusions. XR CHEST PORTABLE   Final Result   1. No significant change in bilateral multifocal pneumonia. Support tubes   and catheters are stable. XR CHEST PORTABLE   Final Result   1. There is no interval change in extensive multifocal bilateral pneumonia. XR CHEST PORTABLE   Final Result   Slightly worsened bilateral pulmonary infiltrates with increasing opacity in   the bilateral bases when compared to previous. Stable lines and tubes. XR CHEST PORTABLE   Final Result   1. Good position right IJ central venous line. Negative for pneumothorax. 2.  Bilateral widespread pulmonary infiltrates with interval mild worsening   on the left and compatible with bilateral pneumonia. XR CHEST PORTABLE   Final Result   Endotracheal tube tip is 3.3 cm above the alberta.       Diffuse pulmonary infiltrates are unchanged. XR ABDOMEN FOR NG/OG/NE TUBE PLACEMENT   Final Result   Nasogastric tube terminates in the stomach. XR CHEST PORTABLE   Final Result   1. Multifocal bilateral pneumonia unchanged when compared with the prior   study. XR CHEST PORTABLE   Final Result   Bilateral airspace disease likely related to pneumonia. XR CHEST PORTABLE    (Results Pending)   CTA PULMONARY W CONTRAST    (Results Pending)   XR CHEST PORTABLE    (Results Pending)   XR CHEST PORTABLE    (Results Pending)   XR CHEST PORTABLE    (Results Pending)        Resident's Assessment and Plan     Assessment and Plan:    1. Altered mental status resolved  -Patient has awake and alert  -Able to move head to track voice and nods appropriately  -Unable to follow commands due to critical care myopathy  -Continue thiamine  -Continue to monitor off sedation    2. Acute hypoxic respiratory failure secondary to ARDS 2/2 COVID-19 pneumonia  -Patient remains on pressure support  -Chest x-ray remains stable as compared to previous  -Patient has for trach and PEG today  -Wean vent settings as able  -Continue to monitor    3. Leukocytosis  -WBC of 22 today  -Cultures grew candidemia from respiratory  -We will hold off on treatment at this time most likely contamination  -We will continue on cefazolin  -Continue to monitor    4. Acute normocytic anemia 2/2 GI bleed  -Hemoglobin dropped to 6.9 this morning  -1 unit of blood transfused  -When placing PEG tube is noted that patient had spots of dried blood in stomach  -Continue Protonix  -We will hold anticoagulation at this time check with surgery morning.  -Continue to monitor      # Peptic ulcer prophylaxis: Protonix  # DVT Prophylaxis: On hold due to drop in hemoglobin  # Disposition: Cont current care     Irma Mcintosh MD, PGY-1    Attending Physician: Dr. Aleah Arce    I personally saw, examined and provided care for the patient.  Radiographs, labs and medication list were reviewed by me independently. I spoke with bedside nursing, therapists and consultants. Critical care services and times documented are independent of procedures and multidisciplinary rounds with Residents. Additionally comprehensive, multidisciplinary rounds were conducted with the MICU team. The case was discussed in detail and plans for care were established. Review of Residents documentation was conducted and revisions were made as appropriate. I agree with the above documented exam, problem list and plan of care.     For trach  Jc Hebert MD,[facility]  Pulmonary&Critical Care Medicine   Director of [address] [place] [address] Director of [address] [place] [address]   Professor Verona Cabot 20-Jul-2021

## 2022-03-18 ENCOUNTER — EMERGENCY (EMERGENCY)
Facility: HOSPITAL | Age: 52
LOS: 1 days | Discharge: HOME | End: 2022-03-18
Attending: EMERGENCY MEDICINE
Payer: MEDICARE

## 2022-03-18 VITALS
WEIGHT: 123.02 LBS | SYSTOLIC BLOOD PRESSURE: 100 MMHG | DIASTOLIC BLOOD PRESSURE: 63 MMHG | HEART RATE: 87 BPM | TEMPERATURE: 98 F | RESPIRATION RATE: 18 BRPM | HEIGHT: 68 IN | OXYGEN SATURATION: 98 %

## 2022-03-18 DIAGNOSIS — Z98.890 OTHER SPECIFIED POSTPROCEDURAL STATES: Chronic | ICD-10-CM

## 2022-03-18 DIAGNOSIS — Z93.1 GASTROSTOMY STATUS: Chronic | ICD-10-CM

## 2022-03-18 PROCEDURE — 99283 EMERGENCY DEPT VISIT LOW MDM: CPT

## 2022-03-18 PROCEDURE — 99284 EMERGENCY DEPT VISIT MOD MDM: CPT

## 2022-03-18 NOTE — ED PROVIDER NOTE - PROGRESS NOTE DETAILS
pt unable to urinate while in the ed,  states she went to the bathroom prior coming to the ed, pt will follow up with her primary care doctor

## 2022-03-18 NOTE — ED PROVIDER NOTE - PATIENT PORTAL LINK FT
You can access the FollowMyHealth Patient Portal offered by [facility] by registering at the following website: [email or web address]. By joining [employer]’s FollowMyHealth portal, you will also be able to view your health information using other applications (apps) compatible with our system.

## 2022-03-18 NOTE — ED PROVIDER NOTE - PHYSICAL EXAMINATION
Const: Awake, alert and oriented. In no acute distress. Well appearing.  HEENT: NC/AT. Moist mucous membranes.  Eyes: No scleral icterus. EOMI.  Neck:. Soft and supple. Full ROM without pain.  Cardiac: +S1/S2. No murmurs. Peripheral pulses 2+ and symmetric. No LE edema.  Resp: Speaking in full sentences. No evidence of respiratory distress. No wheezes, rales or rhonchi.  Abd: Soft, non-tender, non-distended. Normal bowel sounds in all 4 quadrants. No guarding or rebound.  Genitlia: No external lesions noted on exam, cervical os closed, no foreign body noted in vaginal vault, no bleeding, no CMT or adnexal tenderness on palpation   Back: Spine midline and non-tender. No CVAT.  Skin: No rashes, abrasions or lacerations.  Lymph: No cervical lymphadenopathy.  Neuro: Awake, alert & oriented x 3. Moves all extremities symmetrically.

## 2022-03-18 NOTE — ED ADULT TRIAGE NOTE - DIRECT TO ROOM CARE INITIATED:
"Blood pressure 121/70, pulse 83, temperature 97.5  F (36.4  C), temperature source Oral, height 1.702 m (5' 7\"), weight 83 kg (183 lb), last menstrual period 09/04/2016, not currently breastfeeding.  ASTRID Vee CNM   18-Mar-2022 09:18

## 2022-03-18 NOTE — ED PROVIDER NOTE - OBJECTIVE STATEMENT
pt is a 50 y/o female presenting to the ed for evaluation. pt states that she placed a tampon in vagina about 10 days ago. pt reports that she does not remember taking the tampon out, but does state she struggles with short term memory loss. pt stating that about ten days ago went to her pmd for urinary frequency, blood was found in her urine and she was treated for a UTI (denies knowing what antibiotics she was placed on ). pt states sexually active with one partner her . pt denies cp sob fever cough abd pain back pain nausea vomiting numbness or loss of sensation

## 2022-03-18 NOTE — ED PROVIDER NOTE - NSFOLLOWUPINSTRUCTIONS_ED_ALL_ED_FT
please follow up with primary care doctor outpatient  return to the emergency department for any worsening symptoms

## 2022-03-18 NOTE — ED PROVIDER NOTE - NS ED ATTENDING STATEMENT MOD
This was a shared visit with the BLADIMIR. I reviewed and verified the documentation and independently performed the documented:

## 2022-03-18 NOTE — ED PROVIDER NOTE - ATTENDING CONTRIBUTION TO CARE
reports placing tampon 10 days ago and cannot recall removing.  States that both she and her  performed a manual exam and could not feel it.  Denies discharge or vaginal pain. Also with chronic urinary frequency and microscopic hematuria: has never seen a urologist.  Denies dysuria.

## 2022-06-01 ENCOUNTER — APPOINTMENT (OUTPATIENT)
Dept: GASTROENTEROLOGY | Facility: CLINIC | Age: 52
End: 2022-06-01

## 2022-07-22 ENCOUNTER — APPOINTMENT (OUTPATIENT)
Dept: GASTROENTEROLOGY | Facility: CLINIC | Age: 52
End: 2022-07-22

## 2022-08-14 ENCOUNTER — EMERGENCY (EMERGENCY)
Facility: HOSPITAL | Age: 52
LOS: 1 days | Discharge: HOME | End: 2022-08-14
Attending: EMERGENCY MEDICINE
Payer: MEDICARE

## 2022-08-14 VITALS
HEART RATE: 76 BPM | DIASTOLIC BLOOD PRESSURE: 95 MMHG | OXYGEN SATURATION: 100 % | WEIGHT: 179.9 LBS | SYSTOLIC BLOOD PRESSURE: 137 MMHG | RESPIRATION RATE: 20 BRPM | TEMPERATURE: 98 F | HEIGHT: 68 IN

## 2022-08-14 DIAGNOSIS — Z98.890 OTHER SPECIFIED POSTPROCEDURAL STATES: Chronic | ICD-10-CM

## 2022-08-14 DIAGNOSIS — Z93.1 GASTROSTOMY STATUS: Chronic | ICD-10-CM

## 2022-08-14 LAB
ALBUMIN SERPL ELPH-MCNC: 4 G/DL — SIGNIFICANT CHANGE UP (ref 3.3–5.2)
ALP SERPL-CCNC: 100 U/L — SIGNIFICANT CHANGE UP (ref 40–120)
ALT FLD-CCNC: 75 U/L — HIGH
ANION GAP SERPL CALC-SCNC: 17 MMOL/L — SIGNIFICANT CHANGE UP (ref 5–17)
ANISOCYTOSIS BLD QL: SLIGHT — SIGNIFICANT CHANGE UP
APTT BLD: 26.9 SEC — LOW (ref 27.5–35.5)
AST SERPL-CCNC: 114 U/L — HIGH
BASOPHILS # BLD AUTO: 0.05 K/UL — SIGNIFICANT CHANGE UP (ref 0–0.2)
BASOPHILS NFR BLD AUTO: 0.8 % — SIGNIFICANT CHANGE UP (ref 0–2)
BILIRUB SERPL-MCNC: 0.5 MG/DL — SIGNIFICANT CHANGE UP (ref 0.4–2)
BUN SERPL-MCNC: 5.7 MG/DL — LOW (ref 8–20)
BURR CELLS BLD QL SMEAR: PRESENT — SIGNIFICANT CHANGE UP
CALCIUM SERPL-MCNC: 9.1 MG/DL — SIGNIFICANT CHANGE UP (ref 8.4–10.5)
CHLORIDE SERPL-SCNC: 86 MMOL/L — LOW (ref 98–107)
CO2 SERPL-SCNC: 22 MMOL/L — SIGNIFICANT CHANGE UP (ref 22–29)
CREAT SERPL-MCNC: 0.52 MG/DL — SIGNIFICANT CHANGE UP (ref 0.5–1.3)
EGFR: 111 ML/MIN/1.73M2 — SIGNIFICANT CHANGE UP
EOSINOPHIL # BLD AUTO: 0.16 K/UL — SIGNIFICANT CHANGE UP (ref 0–0.5)
EOSINOPHIL NFR BLD AUTO: 2.6 % — SIGNIFICANT CHANGE UP (ref 0–6)
ETHANOL SERPL-MCNC: 269 MG/DL — HIGH (ref 0–9)
GIANT PLATELETS BLD QL SMEAR: PRESENT — SIGNIFICANT CHANGE UP
GLUCOSE SERPL-MCNC: 87 MG/DL — SIGNIFICANT CHANGE UP (ref 70–99)
HCT VFR BLD CALC: 23.9 % — LOW (ref 34.5–45)
HGB BLD-MCNC: 8 G/DL — LOW (ref 11.5–15.5)
HYPOCHROMIA BLD QL: SIGNIFICANT CHANGE UP
INR BLD: 0.97 RATIO — SIGNIFICANT CHANGE UP (ref 0.88–1.16)
LYMPHOCYTES # BLD AUTO: 1.56 K/UL — SIGNIFICANT CHANGE UP (ref 1–3.3)
LYMPHOCYTES # BLD AUTO: 25 % — SIGNIFICANT CHANGE UP (ref 13–44)
MANUAL SMEAR VERIFICATION: SIGNIFICANT CHANGE UP
MCHC RBC-ENTMCNC: 24.1 PG — LOW (ref 27–34)
MCHC RBC-ENTMCNC: 33.5 GM/DL — SIGNIFICANT CHANGE UP (ref 32–36)
MCV RBC AUTO: 72 FL — LOW (ref 80–100)
MONOCYTES # BLD AUTO: 0.43 K/UL — SIGNIFICANT CHANGE UP (ref 0–0.9)
MONOCYTES NFR BLD AUTO: 6.9 % — SIGNIFICANT CHANGE UP (ref 2–14)
NEUTROPHILS # BLD AUTO: 3.71 K/UL — SIGNIFICANT CHANGE UP (ref 1.8–7.4)
NEUTROPHILS NFR BLD AUTO: 59.5 % — SIGNIFICANT CHANGE UP (ref 43–77)
OVALOCYTES BLD QL SMEAR: SLIGHT — SIGNIFICANT CHANGE UP
PLAT MORPH BLD: NORMAL — SIGNIFICANT CHANGE UP
PLATELET # BLD AUTO: 336 K/UL — SIGNIFICANT CHANGE UP (ref 150–400)
POIKILOCYTOSIS BLD QL AUTO: SLIGHT — SIGNIFICANT CHANGE UP
POLYCHROMASIA BLD QL SMEAR: SLIGHT — SIGNIFICANT CHANGE UP
POTASSIUM SERPL-MCNC: 4 MMOL/L — SIGNIFICANT CHANGE UP (ref 3.5–5.3)
POTASSIUM SERPL-SCNC: 4 MMOL/L — SIGNIFICANT CHANGE UP (ref 3.5–5.3)
PROT SERPL-MCNC: 7.2 G/DL — SIGNIFICANT CHANGE UP (ref 6.6–8.7)
PROTHROM AB SERPL-ACNC: 11.2 SEC — SIGNIFICANT CHANGE UP (ref 10.5–13.4)
RAPID RVP RESULT: SIGNIFICANT CHANGE UP
RBC # BLD: 3.32 M/UL — LOW (ref 3.8–5.2)
RBC # FLD: 21 % — HIGH (ref 10.3–14.5)
RBC BLD AUTO: ABNORMAL
SARS-COV-2 RNA SPEC QL NAA+PROBE: SIGNIFICANT CHANGE UP
SCHISTOCYTES BLD QL AUTO: SLIGHT — SIGNIFICANT CHANGE UP
SMUDGE CELLS # BLD: PRESENT — SIGNIFICANT CHANGE UP
SODIUM SERPL-SCNC: 125 MMOL/L — LOW (ref 135–145)
TARGETS BLD QL SMEAR: SLIGHT — SIGNIFICANT CHANGE UP
VARIANT LYMPHS # BLD: 5.2 % — SIGNIFICANT CHANGE UP (ref 0–6)
WBC # BLD: 6.24 K/UL — SIGNIFICANT CHANGE UP (ref 3.8–10.5)
WBC # FLD AUTO: 6.24 K/UL — SIGNIFICANT CHANGE UP (ref 3.8–10.5)

## 2022-08-14 PROCEDURE — 72125 CT NECK SPINE W/O DYE: CPT | Mod: 26,MA

## 2022-08-14 PROCEDURE — 99291 CRITICAL CARE FIRST HOUR: CPT | Mod: 25

## 2022-08-14 PROCEDURE — 93010 ELECTROCARDIOGRAM REPORT: CPT

## 2022-08-14 PROCEDURE — 70450 CT HEAD/BRAIN W/O DYE: CPT | Mod: 26,MA

## 2022-08-14 PROCEDURE — 12002 RPR S/N/AX/GEN/TRNK2.6-7.5CM: CPT

## 2022-08-14 RX ORDER — DIAZEPAM 5 MG
5 TABLET ORAL ONCE
Refills: 0 | Status: DISCONTINUED | OUTPATIENT
Start: 2022-08-14 | End: 2022-08-14

## 2022-08-14 RX ORDER — CEFAZOLIN SODIUM 1 G
1000 VIAL (EA) INJECTION ONCE
Refills: 0 | Status: COMPLETED | OUTPATIENT
Start: 2022-08-14 | End: 2022-08-14

## 2022-08-14 RX ORDER — MORPHINE SULFATE 50 MG/1
4 CAPSULE, EXTENDED RELEASE ORAL ONCE
Refills: 0 | Status: DISCONTINUED | OUTPATIENT
Start: 2022-08-14 | End: 2022-08-14

## 2022-08-14 RX ORDER — CEPHALEXIN 500 MG
1 CAPSULE ORAL
Qty: 28 | Refills: 0
Start: 2022-08-14 | End: 2022-08-20

## 2022-08-14 RX ADMIN — MORPHINE SULFATE 4 MILLIGRAM(S): 50 CAPSULE, EXTENDED RELEASE ORAL at 19:47

## 2022-08-14 RX ADMIN — Medication 100 MILLIGRAM(S): at 19:47

## 2022-08-14 RX ADMIN — Medication 5 MILLIGRAM(S): at 19:48

## 2022-08-14 NOTE — ED PROVIDER NOTE - OBJECTIVE STATEMENT
Patient is a 51 yo female with PMHx anxiety, EtOH abuse BIBEMS with actively bleeding head injury s/p fall. As per EMS patient had several drinks today after which she fell, struck her head, and suffered a laceration to her R parietal scalp; on arrival patient aaox3, with slurred speech, clinically intoxicated, no FND, vitals stable, 6 cm actively bleeding laceration irrigated, artery cauterized, closed with 10 staples. Priority CT called. Patient is a 51 yo female with PMHx anxiety, EtOH abuse BIBEMS with actively bleeding head injury s/p fall. As per EMS patient had several drinks today after which she fell, struck her head, and suffered a laceration to her R parietal scalp; on arrival patient aaox3, with slurred speech, clinically intoxicated, no FND, vitals stable, 6 cm actively bleeding laceration irrigated, artery cauterized, closed with 10 staples. Priority CT called. Patient currently anxious, CIWA 4. Denies fevers, chills, dizziness, lightheadedness, dysphagia, dysarthria, diplopia, photophobia, syncope, cough, congestion, SOB, CP, abdominal pain, neck pain, back pain, diarrhea, dysuria, hematuria, hematochezia, hematemesis, n/v, recent travel, sick contacts, leg swelling.

## 2022-08-14 NOTE — ED ADULT TRIAGE NOTE - CHIEF COMPLAINT QUOTE
C/o head injury s/p seizure. Pt states, "I had a seizure and hit my head on the ground". Denies loc or use of anticoagulants. +Bloody drainage. Hx of ETOH abuse. MD called to bedside.

## 2022-08-14 NOTE — ED PROVIDER NOTE - NSFOLLOWUPINSTRUCTIONS_ED_ALL_ED_FT
Sutures, Staples, or Adhesive Wound Closure      Wound closure refers to holding skin and underlying tissue together while it heals, such as after surgery or after an injury. Health care providers use stitches (sutures), staples, and special types of glue (skin adhesives) to close wounds. Your health care provider will use a wound closure method that helps you heal quickly and reduces the chances of infection or scarring. The type of wound closure depends on the location, size, and depth of your wound.    In most cases, wounds are closed as soon as possible (primary skin closure). Sometimes, closure is delayed so the wound can be cleaned and then can heal naturally over weeks or months (delayed wound closure). This reduces the chance of infection.      What are the different types of wound closure?    Adhesive glue     To use adhesive glue, your health care provider holds the edges of the wound together and paints the glue on the surface of your skin. You may need more than one layer of glue. Once the glue is dry, the wound may be covered with a dressing.    This type of skin closure may be used for small wounds that are not deep (superficial wounds). It is often used for children and on facial wounds. Adhesive glue is less painful than other methods of wound closure, and it does not require a medicine to numb the area (local anesthetic). This method also leaves nothing to be removed.    Adhesive glue cannot be used for wounds that are deep, uneven, or bleeding. It is not used inside of a wound.    Adhesive strips     These strips are made of paper that is sticky (adhesive) and has many small holes in it (is porous). They are applied across your wound edges like a regular bandage.    Adhesive strips may be used to close very shallow wounds. They may be used along with sutures to improve skin closure.    Sutures     Sutures come in many different materials, strengths, and sizes. They may break down as your wound heals (absorbable), or they may need to be removed (nonabsorbable).    Your health care provider sews your skin or the tissues under your skin together with sutures and a steel needle. Your skin edges may be closed in one long (continuous) stitch or in separate stitches. Then the sutures are tied and cut.    Sutures can be used for all kinds of wounds. Absorbable sutures may be used to close tissues under the skin. Sutures can cause a skin reaction that can lead to infection.    Staples     When staples are used to close a wound, the edges of your skin on both sides of the wound are brought close together. A staple is placed across the wound, and an instrument secures the staple edges together.    Staples are often used to close surgical incisions. They are faster to use than sutures, and they cause less skin reaction. Staples need to be removed using a tool that bends the staples away from your skin.      Follow these instructions at home:    Medicines     •Take over-the-counter and prescription medicines only as told by your health care provider.      •If you were prescribed an antibiotic medicine, take it as told by your health care provider. Do not stop taking the antibiotic even if you start to feel better.        Wound care      •Follow instructions from your health care provider about how to take care of your wound and dressing.      •Wash your hands with soap and water before and after touching your wound or dressing. If soap and water are not available, use hand .    • Do not try to remove your wound closures unless your health care provider tells you to do that. You may need a follow-up visit with your health care provider to remove your closures.  •Wound closures may stay in place for 2 weeks or longer.      •Absorbable sutures may dissolve after a few days or weeks.      •If adhesive strip edges start to loosen and curl up, you may trim the loose edges.        • Do not pick at your wound. Picking can cause an infection.      •Apply ointments or creams only as told by your health care provider.    •Check your wound every day for signs of infection. Check for:  •Redness, swelling, or pain.      •Fluid or blood.      •Warmth.      •Pus or a bad smell.        General instructions     • Do not take baths, swim, or use a hot tub until your health care provider approves. Ask your health care provider if you may take showers. You may only be allowed to take sponge baths.      • Do not soak your wound in water.      •Keep all follow-up visits as told by your health care provider. This is important.        Contact a health care provider if you:    •Have a fever or chills.      •Have redness, swelling, or pain around your wound.      •Have fluid or blood coming from your wound.      •Notice that your wound feels warm to the touch.      •Notice pus or a bad smell coming from your wound.      •Notice that the edges of your wound start to separate after your sutures come out.      •Notice that your wound becomes thick, raised, and darker in color after your sutures come out (scarring).        Summary    •The type of wound closure that your health care provider will use depends on the location, size, and depth of your wound. Options to close wounds include stitches (sutures), staples, special types of glue (skin adhesives), and adhesive strips.      •Your health care provider will use a wound closure method that helps you heal quickly and reduces the chances of infection or scarring.      • Do not soak your wound in water. Do not take baths, shower, swim, or use a hot tub until your health care provider approves.      This information is not intended to replace advice given to you by your health care provider. Make sure you discuss any questions you have with your health care provider. Return to the ER in 10 days for staple removal  Keep the wound clean and dry for at least 24-48 hours.   After that, you may gently cleanse with regular soap and water but do not vigorously scrub or remove the staples.   Return to the ER for any redness, warmth, swelling, pain, discharge or any other new symptoms.        Sutures, Staples, or Adhesive Wound Closure      Wound closure refers to holding skin and underlying tissue together while it heals, such as after surgery or after an injury. Health care providers use stitches (sutures), staples, and special types of glue (skin adhesives) to close wounds. Your health care provider will use a wound closure method that helps you heal quickly and reduces the chances of infection or scarring. The type of wound closure depends on the location, size, and depth of your wound.    In most cases, wounds are closed as soon as possible (primary skin closure). Sometimes, closure is delayed so the wound can be cleaned and then can heal naturally over weeks or months (delayed wound closure). This reduces the chance of infection.      What are the different types of wound closure?    Adhesive glue     To use adhesive glue, your health care provider holds the edges of the wound together and paints the glue on the surface of your skin. You may need more than one layer of glue. Once the glue is dry, the wound may be covered with a dressing.    This type of skin closure may be used for small wounds that are not deep (superficial wounds). It is often used for children and on facial wounds. Adhesive glue is less painful than other methods of wound closure, and it does not require a medicine to numb the area (local anesthetic). This method also leaves nothing to be removed.    Adhesive glue cannot be used for wounds that are deep, uneven, or bleeding. It is not used inside of a wound.    Adhesive strips     These strips are made of paper that is sticky (adhesive) and has many small holes in it (is porous). They are applied across your wound edges like a regular bandage.    Adhesive strips may be used to close very shallow wounds. They may be used along with sutures to improve skin closure.    Sutures     Sutures come in many different materials, strengths, and sizes. They may break down as your wound heals (absorbable), or they may need to be removed (nonabsorbable).    Your health care provider sews your skin or the tissues under your skin together with sutures and a steel needle. Your skin edges may be closed in one long (continuous) stitch or in separate stitches. Then the sutures are tied and cut.    Sutures can be used for all kinds of wounds. Absorbable sutures may be used to close tissues under the skin. Sutures can cause a skin reaction that can lead to infection.    Staples     When staples are used to close a wound, the edges of your skin on both sides of the wound are brought close together. A staple is placed across the wound, and an instrument secures the staple edges together.    Staples are often used to close surgical incisions. They are faster to use than sutures, and they cause less skin reaction. Staples need to be removed using a tool that bends the staples away from your skin.      Follow these instructions at home:    Medicines     •Take over-the-counter and prescription medicines only as told by your health care provider.      •If you were prescribed an antibiotic medicine, take it as told by your health care provider. Do not stop taking the antibiotic even if you start to feel better.        Wound care      •Follow instructions from your health care provider about how to take care of your wound and dressing.      •Wash your hands with soap and water before and after touching your wound or dressing. If soap and water are not available, use hand .    • Do not try to remove your wound closures unless your health care provider tells you to do that. You may need a follow-up visit with your health care provider to remove your closures.  •Wound closures may stay in place for 2 weeks or longer.      •Absorbable sutures may dissolve after a few days or weeks.      •If adhesive strip edges start to loosen and curl up, you may trim the loose edges.        • Do not pick at your wound. Picking can cause an infection.      •Apply ointments or creams only as told by your health care provider.    •Check your wound every day for signs of infection. Check for:  •Redness, swelling, or pain.      •Fluid or blood.      •Warmth.      •Pus or a bad smell.        General instructions     • Do not take baths, swim, or use a hot tub until your health care provider approves. Ask your health care provider if you may take showers. You may only be allowed to take sponge baths.      • Do not soak your wound in water.      •Keep all follow-up visits as told by your health care provider. This is important.        Contact a health care provider if you:    •Have a fever or chills.      •Have redness, swelling, or pain around your wound.      •Have fluid or blood coming from your wound.      •Notice that your wound feels warm to the touch.      •Notice pus or a bad smell coming from your wound.      •Notice that the edges of your wound start to separate after your sutures come out.      •Notice that your wound becomes thick, raised, and darker in color after your sutures come out (scarring).        Summary    •The type of wound closure that your health care provider will use depends on the location, size, and depth of your wound. Options to close wounds include stitches (sutures), staples, special types of glue (skin adhesives), and adhesive strips.      •Your health care provider will use a wound closure method that helps you heal quickly and reduces the chances of infection or scarring.      • Do not soak your wound in water. Do not take baths, shower, swim, or use a hot tub until your health care provider approves.      This information is not intended to replace advice given to you by your health care provider. Make sure you discuss any questions you have with your health care provider.

## 2022-08-14 NOTE — ED PROVIDER NOTE - CLINICAL SUMMARY MEDICAL DECISION MAKING FREE TEXT BOX
Patient is a 53 yo female with PMHx anxiety, EtOH abuse BIBEMS with actively bleeding head injury s/p fall. As per EMS patient had several drinks today after which she fell, struck her head, and suffered a laceration to her R parietal scalp; on arrival patient aaox3, with slurred speech, clinically intoxicated, no FND, vitals stable, 6 cm actively bleeding laceration irrigated, artery cauterized, closed with 10 staples. Priority CT called. Patient currently anxious, CIWA 4. Will treat pain and anxiety, CT to r/o bleed, abx given for open wound; patient up to date on tetanus shot. will check labs and continue to monitor.

## 2022-08-14 NOTE — ED PROVIDER NOTE - PHYSICAL EXAMINATION
Constitutional: Anxious appearing, awake, alert, oriented to person, place, and time/situation and in no apparent distress  ENMT: Airway patent nasal mucosa clear. Mouth with normal mucosa. Throat has no vesicles no oropharyngeal exudates and uvula is midline. No blood in the oropharynx  EYES: clear bilaterally, pupils equal, round and reactive to light  Cardiac: Regular rate, regular rhythm. Heart sounds S1, S2. No murmurs, rubs or gallops. Good capillary refill, 2+ pulses, no peripheral edema  Respiratory: Lungs CTAB, no use of accessory muscles, no crackles, satting 99% on RA in no distress  Gastrointestinal: Abdomen nondistended, non-tender, no rebound guarding or peritoneal signs  Genitourinary: No CVA tenderness, pelvis nontender, bladder nondistended  Musculoskeletal: Spine appears normal, range of motion is not limited, no muscle or joint tenderness  Neurological: Alert and oriented, no focal deficits, no motor or sensory deficits. CN 2-12 intact, PERRLA, EOMI, No FND, moving all extremities equally, sensation intact, No dysmetria, no ataxia, negative heel-shin, 5/5 strength   Skin: 6 cm actively bleeding laceration to R parietal scalp

## 2022-08-14 NOTE — ED ADULT TRIAGE NOTE - GLASGOW COMA SCALE: BEST MOTOR RESPONSE, MLM
NICU DAILY PROGRESS NOTE   Patient: Yang Mccallum         Medical Record Number: [id number]                          YOB: 2023, Gestational Age @ birth: 38 0/7   Admit Date: 2022  Birthweight:  8 lb 2.5 oz (3700 g)  Admit Weight: Weight: 3700 g (Filed from Delivery Summary) (06/15/22 0855)                                                                                                                  Today's Date: 2022  Current Day Of Life: 3 days    CGA: 38w 4d                    Problem List:  Patient Active Problem List   Diagnosis   • [place] infant of 38 completed weeks of gestation   • Single delivery by    • Respiratory distress of    • Observation and evaluation of  for suspected infectious condition       Current antibiotic status: none    Interval Events: admitted on CPAP for resp distress, weaned to RA this AM.  PO feeds & breastfeeding are well tolerated.  Sepsis eval with CBC/diff reassuring against infection, antibiotics deferred.    A/B/D Events: none     PHYSICAL EXAM     Today's Weight Birthweight   3548 g (22 0500)  Weight change:  Weight: 3700 g (Filed from Delivery Summary) (06/15/22 0855)     Vitals 24 Hour Range   Temperature   Temp  Min: 98.5 °F (36.9 °C)  Max: 99.2 °F (37.3 °C)   Pulse   Pulse  Min: 124  Max: 146   Respiratory   Resp  Min: 42  Max: 61   Blood Pressure   BP  Min: 59/42  Max: 76/45   Pulse Oximetry    SpO2  Min: 92 %  Max: 100 %     General: AGA, alert and responsive with exam, no acute distress, pink and well perfused.   HEENT: Anterior fontanelle open/flat/soft, sutures approximated, neck is supple.  Chest/Lungs:  Mild subcostal retractions with intermittant tachypnea, clear to auscultation and equal bilaterally.    Heart:  Regular rate and rhythm, S1+S2 noted, no murmurs noted.  Brisk capillary refill.    Abdomen:  Soft, non-distended, no hepatosplenomegaly, no masses, no peritoneal signs, bowel sounds  noted.  Genitourinary: normal male, anus patent   Musculoskeletal/Neurologic: Moves all extremities equally, appropriate for gestational age tone.    Skin: pink, no jaundice, no rashes noted.     INTAKE/OUTPUT     INTAKE:   Date 06/17/22 0700 - 06/18/22 0659 06/18/22 0700 - 06/19/22 0659   Shift 7010-8213 8255-2181 9726-4945 24 Hour Total 5028-3184 0510-0205 4281-3262 24 Hour Total   INTAKE   P.O. 71 57 80 208 30   30   I.V. 44.3 8.9  53.2       NG/GT 15  10 25       Shift Total 130.3 65.9 90 286.2 30   30   OUTPUT   Urine 90 1  91       Shift Total 90 1  91       Weight (kg) 3.5 3.5 3.5 3.5 3.5 3.5 3.5 3.5     Enteral: 20mL/kg/day  Total Intake (IV + PO): 77 mL/kg/day +x1 BF  Current Written Fluid Goal: 90mL/kg/day     OUTPUT:  Urine Output: x7  Stools:  x1     Medications     Medications: none     LABS/IMAGING   Labs:  No results found for this or any previous visit (from the past 24 hour(s)).    Imaging:  XR CHEST AP OR PA    Result Date: 2022  Chest, one view CLINICAL HISTORY: Respiratory distress COMPARISON: Portable chest radiograph yesterday at 9:59 AM FINDINGS: Portable supine frontal chest radiograph shows improving aeration in both lungs with a mild interstitial prominence and slight right basilar volume loss. The pleural spaces remain clear. The heart, pulmonary vessels and cardiothymic silhouette are normal. The orogastric tube is in good position ending in the stomach.     IMPRESSION: Improving lung aeration.       ASSESSMENT AND PLAN    Assessment:  Term baby with TTN, improved resp status, euglycemic.  Above birth weight with adequate but low urine output.  Now has anemia    Plan:  1) Respiratory: admitted on CPAP +5 FiO2 0.25, in room air 6/18.  Initial CXR c/w TTN, CXR improved.  Initial gas:  7.25/56, repeat 7.34/44.  2 desaturation episodes in previous 24 hours.   Plan:  Monitor work of breathing and for desaturations.     2) Cardiovascular: hemodynamically stable  Plan:  Will monitor    3)  Fluids, Electrolytes, Nutrition/Gastrointestinal: initially NPO receiving D10W via PIV and has been euglycemic.  BMP normal today with total Ca 7.6, last iCa 1.24. Above birth weight with borderline urine output.  Tolerating PO/NG feeds in addition to breastfeeding.  Plan:  continue feedings PO/NG & breastfeeding as tolerated.  BM or DBM. Follow weight and output.    4) Heme: mom is O+, baby is A+ with neg Coomb's.   bili 5.9(low risk zone).  Initial Hct 41, repeat  is 31.4 which may be related to fluid overload or hemolysis.  Both platelet counts are clumped.  HCT 36.1, PLT 164K  Plan:  Continue to monitor.    5) ID: mom is GBS neg with AROM at delivery.  Screening CBC x2 reassuring.  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

## 2022-08-14 NOTE — ED PROVIDER NOTE - PATIENT PORTAL LINK FT
You can access the FollowMyHealth Patient Portal offered by [facility] by registering at the following website: [email or web address]. By joining [employer]’s FollowMyHealth portal, you will also be able to view your health information using other applications (apps) compatible with our system.

## 2022-08-14 NOTE — ED ADULT NURSE NOTE - OBJECTIVE STATEMENT
Assumed care of patient in critical care. Pt a&ox4 rr even and unlabored with pmhx of anxiety and etoh abuse. Pt presents to ed brought by ems for actively bleeding head injury s/p fall. As per ems pt had severe drinks prior to fall. Noted laceration to R side of of head upon arrival. Pt denies chest pain, sob, fevers, chills, gu/gi symptoms. pt educated on plan of care, pt able to successfully teach back plan of care to RN, RN will continue to reeducate pt during hospital stay.

## 2022-08-14 NOTE — ED ADULT NURSE REASSESSMENT NOTE - NEURO GAIT
Writer called daughter Regi to inform her of Rk DARDEN recommendations.    Rk Arango PA  You; Alecia Briggs RN 38 minutes ago (1:38 PM)     AW    Since we don't have any openings please have her go to PCP or urgent care for work-up for URI symptoms.     Thank you!   Rk    Message flaco Deluna understands the plan and is in agreement. She plans to coordinate with other family members and seek out an urgent care visit with pt.   etoh intoxicated/unsteady

## 2022-08-14 NOTE — ED PROVIDER NOTE - ATTENDING CONTRIBUTION TO CARE
CLosed head trauma with large scalp hematoma complicated by arterial bleeding from the scalp, bleeding controlled with cautery and direct pressure and wound closed with staples. Tetanus is up to date. Will dc with abx prophylaxis. 10 day return for staple removal.

## 2022-08-14 NOTE — ED PROVIDER NOTE - PROGRESS NOTE DETAILS
JK - patient labs and CT wnl, BAL elevated to 200s, laceration repaired with staples. Will send abx to pharmacy and [facility]. Currently stable, resting comfortably.

## 2022-08-15 VITALS
OXYGEN SATURATION: 99 % | HEART RATE: 76 BPM | SYSTOLIC BLOOD PRESSURE: 134 MMHG | DIASTOLIC BLOOD PRESSURE: 89 MMHG | RESPIRATION RATE: 18 BRPM

## 2022-08-15 PROCEDURE — 80307 DRUG TEST PRSMV CHEM ANLYZR: CPT

## 2022-08-15 PROCEDURE — 99291 CRITICAL CARE FIRST HOUR: CPT | Mod: 25

## 2022-08-15 PROCEDURE — 0225U NFCT DS DNA&RNA 21 SARSCOV2: CPT

## 2022-08-15 PROCEDURE — 72125 CT NECK SPINE W/O DYE: CPT | Mod: MA

## 2022-08-15 PROCEDURE — 82962 GLUCOSE BLOOD TEST: CPT

## 2022-08-15 PROCEDURE — 36415 COLL VENOUS BLD VENIPUNCTURE: CPT

## 2022-08-15 PROCEDURE — 93005 ELECTROCARDIOGRAM TRACING: CPT

## 2022-08-15 PROCEDURE — 80053 COMPREHEN METABOLIC PANEL: CPT

## 2022-08-15 PROCEDURE — 94640 AIRWAY INHALATION TREATMENT: CPT

## 2022-08-15 PROCEDURE — 99285 EMERGENCY DEPT VISIT HI MDM: CPT | Mod: 25

## 2022-08-15 PROCEDURE — 70450 CT HEAD/BRAIN W/O DYE: CPT | Mod: MA

## 2022-08-15 PROCEDURE — 85610 PROTHROMBIN TIME: CPT

## 2022-08-15 PROCEDURE — 96375 TX/PRO/DX INJ NEW DRUG ADDON: CPT | Mod: XU

## 2022-08-15 PROCEDURE — 12002 RPR S/N/AX/GEN/TRNK2.6-7.5CM: CPT

## 2022-08-15 PROCEDURE — 85730 THROMBOPLASTIN TIME PARTIAL: CPT

## 2022-08-15 PROCEDURE — 96374 THER/PROPH/DIAG INJ IV PUSH: CPT | Mod: XU

## 2022-08-15 PROCEDURE — 85025 COMPLETE CBC W/AUTO DIFF WBC: CPT

## 2022-08-15 RX ORDER — ALBUTEROL 90 UG/1
1 AEROSOL, METERED ORAL ONCE
Refills: 0 | Status: COMPLETED | OUTPATIENT
Start: 2022-08-15 | End: 2022-08-15

## 2022-08-15 RX ADMIN — ALBUTEROL 1 PUFF(S): 90 AEROSOL, METERED ORAL at 01:16

## 2022-08-15 NOTE — CHART NOTE - NSCHARTNOTEFT_GEN_A_CORE
SW Note: Worker received call from ED  that pt has been waiting in the ER-WR for a ride back to her residence for the last few hours since being d/c. Worker met with pt in WR to confirm return address and if she had a key for entering her residence. Pts reports her residence is [facility] in [place], living with her  who she reported will let her in. Pt reports having medicaid and using benefits fairly often. Pts medicaid information was omitted from her most recent chart, and according to an older admission, her benefits had . Worker contacted Keshav Dozier) who confirmed that this information was correct. Worker contacted [employer] p[employer] and got a quote for a trip back to her residence, totalling out to 14 dollars. Manager on call (Saurav) approved a taxi voucher for the pt. Worker met with pt and provided her with taxi voucher form and instructions for the taxi when it arrives. Worker also provided information for DSS, as pt reports wanting to recertify her SNAP benefits. No other SW needs at this time.

## 2022-09-01 ENCOUNTER — EMERGENCY (EMERGENCY)
Facility: HOSPITAL | Age: 52
LOS: 1 days | Discharge: HOME | End: 2022-09-01
Attending: EMERGENCY MEDICINE
Payer: MEDICARE

## 2022-09-01 VITALS
OXYGEN SATURATION: 100 % | RESPIRATION RATE: 17 BRPM | DIASTOLIC BLOOD PRESSURE: 96 MMHG | SYSTOLIC BLOOD PRESSURE: 137 MMHG | HEART RATE: 81 BPM | TEMPERATURE: 97 F

## 2022-09-01 VITALS
WEIGHT: 132.06 LBS | HEIGHT: 66 IN | TEMPERATURE: 98 F | DIASTOLIC BLOOD PRESSURE: 78 MMHG | SYSTOLIC BLOOD PRESSURE: 115 MMHG | OXYGEN SATURATION: 98 % | HEART RATE: 82 BPM | RESPIRATION RATE: 20 BRPM

## 2022-09-01 DIAGNOSIS — Z93.1 GASTROSTOMY STATUS: Chronic | ICD-10-CM

## 2022-09-01 DIAGNOSIS — Z98.890 OTHER SPECIFIED POSTPROCEDURAL STATES: Chronic | ICD-10-CM

## 2022-09-01 PROCEDURE — 99282 EMERGENCY DEPT VISIT SF MDM: CPT

## 2022-09-01 PROCEDURE — 99284 EMERGENCY DEPT VISIT MOD MDM: CPT

## 2022-09-01 NOTE — ED ADULT TRIAGE NOTE - CHIEF COMPLAINT QUOTE
Pt brought in by  c/o seizure episode today - pt  missed two doses of seizure meds " she did not wanted to take it "

## 2022-09-01 NOTE — ED PROVIDER NOTE - OBJECTIVE STATEMENT
51 y/o female with PMHx of etoh abuse, anxiety, seizures, presents to the ED after she states she had a seizure today after she missed her medications.    denies fever. denies HA or neck pain. no chest pain or sob. no abd pain. no n/v/d. no urinary f/u/d. no back pain. no motor or sensory deficits. denies illicit drug use. no recent travel. no rash. no other acute issues symptoms or concerns

## 2022-09-01 NOTE — ED PROVIDER NOTE - ENMT NEGATIVE STATEMENT, MLM
Sm[employer]   
Ears: no ear pain and no hearing problems. Nose: no nasal congestion and no nasal drainage. Mouth/Throat: no dysphagia, no hoarseness and no throat pain. Neck: no lumps, no pain, no stiffness and no swollen glands.

## 2022-09-01 NOTE — ED PROVIDER NOTE - CLINICAL SUMMARY MEDICAL DECISION MAKING FREE TEXT BOX
pt a&ox3, no acute distress, advised to take her meds to prevent seizures from reoccurring, return precautions given to pt and pt  and understanding verbalized

## 2022-09-01 NOTE — ED PROVIDER NOTE - PATIENT PORTAL LINK FT
You can access the FollowMyHealth Patient Portal offered by [facility] by registering at the following website: [email or web address]. By joining [employer]’s FollowMyHealth portal, you will also be able to view your health information using other applications (apps) compatible with our system.

## 2022-09-06 ENCOUNTER — INPATIENT (INPATIENT)
Facility: HOSPITAL | Age: 52
LOS: 20 days | Discharge: HOME | DRG: 896 | End: 2022-09-27
Attending: STUDENT IN AN ORGANIZED HEALTH CARE EDUCATION/TRAINING PROGRAM | Admitting: HOSPITALIST
Payer: MEDICARE

## 2022-09-06 VITALS
TEMPERATURE: 98 F | HEIGHT: 66 IN | DIASTOLIC BLOOD PRESSURE: 90 MMHG | RESPIRATION RATE: 20 BRPM | SYSTOLIC BLOOD PRESSURE: 122 MMHG | HEART RATE: 73 BPM | WEIGHT: 130.07 LBS | OXYGEN SATURATION: 98 %

## 2022-09-06 DIAGNOSIS — Z98.890 OTHER SPECIFIED POSTPROCEDURAL STATES: Chronic | ICD-10-CM

## 2022-09-06 DIAGNOSIS — Z93.1 GASTROSTOMY STATUS: Chronic | ICD-10-CM

## 2022-09-06 DIAGNOSIS — F10.10 ALCOHOL ABUSE, UNCOMPLICATED: ICD-10-CM

## 2022-09-06 LAB
ALBUMIN SERPL ELPH-MCNC: 4.1 G/DL — SIGNIFICANT CHANGE UP (ref 3.3–5.2)
ALP SERPL-CCNC: 75 U/L — SIGNIFICANT CHANGE UP (ref 40–120)
ALT FLD-CCNC: 37 U/L — HIGH
ANION GAP SERPL CALC-SCNC: 19 MMOL/L — HIGH (ref 5–17)
ANISOCYTOSIS BLD QL: SLIGHT — SIGNIFICANT CHANGE UP
AST SERPL-CCNC: 50 U/L — HIGH
BASOPHILS # BLD AUTO: 0.04 K/UL — SIGNIFICANT CHANGE UP (ref 0–0.2)
BASOPHILS # BLD AUTO: 0.08 K/UL — SIGNIFICANT CHANGE UP (ref 0–0.2)
BASOPHILS NFR BLD AUTO: 0.7 % — SIGNIFICANT CHANGE UP (ref 0–2)
BASOPHILS NFR BLD AUTO: 1.7 % — SIGNIFICANT CHANGE UP (ref 0–2)
BILIRUB SERPL-MCNC: 0.3 MG/DL — LOW (ref 0.4–2)
BUN SERPL-MCNC: 5.6 MG/DL — LOW (ref 8–20)
CALCIUM SERPL-MCNC: 9.3 MG/DL — SIGNIFICANT CHANGE UP (ref 8.4–10.5)
CHLORIDE SERPL-SCNC: 85 MMOL/L — LOW (ref 98–107)
CO2 SERPL-SCNC: 19 MMOL/L — LOW (ref 22–29)
CREAT SERPL-MCNC: 0.49 MG/DL — LOW (ref 0.5–1.3)
EGFR: 113 ML/MIN/1.73M2 — SIGNIFICANT CHANGE UP
EOSINOPHIL # BLD AUTO: 0 K/UL — SIGNIFICANT CHANGE UP (ref 0–0.5)
EOSINOPHIL # BLD AUTO: 0.25 K/UL — SIGNIFICANT CHANGE UP (ref 0–0.5)
EOSINOPHIL NFR BLD AUTO: 0 % — SIGNIFICANT CHANGE UP (ref 0–6)
EOSINOPHIL NFR BLD AUTO: 5.2 % — SIGNIFICANT CHANGE UP (ref 0–6)
ETHANOL SERPL-MCNC: 226 MG/DL — HIGH (ref 0–9)
GIANT PLATELETS BLD QL SMEAR: PRESENT — SIGNIFICANT CHANGE UP
GLUCOSE SERPL-MCNC: 77 MG/DL — SIGNIFICANT CHANGE UP (ref 70–99)
HCT VFR BLD CALC: 19.8 % — CRITICAL LOW (ref 34.5–45)
HCT VFR BLD CALC: 20.6 % — CRITICAL LOW (ref 34.5–45)
HGB BLD-MCNC: 6.3 G/DL — CRITICAL LOW (ref 11.5–15.5)
HGB BLD-MCNC: 6.5 G/DL — CRITICAL LOW (ref 11.5–15.5)
HYPOCHROMIA BLD QL: SLIGHT — SIGNIFICANT CHANGE UP
IMM GRANULOCYTES NFR BLD AUTO: 0.4 % — SIGNIFICANT CHANGE UP (ref 0–1.5)
IRON SATN MFR SERPL: 11 UG/DL — LOW (ref 37–145)
IRON SATN MFR SERPL: 3 % — LOW (ref 14–50)
LIDOCAIN IGE QN: 45 U/L — SIGNIFICANT CHANGE UP (ref 22–51)
LYMPHOCYTES # BLD AUTO: 1.29 K/UL — SIGNIFICANT CHANGE UP (ref 1–3.3)
LYMPHOCYTES # BLD AUTO: 1.41 K/UL — SIGNIFICANT CHANGE UP (ref 1–3.3)
LYMPHOCYTES # BLD AUTO: 23.4 % — SIGNIFICANT CHANGE UP (ref 13–44)
LYMPHOCYTES # BLD AUTO: 29.6 % — SIGNIFICANT CHANGE UP (ref 13–44)
MACROCYTES BLD QL: SLIGHT — SIGNIFICANT CHANGE UP
MANUAL SMEAR VERIFICATION: SIGNIFICANT CHANGE UP
MCHC RBC-ENTMCNC: 22.7 PG — LOW (ref 27–34)
MCHC RBC-ENTMCNC: 23.3 PG — LOW (ref 27–34)
MCHC RBC-ENTMCNC: 31.6 GM/DL — LOW (ref 32–36)
MCHC RBC-ENTMCNC: 31.8 GM/DL — LOW (ref 32–36)
MCV RBC AUTO: 72 FL — LOW (ref 80–100)
MCV RBC AUTO: 73.3 FL — LOW (ref 80–100)
MICROCYTES BLD QL: SLIGHT — SIGNIFICANT CHANGE UP
MONOCYTES # BLD AUTO: 0.25 K/UL — SIGNIFICANT CHANGE UP (ref 0–0.9)
MONOCYTES # BLD AUTO: 0.72 K/UL — SIGNIFICANT CHANGE UP (ref 0–0.9)
MONOCYTES NFR BLD AUTO: 13.1 % — SIGNIFICANT CHANGE UP (ref 2–14)
MONOCYTES NFR BLD AUTO: 5.2 % — SIGNIFICANT CHANGE UP (ref 2–14)
NEUTROPHILS # BLD AUTO: 2.78 K/UL — SIGNIFICANT CHANGE UP (ref 1.8–7.4)
NEUTROPHILS # BLD AUTO: 3.44 K/UL — SIGNIFICANT CHANGE UP (ref 1.8–7.4)
NEUTROPHILS NFR BLD AUTO: 58.3 % — SIGNIFICANT CHANGE UP (ref 43–77)
NEUTROPHILS NFR BLD AUTO: 62.4 % — SIGNIFICANT CHANGE UP (ref 43–77)
OB PNL STL: NEGATIVE — SIGNIFICANT CHANGE UP
OVALOCYTES BLD QL SMEAR: SLIGHT — SIGNIFICANT CHANGE UP
PLAT MORPH BLD: NORMAL — SIGNIFICANT CHANGE UP
PLATELET # BLD AUTO: 334 K/UL — SIGNIFICANT CHANGE UP (ref 150–400)
PLATELET # BLD AUTO: 339 K/UL — SIGNIFICANT CHANGE UP (ref 150–400)
POIKILOCYTOSIS BLD QL AUTO: SLIGHT — SIGNIFICANT CHANGE UP
POLYCHROMASIA BLD QL SMEAR: SLIGHT — SIGNIFICANT CHANGE UP
POTASSIUM SERPL-MCNC: 4.1 MMOL/L — SIGNIFICANT CHANGE UP (ref 3.5–5.3)
POTASSIUM SERPL-SCNC: 4.1 MMOL/L — SIGNIFICANT CHANGE UP (ref 3.5–5.3)
PROT SERPL-MCNC: 7.2 G/DL — SIGNIFICANT CHANGE UP (ref 6.6–8.7)
RAPID RVP RESULT: SIGNIFICANT CHANGE UP
RBC # BLD: 2.7 M/UL — LOW (ref 3.8–5.2)
RBC # BLD: 2.86 M/UL — LOW (ref 3.8–5.2)
RBC # FLD: 19.7 % — HIGH (ref 10.3–14.5)
RBC # FLD: 20 % — HIGH (ref 10.3–14.5)
RBC BLD AUTO: ABNORMAL
SARS-COV-2 RNA SPEC QL NAA+PROBE: SIGNIFICANT CHANGE UP
SCHISTOCYTES BLD QL AUTO: SLIGHT — SIGNIFICANT CHANGE UP
SODIUM SERPL-SCNC: 122 MMOL/L — LOW (ref 135–145)
TARGETS BLD QL SMEAR: SLIGHT — SIGNIFICANT CHANGE UP
TIBC SERPL-MCNC: 395 UG/DL — SIGNIFICANT CHANGE UP (ref 220–430)
TRANSFERRIN SERPL-MCNC: 276 MG/DL — SIGNIFICANT CHANGE UP (ref 192–382)
VALPROATE SERPL-MCNC: 64.4 UG/ML — SIGNIFICANT CHANGE UP (ref 50–100)
WBC # BLD: 4.77 K/UL — SIGNIFICANT CHANGE UP (ref 3.8–10.5)
WBC # BLD: 5.51 K/UL — SIGNIFICANT CHANGE UP (ref 3.8–10.5)
WBC # FLD AUTO: 4.77 K/UL — SIGNIFICANT CHANGE UP (ref 3.8–10.5)
WBC # FLD AUTO: 5.51 K/UL — SIGNIFICANT CHANGE UP (ref 3.8–10.5)

## 2022-09-06 PROCEDURE — 99285 EMERGENCY DEPT VISIT HI MDM: CPT

## 2022-09-06 PROCEDURE — 93010 ELECTROCARDIOGRAM REPORT: CPT

## 2022-09-06 PROCEDURE — 70450 CT HEAD/BRAIN W/O DYE: CPT | Mod: 26,MG

## 2022-09-06 PROCEDURE — G1004: CPT

## 2022-09-06 PROCEDURE — 72125 CT NECK SPINE W/O DYE: CPT | Mod: 26,MG

## 2022-09-06 RX ORDER — FOLIC ACID 0.8 MG
1 TABLET ORAL ONCE
Refills: 0 | Status: COMPLETED | OUTPATIENT
Start: 2022-09-06 | End: 2022-09-06

## 2022-09-06 RX ORDER — THIAMINE MONONITRATE (VIT B1) 100 MG
100 TABLET ORAL ONCE
Refills: 0 | Status: COMPLETED | OUTPATIENT
Start: 2022-09-06 | End: 2022-09-06

## 2022-09-06 RX ADMIN — Medication 1 TABLET(S): at 20:26

## 2022-09-06 RX ADMIN — Medication 1 MILLIGRAM(S): at 20:26

## 2022-09-06 RX ADMIN — Medication 100 MILLIGRAM(S): at 20:26

## 2022-09-06 NOTE — ED ADULT TRIAGE NOTE - CHIEF COMPLAINT QUOTE
Pt brought in by  who states " I need her to be admitted to the hospital for alcoholism " Last drink PTA . Pt appears intoxicated. Py fell outside on the face in the kitchen yesterday . bruising noted in different stage of healing

## 2022-09-06 NOTE — ED PROVIDER NOTE - OBJECTIVE STATEMENT
Hx of alcoholism and seizure disorder presenting with her  for evaluation of frequent falls in setting of chronic inebriation, has hit her head multiple times this week, also had a seizure this past week and has not been consistently taking her depakote twice a day. Her  notes he does not feel safe with her at home as he is not always at home with her and often finds her passed out on the floor with new bruising and he is worried she may die if she has a bad unwitnessed fall, he notes he would like her to be uptitrated on her seizure medications here as she will likely start to withdrawal from alcohol while trying to restart her seizure meds.    She is supposed to be on depakote 500mg BID

## 2022-09-06 NOTE — ED PROVIDER NOTE - CLINICAL SUMMARY MEDICAL DECISION MAKING FREE TEXT BOX
52F presenting for evaluation of frequent falls, appears intoxicated here,  notes that she has hit her head multiple times this week and that he is worried she has been having seizures secondary to noncompliance with her seizure medicines. Pt does have multiple bruises of different appearing ages on her face / scalp and appears intoxicated currently. Will check labs + CTH, follow up studies, reassess, dispo probable admission given per  patient is not safe to return home.

## 2022-09-06 NOTE — ED ADULT NURSE NOTE - CAS TRG GENERAL AIRWAY, MLM
[address] [place] [address]  OPERATIVE REPORT    Name:  Nigel More  MR#:  [id number]  :  1965  Account #:  [de-identified]  Date of Adm:  2017  Date of Surgery:  2017      PREOPERATIVE DIAGNOSES:  1. Dysfunctional uterine bleeding. 2. Menorrhagia. 3. Fibroid uterus. POSTOPERATIVE DIAGNOSES:  1. Dysfunctional uterine bleeding. 2. Menorrhagia. 3. Fibroid uterus. 4. Submucosal fibroid uterus, 14 week equivalent size. PROCEDURES PERFORMED:  1. Exam under anesthesia. 2. Fractional dilatation and curettage of the uterus. ESTIMATED BLOOD LOSS:  100 mL. SPECIMENS REMOVED: fx d and c    ANESTHESIA:  gen    COMPLICATIONS:  None. TRANSFUSIONS:  None. ANESTHESIA:  General LMA. DIAGNOSTIC STUDIES:  Serum pregnancy test was negative. PROPHYLAXIS ON BOARD  1. Ancef. 2. Flagyl. INDICATION FOR THE PROCEDURE:  This patient is a 54-year-old,   2, para 2 by  section,  female. She is in  with a history of irregular bleeding. Pap smears have all been class 1  in the last two years. The patient notably does have what appears to  be possible fibroids within the uterus and has dysfunctional bleeding. The patient's repeat CBC just preoperatively was 24.3 and 7.3, with a  white count of 4000, and platelet count was 335,343. Blood type is O  positive. Pregnancy test was negative. DESCRIPTION OF PROCEDURE:  The patient was prepared under  general LMA anesthesia in the dorsal lithotomy position as is routine  for the procedures above. The patient had already voided. Hence,  catheterization was not necessary. After an appropriate time-out,  which was completed, accurate, and cleared for the procedure, and a  negative pregnancy test, the patient was prepped and draped. Exam  under anesthesia revealed what appeared to be about a 14-week  equivalent size fibroid uterus. Adnexa were not palpable.   The uterus  was felt to be mobile, but there was no descent. Utilizing various  retractors to expose the cervix, a double-tooth tenaculum was placed  into the anterior lip of the cervix. There was mobility, but no descent of  the uterus. Endocervical curettings were taken with a small curette  and submitted to Pathology. The uterus was then sounded to about 14  cm, with irregularity consistent with submucosal fibroids. The cervix  was dilated to a size 18 Hanks dilator, accepting a medium curette. A  large amount, but otherwise benign-appearing, of endometrial tissue  was taken with a medium sharp curette. All areas were taken down to  a sharp cry at the basalis membrane. Post dilatation and curettage,  Bruno stone forceps yielded no further tissues. The uterus was again  sounded to 14 to 15 cm, with irregularities as noted, as submucosal  fibroids. After an estimated blood loss of about 100 mL, the patient  was then taken to the recovery room in stabilized condition. COMPLICATIONS:  None. FINDINGS:  See operative summary.         Gene Tejeda MD    RS / 1970 [address]  D:  03/31/2017   08:53  T:  03/31/2017   09:19  Job #:  [id number] [employer]

## 2022-09-06 NOTE — ED PROVIDER NOTE - PHYSICAL EXAMINATION
Gen: NAD, non-toxic, conversational  Eyes: PERRLA, EOMI   HENT: Normocephalic, multiple bruises along the forehead in different stages of healing, no obvious lacerations. External ears normal, no rhinorrhea, moist mucous membranes.   CV: RRR, no M/R/G  Resp: CTAB, non-labored, speaking without difficulty on room air  Abd: soft, non tender, non rigid, no guarding or rebound tenderness  Back: No CVAT bilaterally, no midline ttp  Skin: dry, wwp   Neuro: AOx3, speech slurred, appears intoxicated  Psych: Mood sad, affect euthymic

## 2022-09-06 NOTE — ED PROVIDER NOTE - PROGRESS NOTE DETAILS
LEXX Ferrari: labs resulted with hyponatremia, also showing anemia with hgb <7 which was unexpected. Hemeoccult performed and sent to lab, also sending repeat CBC incase of lab or labeling error with blood work as rectal exam appeared grossly negative and another patient in department nearby this patient with similar CBC results.

## 2022-09-06 NOTE — ED PROVIDER NOTE - CARE PLAN
Principal Discharge DX:	Alcohol abuse  Secondary Diagnosis:	Frequent falls   1 Principal Discharge DX:	Alcohol abuse  Secondary Diagnosis:	Frequent falls  Secondary Diagnosis:	Hyponatremia  Secondary Diagnosis:	Anemia

## 2022-09-07 DIAGNOSIS — F10.10 ALCOHOL ABUSE, UNCOMPLICATED: ICD-10-CM

## 2022-09-07 DIAGNOSIS — D64.9 ANEMIA, UNSPECIFIED: ICD-10-CM

## 2022-09-07 LAB
ANION GAP SERPL CALC-SCNC: 18 MMOL/L — HIGH (ref 5–17)
BLD GP AB SCN SERPL QL: SIGNIFICANT CHANGE UP
BUN SERPL-MCNC: 5.5 MG/DL — LOW (ref 8–20)
CALCIUM SERPL-MCNC: 9.2 MG/DL — SIGNIFICANT CHANGE UP (ref 8.4–10.5)
CHLORIDE SERPL-SCNC: 89 MMOL/L — LOW (ref 98–107)
CO2 SERPL-SCNC: 21 MMOL/L — LOW (ref 22–29)
CREAT SERPL-MCNC: 0.48 MG/DL — LOW (ref 0.5–1.3)
EGFR: 113 ML/MIN/1.73M2 — SIGNIFICANT CHANGE UP
FERRITIN SERPL-MCNC: 34 NG/ML — SIGNIFICANT CHANGE UP (ref 15–150)
GLUCOSE SERPL-MCNC: 97 MG/DL — SIGNIFICANT CHANGE UP (ref 70–99)
HCT VFR BLD CALC: 22.8 % — LOW (ref 34.5–45)
HGB BLD-MCNC: 7.4 G/DL — LOW (ref 11.5–15.5)
INR BLD: 1.01 RATIO — SIGNIFICANT CHANGE UP (ref 0.88–1.16)
MCHC RBC-ENTMCNC: 23.9 PG — LOW (ref 27–34)
MCHC RBC-ENTMCNC: 32.5 GM/DL — SIGNIFICANT CHANGE UP (ref 32–36)
MCV RBC AUTO: 73.8 FL — LOW (ref 80–100)
PCP SPEC-MCNC: SIGNIFICANT CHANGE UP
PLATELET # BLD AUTO: 303 K/UL — SIGNIFICANT CHANGE UP (ref 150–400)
POTASSIUM SERPL-MCNC: 4 MMOL/L — SIGNIFICANT CHANGE UP (ref 3.5–5.3)
POTASSIUM SERPL-SCNC: 4 MMOL/L — SIGNIFICANT CHANGE UP (ref 3.5–5.3)
PROTHROM AB SERPL-ACNC: 11.7 SEC — SIGNIFICANT CHANGE UP (ref 10.5–13.4)
RBC # BLD: 3.09 M/UL — LOW (ref 3.8–5.2)
RBC # BLD: 3.09 M/UL — LOW (ref 3.8–5.2)
RBC # FLD: 19.3 % — HIGH (ref 10.3–14.5)
RETICS #: 50.7 K/UL — SIGNIFICANT CHANGE UP (ref 25–125)
RETICS/RBC NFR: 1.6 % — SIGNIFICANT CHANGE UP (ref 0.5–2.5)
SODIUM SERPL-SCNC: 128 MMOL/L — LOW (ref 135–145)
WBC # BLD: 6.3 K/UL — SIGNIFICANT CHANGE UP (ref 3.8–10.5)
WBC # FLD AUTO: 6.3 K/UL — SIGNIFICANT CHANGE UP (ref 3.8–10.5)

## 2022-09-07 PROCEDURE — 99223 1ST HOSP IP/OBS HIGH 75: CPT

## 2022-09-07 PROCEDURE — 93306 TTE W/DOPPLER COMPLETE: CPT | Mod: 26

## 2022-09-07 PROCEDURE — 93010 ELECTROCARDIOGRAM REPORT: CPT

## 2022-09-07 RX ORDER — TRAZODONE HCL 50 MG
50 TABLET ORAL AT BEDTIME
Refills: 0 | Status: DISCONTINUED | OUTPATIENT
Start: 2022-09-07 | End: 2022-09-16

## 2022-09-07 RX ORDER — BUDESONIDE AND FORMOTEROL FUMARATE DIHYDRATE 160; 4.5 UG/1; UG/1
2 AEROSOL RESPIRATORY (INHALATION)
Refills: 0 | Status: DISCONTINUED | OUTPATIENT
Start: 2022-09-07 | End: 2022-09-27

## 2022-09-07 RX ORDER — DIAZEPAM 5 MG
5 TABLET ORAL
Refills: 0 | Status: DISCONTINUED | OUTPATIENT
Start: 2022-09-07 | End: 2022-09-08

## 2022-09-07 RX ORDER — DIVALPROEX SODIUM 500 MG/1
500 TABLET, DELAYED RELEASE ORAL
Refills: 0 | Status: DISCONTINUED | OUTPATIENT
Start: 2022-09-07 | End: 2022-09-27

## 2022-09-07 RX ORDER — ALBUTEROL 90 UG/1
2 AEROSOL, METERED ORAL EVERY 6 HOURS
Refills: 0 | Status: DISCONTINUED | OUTPATIENT
Start: 2022-09-07 | End: 2022-09-27

## 2022-09-07 RX ORDER — QUETIAPINE FUMARATE 200 MG/1
50 TABLET, FILM COATED ORAL AT BEDTIME
Refills: 0 | Status: DISCONTINUED | OUTPATIENT
Start: 2022-09-07 | End: 2022-09-27

## 2022-09-07 RX ORDER — THIAMINE MONONITRATE (VIT B1) 100 MG
100 TABLET ORAL DAILY
Refills: 0 | Status: DISCONTINUED | OUTPATIENT
Start: 2022-09-07 | End: 2022-09-17

## 2022-09-07 RX ORDER — FOLIC ACID 0.8 MG
1 TABLET ORAL DAILY
Refills: 0 | Status: DISCONTINUED | OUTPATIENT
Start: 2022-09-07 | End: 2022-09-27

## 2022-09-07 RX ORDER — DIAZEPAM 5 MG
2 TABLET ORAL ONCE
Refills: 0 | Status: DISCONTINUED | OUTPATIENT
Start: 2022-09-07 | End: 2022-09-07

## 2022-09-07 RX ORDER — IRON SUCROSE 20 MG/ML
200 INJECTION, SOLUTION INTRAVENOUS EVERY 24 HOURS
Refills: 0 | Status: COMPLETED | OUTPATIENT
Start: 2022-09-07 | End: 2022-09-09

## 2022-09-07 RX ORDER — LANOLIN ALCOHOL/MO/W.PET/CERES
3 CREAM (GRAM) TOPICAL AT BEDTIME
Refills: 0 | Status: DISCONTINUED | OUTPATIENT
Start: 2022-09-07 | End: 2022-09-27

## 2022-09-07 RX ORDER — ERYTHROPOIETIN 10000 [IU]/ML
40000 INJECTION, SOLUTION INTRAVENOUS; SUBCUTANEOUS ONCE
Refills: 0 | Status: COMPLETED | OUTPATIENT
Start: 2022-09-07 | End: 2022-09-07

## 2022-09-07 RX ORDER — ONDANSETRON 8 MG/1
4 TABLET, FILM COATED ORAL EVERY 8 HOURS
Refills: 0 | Status: DISCONTINUED | OUTPATIENT
Start: 2022-09-07 | End: 2022-09-27

## 2022-09-07 RX ORDER — ACETAMINOPHEN 500 MG
650 TABLET ORAL EVERY 6 HOURS
Refills: 0 | Status: DISCONTINUED | OUTPATIENT
Start: 2022-09-07 | End: 2022-09-27

## 2022-09-07 RX ADMIN — Medication 1 TABLET(S): at 12:28

## 2022-09-07 RX ADMIN — Medication 1 MILLIGRAM(S): at 12:28

## 2022-09-07 RX ADMIN — DIVALPROEX SODIUM 500 MILLIGRAM(S): 500 TABLET, DELAYED RELEASE ORAL at 05:01

## 2022-09-07 RX ADMIN — Medication 50 MILLIGRAM(S): at 21:11

## 2022-09-07 RX ADMIN — ERYTHROPOIETIN 40000 UNIT(S): 10000 INJECTION, SOLUTION INTRAVENOUS; SUBCUTANEOUS at 15:48

## 2022-09-07 RX ADMIN — QUETIAPINE FUMARATE 50 MILLIGRAM(S): 200 TABLET, FILM COATED ORAL at 21:11

## 2022-09-07 RX ADMIN — Medication 50 MILLIGRAM(S): at 12:28

## 2022-09-07 RX ADMIN — Medication 2 MILLIGRAM(S): at 20:32

## 2022-09-07 RX ADMIN — DIVALPROEX SODIUM 500 MILLIGRAM(S): 500 TABLET, DELAYED RELEASE ORAL at 18:16

## 2022-09-07 RX ADMIN — BUDESONIDE AND FORMOTEROL FUMARATE DIHYDRATE 2 PUFF(S): 160; 4.5 AEROSOL RESPIRATORY (INHALATION) at 20:28

## 2022-09-07 RX ADMIN — ALBUTEROL 2 PUFF(S): 90 AEROSOL, METERED ORAL at 06:18

## 2022-09-07 RX ADMIN — IRON SUCROSE 110 MILLIGRAM(S): 20 INJECTION, SOLUTION INTRAVENOUS at 18:08

## 2022-09-07 RX ADMIN — Medication 100 MILLIGRAM(S): at 12:28

## 2022-09-07 RX ADMIN — Medication 50 MILLIGRAM(S): at 05:01

## 2022-09-07 RX ADMIN — Medication 50 MILLIGRAM(S): at 18:09

## 2022-09-07 NOTE — H&P ADULT - ASSESSMENT
51 y/o female with PMH of seizure, PTSD, anxiety, depression, polysubstance abuse came to the ED for frequent falls. Patient said she came because she was having problem breathing; she was on nebulizer and albuterol but changed PCP, and the nebulizer was not prescribed. As per ED, patient's  said she has been falling more frequently, hit her head multiple times this week; does not feel safe with her at home; concerned she might die if she has a bad unwitnessed fall since he is not usually home with her. He also mentioned she has not been taken her seizure medication as prescribed.   In the ED, found to have hb 6.3; fobt negative; alcohol level: 226; CT head cervical: CT: No acute hemorrhage, extra-axial collections or displaced calvarial fracture. Right parietal scalp hematoma and laceration. Bilateral periorbital soft tissue swelling. Atrophy out of proportion for stated age. Cervical spine CT: No acute fracture traumatic subluxation. Mild degenerative changes.    Microcytic anemia   Admit to medical floor   Hb:  6.3  Iron profile done  Likely due to malnutrition in the setting of alcohol use and bulimia (patient reported hx of anorexia bulimia)    Type and screen done; consented for transfusion   1 unit of PRBC ordered   GI consulted     Alcohol intoxication   Patient has high risk of withdrawal   CIWA initiated   MVT  Folic acid   Thiamine for possible thiamine deficiency due to EtOH abuse   Seizure/aspiration/fall precaution   Cessation advised     Hyponatremia   Na 122   Likely due to alcohol/beer use   Gently hydration   Monitor BMP     Seizure   Valproic acid level WNL   Continue Divalproex 500mg bid   Seizure precaution     Anxiety/depression/PTSD   Seroquel 50mg HS   Trazodone 50mg HS     Tobacco use   Nicotine offered, patient declined   Cessation advised     Supportive   DVT prophylaxis: CSD   Diet: regular     Plan of care discussed with patient

## 2022-09-07 NOTE — PROGRESS NOTE ADULT - ASSESSMENT
51 y/o female with PMH of seizure, PTSD, anxiety, depression, polysubstance abuse came to the ED for frequent falls. Patient said she came because she was having problem breathing; she was on nebulizer and albuterol but changed PCP, and the nebulizer was not prescribed. As per ED, patient's  said she has been falling more frequently, hit her head multiple times this week; does not feel safe with her at home; concerned she might die if she has a bad unwitnessed fall since he is not usually home with her. He also mentioned she has not been taken her seizure medication as prescribed.   In the ED, found to have hb 6.3; fobt negative; alcohol level: 226; CT head cervical: CT: No acute hemorrhage, extra-axial collections or displaced calvarial fracture. Right parietal scalp hematoma and laceration. Bilateral periorbital soft tissue swelling. Atrophy out of proportion for stated age. Cervical spine CT: No acute fracture traumatic subluxation. Mild degenerative changes.    # Gait instability leading to multiple falls and trauma  multifactorial- Alcoholism related gait instability, acute on chr anemia, polysubstance abuse and intoxication, hyponatremia  PT eval  CTH as above    # acute on chronic microcytic anemia superimposed on iron deficiency with inappropriate retic response  multifactorial- iron def, marrow suppression from alcohol toxin, possible chronic gastritis alcohol related  s/p PRBC x 1   Venofer x 3 days  Epogen 40 K x 1  trend H/H  FOBT neg  no plans for invasive GI testing now    # Hyponatremia  likely sec to beer potomania and low solute load  Osms and Urine studies not done  However Na improving well  Cont reg diet  no need for IVF at this time    # Alcohol intoxication  @ 4-5 hrs of half life  must clear complete in 24 hrs  high risk of withdrawal thereafter  CIWA initiated    # Alcoholism  Albumin WNL  Check Mg, Phos  MVI, Folate, Thiamine for suspected thiamine deficiency    # COPD not in exacerbation in an active smoker  Symbicort , albuterol inh prn  Duoneb prn  NC O2 PRN    # Seizure ( EEG in 2021- Interictal findings suggest potential epileptogenic focus and structural abnormality in the right temporal region)  Valproic acid level WNL   Continue Divalproex 500mg bid   Seizure precaution     # Anxiety/depression/PTSD   Seroquel 50mg HS   Trazodone 50mg HS     # Tobacco dependency  Nicotine offered, patient declined   Cessation advised     Supportive   DVT prophylaxis: CSD   Diet: regular     Plan of care discussed with patient
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
## 2022-09-07 NOTE — H&P ADULT - NSHPPHYSICALEXAM_GEN_ALL_CORE
Vital Signs Last 24 Hrs  T(C): 36.8 (06 Sep 2022 23:34), Max: 36.8 (06 Sep 2022 23:34)  T(F): 98.2 (06 Sep 2022 23:34), Max: 98.2 (06 Sep 2022 23:34)  HR: 82 (06 Sep 2022 23:34) (73 - 82)  BP: 130/82 (06 Sep 2022 23:34) (120/80 - 130/82)  BP(mean): --  RR: 18 (06 Sep 2022 23:34) (18 - 20)  SpO2: 100% (06 Sep 2022 23:34) (98% - 100%)    Parameters below as of 06 Sep 2022 23:34  Patient On (Oxygen Delivery Method): room air

## 2022-09-07 NOTE — H&P ADULT - HISTORY OF PRESENT ILLNESS
51 y/o female with PMH of seizure, PTSD, anxiety, depression, polysubstance abuse came to the ED for frequent falls. Patient said she came because she was having problem breathing; she was on nebulizer and albuterol but changed PCP, and the nebulizer was not prescribed. As per ED, patient's  said she has been falling more frequently, hit her head multiple times this week; does not feel safe with her at home; concerned she might die if she has a bad unwitnessed fall since he is not usually home with her. He also mentioned she has not been taken her seizure medication as prescribed. Patient has no nausea, vomiting, chest pain, palpitation, abdominal pain, change in bowel/urinary habit, fever, chills, melena, hematuria.

## 2022-09-07 NOTE — CONSULT NOTE ADULT - NS ATTEND AMEND GEN_ALL_CORE FT
I evaluated this pt. with my NP and agree with the above assessment and management plan. Pt with probable alcohol related bone marrow suppression and anemia vs. anemia of chronic disease. presently in ETOH withdrawal. Not a candidate for any type of GI w/u presently.  IV Pantoprazole for GI mucosal cytoprotection. [facility] protocol as per Medicine team. Iron studies ordered. I evaluated this pt. with my NP and agree with the above assessment and management plan. Pt with probable alcohol related bone marrow suppression and anemia vs. anemia of chronic disease. presently in ETOH withdrawal. Not a candidate for any type of GI w/u presently.  IV Pantoprazole for GI mucosal cytoprotection. [facility] protocol as per Medicine team. Iron studies c/w iron deficiency. Transfuse to Hb of 8 grams or higher. Diet as tolerated as she has no clinical evidence of active GI bleeding

## 2022-09-07 NOTE — CONSULT NOTE ADULT - PROBLEM SELECTOR RECOMMENDATION 9
Possibly due to Alcoholic liver disease secondary to bone marrow depression vs chronic disease, vs NANCY  We will recommend cardiology consult  Continue to monitor Hemoglobin and transfuse as needed  Eventual EGD/Colon once medical stable  Ok to feed patient as there is no emergent GI procedures schedules.  AM Labs CBC, CMP, PT/INR

## 2022-09-08 LAB
ANION GAP SERPL CALC-SCNC: 14 MMOL/L — SIGNIFICANT CHANGE UP (ref 5–17)
BUN SERPL-MCNC: 7.2 MG/DL — LOW (ref 8–20)
CALCIUM SERPL-MCNC: 9.4 MG/DL — SIGNIFICANT CHANGE UP (ref 8.4–10.5)
CHLORIDE SERPL-SCNC: 97 MMOL/L — LOW (ref 98–107)
CO2 SERPL-SCNC: 23 MMOL/L — SIGNIFICANT CHANGE UP (ref 22–29)
CREAT SERPL-MCNC: 0.53 MG/DL — SIGNIFICANT CHANGE UP (ref 0.5–1.3)
EGFR: 111 ML/MIN/1.73M2 — SIGNIFICANT CHANGE UP
GLUCOSE SERPL-MCNC: 93 MG/DL — SIGNIFICANT CHANGE UP (ref 70–99)
HCT VFR BLD CALC: 26 % — LOW (ref 34.5–45)
HGB BLD-MCNC: 8.1 G/DL — LOW (ref 11.5–15.5)
MAGNESIUM SERPL-MCNC: 1.6 MG/DL — SIGNIFICANT CHANGE UP (ref 1.6–2.6)
MCHC RBC-ENTMCNC: 23.5 PG — LOW (ref 27–34)
MCHC RBC-ENTMCNC: 31.2 GM/DL — LOW (ref 32–36)
MCV RBC AUTO: 75.6 FL — LOW (ref 80–100)
PHOSPHATE SERPL-MCNC: 3.4 MG/DL — SIGNIFICANT CHANGE UP (ref 2.4–4.7)
PLATELET # BLD AUTO: 336 K/UL — SIGNIFICANT CHANGE UP (ref 150–400)
POTASSIUM SERPL-MCNC: 3.9 MMOL/L — SIGNIFICANT CHANGE UP (ref 3.5–5.3)
POTASSIUM SERPL-SCNC: 3.9 MMOL/L — SIGNIFICANT CHANGE UP (ref 3.5–5.3)
RBC # BLD: 3.44 M/UL — LOW (ref 3.8–5.2)
RBC # FLD: 19.8 % — HIGH (ref 10.3–14.5)
SODIUM SERPL-SCNC: 133 MMOL/L — LOW (ref 135–145)
WBC # BLD: 5.72 K/UL — SIGNIFICANT CHANGE UP (ref 3.8–10.5)
WBC # FLD AUTO: 5.72 K/UL — SIGNIFICANT CHANGE UP (ref 3.8–10.5)

## 2022-09-08 PROCEDURE — 76705 ECHO EXAM OF ABDOMEN: CPT | Mod: 26

## 2022-09-08 PROCEDURE — 99233 SBSQ HOSP IP/OBS HIGH 50: CPT

## 2022-09-08 RX ORDER — DIAZEPAM 5 MG
10 TABLET ORAL EVERY 6 HOURS
Refills: 0 | Status: DISCONTINUED | OUTPATIENT
Start: 2022-09-08 | End: 2022-09-11

## 2022-09-08 RX ORDER — DIAZEPAM 5 MG
2.5 TABLET ORAL ONCE
Refills: 0 | Status: DISCONTINUED | OUTPATIENT
Start: 2022-09-08 | End: 2022-09-08

## 2022-09-08 RX ORDER — DIAZEPAM 5 MG
5 TABLET ORAL ONCE
Refills: 0 | Status: DISCONTINUED | OUTPATIENT
Start: 2022-09-08 | End: 2022-09-08

## 2022-09-08 RX ORDER — PANTOPRAZOLE SODIUM 20 MG/1
40 TABLET, DELAYED RELEASE ORAL
Refills: 0 | Status: DISCONTINUED | OUTPATIENT
Start: 2022-09-08 | End: 2022-09-27

## 2022-09-08 RX ADMIN — Medication 100 MILLIGRAM(S): at 13:03

## 2022-09-08 RX ADMIN — Medication 1 TABLET(S): at 13:02

## 2022-09-08 RX ADMIN — Medication 5 MILLIGRAM(S): at 13:31

## 2022-09-08 RX ADMIN — QUETIAPINE FUMARATE 50 MILLIGRAM(S): 200 TABLET, FILM COATED ORAL at 20:32

## 2022-09-08 RX ADMIN — Medication 10 MILLIGRAM(S): at 23:20

## 2022-09-08 RX ADMIN — Medication 5 MILLIGRAM(S): at 20:57

## 2022-09-08 RX ADMIN — Medication 2 MILLIGRAM(S): at 13:16

## 2022-09-08 RX ADMIN — Medication 10 MILLIGRAM(S): at 16:29

## 2022-09-08 RX ADMIN — Medication 5 MILLIGRAM(S): at 02:44

## 2022-09-08 RX ADMIN — IRON SUCROSE 110 MILLIGRAM(S): 20 INJECTION, SOLUTION INTRAVENOUS at 16:28

## 2022-09-08 RX ADMIN — Medication 5 MILLIGRAM(S): at 19:29

## 2022-09-08 RX ADMIN — Medication 5 MILLIGRAM(S): at 12:26

## 2022-09-08 RX ADMIN — DIVALPROEX SODIUM 500 MILLIGRAM(S): 500 TABLET, DELAYED RELEASE ORAL at 06:02

## 2022-09-08 RX ADMIN — BUDESONIDE AND FORMOTEROL FUMARATE DIHYDRATE 2 PUFF(S): 160; 4.5 AEROSOL RESPIRATORY (INHALATION) at 09:03

## 2022-09-08 RX ADMIN — Medication 2.5 MILLIGRAM(S): at 02:04

## 2022-09-08 RX ADMIN — Medication 50 MILLIGRAM(S): at 20:32

## 2022-09-08 RX ADMIN — DIVALPROEX SODIUM 500 MILLIGRAM(S): 500 TABLET, DELAYED RELEASE ORAL at 16:29

## 2022-09-08 RX ADMIN — Medication 1 MILLIGRAM(S): at 13:02

## 2022-09-08 RX ADMIN — Medication 5 MILLIGRAM(S): at 16:02

## 2022-09-08 RX ADMIN — Medication 50 MILLIGRAM(S): at 13:02

## 2022-09-08 RX ADMIN — Medication 50 MILLIGRAM(S): at 05:59

## 2022-09-08 NOTE — CHART NOTE - NSCHARTNOTEFT_GEN_A_CORE
Contacted by RN for agitation despite 5 valium PRN given at 12:26.  Patient seen and examined at bedside. Patient agitated, screaming she needs to get another beer, is unaware of where she is, only orientated to self. Patient is paranoid, thinks we are drugging her and out to get her. Then she breaks down and states she is lonely before again accusing the staff of attempting to harm her.     Vital Signs  T(C): 36.5 (09-08-22 @ 12:25), Max: 37.1 (09-07-22 @ 20:14)  HR: 83 (09-08-22 @ 12:41) (65 - 90)  BP: 117/81 (09-08-22 @ 12:41) (103/67 - 133/99)  RR: 18 (09-08-22 @ 12:41) (18 - 20)  SpO2: 98% (09-08-22 @ 12:41) (95% - 99%)    A/P: EtOH withdrawal  CIWA > 10 earlier, s/p 5 Valium PRN given with no improvement in behavior  2mg IM Ativan ordered STAT  Attending notified  RN to monitor, assess and escalate to PA PRN.  Will continue to monitor.

## 2022-09-08 NOTE — PROGRESS NOTE ADULT - SUBJECTIVE AND OBJECTIVE BOX
Pt seen and examined. She appears less tremulous today. Hb up to 8.1 grams after transfusion. TTE done yesterday showed normal EF of 60 to 65% and normal LV function with only mild MR. Essentially negative Echo. No GI complaints offered. Pt. c/o anxiety which she states she has chronically and not related to ETOH withdrawal.    REVIEW OF SYSTEMS:  Constitutional: No fever, weight loss or fatigue  Cardiovascular: No chest pain, palpitations, dizziness or leg swelling  Gastrointestinal: As noted above. No abdominal or epigastric pain. No nausea, vomiting or hematemesis; No diarrhea or constipation. No melena or hematochezia.  Skin: No itching, burning, rashes or lesions       MEDICATIONS:  MEDICATIONS  (STANDING):  budesonide  80 MICROgram(s)/formoterol 4.5 MICROgram(s) Inhaler 2 Puff(s) Inhalation two times a day  chlordiazePOXIDE 50 milliGRAM(s) Oral every 8 hours  chlordiazePOXIDE   Oral   diVALproex  milliGRAM(s) Oral two times a day  folic acid 1 milliGRAM(s) Oral daily  iron sucrose IVPB 200 milliGRAM(s) IV Intermittent every 24 hours  multivitamin 1 Tablet(s) Oral daily  QUEtiapine 50 milliGRAM(s) Oral at bedtime  thiamine 100 milliGRAM(s) Oral daily  traZODone 50 milliGRAM(s) Oral at bedtime    MEDICATIONS  (PRN):  acetaminophen     Tablet .. 650 milliGRAM(s) Oral every 6 hours PRN Temp greater or equal to 38C (100.4F), Mild Pain (1 - 3)  ALBUTerol    90 MICROgram(s) HFA Inhaler 2 Puff(s) Inhalation every 6 hours PRN Shortness of Breath and/or Wheezing  aluminum hydroxide/magnesium hydroxide/simethicone Suspension 30 milliLiter(s) Oral every 4 hours PRN Dyspepsia  diazepam  Injectable 5 milliGRAM(s) IV Push every 1 hour PRN CIWA > 8  melatonin 3 milliGRAM(s) Oral at bedtime PRN Insomnia  ondansetron Injectable 4 milliGRAM(s) IV Push every 8 hours PRN Nausea and/or Vomiting      Allergies    No Known Allergies    Intolerances        Vital Signs Last 24 Hrs  T(C): 36.6 (08 Sep 2022 02:59), Max: 37.1 (07 Sep 2022 20:14)  T(F): 97.8 (08 Sep 2022 02:59), Max: 98.7 (07 Sep 2022 20:14)  HR: 65 (08 Sep 2022 02:59) (65 - 90)  BP: 108/74 (08 Sep 2022 02:59) (103/67 - 133/99)  BP(mean): --  RR: 18 (08 Sep 2022 02:59) (18 - 18)  SpO2: 96% (08 Sep 2022 02:59) (95% - 99%)    Parameters below as of 08 Sep 2022 02:59  Patient On (Oxygen Delivery Method): room air         @ 07:01  -   @ 07:00  --------------------------------------------------------  IN: 600 mL / OUT: 0 mL / NET: 600 mL        PHYSICAL EXAM:    General: Well developed; in no acute distress  HEENT: MMM, conjunctiva pink and sclera anicteric.  Lungs: clear to auscultation bilaterally.  Cor: RRR S1, S2 only.  Gastrointestinal: Abdomen: Soft non-tender non-distended; Normal bowel sounds; No hepatosplenomegaly.  Extremities: no cyanosis, clubbing or edema.  Skin: Warm and dry. No obvious rash  Neuro: Pt. a + o x 3, mild tremulousness w/o asterixsis    LABS:  CBC Full  -  ( 08 Sep 2022 06:03 )  WBC Count : 5.72 K/uL  RBC Count : 3.44 M/uL  Hemoglobin : 8.1 g/dL  Hematocrit : 26.0 %  Platelet Count - Automated : 336 K/uL  Mean Cell Volume : 75.6 fl  Mean Cell Hemoglobin : 23.5 pg  Mean Cell Hemoglobin Concentration : 31.2 gm/dL  Auto Neutrophil # : x  Auto Lymphocyte # : x  Auto Monocyte # : x  Auto Eosinophil # : x  Auto Basophil # : x  Auto Neutrophil % : x  Auto Lymphocyte % : x  Auto Monocyte % : x  Auto Eosinophil % : x  Auto Basophil % : x        133<L>  |  97<L>  |  7.2<L>  ----------------------------<  93  3.9   |  23.0  |  0.53    Ca    9.4      08 Sep 2022 06:03  Phos  3.4     09-  Mg     1.6     -    TPro  7.2  /  Alb  4.1  /  TBili  0.3<L>  /  DBili  x   /  AST  50<H>  /  ALT  37<H>  /  AlkPhos  75  09-06    PT/INR - ( 07 Sep 2022 09:01 )   PT: 11.7 sec;   INR: 1.01 ratio                           RADIOLOGY & ADDITIONAL STUDIES (The following images were personally reviewed):< from: TTE Echo Complete w/ Contrast w/ Doppler (22 @ 13:09) >  O TTE WITH CON COMP W DOPP                          PROCEDURE DATE:  2022          INTERPRETATION:  TRANSTHORACIC ECHOCARDIOGRAM REPORT        Patient Name:   LOTUS HOUSE Patient Location: [facility] Rec #:  [id number]         Accession #:      [id number]  Account #:                       Height:           66.9 in 170.0 cm  YOB: 1970        Weight:           141.1 lb 64.00 kg  Patient Age:    52 years         BSA:              1.74 m²  Patient Gender: F                BP:               107/68 mmHg      Date of Exam:        2022 1:09:52 PM  Sonographer:         Nirali Last  Referring Physician: Soraya Villafana MD    Procedure:     2D Echo/Doppler/Color Doppler Complete.  Indications:   Abnormal electrocardiogram [ECG] [EKG] - R94.31  Diagnosis:     Abnormal electrocardiogram [ECG] [EKG] - R94.31  Study Details: Technically difficult study.        2D AND M-MODE MEASUREMENTS (normal ranges within parentheses):  Left  Normal   Aorta/Left            Normal  Ventricle:                    Atrium:  IVSd (2D):    0.89  (0.7-1.1) Aortic Root    2.70  (2.4-3.7)                 cm             (2D):           cm  LVPWd (2D):   0.82  (0.7-1.1) Left Atrium    2.50  (1.9-4.0)                 cm             (2D):           cm  LVIDd (2D):   3.84  (3.4-5.7) LA Volume      31.5                 cm             Index         ml/m²  LVIDs (2D):   2.26            Right Ventricle:                 cm             TAPSE:           2.30 cm  LV FS (2D):   41.1   (>25%)                  %  Relative Wall 0.43   (<0.42)  Thickness    LV SYSTOLIC FUNCTION BY 2D PLANIMETRY (MOD):  EF-A4C View: 63.5 % EF-A2C View: 73.1 % EF-Biplane: 69 %    LV DIASTOLIC FUNCTION:  MV Peak E: 0.98 m/s e', MV Heaven: 0.11 m/s  MV Peak A: 0.78 m/s E/e' Ratio: 8.74  E/A Ratio: 1.26     Decel Time: 201 msec    SPECTRAL DOPPLER ANALYSIS (where applicable):  Mitral Valve:  MV P1/2 Time: 58.33 msec  MV Area, PHT: 3.77 cm²    Aortic Valve: AoV Max Alvaro: 1.07 m/s AoV Peak P.5 mmHg AoV Mean P.3 mmHg    LVOT Vmax: 1.18 m/s LVOT VTI: 0.222 m LVOT Diameter: 2.20 cm    AoV Area, Vmax: 4.21 cm² AoV Area, VTI: 4.20 cm² AoV Area, Vmn: 3.95 cm²  Ao VTI: 0.201  Tricuspid Valve and PA/RV Systolic Pressure: TRMax Velocity: 2.15 m/s RA   Pressure: 3 mmHg RVSP/PASP: 21.5 mmHg      PHYSICIAN INTERPRETATION:  Left Ventricle: Normal left ventricular size and wall thicknesses, with   normal systolic and diastolic function. The left ventricular internal   cavitysize is normal. Left ventricular wall thickness is normal.  Global LV systolic function was normal. Left ventricular ejection   fraction, by visual estimation, is 60 to 65%. Spectral Doppler shows   normal pattern of LV diastolic filling.  Right Ventricle: Normal right ventricular size and function. The right   ventricular size is normal. RV systolic function is normal.  Left Atrium: Normal left atrial size.  Right Atrium: Normal right atrial size.  Pericardium: There is no evidence of pericardial effusion.  Mitral Valve: The mitral valve is normal in structure. Mitral leaflet   mobility is normal. Trace mitral valve regurgitation is seen.  Tricuspid Valve: The tricuspid valve is normal in structure. Mild   tricuspid regurgitation is visualized.  Aortic Valve: The aortic valve is trileaflet. Sclerotic aortic valve with   normal opening. Mild aortic valve regurgitation is seen.  Pulmonic Valve: The pulmonic valve was not well visualized. The pulmonic   valve is normal. Trace pulmonic valveregurgitation.  Aorta: The aortic root and ascending aorta are structurally normal, with   no evidence of dilitation.  Pulmonary Artery: The main pulmonary artery is normal in size.  Venous: A normal flow pattern is recorded from the right upper pulmonary   vein. The inferior vena cava was normal sized, with respiratory size   variation greater than 50%.  In comparison to the previous echocardiogram(s): Prior examinations are   available and were reviewed for comparison purposes.      Summary:   1. Technically difficult study.   2. Normal left ventricular size and wall thicknesses, with normal   systolic and diastolic function.   3. Left ventricular ejection fraction, by visual estimation, is 60 to   65%.   4. Normal right ventricular size and function.   5. Normal left atrial size.   6. Normal right atrial size.   7. Sclerotic aortic valve with normal opening.   8. Mild aortic regurgitation.   9. Trace mitral valve regurgitation.  10. Mild tricuspid regurgitation.    MD Ivonne Electronically signed on 2022 at 2:37:46 PM            *** Final ***    < end of copied text >

## 2022-09-08 NOTE — PROGRESS NOTE ADULT - ASSESSMENT
Patient is a 52 year old female with PMH of Seizure, PTSD, anxiety, depression, polysubstance abuse came to the ED for frequent falls. Patient said she came because she was having problem breathing; she was on nebulizer and albuterol but changed PCP, and the nebulizer was not prescribed. As per ED, patient's  said she has been falling more frequently, hit her head multiple times this week; does not feel safe with her at home; concerned she might die if she has a bad unwitnessed fall since he is not usually home with her. He also mentioned she has not been taken her seizure medication as prescribed. In the ED, found to have hb 6.3; fobt negative; alcohol level: 226; CT head cervical: CT: No acute hemorrhage, extra-axial collections or displaced calvarial fracture. Right parietal scalp hematoma and laceration. Bilateral periorbital soft tissue swelling.      # Frequent Falls due to Unsteady Gait - multifactorial likely due to alcohol intoxication, hyponatremia   -patient recently fell given old staples on posterior scalp (given acute state unable to clarify when staples were placed; will need to be removed prior to discharge)  -CTH on admission negative for acute pathology but right parietal scalp hematoma noted  -CT neck negative for acute fracture  -sodium improving to 133  -ETOH level on admission > 200  -fall precautions   -PT evaluation prior to discharge    # Acute on chronic microcytic anemia superimposed on iron deficiency with inappropriate retic response  multifactorial- iron deficiency, bone marrow suppression from alcohol abuse   Hb 8.1 s/p PRBC x 1   Continue Venofer x 3 days  Epogen 40 K x 1 given  FOBT neg  EGD once patient is no longer in withdrawal  Monitor CBC  Continue PPI  GI recs appreciated    # Hyponatremia  likely secondary to low solute load/beer protomania   however no urine studies collected  sodium 122 on admission  sodium 133 today  defer IVF at this time  encourage PO intake  monitor I/os, daily weights  repeat BMP in AM    # Alcohol Withdrawal  -CIWA 20 this afternoon  -d/c librium taper  -start valium 10 mg q6hrs and taper daily  -continue PRN valium for CIWA > 8  -continue thiamine/folic acid/MVI  -upgrade to SDU   -SW consult    # COPD not in acute exacerbation    Continue Symbicort    Duoneb prn    # Seizure ( EEG in 2021- Interictal findings suggest potential epileptogenic focus and structural abnormality in the right temporal region)  Valproic acid level WNL   Continue Divalproex 500mg bid   Seizure precautions    # Anxiety/depression/PTSD   Seroquel 50mg HS   Trazodone 50mg HS   -psych evaluation given emotional state    # Tobacco dependency  Decline nicotine  patch  Cessation advised     DVT prophylaxis: CSD     Dispo - Given elevated CIWA; upgrade to SDU. Patient is high risk for DTs. Start Valium ATC. Low threshold for MICU evaluation.    Plan discussed with patient, medicine PA, RN, Dr. Lovett

## 2022-09-08 NOTE — PHYSICAL THERAPY INITIAL EVALUATION ADULT - IMPAIRMENTS FOUND, PT EVAL
aerobic capacity/endurance/anthropometric characteristics/gait, locomotion, and balance/muscle strength

## 2022-09-08 NOTE — PROGRESS NOTE ADULT - SUBJECTIVE AND OBJECTIVE BOX
CHIEF COMPLAINT/INTERVAL HISTORY:    Patient is a 52y old  Female who presents with a chief complaint of falls    SUBJECTIVE & OBJECTIVE: Pt seen and examined at bedside. No overnight     ICU Vital Signs Last 24 Hrs  T(C): 36.2 (08 Sep 2022 14:30), Max: 37.1 (07 Sep 2022 20:14)  T(F): 97.1 (08 Sep 2022 14:30), Max: 98.7 (07 Sep 2022 20:14)  HR: 81 (08 Sep 2022 14:30) (65 - 92)  BP: 125/85 (08 Sep 2022 14:30) (103/67 - 133/99)  RR: 18 (08 Sep 2022 14:30) (16 - 20)  SpO2: 98% (08 Sep 2022 14:30) (95% - 99%)    O2 Parameters below as of 08 Sep 2022 14:30  Patient On (Oxygen Delivery Method): room air    MEDICATIONS  (STANDING):  budesonide  80 MICROgram(s)/formoterol 4.5 MICROgram(s) Inhaler 2 Puff(s) Inhalation two times a day  diazepam  Injectable 10 milliGRAM(s) IV Push every 6 hours  diVALproex  milliGRAM(s) Oral two times a day  folic acid 1 milliGRAM(s) Oral daily  iron sucrose IVPB 200 milliGRAM(s) IV Intermittent every 24 hours  multivitamin 1 Tablet(s) Oral daily  pantoprazole    Tablet 40 milliGRAM(s) Oral before breakfast  QUEtiapine 50 milliGRAM(s) Oral at bedtime  thiamine 100 milliGRAM(s) Oral daily  traZODone 50 milliGRAM(s) Oral at bedtime    MEDICATIONS  (PRN):  acetaminophen     Tablet .. 650 milliGRAM(s) Oral every 6 hours PRN Temp greater or equal to 38C (100.4F), Mild Pain (1 - 3)  ALBUTerol    90 MICROgram(s) HFA Inhaler 2 Puff(s) Inhalation every 6 hours PRN Shortness of Breath and/or Wheezing  aluminum hydroxide/magnesium hydroxide/simethicone Suspension 30 milliLiter(s) Oral every 4 hours PRN Dyspepsia  diazepam  Injectable 5 milliGRAM(s) IV Push every 1 hour PRN CIWA > 8  melatonin 3 milliGRAM(s) Oral at bedtime PRN Insomnia  ondansetron Injectable 4 milliGRAM(s) IV Push every 8 hours PRN Nausea and/or Vomiting      LABS:                        8.1    5.72  )-----------( 336      ( 08 Sep 2022 06:03 )             26.0     09-08    133<L>  |  97<L>  |  7.2<L>  ----------------------------<  93  3.9   |  23.0  |  0.53    Ca    9.4      08 Sep 2022 06:03  Phos  3.4     09-08  Mg     1.6     09-08    TPro  7.2  /  Alb  4.1  /  TBili  0.3<L>  /  DBili  x   /  AST  50<H>  /  ALT  37<H>  /  AlkPhos  75  09-06    PT/INR - ( 07 Sep 2022 09:01 )   PT: 11.7 sec;   INR: 1.01 ratio      PHYSICAL EXAM:    GENERAL: NAD, well-groomed, well-developed  HEAD:  Atraumatic, Normocephalic  EYES: EOMI, PERRLA, conjunctiva and sclera clear  ENMT: Moist mucous membranes  NECK: Supple, No JVD  NERVOUS SYSTEM:  Alert & Oriented X3, Motor Strength 5/5 B/L upper and lower extremities; DTRs 2+ intact and symmetric  CHEST/LUNG: Clear to auscultation bilaterally; No rales, rhonchi, wheezing, or rubs  HEART: Regular rate and rhythm; No murmurs, rubs, or gallops  ABDOMEN: Soft, Nontender, Nondistended; Bowel sounds present  EXTREMITIES:  2+ Peripheral Pulses, No clubbing, cyanosis, or edema       CHIEF COMPLAINT/INTERVAL HISTORY:    Patient is a 52y old  Female who presents with a chief complaint of falls    SUBJECTIVE & OBJECTIVE: Pt seen and examined at bedside. No overnight events. Patient emotional, paranoid, crying. CIWA 20 earlier today; required PRN valium. Will d/c librium and start Valium ATC. Upgrade to SDU.    ROS: Limited due to emotional state    ICU Vital Signs Last 24 Hrs  T(C): 36.2 (08 Sep 2022 14:30), Max: 37.1 (07 Sep 2022 20:14)  T(F): 97.1 (08 Sep 2022 14:30), Max: 98.7 (07 Sep 2022 20:14)  HR: 81 (08 Sep 2022 14:30) (65 - 92)  BP: 125/85 (08 Sep 2022 14:30) (103/67 - 133/99)  RR: 18 (08 Sep 2022 14:30) (16 - 20)  SpO2: 98% (08 Sep 2022 14:30) (95% - 99%)    O2 Parameters below as of 08 Sep 2022 14:30  Patient On (Oxygen Delivery Method): room air    MEDICATIONS  (STANDING):  budesonide  80 MICROgram(s)/formoterol 4.5 MICROgram(s) Inhaler 2 Puff(s) Inhalation two times a day  diazepam  Injectable 10 milliGRAM(s) IV Push every 6 hours  diVALproex  milliGRAM(s) Oral two times a day  folic acid 1 milliGRAM(s) Oral daily  iron sucrose IVPB 200 milliGRAM(s) IV Intermittent every 24 hours  multivitamin 1 Tablet(s) Oral daily  pantoprazole    Tablet 40 milliGRAM(s) Oral before breakfast  QUEtiapine 50 milliGRAM(s) Oral at bedtime  thiamine 100 milliGRAM(s) Oral daily  traZODone 50 milliGRAM(s) Oral at bedtime    MEDICATIONS  (PRN):  acetaminophen     Tablet .. 650 milliGRAM(s) Oral every 6 hours PRN Temp greater or equal to 38C (100.4F), Mild Pain (1 - 3)  ALBUTerol    90 MICROgram(s) HFA Inhaler 2 Puff(s) Inhalation every 6 hours PRN Shortness of Breath and/or Wheezing  aluminum hydroxide/magnesium hydroxide/simethicone Suspension 30 milliLiter(s) Oral every 4 hours PRN Dyspepsia  diazepam  Injectable 5 milliGRAM(s) IV Push every 1 hour PRN CIWA > 8  melatonin 3 milliGRAM(s) Oral at bedtime PRN Insomnia  ondansetron Injectable 4 milliGRAM(s) IV Push every 8 hours PRN Nausea and/or Vomiting      LABS:                        8.1    5.72  )-----------( 336      ( 08 Sep 2022 06:03 )             26.0     09-08    133<L>  |  97<L>  |  7.2<L>  ----------------------------<  93  3.9   |  23.0  |  0.53    Ca    9.4      08 Sep 2022 06:03  Phos  3.4     09-08  Mg     1.6     09-08    TPro  7.2  /  Alb  4.1  /  TBili  0.3<L>  /  DBili  x   /  AST  50<H>  /  ALT  37<H>  /  AlkPhos  75  09-06    PT/INR - ( 07 Sep 2022 09:01 )   PT: 11.7 sec;   INR: 1.01 ratio      PHYSICAL EXAM:    GENERAL: middle aged female, emotional, agitated, crying  HEAD:  Atraumatic, Normocephalic, posterior scalp staples  EYES: EOMI, PERRLA, conjunctiva and sclera clear  ENMT: Moist mucous membranes  NECK: Supple   NERVOUS SYSTEM:  Alert & Oriented X3, Motor Strength 5/5 B/L upper and lower extremities   CHEST/LUNG: bilateral air entry, coarse breath sounds  HEART: Regular rate and rhythm; + S1/S2  ABDOMEN: Soft, Nontender, obese; Bowel sounds present  EXTREMITIES:  no pedal edema

## 2022-09-08 NOTE — CHART NOTE - NSCHARTNOTEFT_GEN_A_CORE
I have evaluated this patient and have determined that restraints are warranted to optimize medical care. Ordered soft vest because Pt extremely agitated and hazard to herself and staff. Patient has Hx of freq falls and concern for impulsiveness. Patient was assessed for current physical and psychological risk factors as well as special needs. There are no medical conditions or limitations that would place this patient at risk while in restraints. Dr. Gena camacho.

## 2022-09-08 NOTE — PROGRESS NOTE ADULT - ASSESSMENT
Alcohol withdrawal with iron deficiency anemia which could be secondary to ETOH alcohol suppression or chronic disease or occult GI blood loss. Pt. will likely need eventual endoscopic evaluation once she is over her ETOH withdrawal. No evidence of active GI bleeding at this time. OK to feed pt. Pantoprazole for GI mucosal cytoprotection. Abdominal ultrasound ordered for visualization of liver and to r/o ascites. Repeat labs ordered for the AM.

## 2022-09-09 LAB
ALBUMIN SERPL ELPH-MCNC: 3.6 G/DL — SIGNIFICANT CHANGE UP (ref 3.3–5.2)
ALP SERPL-CCNC: 73 U/L — SIGNIFICANT CHANGE UP (ref 40–120)
ALT FLD-CCNC: 29 U/L — SIGNIFICANT CHANGE UP
ANION GAP SERPL CALC-SCNC: 14 MMOL/L — SIGNIFICANT CHANGE UP (ref 5–17)
ANISOCYTOSIS BLD QL: SIGNIFICANT CHANGE UP
AST SERPL-CCNC: 34 U/L — HIGH
BASOPHILS # BLD AUTO: 0 K/UL — SIGNIFICANT CHANGE UP (ref 0–0.2)
BASOPHILS NFR BLD AUTO: 0 % — SIGNIFICANT CHANGE UP (ref 0–2)
BILIRUB SERPL-MCNC: 0.3 MG/DL — LOW (ref 0.4–2)
BUN SERPL-MCNC: 4.9 MG/DL — LOW (ref 8–20)
BURR CELLS BLD QL SMEAR: PRESENT — SIGNIFICANT CHANGE UP
CALCIUM SERPL-MCNC: 9.3 MG/DL — SIGNIFICANT CHANGE UP (ref 8.4–10.5)
CHLORIDE SERPL-SCNC: 100 MMOL/L — SIGNIFICANT CHANGE UP (ref 98–107)
CO2 SERPL-SCNC: 20 MMOL/L — LOW (ref 22–29)
CREAT SERPL-MCNC: 0.52 MG/DL — SIGNIFICANT CHANGE UP (ref 0.5–1.3)
EGFR: 111 ML/MIN/1.73M2 — SIGNIFICANT CHANGE UP
EOSINOPHIL # BLD AUTO: 0.13 K/UL — SIGNIFICANT CHANGE UP (ref 0–0.5)
EOSINOPHIL NFR BLD AUTO: 2.6 % — SIGNIFICANT CHANGE UP (ref 0–6)
GIANT PLATELETS BLD QL SMEAR: PRESENT — SIGNIFICANT CHANGE UP
GLUCOSE SERPL-MCNC: 71 MG/DL — SIGNIFICANT CHANGE UP (ref 70–99)
HCT VFR BLD CALC: 26.4 % — LOW (ref 34.5–45)
HGB BLD-MCNC: 8.1 G/DL — LOW (ref 11.5–15.5)
HYPOCHROMIA BLD QL: SIGNIFICANT CHANGE UP
LYMPHOCYTES # BLD AUTO: 1.24 K/UL — SIGNIFICANT CHANGE UP (ref 1–3.3)
LYMPHOCYTES # BLD AUTO: 24.8 % — SIGNIFICANT CHANGE UP (ref 13–44)
MACROCYTES BLD QL: SLIGHT — SIGNIFICANT CHANGE UP
MAGNESIUM SERPL-MCNC: 1.7 MG/DL — SIGNIFICANT CHANGE UP (ref 1.6–2.6)
MANUAL SMEAR VERIFICATION: SIGNIFICANT CHANGE UP
MCHC RBC-ENTMCNC: 23.5 PG — LOW (ref 27–34)
MCHC RBC-ENTMCNC: 30.7 GM/DL — LOW (ref 32–36)
MCV RBC AUTO: 76.5 FL — LOW (ref 80–100)
MICROCYTES BLD QL: SIGNIFICANT CHANGE UP
MONOCYTES # BLD AUTO: 0.36 K/UL — SIGNIFICANT CHANGE UP (ref 0–0.9)
MONOCYTES NFR BLD AUTO: 7.1 % — SIGNIFICANT CHANGE UP (ref 2–14)
NEUTROPHILS # BLD AUTO: 3.24 K/UL — SIGNIFICANT CHANGE UP (ref 1.8–7.4)
NEUTROPHILS NFR BLD AUTO: 64.6 % — SIGNIFICANT CHANGE UP (ref 43–77)
PHOSPHATE SERPL-MCNC: 3.6 MG/DL — SIGNIFICANT CHANGE UP (ref 2.4–4.7)
PLAT MORPH BLD: NORMAL — SIGNIFICANT CHANGE UP
PLATELET # BLD AUTO: 319 K/UL — SIGNIFICANT CHANGE UP (ref 150–400)
POIKILOCYTOSIS BLD QL AUTO: SIGNIFICANT CHANGE UP
POLYCHROMASIA BLD QL SMEAR: SIGNIFICANT CHANGE UP
POTASSIUM SERPL-MCNC: 3.7 MMOL/L — SIGNIFICANT CHANGE UP (ref 3.5–5.3)
POTASSIUM SERPL-SCNC: 3.7 MMOL/L — SIGNIFICANT CHANGE UP (ref 3.5–5.3)
PROT SERPL-MCNC: 6.6 G/DL — SIGNIFICANT CHANGE UP (ref 6.6–8.7)
RBC # BLD: 3.45 M/UL — LOW (ref 3.8–5.2)
RBC # FLD: 21 % — HIGH (ref 10.3–14.5)
RBC BLD AUTO: ABNORMAL
SODIUM SERPL-SCNC: 134 MMOL/L — LOW (ref 135–145)
TARGETS BLD QL SMEAR: SLIGHT — SIGNIFICANT CHANGE UP
VARIANT LYMPHS # BLD: 0.9 % — SIGNIFICANT CHANGE UP (ref 0–6)
WBC # BLD: 5.02 K/UL — SIGNIFICANT CHANGE UP (ref 3.8–10.5)
WBC # FLD AUTO: 5.02 K/UL — SIGNIFICANT CHANGE UP (ref 3.8–10.5)

## 2022-09-09 PROCEDURE — 99233 SBSQ HOSP IP/OBS HIGH 50: CPT

## 2022-09-09 RX ORDER — DIAZEPAM 5 MG
5 TABLET ORAL
Refills: 0 | Status: DISCONTINUED | OUTPATIENT
Start: 2022-09-09 | End: 2022-09-16

## 2022-09-09 RX ADMIN — Medication 50 MILLIGRAM(S): at 20:03

## 2022-09-09 RX ADMIN — QUETIAPINE FUMARATE 50 MILLIGRAM(S): 200 TABLET, FILM COATED ORAL at 20:02

## 2022-09-09 RX ADMIN — PANTOPRAZOLE SODIUM 40 MILLIGRAM(S): 20 TABLET, DELAYED RELEASE ORAL at 05:36

## 2022-09-09 RX ADMIN — DIVALPROEX SODIUM 500 MILLIGRAM(S): 500 TABLET, DELAYED RELEASE ORAL at 05:36

## 2022-09-09 RX ADMIN — Medication 10 MILLIGRAM(S): at 12:26

## 2022-09-09 RX ADMIN — Medication 10 MILLIGRAM(S): at 17:36

## 2022-09-09 RX ADMIN — DIVALPROEX SODIUM 500 MILLIGRAM(S): 500 TABLET, DELAYED RELEASE ORAL at 17:36

## 2022-09-09 RX ADMIN — Medication 10 MILLIGRAM(S): at 05:36

## 2022-09-09 RX ADMIN — IRON SUCROSE 110 MILLIGRAM(S): 20 INJECTION, SOLUTION INTRAVENOUS at 17:36

## 2022-09-09 RX ADMIN — BUDESONIDE AND FORMOTEROL FUMARATE DIHYDRATE 2 PUFF(S): 160; 4.5 AEROSOL RESPIRATORY (INHALATION) at 21:14

## 2022-09-09 RX ADMIN — Medication 100 MILLIGRAM(S): at 12:34

## 2022-09-09 RX ADMIN — Medication 3 MILLIGRAM(S): at 20:03

## 2022-09-09 RX ADMIN — BUDESONIDE AND FORMOTEROL FUMARATE DIHYDRATE 2 PUFF(S): 160; 4.5 AEROSOL RESPIRATORY (INHALATION) at 09:03

## 2022-09-09 RX ADMIN — Medication 1 MILLIGRAM(S): at 12:26

## 2022-09-09 RX ADMIN — Medication 1 TABLET(S): at 12:27

## 2022-09-09 NOTE — PROGRESS NOTE ADULT - NS ATTEND AMEND GEN_ALL_CORE FT
I reviewed  the labs  , notes radiology images    Patient is on CIWA protocol and received sedation prior to my visit. Lethargic. Very agitated as per nursing  NO reports of bleeding. hemoglobin stable  Will need eventual colon/ egd for FE def anemia

## 2022-09-09 NOTE — PROCEDURE NOTE - NSLINELOCSCALP_GEN_A_CORE
Please sign if okay to refill  Last Seen 07/11/2022  Next Appt -none set   Last Refilled 01/03/2022 (qty 90 w/1 refill)  Routed to Provider
Prescriptions sent to pharmacy
temporal

## 2022-09-09 NOTE — PROGRESS NOTE ADULT - ASSESSMENT
52 year old female with PMH of Seizure, PTSD, anxiety, depression, polysubstance abuse came to the ED for frequent falls. GI consult for anemia.

## 2022-09-09 NOTE — CHART NOTE - NSCHARTNOTEFT_GEN_A_CORE
Called to evaluate patient for restraints.   Patient has been on restraints for agitation. Pt extremely agitated, hitting staff and pulling at IV sites. Pt is unable to be redirected and chemical restraints have been unsuccessful thus far.    Other interventions attempted: de-escalation, orientation check, constant observation, environment modification, medication assessment.     [X ] I have evaluated this patient and have determined that restraints are warranted to optimize medical care. Patient was assessed for current physical and psychological risk factors as well as special needs. There are no medical conditions or limitations that would place this patient at risk while in restraints.    Type of restraint: Bilateral soft wrist restraints  Behavioral criteria for discontinuation of restraint: [ X ] See order  Nocturnist Aware

## 2022-09-09 NOTE — PROGRESS NOTE ADULT - ASSESSMENT
Patient is a 52 year old female with PMH of Seizure, PTSD, anxiety, depression, polysubstance abuse came to the ED for frequent falls. Patient said she came because she was having problem breathing; she was on nebulizer and albuterol but changed PCP, and the nebulizer was not prescribed. As per ED, patient's  said she has been falling more frequently, hit her head multiple times this week; does not feel safe with her at home; concerned she might die if she has a bad unwitnessed fall since he is not usually home with her. He also mentioned she has not been taken her seizure medication as prescribed. In the ED, found to have hb 6.3; fobt negative; alcohol level: 226; CT head cervical: CT: No acute hemorrhage, extra-axial collections or displaced calvarial fracture. Right parietal scalp hematoma and laceration. Bilateral periorbital soft tissue swelling.      # Frequent Falls due to Unsteady Gait - multifactorial likely due to alcohol intoxication, hyponatremia   -patient recently fell given old staples on posterior scalp (given acute state unable to clarify when staples were placed; will need to be removed prior to discharge) - Waiting to speak to spouse  -CTH on admission negative for acute pathology but right parietal scalp hematoma noted  -CT neck negative for acute fracture  -sodium improving to 133  -ETOH level on admission > 200  -fall precautions   -PT evaluation prior to discharge    # Acute on chronic microcytic anemia superimposed on iron deficiency with inappropriate retic response  multifactorial- iron deficiency, bone marrow suppression from alcohol abuse   Hb 8.1 s/p PRBC x 1   Continue Venofer x 3 days  Epogen 40 K x 1 given  FOBT neg  EGD once patient is no longer in withdrawal  Monitor CBC  Continue PPI  GI recs appreciated    # Hyponatremia  likely secondary to low solute load/beer protomania   however no urine studies collected  Improving    # Alcohol Withdrawal  -CIWA elevated  -c/w valium 10 mg q6hrs and taper as tolerated  -continue PRN valium for CIWA > 8  -continue thiamine/folic acid/MVI  -C/w SDU  -SW consult    # COPD not in acute exacerbation    Continue Symbicort    Duoneb prn    # Seizure ( EEG in 2021- Interictal findings suggest potential epileptogenic focus and structural abnormality in the right temporal region)  Valproic acid level WNL   Continue Divalproex 500mg bid   Seizure precautions    # Anxiety/depression/PTSD   Seroquel 50mg HS   Trazodone 50mg HS   -psych evaluation given emotional state    # Tobacco dependency  Decline nicotine  patch  Cessation advised     DVT prophylaxis: CSD     Dispo - Remains acute    Plan discussed with patient    Will attempt to speak to

## 2022-09-09 NOTE — PROGRESS NOTE ADULT - PROBLEM SELECTOR PLAN 1
Microcytic, Iron panel reveals Iron panel deficiency anemia. Most likely suppression after ETOH use, chronic disease or occult GI loss.  US abdomen shows hepatic steatosis.   Continue Protonix.  Trend CBCs, transfuse to Hgb 7 or higher.   Iron panel supplements, MVI, thiamine, folic acid  Will plan for EGD once medically optimized, off CIWA.

## 2022-09-09 NOTE — PROGRESS NOTE ADULT - SUBJECTIVE AND OBJECTIVE BOX
Chief Complaint: This is a 52y old woman patient being seen in follow-up consultation for anemia.     Interval HPI / 24H events:  Patient seen and evaluated at bedside, with 1:1 observation. Patient is with elaine rizzo     ROS: A 14-point review of systems was reviewed and was otherwise negative save what was reported in the HPI.    PAST MEDICAL/SURGICAL HISTORY:  Seizure    ETOH abuse    Tobacco dependence    Hypertension    H/O tracheostomy      MEDICATIONS  (STANDING):  budesonide  80 MICROgram(s)/formoterol 4.5 MICROgram(s) Inhaler 2 Puff(s) Inhalation two times a day  diazepam  Injectable 10 milliGRAM(s) IV Push every 6 hours  diVALproex  milliGRAM(s) Oral two times a day  folic acid 1 milliGRAM(s) Oral daily  iron sucrose IVPB 200 milliGRAM(s) IV Intermittent every 24 hours  multivitamin 1 Tablet(s) Oral daily  pantoprazole    Tablet 40 milliGRAM(s) Oral before breakfast  QUEtiapine 50 milliGRAM(s) Oral at bedtime  thiamine 100 milliGRAM(s) Oral daily  traZODone 50 milliGRAM(s) Oral at bedtime    MEDICATIONS  (PRN):  acetaminophen     Tablet .. 650 milliGRAM(s) Oral every 6 hours PRN Temp greater or equal to 38C (100.4F), Mild Pain (1 - 3)  ALBUTerol    90 MICROgram(s) HFA Inhaler 2 Puff(s) Inhalation every 6 hours PRN Shortness of Breath and/or Wheezing  aluminum hydroxide/magnesium hydroxide/simethicone Suspension 30 milliLiter(s) Oral every 4 hours PRN Dyspepsia  diazepam  Injectable 5 milliGRAM(s) IV Push every 1 hour PRN CIWA > 8  melatonin 3 milliGRAM(s) Oral at bedtime PRN Insomnia  ondansetron Injectable 4 milliGRAM(s) IV Push every 8 hours PRN Nausea and/or Vomiting    No Known Allergies    T(C): 36.3 (09-09-22 @ 04:30), Max: 36.5 (09-08-22 @ 12:25)  HR: 64 (09-09-22 @ 10:02) (62 - 90)  BP: 102/65 (09-09-22 @ 10:02) (99/69 - 150/93)  RR: 18 (09-09-22 @ 10:02) (16 - 23)  SpO2: 96% (09-09-22 @ 10:02) (92% - 100%)    I&O's Summary    PHYSICAL EXAM:      Constitutional:  Eyes:  ENMT:  Neck:  Breasts:  Back:  Respiratory:  Cardiovascular:  Gastrointestinal:  Genitourinary:  Rectal:  Extremities:  Vascular:  Neurological:  Skin:  Lymph Nodes:  Musculoskeletal:  Psychiatric:      LABS:               8.1    5.02  )-----------( 319      ( 09-09 @ 06:00 )             26.4                8.1    5.72  )-----------( 336      ( 09-08 @ 06:03 )             26.0                7.4    6.30  )-----------( 303      ( 09-07 @ 09:01 )             22.8                6.3    5.51  )-----------( 334      ( 09-06 @ 22:16 )             19.8                6.5    4.77  )-----------( 339      ( 09-06 @ 20:08 )             20.6       09-09    134<L>  |  100  |  4.9<L>  ----------------------------<  71  3.7   |  20.0<L>  |  0.52    Ca    9.3      09 Sep 2022 06:00  Phos  3.6     09-09  Mg     1.7     09-09    TPro  6.6  /  Alb  3.6  /  TBili  0.3<L>  /  DBili  x   /  AST  34<H>  /  ALT  29  /  AlkPhos  73  09-09    LIVER FUNCTIONS - ( 09 Sep 2022 06:00 )  Alb: 3.6 g/dL / Pro: 6.6 g/dL / ALK PHOS: 73 U/L / ALT: 29 U/L / AST: 34 U/L / GGT: x             Lipase, Serum: 45 U/L (09-06-22 @ 20:08)      Ferritin, Serum: 34 ng/mL (09-07-22 @ 06:05)  Iron - Total Binding Capacity.: 395 ug/dL (09-06-22 @ 20:08)  % Saturation, Iron: 3 % *L* (09-06-22 @ 20:08)  Iron Total, Serum: 11 ug/dL *L* (09-06-22 @ 20:08)    MELD-Na  Dialysis at least twice in past week: Y/N?  Creatinine:  0.52  Total Bili:  0.3  INR: --  Sodium: 134      IMAGING: I personally reviewed the [XXXXXX], and I agree with the radiologist's interpretation as described below:   Chief Complaint: This is a 52y old woman patient being seen in follow-up consultation for anemia.     Interval HPI / 24H events:  Patient seen and evaluated at bedside, with 1:1 observation. Patient is with vest posy, received Valium for agitation per bedside nurse. Patient was drowsy, slightly open eyes to repeated verbal stimuli. Denies abdominal pain, nausea, vomiting. Her hemoglobin remain stable, today 8.1gm today.     Review of Systems:  Unable to obtain due to her mental status.     PAST MEDICAL/SURGICAL HISTORY:  Seizure    ETOH abuse    Tobacco dependence    Hypertension    H/O tracheostomy      MEDICATIONS  (STANDING):  budesonide  80 MICROgram(s)/formoterol 4.5 MICROgram(s) Inhaler 2 Puff(s) Inhalation two times a day  diazepam  Injectable 10 milliGRAM(s) IV Push every 6 hours  diVALproex  milliGRAM(s) Oral two times a day  folic acid 1 milliGRAM(s) Oral daily  iron sucrose IVPB 200 milliGRAM(s) IV Intermittent every 24 hours  multivitamin 1 Tablet(s) Oral daily  pantoprazole    Tablet 40 milliGRAM(s) Oral before breakfast  QUEtiapine 50 milliGRAM(s) Oral at bedtime  thiamine 100 milliGRAM(s) Oral daily  traZODone 50 milliGRAM(s) Oral at bedtime    MEDICATIONS  (PRN):  acetaminophen     Tablet .. 650 milliGRAM(s) Oral every 6 hours PRN Temp greater or equal to 38C (100.4F), Mild Pain (1 - 3)  ALBUTerol    90 MICROgram(s) HFA Inhaler 2 Puff(s) Inhalation every 6 hours PRN Shortness of Breath and/or Wheezing  aluminum hydroxide/magnesium hydroxide/simethicone Suspension 30 milliLiter(s) Oral every 4 hours PRN Dyspepsia  diazepam  Injectable 5 milliGRAM(s) IV Push every 1 hour PRN CIWA > 8  melatonin 3 milliGRAM(s) Oral at bedtime PRN Insomnia  ondansetron Injectable 4 milliGRAM(s) IV Push every 8 hours PRN Nausea and/or Vomiting    No Known Allergies    T(C): 36.3 (09-09-22 @ 04:30), Max: 36.5 (09-08-22 @ 12:25)  HR: 64 (09-09-22 @ 10:02) (62 - 90)  BP: 102/65 (09-09-22 @ 10:02) (99/69 - 150/93)  RR: 18 (09-09-22 @ 10:02) (16 - 23)  SpO2: 96% (09-09-22 @ 10:02) (92% - 100%)    PHYSICAL EXAM:    Constitutional: No acute distress,  Neuro: open eyes to repeated verbal stimuli,  HEENT: PERRL, anicteric sclerae, oral mucosa pink and moist  Neck: supple, no JVD  CV: regular rate, regular rhythm, +S1S2,   Pulm/chest: lung sounds diminished bilaterally, no accessory muscle use noted  Abd: soft, NT, ND, +BS. No rigidity, rebound tenderness.   Ext: no Cyanosis, clubbing or edema  Skin: warm, no jaundice       LABS:               8.1    5.02  )-----------( 319      ( 09-09 @ 06:00 )             26.4                8.1    5.72  )-----------( 336      ( 09-08 @ 06:03 )             26.0                7.4    6.30  )-----------( 303      ( 09-07 @ 09:01 )             22.8                6.3    5.51  )-----------( 334      ( 09-06 @ 22:16 )             19.8                6.5    4.77  )-----------( 339      ( 09-06 @ 20:08 )             20.6       09-09    134<L>  |  100  |  4.9<L>  ----------------------------<  71  3.7   |  20.0<L>  |  0.52    Ca    9.3      09 Sep 2022 06:00  Phos  3.6     09-09  Mg     1.7     09-09    TPro  6.6  /  Alb  3.6  /  TBili  0.3<L>  /  DBili  x   /  AST  34<H>  /  ALT  29  /  AlkPhos  73  09-09    LIVER FUNCTIONS - ( 09 Sep 2022 06:00 )  Alb: 3.6 g/dL / Pro: 6.6 g/dL / ALK PHOS: 73 U/L / ALT: 29 U/L / AST: 34 U/L / GGT: x             Lipase, Serum: 45 U/L (09-06-22 @ 20:08)      Ferritin, Serum: 34 ng/mL (09-07-22 @ 06:05)  Iron - Total Binding Capacity.: 395 ug/dL (09-06-22 @ 20:08)  % Saturation, Iron: 3 % *L* (09-06-22 @ 20:08)  Iron Total, Serum: 11 ug/dL *L* (09-06-22 @ 20:08)      < from: US Abdomen Upper Quadrant Right (09.08.22 @ 11:47) >  FINDINGS:  Liver: Diffusely echogenic parenchyma compatible with steatosis. No   contour nodularity or focal lesion.  Bile ducts: Normal caliber. Common bile duct measures 3 mm.  Gallbladder: Contracted, limiting evaluation. No gallstones identified.  Pancreas: Visualized portions are within normal limits.  Right kidney: 10.5 cm. No hydronephrosis.  Ascites: None.  IVC: Visualized portions are within normal limits.    IMPRESSION:  Fatty liver.  No morphologic evidence of cirrhosis.    < end of copied text >    < from: US Abdomen Upper Quadrant Right (09.08.22 @ 11:47) >  FINDINGS:  Liver: Diffusely echogenic parenchyma compatible with steatosis. No   contour nodularity or focal lesion.  Bile ducts: Normal caliber. Common bile duct measures 3 mm.  Gallbladder: Contracted, limiting evaluation. No gallstones identified.  Pancreas: Visualized portions are within normal limits.  Right kidney: 10.5 cm. No hydronephrosis.  Ascites: None.  IVC: Visualized portions are within normal limits.    IMPRESSION:  Fatty liver.  No morphologic evidence of cirrhosis.    < end of copied text >

## 2022-09-09 NOTE — PROGRESS NOTE ADULT - SUBJECTIVE AND OBJECTIVE BOX
Hospitalist Daily Progress Note    Chief Complaint:  Patient is a 52y old  Female who presents with a chief complaint of     SUBJECTIVE / OVERNIGHT EVENTS:  Patient was seen and examined at bedside. Episodes of agitation. Resting current.y  Patient denies chest pain, SOB, abd pain, N/V, fever, chills, dysuria or any other complaints. All remainder ROS negative.     MEDICATIONS  (STANDING):  budesonide  80 MICROgram(s)/formoterol 4.5 MICROgram(s) Inhaler 2 Puff(s) Inhalation two times a day  diazepam  Injectable 10 milliGRAM(s) IV Push every 6 hours  diVALproex  milliGRAM(s) Oral two times a day  folic acid 1 milliGRAM(s) Oral daily  iron sucrose IVPB 200 milliGRAM(s) IV Intermittent every 24 hours  multivitamin 1 Tablet(s) Oral daily  pantoprazole    Tablet 40 milliGRAM(s) Oral before breakfast  QUEtiapine 50 milliGRAM(s) Oral at bedtime  thiamine 100 milliGRAM(s) Oral daily  traZODone 50 milliGRAM(s) Oral at bedtime    MEDICATIONS  (PRN):  acetaminophen     Tablet .. 650 milliGRAM(s) Oral every 6 hours PRN Temp greater or equal to 38C (100.4F), Mild Pain (1 - 3)  ALBUTerol    90 MICROgram(s) HFA Inhaler 2 Puff(s) Inhalation every 6 hours PRN Shortness of Breath and/or Wheezing  aluminum hydroxide/magnesium hydroxide/simethicone Suspension 30 milliLiter(s) Oral every 4 hours PRN Dyspepsia  diazepam  Injectable 5 milliGRAM(s) IV Push every 1 hour PRN CIWA > 8  melatonin 3 milliGRAM(s) Oral at bedtime PRN Insomnia  ondansetron Injectable 4 milliGRAM(s) IV Push every 8 hours PRN Nausea and/or Vomiting        I&O's Summary      PHYSICAL EXAM:  Vital Signs Last 24 Hrs  T(C): 36.7 (09 Sep 2022 12:11), Max: 36.7 (09 Sep 2022 12:11)  T(F): 98.1 (09 Sep 2022 12:11), Max: 98.1 (09 Sep 2022 12:11)  HR: 81 (09 Sep 2022 14:01) (62 - 90)  BP: 111/71 (09 Sep 2022 14:01) (99/69 - 150/93)  BP(mean): 79 (09 Sep 2022 14:01) (73 - 108)  RR: 17 (09 Sep 2022 14:01) (16 - 23)  SpO2: 95% (09 Sep 2022 14:01) (92% - 100%)    Parameters below as of 09 Sep 2022 14:01  Patient On (Oxygen Delivery Method): room air          Constitutional: NAD, Resting  ENT: Supple, No JVD  Lungs: CTA B/L, Non-labored breathing  Cardio: RRR, S1/S2, No murmur  Abdomen: Soft, Nontender, Nondistended; Bowel sounds present  Extremities: No calf tenderness, No pitting edema  Musculoskeletal:   No clubbing or cyanosis of digits; no joint swelling or tenderness to palpation  Psych: Calm, cooperative affect appropriate  Neuro: Awake and alert, oriented to name, unaware of year, aware of location, tremors  Skin: No rashes; no palpable lesions    LABS:                        8.1    5.02  )-----------( 319      ( 09 Sep 2022 06:00 )             26.4     09-09    134<L>  |  100  |  4.9<L>  ----------------------------<  71  3.7   |  20.0<L>  |  0.52    Ca    9.3      09 Sep 2022 06:00  Phos  3.6     09-09  Mg     1.7     09-09    TPro  6.6  /  Alb  3.6  /  TBili  0.3<L>  /  DBili  x   /  AST  34<H>  /  ALT  29  /  AlkPhos  73  09-09              CAPILLARY BLOOD GLUCOSE            RADIOLOGY REVIEWED

## 2022-09-10 LAB
BASOPHILS # BLD AUTO: 0.04 K/UL — SIGNIFICANT CHANGE UP (ref 0–0.2)
BASOPHILS NFR BLD AUTO: 0.7 % — SIGNIFICANT CHANGE UP (ref 0–2)
EOSINOPHIL # BLD AUTO: 0.02 K/UL — SIGNIFICANT CHANGE UP (ref 0–0.5)
EOSINOPHIL NFR BLD AUTO: 0.4 % — SIGNIFICANT CHANGE UP (ref 0–6)
HCT VFR BLD CALC: 29.6 % — LOW (ref 34.5–45)
HGB BLD-MCNC: 8.8 G/DL — LOW (ref 11.5–15.5)
IMM GRANULOCYTES NFR BLD AUTO: 0.7 % — SIGNIFICANT CHANGE UP (ref 0–1.5)
LYMPHOCYTES # BLD AUTO: 0.93 K/UL — LOW (ref 1–3.3)
LYMPHOCYTES # BLD AUTO: 16.9 % — SIGNIFICANT CHANGE UP (ref 13–44)
MCHC RBC-ENTMCNC: 23.8 PG — LOW (ref 27–34)
MCHC RBC-ENTMCNC: 29.7 GM/DL — LOW (ref 32–36)
MCV RBC AUTO: 80.2 FL — SIGNIFICANT CHANGE UP (ref 80–100)
MONOCYTES # BLD AUTO: 0.77 K/UL — SIGNIFICANT CHANGE UP (ref 0–0.9)
MONOCYTES NFR BLD AUTO: 14 % — SIGNIFICANT CHANGE UP (ref 2–14)
NEUTROPHILS # BLD AUTO: 3.69 K/UL — SIGNIFICANT CHANGE UP (ref 1.8–7.4)
NEUTROPHILS NFR BLD AUTO: 67.3 % — SIGNIFICANT CHANGE UP (ref 43–77)
PLATELET # BLD AUTO: 285 K/UL — SIGNIFICANT CHANGE UP (ref 150–400)
RBC # BLD: 3.69 M/UL — LOW (ref 3.8–5.2)
RBC # FLD: 22.4 % — HIGH (ref 10.3–14.5)
WBC # BLD: 5.49 K/UL — SIGNIFICANT CHANGE UP (ref 3.8–10.5)
WBC # FLD AUTO: 5.49 K/UL — SIGNIFICANT CHANGE UP (ref 3.8–10.5)

## 2022-09-10 PROCEDURE — 99233 SBSQ HOSP IP/OBS HIGH 50: CPT

## 2022-09-10 RX ADMIN — BUDESONIDE AND FORMOTEROL FUMARATE DIHYDRATE 2 PUFF(S): 160; 4.5 AEROSOL RESPIRATORY (INHALATION) at 20:35

## 2022-09-10 RX ADMIN — Medication 100 MILLIGRAM(S): at 10:05

## 2022-09-10 RX ADMIN — BUDESONIDE AND FORMOTEROL FUMARATE DIHYDRATE 2 PUFF(S): 160; 4.5 AEROSOL RESPIRATORY (INHALATION) at 09:26

## 2022-09-10 RX ADMIN — Medication 10 MILLIGRAM(S): at 17:30

## 2022-09-10 RX ADMIN — DIVALPROEX SODIUM 500 MILLIGRAM(S): 500 TABLET, DELAYED RELEASE ORAL at 17:30

## 2022-09-10 RX ADMIN — QUETIAPINE FUMARATE 50 MILLIGRAM(S): 200 TABLET, FILM COATED ORAL at 22:09

## 2022-09-10 RX ADMIN — Medication 1 TABLET(S): at 10:05

## 2022-09-10 RX ADMIN — Medication 1 MILLIGRAM(S): at 10:05

## 2022-09-10 RX ADMIN — Medication 10 MILLIGRAM(S): at 11:49

## 2022-09-10 RX ADMIN — Medication 10 MILLIGRAM(S): at 06:34

## 2022-09-10 RX ADMIN — PANTOPRAZOLE SODIUM 40 MILLIGRAM(S): 20 TABLET, DELAYED RELEASE ORAL at 06:33

## 2022-09-10 RX ADMIN — DIVALPROEX SODIUM 500 MILLIGRAM(S): 500 TABLET, DELAYED RELEASE ORAL at 06:33

## 2022-09-10 RX ADMIN — Medication 10 MILLIGRAM(S): at 00:57

## 2022-09-10 RX ADMIN — Medication 50 MILLIGRAM(S): at 22:09

## 2022-09-10 NOTE — PROGRESS NOTE ADULT - SUBJECTIVE AND OBJECTIVE BOX
Hospitalist Daily Progress Note    Chief Complaint:  Patient is a 52y old  Female who presents with a chief complaint of     SUBJECTIVE / OVERNIGHT EVENTS:  Patient was seen and examined at bedside. Overnight events noted. Removed restraints. No active complaints.   Patient denies chest pain, SOB, abd pain, N/V, fever, chills, dysuria or any other complaints. All remainder ROS negative.     MEDICATIONS  (STANDING):  budesonide  80 MICROgram(s)/formoterol 4.5 MICROgram(s) Inhaler 2 Puff(s) Inhalation two times a day  diazepam  Injectable 10 milliGRAM(s) IV Push every 6 hours  diVALproex  milliGRAM(s) Oral two times a day  folic acid 1 milliGRAM(s) Oral daily  multivitamin 1 Tablet(s) Oral daily  pantoprazole    Tablet 40 milliGRAM(s) Oral before breakfast  QUEtiapine 50 milliGRAM(s) Oral at bedtime  thiamine 100 milliGRAM(s) Oral daily  traZODone 50 milliGRAM(s) Oral at bedtime    MEDICATIONS  (PRN):  acetaminophen     Tablet .. 650 milliGRAM(s) Oral every 6 hours PRN Temp greater or equal to 38C (100.4F), Mild Pain (1 - 3)  ALBUTerol    90 MICROgram(s) HFA Inhaler 2 Puff(s) Inhalation every 6 hours PRN Shortness of Breath and/or Wheezing  aluminum hydroxide/magnesium hydroxide/simethicone Suspension 30 milliLiter(s) Oral every 4 hours PRN Dyspepsia  diazepam  Injectable 5 milliGRAM(s) IV Push every 1 hour PRN CIWA > 8  melatonin 3 milliGRAM(s) Oral at bedtime PRN Insomnia  ondansetron Injectable 4 milliGRAM(s) IV Push every 8 hours PRN Nausea and/or Vomiting        I&O's Summary      PHYSICAL EXAM:  Vital Signs Last 24 Hrs  T(C): 36.6 (10 Sep 2022 08:02), Max: 36.8 (09 Sep 2022 20:00)  T(F): 97.8 (10 Sep 2022 08:02), Max: 98.2 (09 Sep 2022 20:00)  HR: 64 (10 Sep 2022 09:07) (57 - 85)  BP: 107/76 (10 Sep 2022 09:07) (88/57 - 135/85)  BP(mean): 72 (09 Sep 2022 22:00) (72 - 97)  RR: 18 (10 Sep 2022 09:07) (16 - 20)  SpO2: 100% (10 Sep 2022 09:07) (95% - 100%)    Parameters below as of 10 Sep 2022 09:07  Patient On (Oxygen Delivery Method): room air          Constitutional: NAD, Resting  ENT: Supple, No JVD  Lungs: CTA B/L, Non-labored breathing  Cardio: RRR, S1/S2, No murmur  Abdomen: Soft, Nontender, Nondistended; Bowel sounds present  Extremities: No calf tenderness, No pitting edema  Musculoskeletal:   No clubbing or cyanosis of digits; no joint swelling or tenderness to palpation  Psych: Drowsy but calm  Neuro: Awakens, answers questions , oriented  to name and location, states year is 2020  Skin: No rashes; no palpable lesions    LABS:                        8.8    5.49  )-----------( 285      ( 10 Sep 2022 07:30 )             29.6     09-09    134<L>  |  100  |  4.9<L>  ----------------------------<  71  3.7   |  20.0<L>  |  0.52    Ca    9.3      09 Sep 2022 06:00  Phos  3.6     09-09  Mg     1.7     09-09    TPro  6.6  /  Alb  3.6  /  TBili  0.3<L>  /  DBili  x   /  AST  34<H>  /  ALT  29  /  AlkPhos  73  09-09              CAPILLARY BLOOD GLUCOSE            RADIOLOGY REVIEWED

## 2022-09-10 NOTE — PROGRESS NOTE ADULT - ASSESSMENT
Patient is a 52 year old female with PMH of Seizure, PTSD, anxiety, depression, polysubstance abuse came to the ED for frequent falls. Patient said she came because she was having problem breathing; she was on nebulizer and albuterol but changed PCP, and the nebulizer was not prescribed. As per ED, patient's  said she has been falling more frequently, hit her head multiple times this week; does not feel safe with her at home; concerned she might die if she has a bad unwitnessed fall since he is not usually home with her. He also mentioned she has not been taken her seizure medication as prescribed. In the ED, found to have hb 6.3; fobt negative; alcohol level: 226; CT head cervical: CT: No acute hemorrhage, extra-axial collections or displaced calvarial fracture. Right parietal scalp hematoma and laceration. Bilateral periorbital soft tissue swelling.      # Frequent Falls due to Unsteady Gait - multifactorial likely due to alcohol intoxication, hyponatremia   CTH on admission negative for acute pathology but right parietal scalp hematoma noted  CT neck negative for acute fracture  sodium improved  ETOH level on admission > 200  fall precautions   PT evaluation prior to discharge    #Acute on chronic microcytic anemia superimposed on iron deficiency with inappropriate retic response  multifactorial- iron deficiency, bone marrow suppression from alcohol abuse   Hb 8.1 s/p PRBC x 1   Continue Venofer x 3 days  Epogen 40 K x 1 given  FOBT neg  EGD once patient is no longer in withdrawal  Monitor CBC  Continue PPI  GI recs appreciated    #Hyponatremia  likely secondary to low solute load/beer protomania   however no urine studies collected  Improving    #Alcohol Withdrawal  CIWA elevated  c/w valium 10 mg q6hrs and taper as tolerated  continue PRN valium for CIWA > 8  continue thiamine/folic acid/MVI  C/w SDU  SW consult    #COPD not in acute exacerbation    Continue Symbicort    Duoneb prn    #Seizure ( EEG in 2021- Interictal findings suggest potential epileptogenic focus and structural abnormality in the right temporal region)  Valproic acid level WNL   Continue Divalproex 500mg bid   Seizure precautions    #Anxiety/depression/PTSD   Seroquel 50mg HS   Trazodone 50mg HS   psych evaluation given emotional state - NOT YET SEEN.     #Tobacco dependency  Decline nicotine patch  Cessation advised     DVT prophylaxis: CSD     Dispo - Remains acute    Spoke to patients  Dago on 9/10

## 2022-09-11 LAB
ANION GAP SERPL CALC-SCNC: 13 MMOL/L — SIGNIFICANT CHANGE UP (ref 5–17)
BASOPHILS # BLD AUTO: 0.03 K/UL — SIGNIFICANT CHANGE UP (ref 0–0.2)
BASOPHILS NFR BLD AUTO: 0.4 % — SIGNIFICANT CHANGE UP (ref 0–2)
BUN SERPL-MCNC: 9.1 MG/DL — SIGNIFICANT CHANGE UP (ref 8–20)
CALCIUM SERPL-MCNC: 9.7 MG/DL — SIGNIFICANT CHANGE UP (ref 8.4–10.5)
CHLORIDE SERPL-SCNC: 100 MMOL/L — SIGNIFICANT CHANGE UP (ref 98–107)
CO2 SERPL-SCNC: 21 MMOL/L — LOW (ref 22–29)
CREAT SERPL-MCNC: 0.62 MG/DL — SIGNIFICANT CHANGE UP (ref 0.5–1.3)
EGFR: 106 ML/MIN/1.73M2 — SIGNIFICANT CHANGE UP
EOSINOPHIL # BLD AUTO: 0.01 K/UL — SIGNIFICANT CHANGE UP (ref 0–0.5)
EOSINOPHIL NFR BLD AUTO: 0.1 % — SIGNIFICANT CHANGE UP (ref 0–6)
FOLATE SERPL-MCNC: >20 NG/ML — SIGNIFICANT CHANGE UP
GLUCOSE SERPL-MCNC: 85 MG/DL — SIGNIFICANT CHANGE UP (ref 70–99)
HCT VFR BLD CALC: 29.2 % — LOW (ref 34.5–45)
HGB BLD-MCNC: 8.6 G/DL — LOW (ref 11.5–15.5)
IMM GRANULOCYTES NFR BLD AUTO: 0.4 % — SIGNIFICANT CHANGE UP (ref 0–1.5)
LYMPHOCYTES # BLD AUTO: 1.24 K/UL — SIGNIFICANT CHANGE UP (ref 1–3.3)
LYMPHOCYTES # BLD AUTO: 18.6 % — SIGNIFICANT CHANGE UP (ref 13–44)
MCHC RBC-ENTMCNC: 23.3 PG — LOW (ref 27–34)
MCHC RBC-ENTMCNC: 29.5 GM/DL — LOW (ref 32–36)
MCV RBC AUTO: 79.1 FL — LOW (ref 80–100)
MONOCYTES # BLD AUTO: 1.03 K/UL — HIGH (ref 0–0.9)
MONOCYTES NFR BLD AUTO: 15.4 % — HIGH (ref 2–14)
NEUTROPHILS # BLD AUTO: 4.33 K/UL — SIGNIFICANT CHANGE UP (ref 1.8–7.4)
NEUTROPHILS NFR BLD AUTO: 65.1 % — SIGNIFICANT CHANGE UP (ref 43–77)
PLATELET # BLD AUTO: 364 K/UL — SIGNIFICANT CHANGE UP (ref 150–400)
POTASSIUM SERPL-MCNC: 4.3 MMOL/L — SIGNIFICANT CHANGE UP (ref 3.5–5.3)
POTASSIUM SERPL-SCNC: 4.3 MMOL/L — SIGNIFICANT CHANGE UP (ref 3.5–5.3)
RBC # BLD: 3.69 M/UL — LOW (ref 3.8–5.2)
RBC # FLD: 23.1 % — HIGH (ref 10.3–14.5)
SODIUM SERPL-SCNC: 134 MMOL/L — LOW (ref 135–145)
VIT B12 SERPL-MCNC: 1039 PG/ML — SIGNIFICANT CHANGE UP (ref 232–1245)
WBC # BLD: 6.67 K/UL — SIGNIFICANT CHANGE UP (ref 3.8–10.5)
WBC # FLD AUTO: 6.67 K/UL — SIGNIFICANT CHANGE UP (ref 3.8–10.5)

## 2022-09-11 PROCEDURE — 99233 SBSQ HOSP IP/OBS HIGH 50: CPT

## 2022-09-11 RX ORDER — DIAZEPAM 5 MG
5 TABLET ORAL EVERY 6 HOURS
Refills: 0 | Status: DISCONTINUED | OUTPATIENT
Start: 2022-09-11 | End: 2022-09-12

## 2022-09-11 RX ADMIN — Medication 50 MILLIGRAM(S): at 21:30

## 2022-09-11 RX ADMIN — QUETIAPINE FUMARATE 50 MILLIGRAM(S): 200 TABLET, FILM COATED ORAL at 21:30

## 2022-09-11 RX ADMIN — Medication 1 MILLIGRAM(S): at 11:11

## 2022-09-11 RX ADMIN — DIVALPROEX SODIUM 500 MILLIGRAM(S): 500 TABLET, DELAYED RELEASE ORAL at 05:50

## 2022-09-11 RX ADMIN — Medication 5 MILLIGRAM(S): at 18:01

## 2022-09-11 RX ADMIN — PANTOPRAZOLE SODIUM 40 MILLIGRAM(S): 20 TABLET, DELAYED RELEASE ORAL at 05:50

## 2022-09-11 RX ADMIN — Medication 100 MILLIGRAM(S): at 11:10

## 2022-09-11 RX ADMIN — Medication 10 MILLIGRAM(S): at 05:50

## 2022-09-11 RX ADMIN — DIVALPROEX SODIUM 500 MILLIGRAM(S): 500 TABLET, DELAYED RELEASE ORAL at 17:20

## 2022-09-11 RX ADMIN — BUDESONIDE AND FORMOTEROL FUMARATE DIHYDRATE 2 PUFF(S): 160; 4.5 AEROSOL RESPIRATORY (INHALATION) at 21:40

## 2022-09-11 RX ADMIN — Medication 5 MILLIGRAM(S): at 11:10

## 2022-09-11 RX ADMIN — Medication 1 TABLET(S): at 11:11

## 2022-09-11 NOTE — PROGRESS NOTE ADULT - SUBJECTIVE AND OBJECTIVE BOX
Hospitalist Daily Progress Note    Chief Complaint:  Patient is a 52y old  Female who presents with a chief complaint of     SUBJECTIVE / OVERNIGHT EVENTS:  Patient was seen and examined at bedside. No overnight events noted. Episodes of agitation noted. No active complaints.   Patient denies chest pain, SOB, abd pain, N/V, fever, chills, dysuria or any other complaints. All remainder ROS negative.     MEDICATIONS  (STANDING):  budesonide  80 MICROgram(s)/formoterol 4.5 MICROgram(s) Inhaler 2 Puff(s) Inhalation two times a day  diazepam  Injectable 5 milliGRAM(s) IV Push every 6 hours  diVALproex  milliGRAM(s) Oral two times a day  folic acid 1 milliGRAM(s) Oral daily  multivitamin 1 Tablet(s) Oral daily  pantoprazole    Tablet 40 milliGRAM(s) Oral before breakfast  QUEtiapine 50 milliGRAM(s) Oral at bedtime  thiamine 100 milliGRAM(s) Oral daily  traZODone 50 milliGRAM(s) Oral at bedtime    MEDICATIONS  (PRN):  acetaminophen     Tablet .. 650 milliGRAM(s) Oral every 6 hours PRN Temp greater or equal to 38C (100.4F), Mild Pain (1 - 3)  ALBUTerol    90 MICROgram(s) HFA Inhaler 2 Puff(s) Inhalation every 6 hours PRN Shortness of Breath and/or Wheezing  aluminum hydroxide/magnesium hydroxide/simethicone Suspension 30 milliLiter(s) Oral every 4 hours PRN Dyspepsia  diazepam  Injectable 5 milliGRAM(s) IV Push every 1 hour PRN CIWA > 8  melatonin 3 milliGRAM(s) Oral at bedtime PRN Insomnia  ondansetron Injectable 4 milliGRAM(s) IV Push every 8 hours PRN Nausea and/or Vomiting        I&O's Summary      PHYSICAL EXAM:  Vital Signs Last 24 Hrs  T(C): 36.6 (11 Sep 2022 09:05), Max: 37 (10 Sep 2022 20:02)  T(F): 97.8 (11 Sep 2022 09:05), Max: 98.6 (10 Sep 2022 20:02)  HR: 59 (11 Sep 2022 09:05) (58 - 70)  BP: 96/70 (11 Sep 2022 09:05) (96/70 - 136/53)  BP(mean): 95 (11 Sep 2022 04:00) (87 - 95)  RR: 17 (11 Sep 2022 09:05) (13 - 18)  SpO2: 97% (11 Sep 2022 09:05) (96% - 100%)    Parameters below as of 11 Sep 2022 09:05  Patient On (Oxygen Delivery Method): room air          Constitutional: NAD, Resting, Chronically ill appearing female , Ecchymosis noted on face   ENT: Supple, No JVD  Lungs: CTA B/L, Non-labored breathing  Cardio: RRR, S1/S2, No murmur  Abdomen: Soft, Nontender, Nondistended; Bowel sounds present  Extremities: No calf tenderness, No pitting edema  Musculoskeletal:   No clubbing or cyanosis of digits; no joint swelling or tenderness to palpation  Psych: Calm, cooperative affect appropriate  Neuro: Awake and alert, oriented to name and location, states year is 2020  Skin: No rashes; no palpable lesions    LABS:                        8.6    6.67  )-----------( 364      ( 11 Sep 2022 08:45 )             29.2     09-11    134<L>  |  100  |  9.1  ----------------------------<  85  4.3   |  21.0<L>  |  0.62    Ca    9.7      11 Sep 2022 08:45                CAPILLARY BLOOD GLUCOSE            RADIOLOGY REVIEWED

## 2022-09-11 NOTE — PROGRESS NOTE ADULT - ASSESSMENT
Patient is a 52 year old female with PMH of Seizure, PTSD, anxiety, depression, polysubstance abuse came to the ED for frequent falls. Patient said she came because she was having problem breathing; she was on nebulizer and albuterol but changed PCP, and the nebulizer was not prescribed. As per ED, patient's  said she has been falling more frequently, hit her head multiple times this week; does not feel safe with her at home; concerned she might die if she has a bad unwitnessed fall since he is not usually home with her. He also mentioned she has not been taken her seizure medication as prescribed. In the ED, found to have hb 6.3; fobt negative; alcohol level: 226; CT head cervical: CT: No acute hemorrhage, extra-axial collections or displaced calvarial fracture. Right parietal scalp hematoma and laceration. Bilateral periorbital soft tissue swelling.      #Alcohol Withdrawal  CIWA   Valium tapered to 5 q6h  continue PRN valium for CIWA > 8  continue thiamine/folic acid/MVI  C/w SDU  SW consult  1:1 for now    #Acute on chronic microcytic anemia superimposed on iron deficiency with inappropriate retic response  multifactorial- iron deficiency, bone marrow suppression from alcohol abuse   Hb 8.1 s/p PRBC x 1   Continue Venofer x 3 days  Epogen 40 K x 1 given  FOBT neg  EGD once patient is no longer in withdrawal  Monitor CBC  Continue PPI  GI recs appreciated    # Frequent Falls due to Unsteady Gait - multifactorial likely due to alcohol intoxication, hyponatremia   CTH on admission negative for acute pathology but right parietal scalp hematoma noted  CT neck negative for acute fracture  sodium improved  ETOH level on admission > 200  fall precautions   PT evaluation prior to discharge    #Hyponatremia  likely secondary to low solute load/beer protomania   however no urine studies collected  Improving    #COPD not in acute exacerbation    Continue Symbicort    Duoneb prn    #Seizure ( EEG in 2021- Interictal findings suggest potential epileptogenic focus and structural abnormality in the right temporal region)  Valproic acid level WNL   Continue Divalproex 500mg bid   Seizure precautions    #Anxiety/depression/PTSD   Seroquel 50mg HS   Trazodone 50mg HS   psych evaluation given emotional state - NOT YET SEEN.     #Tobacco dependency  Decline nicotine patch  Cessation advised     DVT prophylaxis: CSD     Dispo - Remains acute    Spoke to patients  Dago on 9/10

## 2022-09-12 PROCEDURE — 99233 SBSQ HOSP IP/OBS HIGH 50: CPT

## 2022-09-12 PROCEDURE — 99232 SBSQ HOSP IP/OBS MODERATE 35: CPT

## 2022-09-12 RX ORDER — INFLUENZA VIRUS VACCINE 15; 15; 15; 15 UG/.5ML; UG/.5ML; UG/.5ML; UG/.5ML
0.5 SUSPENSION INTRAMUSCULAR ONCE
Refills: 0 | Status: DISCONTINUED | OUTPATIENT
Start: 2022-09-12 | End: 2022-09-27

## 2022-09-12 RX ADMIN — BUDESONIDE AND FORMOTEROL FUMARATE DIHYDRATE 2 PUFF(S): 160; 4.5 AEROSOL RESPIRATORY (INHALATION) at 21:50

## 2022-09-12 RX ADMIN — DIVALPROEX SODIUM 500 MILLIGRAM(S): 500 TABLET, DELAYED RELEASE ORAL at 16:43

## 2022-09-12 RX ADMIN — Medication 5 MILLIGRAM(S): at 00:17

## 2022-09-12 RX ADMIN — QUETIAPINE FUMARATE 50 MILLIGRAM(S): 200 TABLET, FILM COATED ORAL at 21:31

## 2022-09-12 RX ADMIN — PANTOPRAZOLE SODIUM 40 MILLIGRAM(S): 20 TABLET, DELAYED RELEASE ORAL at 06:00

## 2022-09-12 RX ADMIN — DIVALPROEX SODIUM 500 MILLIGRAM(S): 500 TABLET, DELAYED RELEASE ORAL at 05:18

## 2022-09-12 RX ADMIN — Medication 50 MILLIGRAM(S): at 21:32

## 2022-09-12 RX ADMIN — Medication 1 TABLET(S): at 11:22

## 2022-09-12 RX ADMIN — Medication 1 MILLIGRAM(S): at 11:22

## 2022-09-12 RX ADMIN — Medication 5 MILLIGRAM(S): at 05:17

## 2022-09-12 RX ADMIN — Medication 100 MILLIGRAM(S): at 11:22

## 2022-09-12 NOTE — PATIENT PROFILE ADULT - FALL HARM RISK - HARM RISK INTERVENTIONS
Assistance with ambulation/Assistance OOB with selected safe patient handling equipment/Communicate Risk of Fall with Harm to all staff/Monitor for mental status changes/Monitor gait and stability/Reinforce activity limits and safety measures with patient and family/Tailored Fall Risk Interventions/Toileting schedule using arm’s reach rule for commode and bathroom/Use of alarms - bed, chair and/or voice tab/Visual Cue: Yellow wristband and red socks/Bed in lowest position, wheels locked, appropriate side rails in place/Call bell, personal items and telephone in reach/Instruct patient to call for assistance before getting out of bed or chair/Non-slip footwear when patient is out of bed/[place] to call system/Physically safe environment - no spills, clutter or unnecessary equipment/Purposeful Proactive Rounding/Room/bathroom lighting operational, light cord in reach

## 2022-09-12 NOTE — PROGRESS NOTE ADULT - PROBLEM SELECTOR PLAN 1
Microcytic, Iron panel reveals deficiency anemia. Most likely suppression after ETOH use, chronic disease or occult GI loss.  US abdomen shows hepatic steatosis. No morphologic evidence of cirrhosis.   Continue Protonix.  Trend CBCs, transfuse as needed, goal 7 or higher.   Iron supplements, MVI, thiamine, folic acid  Will plan for EGD once medically optimized, off CIWA.

## 2022-09-12 NOTE — PROGRESS NOTE ADULT - NS ATTEND AMEND GEN_ALL_CORE FT
52 F  microcytic anemia  also severe EtOH abuse, off CIWA but not awake  please consult GI prior to discharge - will need EGD/colon    Obi Arnold [phone] consents for her

## 2022-09-12 NOTE — PROGRESS NOTE ADULT - SUBJECTIVE AND OBJECTIVE BOX
Hospitalist Daily Progress Note    Chief Complaint:  Patient is a 52y old  Female who presents with a chief complaint of     SUBJECTIVE / OVERNIGHT EVENTS:  Patient was seen and examined at bedside. Patient without complaints. Mental status appears to be at baseline.   Patient denies chest pain, SOB, abd pain, N/V, fever, chills, dysuria or any other complaints. All remainder ROS negative.     MEDICATIONS  (STANDING):  budesonide  80 MICROgram(s)/formoterol 4.5 MICROgram(s) Inhaler 2 Puff(s) Inhalation two times a day  diVALproex  milliGRAM(s) Oral two times a day  folic acid 1 milliGRAM(s) Oral daily  influenza   Vaccine 0.5 milliLiter(s) IntraMuscular once  multivitamin 1 Tablet(s) Oral daily  pantoprazole    Tablet 40 milliGRAM(s) Oral before breakfast  QUEtiapine 50 milliGRAM(s) Oral at bedtime  thiamine 100 milliGRAM(s) Oral daily  traZODone 50 milliGRAM(s) Oral at bedtime    MEDICATIONS  (PRN):  acetaminophen     Tablet .. 650 milliGRAM(s) Oral every 6 hours PRN Temp greater or equal to 38C (100.4F), Mild Pain (1 - 3)  ALBUTerol    90 MICROgram(s) HFA Inhaler 2 Puff(s) Inhalation every 6 hours PRN Shortness of Breath and/or Wheezing  aluminum hydroxide/magnesium hydroxide/simethicone Suspension 30 milliLiter(s) Oral every 4 hours PRN Dyspepsia  diazepam  Injectable 5 milliGRAM(s) IV Push every 1 hour PRN CIWA > 8  melatonin 3 milliGRAM(s) Oral at bedtime PRN Insomnia  ondansetron Injectable 4 milliGRAM(s) IV Push every 8 hours PRN Nausea and/or Vomiting        I&O's Summary      PHYSICAL EXAM:  Vital Signs Last 24 Hrs  T(C): 36.7 (12 Sep 2022 07:43), Max: 36.9 (12 Sep 2022 04:00)  T(F): 98.1 (12 Sep 2022 07:43), Max: 98.5 (12 Sep 2022 04:00)  HR: 76 (12 Sep 2022 08:00) (67 - 76)  BP: 144/106 (12 Sep 2022 08:00) (102/77 - 144/106)  BP(mean): 116 (12 Sep 2022 08:00) (83 - 116)  RR: 16 (12 Sep 2022 08:00) (16 - 19)  SpO2: 97% (12 Sep 2022 08:00) (97% - 100%)    Parameters below as of 12 Sep 2022 08:00  Patient On (Oxygen Delivery Method): room air          Constitutional: NAD, Resting, Chronically ill appearing female, Ecchymosis of left eye   ENT: Supple, No JVD  Lungs: CTA B/L, Non-labored breathing  Cardio: RRR, S1/S2, No murmur  Abdomen: Soft, Nontender, Nondistended; Bowel sounds present  Extremities: No calf tenderness, No pitting edema  Musculoskeletal:   No clubbing or cyanosis of digits; no joint swelling or tenderness to palpation  Psych: Calm, cooperative affect appropriate  Neuro: Awake and alert, oriented to name and location, states year is 2022, moves all extremities, Mumbles  Skin: No rashes; no palpable lesions    LABS:                        8.6    6.67  )-----------( 364      ( 11 Sep 2022 08:45 )             29.2     09-11    134<L>  |  100  |  9.1  ----------------------------<  85  4.3   |  21.0<L>  |  0.62    Ca    9.7      11 Sep 2022 08:45                CAPILLARY BLOOD GLUCOSE            RADIOLOGY REVIEWED

## 2022-09-12 NOTE — PROGRESS NOTE ADULT - SUBJECTIVE AND OBJECTIVE BOX
Chief Complaint: This is a 52y old woman patient being seen in follow-up consultation for anemia.     Interval HPI / 24H events:  Patient seen and evaluated at bedside, confused, minimally conversive. On Valium 5 mg Q6H, last dose received this morning. Denies abdominal pain, nausea, vomiting. Her hemoglobin remain stable, today 8.6gm today.     Review of Systems:  Unable to obtain due to her mental status.       PAST MEDICAL/SURGICAL HISTORY:  Seizure    ETOH abuse    Tobacco dependence    Hypertension    H/O tracheostomy      MEDICATIONS  (STANDING):  budesonide  80 MICROgram(s)/formoterol 4.5 MICROgram(s) Inhaler 2 Puff(s) Inhalation two times a day  diazepam  Injectable 5 milliGRAM(s) IV Push every 6 hours  diVALproex  milliGRAM(s) Oral two times a day  folic acid 1 milliGRAM(s) Oral daily  influenza   Vaccine 0.5 milliLiter(s) IntraMuscular once  multivitamin 1 Tablet(s) Oral daily  pantoprazole    Tablet 40 milliGRAM(s) Oral before breakfast  QUEtiapine 50 milliGRAM(s) Oral at bedtime  thiamine 100 milliGRAM(s) Oral daily  traZODone 50 milliGRAM(s) Oral at bedtime    MEDICATIONS  (PRN):  acetaminophen     Tablet .. 650 milliGRAM(s) Oral every 6 hours PRN Temp greater or equal to 38C (100.4F), Mild Pain (1 - 3)  ALBUTerol    90 MICROgram(s) HFA Inhaler 2 Puff(s) Inhalation every 6 hours PRN Shortness of Breath and/or Wheezing  aluminum hydroxide/magnesium hydroxide/simethicone Suspension 30 milliLiter(s) Oral every 4 hours PRN Dyspepsia  diazepam  Injectable 5 milliGRAM(s) IV Push every 1 hour PRN CIWA > 8  melatonin 3 milliGRAM(s) Oral at bedtime PRN Insomnia  ondansetron Injectable 4 milliGRAM(s) IV Push every 8 hours PRN Nausea and/or Vomiting    No Known Allergies    T(C): 36.7 (09-12-22 @ 07:43), Max: 36.9 (09-12-22 @ 04:00)  HR: 76 (09-12-22 @ 08:00) (59 - 76)  BP: 144/106 (09-12-22 @ 08:00) (96/70 - 144/106)  RR: 16 (09-12-22 @ 08:00) (16 - 19)  SpO2: 97% (09-12-22 @ 08:00) (97% - 100%)      PHYSICAL EXAM:    Constitutional: No acute distress,  Neuro: Awake, alert, confused,   HEENT: PERRL, anicteric sclerae, oral mucosa pink and moist  Neck: supple, no JVD  CV: regular rate, regular rhythm, +S1S2,   Pulm/chest: lung sounds diminished bilaterally, no accessory muscle use noted  Abd: soft, NT, ND, +BS. No rigidity, rebound tenderness.   Ext: no Cyanosis, clubbing or edema  Skin: warm, no jaundice       LABS:               8.6    6.67  )-----------( 364      ( 09-11 @ 08:45 )             29.2                8.8    5.49  )-----------( 285      ( 09-10 @ 07:30 )             29.6       09-11    134<L>  |  100  |  9.1  ----------------------------<  85  4.3   |  21.0<L>  |  0.62    Ca    9.7      11 Sep 2022 08:45     Chief Complaint: This is a 52y old woman patient being seen in follow-up consultation for anemia.     Interval HPI / 24H events:  Patient seen and evaluated at bedside, confused, minimally conversive. On Valium 5 mg Q6H, last dose received this morning. Denies abdominal pain, nausea, vomiting. Her hemoglobin remain stable, today 8.6gm today.     Review of Systems:  Unable to obtain due to her mental status.       PAST MEDICAL/SURGICAL HISTORY:  Seizure    ETOH abuse    Tobacco dependence    Hypertension    H/O tracheostomy      MEDICATIONS  (STANDING):  budesonide  80 MICROgram(s)/formoterol 4.5 MICROgram(s) Inhaler 2 Puff(s) Inhalation two times a day  diazepam  Injectable 5 milliGRAM(s) IV Push every 6 hours  diVALproex  milliGRAM(s) Oral two times a day  folic acid 1 milliGRAM(s) Oral daily  influenza   Vaccine 0.5 milliLiter(s) IntraMuscular once  multivitamin 1 Tablet(s) Oral daily  pantoprazole    Tablet 40 milliGRAM(s) Oral before breakfast  QUEtiapine 50 milliGRAM(s) Oral at bedtime  thiamine 100 milliGRAM(s) Oral daily  traZODone 50 milliGRAM(s) Oral at bedtime    MEDICATIONS  (PRN):  acetaminophen     Tablet .. 650 milliGRAM(s) Oral every 6 hours PRN Temp greater or equal to 38C (100.4F), Mild Pain (1 - 3)  ALBUTerol    90 MICROgram(s) HFA Inhaler 2 Puff(s) Inhalation every 6 hours PRN Shortness of Breath and/or Wheezing  aluminum hydroxide/magnesium hydroxide/simethicone Suspension 30 milliLiter(s) Oral every 4 hours PRN Dyspepsia  diazepam  Injectable 5 milliGRAM(s) IV Push every 1 hour PRN CIWA > 8  melatonin 3 milliGRAM(s) Oral at bedtime PRN Insomnia  ondansetron Injectable 4 milliGRAM(s) IV Push every 8 hours PRN Nausea and/or Vomiting    No Known Allergies    T(C): 36.7 (09-12-22 @ 07:43), Max: 36.9 (09-12-22 @ 04:00)  HR: 76 (09-12-22 @ 08:00) (59 - 76)  BP: 144/106 (09-12-22 @ 08:00) (96/70 - 144/106)  RR: 16 (09-12-22 @ 08:00) (16 - 19)  SpO2: 97% (09-12-22 @ 08:00) (97% - 100%)      PHYSICAL EXAM:    Constitutional: No acute distress,  Neuro: Awake, obtunded, non responsive to verbal or physical stimuli apart from moaning  HEENT: PERRL, anicteric sclerae, oral mucosa pink and moist  Neck: supple, no JVD  CV: regular rate, regular rhythm, +S1S2,   Pulm/chest: lung sounds diminished bilaterally, no accessory muscle use noted  Abd: soft, NT, ND, +BS. No rigidity, rebound tenderness.   Ext: no Cyanosis, clubbing or edema  Skin: warm, no jaundice       LABS:               8.6    6.67  )-----------( 364      ( 09-11 @ 08:45 )             29.2                8.8    5.49  )-----------( 285      ( 09-10 @ 07:30 )             29.6       09-11    134<L>  |  100  |  9.1  ----------------------------<  85  4.3   |  21.0<L>  |  0.62    Ca    9.7      11 Sep 2022 08:45

## 2022-09-12 NOTE — PROGRESS NOTE ADULT - ASSESSMENT
Patient is a 52 year old female with PMH of Seizure, PTSD, anxiety, depression, polysubstance abuse came to the ED for frequent falls. Patient said she came because she was having problem breathing; she was on nebulizer and albuterol but changed PCP, and the nebulizer was not prescribed. As per ED, patient's  said she has been falling more frequently, hit her head multiple times this week; does not feel safe with her at home; concerned she might die if she has a bad unwitnessed fall since he is not usually home with her. He also mentioned she has not been taken her seizure medication as prescribed. In the ED, found to have hb 6.3; fobt negative; alcohol level: 226; CT head cervical: CT: No acute hemorrhage, extra-axial collections or displaced calvarial fracture. Right parietal scalp hematoma and laceration. Bilateral periorbital soft tissue swelling.      #Alcohol Withdrawal  CIWA   Valium tapered to PRN for CIWA >8  continue thiamine/folic acid/MVI  Downgrade from SDU  Removed 1:1    #Acute on chronic microcytic anemia superimposed on iron deficiency with inappropriate retic response  multifactorial- iron deficiency, bone marrow suppression from alcohol abuse   Hb 8.1 s/p PRBC x 1   Completed Venofer x 3 days  Epogen 40 K x 1 given  FOBT neg  EGD once patient is no longer in withdrawal  Monitor CBC  Continue PPI  GI recs appreciated    # Frequent Falls due to Unsteady Gait - multifactorial likely due to alcohol intoxication, hyponatremia   CTH on admission negative for acute pathology but right parietal scalp hematoma noted  CT neck negative for acute fracture  sodium improved  ETOH level on admission > 200  fall precautions   PT evaluation prior to discharge    #Hyponatremia  likely secondary to low solute load/beer protomania   however no urine studies collected  Improving    #COPD not in acute exacerbation    Continue Symbicort    Duoneb prn    #Seizure ( EEG in 2021- Interictal findings suggest potential epileptogenic focus and structural abnormality in the right temporal region)  Valproic acid level WNL   Continue Divalproex 500mg bid   Seizure precautions    #Anxiety/depression/PTSD   Seroquel 50mg HS   Trazodone 50mg HS   psych evaluation given emotional state - NOT YET SEEN.     #Tobacco dependency  Decline nicotine patch  Cessation advised     DVT prophylaxis: CSD     Dispo - Remains acute, Needs EGD and PT tiffany    Spoke to patients  Dago on 9/12

## 2022-09-13 LAB
ANION GAP SERPL CALC-SCNC: 12 MMOL/L — SIGNIFICANT CHANGE UP (ref 5–17)
BASOPHILS # BLD AUTO: 0.04 K/UL — SIGNIFICANT CHANGE UP (ref 0–0.2)
BASOPHILS NFR BLD AUTO: 0.6 % — SIGNIFICANT CHANGE UP (ref 0–2)
BUN SERPL-MCNC: 13.4 MG/DL — SIGNIFICANT CHANGE UP (ref 8–20)
CALCIUM SERPL-MCNC: 9.7 MG/DL — SIGNIFICANT CHANGE UP (ref 8.4–10.5)
CHLORIDE SERPL-SCNC: 103 MMOL/L — SIGNIFICANT CHANGE UP (ref 98–107)
CO2 SERPL-SCNC: 23 MMOL/L — SIGNIFICANT CHANGE UP (ref 22–29)
CREAT SERPL-MCNC: 0.62 MG/DL — SIGNIFICANT CHANGE UP (ref 0.5–1.3)
EGFR: 106 ML/MIN/1.73M2 — SIGNIFICANT CHANGE UP
EOSINOPHIL # BLD AUTO: 0.01 K/UL — SIGNIFICANT CHANGE UP (ref 0–0.5)
EOSINOPHIL NFR BLD AUTO: 0.1 % — SIGNIFICANT CHANGE UP (ref 0–6)
GLUCOSE SERPL-MCNC: 82 MG/DL — SIGNIFICANT CHANGE UP (ref 70–99)
HCT VFR BLD CALC: 31.8 % — LOW (ref 34.5–45)
HGB BLD-MCNC: 9.5 G/DL — LOW (ref 11.5–15.5)
IMM GRANULOCYTES NFR BLD AUTO: 1.2 % — SIGNIFICANT CHANGE UP (ref 0–1.5)
LYMPHOCYTES # BLD AUTO: 1.52 K/UL — SIGNIFICANT CHANGE UP (ref 1–3.3)
LYMPHOCYTES # BLD AUTO: 22.1 % — SIGNIFICANT CHANGE UP (ref 13–44)
MCHC RBC-ENTMCNC: 24.2 PG — LOW (ref 27–34)
MCHC RBC-ENTMCNC: 29.9 GM/DL — LOW (ref 32–36)
MCV RBC AUTO: 81.1 FL — SIGNIFICANT CHANGE UP (ref 80–100)
MONOCYTES # BLD AUTO: 0.9 K/UL — SIGNIFICANT CHANGE UP (ref 0–0.9)
MONOCYTES NFR BLD AUTO: 13.1 % — SIGNIFICANT CHANGE UP (ref 2–14)
NEUTROPHILS # BLD AUTO: 4.32 K/UL — SIGNIFICANT CHANGE UP (ref 1.8–7.4)
NEUTROPHILS NFR BLD AUTO: 62.9 % — SIGNIFICANT CHANGE UP (ref 43–77)
PLATELET # BLD AUTO: 375 K/UL — SIGNIFICANT CHANGE UP (ref 150–400)
POTASSIUM SERPL-MCNC: 4.3 MMOL/L — SIGNIFICANT CHANGE UP (ref 3.5–5.3)
POTASSIUM SERPL-SCNC: 4.3 MMOL/L — SIGNIFICANT CHANGE UP (ref 3.5–5.3)
RBC # BLD: 3.92 M/UL — SIGNIFICANT CHANGE UP (ref 3.8–5.2)
RBC # FLD: 25.8 % — HIGH (ref 10.3–14.5)
SARS-COV-2 RNA SPEC QL NAA+PROBE: SIGNIFICANT CHANGE UP
SODIUM SERPL-SCNC: 138 MMOL/L — SIGNIFICANT CHANGE UP (ref 135–145)
WBC # BLD: 6.87 K/UL — SIGNIFICANT CHANGE UP (ref 3.8–10.5)
WBC # FLD AUTO: 6.87 K/UL — SIGNIFICANT CHANGE UP (ref 3.8–10.5)

## 2022-09-13 PROCEDURE — 99232 SBSQ HOSP IP/OBS MODERATE 35: CPT

## 2022-09-13 RX ADMIN — QUETIAPINE FUMARATE 50 MILLIGRAM(S): 200 TABLET, FILM COATED ORAL at 21:38

## 2022-09-13 RX ADMIN — Medication 50 MILLIGRAM(S): at 21:38

## 2022-09-13 RX ADMIN — Medication 1 TABLET(S): at 11:49

## 2022-09-13 RX ADMIN — PANTOPRAZOLE SODIUM 40 MILLIGRAM(S): 20 TABLET, DELAYED RELEASE ORAL at 06:18

## 2022-09-13 RX ADMIN — Medication 100 MILLIGRAM(S): at 11:49

## 2022-09-13 RX ADMIN — DIVALPROEX SODIUM 500 MILLIGRAM(S): 500 TABLET, DELAYED RELEASE ORAL at 19:49

## 2022-09-13 RX ADMIN — DIVALPROEX SODIUM 500 MILLIGRAM(S): 500 TABLET, DELAYED RELEASE ORAL at 06:18

## 2022-09-13 RX ADMIN — Medication 1 MILLIGRAM(S): at 11:49

## 2022-09-13 NOTE — PROGRESS NOTE ADULT - SUBJECTIVE AND OBJECTIVE BOX
cc: etoh , anemia     interval hx: patient seen and evla. comfortable , in no acute distress. denies any n/v/abd pain     Vital Signs Last 24 Hrs  T(C): 36.4 (13 Sep 2022 11:34), Max: 37 (13 Sep 2022 05:33)  T(F): 97.5 (13 Sep 2022 11:34), Max: 98.6 (13 Sep 2022 05:33)  HR: 69 (13 Sep 2022 11:34) (63 - 75)  BP: 129/88 (13 Sep 2022 11:34) (114/77 - 137/83)  BP(mean): 90 (12 Sep 2022 22:00) (90 - 90)  RR: 18 (13 Sep 2022 11:34) (18 - 20)  SpO2: 95% (13 Sep 2022 11:34) (95% - 99%)    Parameters below as of 13 Sep 2022 11:34  Patient On (Oxygen Delivery Method): room air    Constitutional: NAD, Resting, Chronically ill appearing female, Ecchymosis of left eye   ENT: Supple, No JVD  Lungs: CTA B/L, Non-labored breathing  Cardio: RRR, S1/S2, No murmur  Abdomen: Soft, Nontender, Nondistended; Bowel sounds present  Extremities: No calf tenderness, No pitting edema  Musculoskeletal:   No clubbing or cyanosis of digits; no joint swelling or tenderness to palpation  Psych: Calm, cooperative affect appropriate  Neuro: Awake and alert, oriented to name and location, states year is 2022, moves all extremities, Mumbles  Skin: No rashes; no palpable lesions                            9.5    6.87  )-----------( 375      ( 13 Sep 2022 06:40 )             31.8   09-13    138  |  103  |  13.4  ----------------------------<  82  4.3   |  23.0  |  0.62    Ca    9.7      13 Sep 2022 06:40

## 2022-09-13 NOTE — CHART NOTE - NSCHARTNOTEFT_GEN_A_CORE
Chief Complaint:  Patient is a 52y old  Female who presents with a chief complaint of Uncomplicated alcohol abuse     (13 Sep 2022 13:23)      Interval Events / Subjective: Patient seen and examined at bedside. Somnolent, barely arousable.    at bedside, states her normal is usually better than this.   No complaints, Hb uptrended since admission.     MEDICATIONS:   MEDICATIONS  (STANDING):  budesonide  80 MICROgram(s)/formoterol 4.5 MICROgram(s) Inhaler 2 Puff(s) Inhalation two times a day  diVALproex  milliGRAM(s) Oral two times a day  folic acid 1 milliGRAM(s) Oral daily  influenza   Vaccine 0.5 milliLiter(s) IntraMuscular once  multivitamin 1 Tablet(s) Oral daily  pantoprazole    Tablet 40 milliGRAM(s) Oral before breakfast  QUEtiapine 50 milliGRAM(s) Oral at bedtime  thiamine 100 milliGRAM(s) Oral daily  traZODone 50 milliGRAM(s) Oral at bedtime    MEDICATIONS  (PRN):  acetaminophen     Tablet .. 650 milliGRAM(s) Oral every 6 hours PRN Temp greater or equal to 38C (100.4F), Mild Pain (1 - 3)  ALBUTerol    90 MICROgram(s) HFA Inhaler 2 Puff(s) Inhalation every 6 hours PRN Shortness of Breath and/or Wheezing  aluminum hydroxide/magnesium hydroxide/simethicone Suspension 30 milliLiter(s) Oral every 4 hours PRN Dyspepsia  diazepam  Injectable 5 milliGRAM(s) IV Push every 1 hour PRN CIWA > 8  melatonin 3 milliGRAM(s) Oral at bedtime PRN Insomnia  ondansetron Injectable 4 milliGRAM(s) IV Push every 8 hours PRN Nausea and/or Vomiting      ALLERGIES:   Allergies    No Known Allergies    Intolerances        VITAL SIGNS:   Vital Signs Last 24 Hrs  T(C): 36.4 (13 Sep 2022 11:34), Max: 37 (13 Sep 2022 05:33)  T(F): 97.5 (13 Sep 2022 11:34), Max: 98.6 (13 Sep 2022 05:33)  HR: 69 (13 Sep 2022 11:34) (63 - 75)  BP: 129/88 (13 Sep 2022 11:34) (114/77 - 137/83)  BP(mean): 90 (12 Sep 2022 22:00) (90 - 90)  RR: 18 (13 Sep 2022 11:34) (18 - 20)  SpO2: 95% (13 Sep 2022 11:34) (95% - 99%)    Parameters below as of 13 Sep 2022 11:34  Patient On (Oxygen Delivery Method): room air      I&O's Summary    12 Sep 2022 07:01  -  13 Sep 2022 07:00  --------------------------------------------------------  IN: 200 mL / OUT: 0 mL / NET: 200 mL        PHYSICAL EXAM:   GENERAL:  Appears older that stated age, somnolent, can open eyes but not conversant  HEENT:  NC/AT,  conjunctivae clear, sclera -anicteric  CHEST:  Full & symmetric excursion, no increased effort, breath sounds clear  HEART:  Regular rhythm, S1, S2, no murmur/rub/S3/S4,  no edema  ABDOMEN:  Soft, non-tender, non-distended, normoactive bowel sounds,  no masses,  EXTREMITIES: No cyanosis, clubbing or edema  SKIN:  No rash/erythema/ecchymoses/petechiae/wounds/abscess/warm/dry  NEURO:  Alert     LABS:  CBC Full  -  ( 13 Sep 2022 06:40 )  WBC Count : 6.87 K/uL  RBC Count : 3.92 M/uL  Hemoglobin : 9.5 g/dL  Hematocrit : 31.8 %  Platelet Count - Automated : 375 K/uL  Mean Cell Volume : 81.1 fl  Mean Cell Hemoglobin : 24.2 pg  Mean Cell Hemoglobin Concentration : 29.9 gm/dL  Auto Neutrophil # : 4.32 K/uL  Auto Lymphocyte # : 1.52 K/uL  Auto Monocyte # : 0.90 K/uL  Auto Eosinophil # : 0.01 K/uL  Auto Basophil # : 0.04 K/uL  Auto Neutrophil % : 62.9 %  Auto Lymphocyte % : 22.1 %  Auto Monocyte % : 13.1 %  Auto Eosinophil % : 0.1 %  Auto Basophil % : 0.6 %    09-13    138  |  103  |  13.4  ----------------------------<  82  4.3   |  23.0  |  0.62    Ca    9.7      13 Sep 2022 06:40          52 F admitted with EtOH intoxication. In withdrawal, on CIWA for several days.   GI called by primary team because off CIWA today.   Plan:       Would be very problematic to prep her with the current mental status.   If primary team feels she is ready for discharge, we can see her as outpatient for anemia eval.  Else, we can wait until later in the week for her mental status to improve so that she can have EGD/colon before discharge.

## 2022-09-13 NOTE — PROGRESS NOTE ADULT - ASSESSMENT
Patient is a 52 year old female with PMH of Seizure, PTSD, anxiety, depression, polysubstance abuse came to the ED for frequent falls. Patient said she came because she was having problem breathing; she was on nebulizer and albuterol but changed PCP, and the nebulizer was not prescribed. As per ED, patient's  said she has been falling more frequently, hit her head multiple times this week; does not feel safe with her at home; concerned she might die if she has a bad unwitnessed fall since he is not usually home with her. He also mentioned she has not been taken her seizure medication as prescribed. In the ED, found to have hb 6.3; fobt negative; alcohol level: 226; CT head cervical: CT: No acute hemorrhage, extra-axial collections or displaced calvarial fracture. Right parietal scalp hematoma and laceration. Bilateral periorbital soft tissue swelling.      >Acute on chronic microcytic anemia superimposed on iron deficiency with inappropriate retic response  -multifactorial- iron deficiency, bone marrow suppression from alcohol abuse   -Hb 8.1 s/p PRBC x 1   -Completed Venofer x 3 days  -Epogen 40 K x 1 given  -FOBT neg  -Monitor CBC  -Continue PPI  -GI reconsulted , need egd prior to dc     >Alcohol Withdrawal/ not in withdrawal anymore   -CIWA   -Valium tapered to PRN for CIWA >8  -continue thiamine/folic acid/MVI      > Frequent Falls due to Unsteady Gait - multifactorial likely due to alcohol intoxication, hyponatremia   -CTH on admission negative for acute pathology but right parietal scalp hematoma noted  -CT neck negative for acute fracture  -sodium improved  -ETOH level on admission > 200  -fall precautions   -PT evaluation prior to discharge    >Hyponatremia/ imrpoved   -likely secondary to low solute load/beer protomania   -Improving    >COPD not in acute exacerbation    -Continue Symbicort    -Duoneb prn    >hx of Seizure ( EEG in 2021- Interictal findings suggest potential epileptogenic focus and structural abnormality in the right temporal region)  -Valproic acid level WNL   -Continue Divalproex 500mg bid   -Seizure precautions    >Anxiety/depression/PTSD   -Seroquel 50mg HS   -Trazodone 50mg HS   -psych consult     >Tobacco dependency  -Decline nicotine patch  -Cessation advised     >DVT prophylaxis: CSD     >Dispo - Remains acute, Needs EGD and PT eval

## 2022-09-13 NOTE — DIETITIAN INITIAL EVALUATION ADULT - PERTINENT MEDS FT
MEDICATIONS  (STANDING):  budesonide  80 MICROgram(s)/formoterol 4.5 MICROgram(s) Inhaler 2 Puff(s) Inhalation two times a day  diVALproex  milliGRAM(s) Oral two times a day  folic acid 1 milliGRAM(s) Oral daily  influenza   Vaccine 0.5 milliLiter(s) IntraMuscular once  multivitamin 1 Tablet(s) Oral daily  pantoprazole    Tablet 40 milliGRAM(s) Oral before breakfast  QUEtiapine 50 milliGRAM(s) Oral at bedtime  thiamine 100 milliGRAM(s) Oral daily  traZODone 50 milliGRAM(s) Oral at bedtime    MEDICATIONS  (PRN):  acetaminophen     Tablet .. 650 milliGRAM(s) Oral every 6 hours PRN Temp greater or equal to 38C (100.4F), Mild Pain (1 - 3)  ALBUTerol    90 MICROgram(s) HFA Inhaler 2 Puff(s) Inhalation every 6 hours PRN Shortness of Breath and/or Wheezing  aluminum hydroxide/magnesium hydroxide/simethicone Suspension 30 milliLiter(s) Oral every 4 hours PRN Dyspepsia  diazepam  Injectable 5 milliGRAM(s) IV Push every 1 hour PRN CIWA > 8  melatonin 3 milliGRAM(s) Oral at bedtime PRN Insomnia  ondansetron Injectable 4 milliGRAM(s) IV Push every 8 hours PRN Nausea and/or Vomiting

## 2022-09-13 NOTE — DIETITIAN INITIAL EVALUATION ADULT - NSFNSPHYEXAMSKINFT_GEN_A_CORE
Nutrition Follow Up Note    Patient seen for: malnutrition/TPN follow up on 8ICU     Source: medical record, TPN Team rounds    Chart reviewed, events noted.     Nutrition Status:     Diet: NPO    Parenteral Nutrition: TPN (ordered 1/15; now discontinued)     Non-Protein Calories: 1214 gregorio/day (22 gregorio/Kg)  Dextrose Infusion Rate: 2.7 mg/Kg/min  Lipid Infusion Rate: 0.92 Gm/Kg/day; 0.08 Gm/Kg/hr     Ileostomy output:     Daily Weight in k.3 (), Weight in k.9 (01-15), Weight in k.6 ()    Drug Dosing Weight  Weight (kg): 54 (15 Isma 2020 15:12)  BMI (kg/m2): 17.6 (15 Isma 2020 15:12)    Pertinent Medications: MEDICATIONS  (STANDING):  acetaminophen   Tablet .. 975 milliGRAM(s) Oral every 6 hours  albuterol/ipratropium for Nebulization. 3 milliLiter(s) Nebulizer every 6 hours  buDESOnide    Inhalation Suspension 0.5 milliGRAM(s) Inhalation every 12 hours  chlorhexidine 0.12% Liquid 15 milliLiter(s) Oral Mucosa every 12 hours  chlorhexidine 2% Cloths 1 Application(s) Topical <User Schedule>  diphenoxylate/atropine 2 Tablet(s) Oral <User Schedule>  enoxaparin Injectable 40 milliGRAM(s) SubCutaneous daily  insulin lispro (HumaLOG) corrective regimen sliding scale   SubCutaneous every 6 hours  loperamide 16 milliGRAM(s) Oral <User Schedule>  norepinephrine Infusion 0.5 MICROgram(s)/kG/Min (50.625 mL/Hr) IV Continuous <Continuous>  octreotide  Injectable 100 MICROGram(s) SubCutaneous three times a day  opium Tincture 6 milliGRAM(s) Oral <User Schedule>  pantoprazole    Tablet 40 milliGRAM(s) Oral before breakfast  piperacillin/tazobactam IVPB.. 3.375 Gram(s) IV Intermittent every 8 hours  propofol Infusion 20 MICROgram(s)/kG/Min (6.504 mL/Hr) IV Continuous <Continuous>  sodium chloride 0.9%. 1000 milliLiter(s) (10 mL/Hr) IV Continuous <Continuous>  sodium chloride 0.9%. 1000 milliLiter(s) (75 mL/Hr) IV Continuous <Continuous>    MEDICATIONS  (PRN):  ondansetron Injectable 4 milliGRAM(s) IV Push every 6 hours PRN Nausea and/or Vomiting    LABS:    @ 00:14: Sodium 142, Potassium 4.0, Chloride 101, Calcium 7.6<L>, Magnesium 2.0, Phosphorus 2.3<L>, BUN 23, Creatinine 0.48<L>, <H>, Alk Phos 62, ALT/SGPT 13, AST/SGOT 12, HbA1c --, Total Protein 5.5<L>, Albumin 2.4<L>, Prealbumin --, Total Bilirubin 1.0, Direct Bilirubin 0.5<H>, Hemoglobin 8.6<L>, Hematocrit 25.9<L>    POCT Blood Glucose.: 115 mg/dL (2020 06:23)  POCT Blood Glucose.: 108 mg/dL (15 Isma 2020 23:55)  POCT Blood Glucose.: 173 mg/dL (15 Isma 2020 12:19)  POCT Blood Glucose.: 172 mg/dL (15 Isma 2020 10:32)    Triglycerides, Serum: 51 mg/dL (01-15-20 @ 01:44)  Triglycerides, Serum: 60 mg/dL (20 @ 03:31)  Triglycerides, Serum: 50 mg/dL (20 @ 01:05)  Triglycerides, Serum: 48 mg/dL (20 @ 02:44)  Triglycerides, Serum: 56 mg/dL (20 @ 00:42)  Triglycerides, Serum: 39 mg/dL (19 @ 00:56)  Triglycerides, Serum: 51 mg/dL (19 @ 01:08)  Triglycerides, Serum: 75 mg/dL (19 @ 02:29)  Triglycerides, Serum: 69 mg/dL (19 @ 01:10)    Skin per nursing documentation: no pressure injuries noted  Edema: 2+ left/right foot (1/15)    Estimated Needs: based on dosing wt 54.2Kg, with consideration for TPN, malnutrition  4169-5968 gregorio/day (25-30cal/Kg)   Gm protein/day (1.8-2.2Gm/Kg)     Previous Nutrition Diagnosis: Severe malnutrition  Nutrition Diagnosis is: ongoing, being addressed with TPN    New Nutrition Diagnosis: none     Interventions:     Recommend  1) TPN per TPN Team/Nutrition assessment    Monitoring and Evaluation:     Continue to monitor nutrition provision and tolerance, weights, labs, skin integrity.    RD remains available upon request and will follow up per protocol.    Sowmya Graham MS RD CDN [facility], Pager # [phone]. Nutrition Follow Up Note    Patient seen for: malnutrition/TPN follow up on 8ICU     Source: medical record, TPN Team rounds    Chart reviewed, events noted. "Overnight pt intubated for resp distress, taken to OR for VATs, 2L blood evacuated and received 2uPRBC intraop. 2 chest tubes left in place. this morning on 0.1 levo, intubated sedated, aware, responds to commands." Pt now extubated .    Nutrition Status: Malnutrition. Pt with 150 cm small bowel remaining after GI surgery x 3. TPN initiated . Pt has been tolerating DYS1NC diet since . Pt with ongoing high ileostomy output, now downtrending, with 0.5:1 fluid repletions.    TPN reduced to half on , with diet advancement to Mechanical Soft. Pt now NPO S/P intubation/extubation. TPN discontinued as of 1/15; no current access available. Fecal fat results indicate some degree of fat malabsorption. See nutrition recommendations below.    Diet: NPO    Parenteral Nutrition: discontinued as of 1/15    Ileostomy output: 260ml <downtrending>; Rx for lomotil, imodium, octreotide, tincture of opium noted.    Daily Weight in k.3 (), Weight in k.9 (01-15), Weight in k.6 ()    Drug Dosing Weight  Weight (kg): 54 (15 Isma 2020 15:12)  BMI (kg/m2): 17.6 (15 Isma 2020 15:12)    Pertinent Medications: MEDICATIONS  (STANDING):  acetaminophen   Tablet .. 975 milliGRAM(s) Oral every 6 hours  albuterol/ipratropium for Nebulization. 3 milliLiter(s) Nebulizer every 6 hours  buDESOnide    Inhalation Suspension 0.5 milliGRAM(s) Inhalation every 12 hours  chlorhexidine 0.12% Liquid 15 milliLiter(s) Oral Mucosa every 12 hours  chlorhexidine 2% Cloths 1 Application(s) Topical <User Schedule>  diphenoxylate/atropine 2 Tablet(s) Oral <User Schedule>  enoxaparin Injectable 40 milliGRAM(s) SubCutaneous daily  insulin lispro (HumaLOG) corrective regimen sliding scale   SubCutaneous every 6 hours  loperamide 16 milliGRAM(s) Oral <User Schedule>  norepinephrine Infusion 0.5 MICROgram(s)/kG/Min (50.625 mL/Hr) IV Continuous <Continuous>  octreotide  Injectable 100 MICROGram(s) SubCutaneous three times a day  opium Tincture 6 milliGRAM(s) Oral <User Schedule>  pantoprazole    Tablet 40 milliGRAM(s) Oral before breakfast  piperacillin/tazobactam IVPB.. 3.375 Gram(s) IV Intermittent every 8 hours  propofol Infusion 20 MICROgram(s)/kG/Min (6.504 mL/Hr) IV Continuous <Continuous>  sodium chloride 0.9%. 1000 milliLiter(s) (10 mL/Hr) IV Continuous <Continuous>  sodium chloride 0.9%. 1000 milliLiter(s) (75 mL/Hr) IV Continuous <Continuous>    MEDICATIONS  (PRN):  ondansetron Injectable 4 milliGRAM(s) IV Push every 6 hours PRN Nausea and/or Vomiting    LABS:    @ 00:14: Sodium 142, Potassium 4.0, Chloride 101, Calcium 7.6<L>, Magnesium 2.0, Phosphorus 2.3<L>, BUN 23, Creatinine 0.48<L>, <H>, Alk Phos 62, ALT/SGPT 13, AST/SGOT 12, HbA1c --, Total Protein 5.5<L>, Albumin 2.4<L>, Prealbumin --, Total Bilirubin 1.0, Direct Bilirubin 0.5<H>, Hemoglobin 8.6<L>, Hematocrit 25.9<L>    POCT Blood Glucose.: 115 mg/dL (2020 06:23)  POCT Blood Glucose.: 108 mg/dL (15 Isma 2020 23:55)  POCT Blood Glucose.: 173 mg/dL (15 Isma 2020 12:19)  POCT Blood Glucose.: 172 mg/dL (15 Isma 2020 10:32)    Triglycerides, Serum: 51 mg/dL (01-15-20 @ 01:44)  Triglycerides, Serum: 60 mg/dL (20 @ 03:31)  Triglycerides, Serum: 50 mg/dL (20 @ 01:05)  Triglycerides, Serum: 48 mg/dL (20 @ 02:44)  Triglycerides, Serum: 56 mg/dL (20 @ 00:42)  Triglycerides, Serum: 39 mg/dL (19 @ 00:56)  Triglycerides, Serum: 51 mg/dL (19 @ 01:08)  Triglycerides, Serum: 75 mg/dL (19 @ 02:29)  Triglycerides, Serum: 69 mg/dL (19 @ 01:10)    Skin per nursing documentation: no pressure injuries noted  Edema: 2+ left/right foot (1/15)    Estimated Needs: based on dosing wt 54.2Kg, with consideration for TPN, malnutrition  7707-6205 gregorio/day (25-30cal/Kg)   Gm protein/day (1.8-2.2Gm/Kg)     Previous Nutrition Diagnosis: Severe malnutrition  Nutrition Diagnosis is: ongoing, being addressed with TPN    New Nutrition Diagnosis: none     Interventions: No plans to resume TPN at this time. Resume po diet and supplements as medically feasible.    Recommend  1) Pending bedside dysphagia screen, advance diet to tolerated consistency.  2) Add Vital1.2 tid, as an ORAL supplement, in setting of suspected fat malabsorption.  3) Add 2 servings Promote supplement.  4) Consider supplementation of po diet with 15ml MCT oil, in setting of poor absorption of LCT.  5) Monitor need for enteral feeding if pt unable to meet nutrition needs via po intake alone.    Monitoring and Evaluation:     Continue to monitor nutrition provision and tolerance, weights, labs, skin integrity.    RD remains available upon request and will follow up per protocol.    Sowmya Graham, MS SPARKS CDN [facility], Pager # [phone]. surgical incision

## 2022-09-13 NOTE — DIETITIAN INITIAL EVALUATION ADULT - PERTINENT LABORATORY DATA
09-13    138  |  103  |  13.4  ----------------------------<  82  4.3   |  23.0  |  0.62    Ca    9.7      13 Sep 2022 06:40

## 2022-09-13 NOTE — DIETITIAN INITIAL EVALUATION ADULT - ORAL INTAKE PTA/DIET HISTORY
Pt very lethargic and emotional during interview- limited hx obtained. Pt reported poor po intake PTA and currently. Stated  lbs. Pt eating breakfast during assessment but reported nausea. Food preferences obtained.

## 2022-09-13 NOTE — DIETITIAN INITIAL EVALUATION ADULT - ADD RECOMMEND
Add Ensure Enlive TID to optimize po intake and provide an additional 350kcal, 20g protein per serving  Continue MVI, thiamine, folic acid  RX: Iron and Vitamin C supplement

## 2022-09-14 LAB
ALBUMIN SERPL ELPH-MCNC: 4 G/DL — SIGNIFICANT CHANGE UP (ref 3.3–5.2)
ALP SERPL-CCNC: 80 U/L — SIGNIFICANT CHANGE UP (ref 40–120)
ALT FLD-CCNC: 21 U/L — SIGNIFICANT CHANGE UP
ANION GAP SERPL CALC-SCNC: 13 MMOL/L — SIGNIFICANT CHANGE UP (ref 5–17)
AST SERPL-CCNC: 30 U/L — SIGNIFICANT CHANGE UP
BILIRUB SERPL-MCNC: 0.3 MG/DL — LOW (ref 0.4–2)
BUN SERPL-MCNC: 12.1 MG/DL — SIGNIFICANT CHANGE UP (ref 8–20)
CALCIUM SERPL-MCNC: 9.9 MG/DL — SIGNIFICANT CHANGE UP (ref 8.4–10.5)
CHLORIDE SERPL-SCNC: 98 MMOL/L — SIGNIFICANT CHANGE UP (ref 98–107)
CO2 SERPL-SCNC: 21 MMOL/L — LOW (ref 22–29)
CREAT SERPL-MCNC: 0.6 MG/DL — SIGNIFICANT CHANGE UP (ref 0.5–1.3)
EGFR: 107 ML/MIN/1.73M2 — SIGNIFICANT CHANGE UP
GLUCOSE SERPL-MCNC: 76 MG/DL — SIGNIFICANT CHANGE UP (ref 70–99)
HCT VFR BLD CALC: 38.5 % — SIGNIFICANT CHANGE UP (ref 34.5–45)
HGB BLD-MCNC: 11.6 G/DL — SIGNIFICANT CHANGE UP (ref 11.5–15.5)
MAGNESIUM SERPL-MCNC: 1.6 MG/DL — SIGNIFICANT CHANGE UP (ref 1.6–2.6)
MCHC RBC-ENTMCNC: 24.8 PG — LOW (ref 27–34)
MCHC RBC-ENTMCNC: 30.1 GM/DL — LOW (ref 32–36)
MCV RBC AUTO: 82.4 FL — SIGNIFICANT CHANGE UP (ref 80–100)
PLATELET # BLD AUTO: 244 K/UL — SIGNIFICANT CHANGE UP (ref 150–400)
POTASSIUM SERPL-MCNC: 4.6 MMOL/L — SIGNIFICANT CHANGE UP (ref 3.5–5.3)
POTASSIUM SERPL-SCNC: 4.6 MMOL/L — SIGNIFICANT CHANGE UP (ref 3.5–5.3)
PROT SERPL-MCNC: 7.8 G/DL — SIGNIFICANT CHANGE UP (ref 6.6–8.7)
RBC # BLD: 4.67 M/UL — SIGNIFICANT CHANGE UP (ref 3.8–5.2)
RBC # FLD: 26.4 % — HIGH (ref 10.3–14.5)
SODIUM SERPL-SCNC: 132 MMOL/L — LOW (ref 135–145)
WBC # BLD: 5.62 K/UL — SIGNIFICANT CHANGE UP (ref 3.8–10.5)
WBC # FLD AUTO: 5.62 K/UL — SIGNIFICANT CHANGE UP (ref 3.8–10.5)

## 2022-09-14 PROCEDURE — 99232 SBSQ HOSP IP/OBS MODERATE 35: CPT

## 2022-09-14 RX ADMIN — BUDESONIDE AND FORMOTEROL FUMARATE DIHYDRATE 2 PUFF(S): 160; 4.5 AEROSOL RESPIRATORY (INHALATION) at 09:26

## 2022-09-14 RX ADMIN — Medication 100 MILLIGRAM(S): at 17:15

## 2022-09-14 RX ADMIN — Medication 50 MILLIGRAM(S): at 21:25

## 2022-09-14 RX ADMIN — PANTOPRAZOLE SODIUM 40 MILLIGRAM(S): 20 TABLET, DELAYED RELEASE ORAL at 06:16

## 2022-09-14 RX ADMIN — BUDESONIDE AND FORMOTEROL FUMARATE DIHYDRATE 2 PUFF(S): 160; 4.5 AEROSOL RESPIRATORY (INHALATION) at 20:57

## 2022-09-14 RX ADMIN — Medication 1 MILLIGRAM(S): at 17:15

## 2022-09-14 RX ADMIN — QUETIAPINE FUMARATE 50 MILLIGRAM(S): 200 TABLET, FILM COATED ORAL at 21:25

## 2022-09-14 RX ADMIN — DIVALPROEX SODIUM 500 MILLIGRAM(S): 500 TABLET, DELAYED RELEASE ORAL at 17:14

## 2022-09-14 RX ADMIN — DIVALPROEX SODIUM 500 MILLIGRAM(S): 500 TABLET, DELAYED RELEASE ORAL at 06:15

## 2022-09-14 RX ADMIN — Medication 1 TABLET(S): at 17:15

## 2022-09-14 NOTE — PROGRESS NOTE ADULT - ASSESSMENT
Patient is a 52 year old female with PMH of Seizure, PTSD, anxiety, depression, polysubstance abuse came to the ED for frequent falls. Patient said she came because she was having problem breathing; she was on nebulizer and albuterol but changed PCP, and the nebulizer was not prescribed. As per ED, patient's  said she has been falling more frequently, hit her head multiple times this week; does not feel safe with her at home; concerned she might die if she has a bad unwitnessed fall since he is not usually home with her. He also mentioned she has not been taken her seizure medication as prescribed. In the ED, found to have hb 6.3; fobt negative; alcohol level: 226; CT head cervical: CT: No acute hemorrhage, extra-axial collections or displaced calvarial fracture. Right parietal scalp hematoma and laceration. Bilateral periorbital soft tissue swelling.      >Acute on chronic microcytic anemia superimposed on iron deficiency with inappropriate retic response  -multifactorial- iron deficiency, bone marrow suppression from alcohol abuse   -Hb 8.1 s/p PRBC x 1   -Completed Venofer x 3 days  -Epogen 40 K x 1 given  -FOBT neg  -Monitor CBC  -Continue PPI  -GI reconsulted , recommend colonoscopy / egd prior to dc     >Alcohol Withdrawal/ not in withdrawal anymore   -CIWA   -Valium tapered to PRN for CIWA >8  -continue thiamine/folic acid/MVI      > Frequent Falls due to Unsteady Gait - multifactorial likely due to alcohol intoxication, hyponatremia   -CTH on admission negative for acute pathology but right parietal scalp hematoma noted  -CT neck negative for acute fracture  -sodium improved  -ETOH level on admission > 200  -fall precautions   -PT evaluation prior to discharge    >Hyponatremia/ imrpoved   -likely secondary to low solute load/beer protomania   -Improving    >COPD not in acute exacerbation    -Continue Symbicort    -Duoneb prn    >hx of Seizure ( EEG in 2021- Interictal findings suggest potential epileptogenic focus and structural abnormality in the right temporal region)  -Valproic acid level WNL   -Continue Divalproex 500mg bid   -Seizure precautions    >Anxiety/depression/PTSD   -Seroquel 50mg HS   -Trazodone 50mg HS   -psych consulted     >Tobacco dependency  -Decline nicotine patch  -Cessation advised     >DVT prophylaxis: CSD     >Dispo - Remains acute, Needs EGD/ colonoscopy  prior to dc

## 2022-09-14 NOTE — SBIRT NOTE ADULT - NSSBIRTUNABLESCROTHER_GEN_A_CORE
Although mentation is improving, patient remains altered. Patient completed detox in hospital and discharge plan is for sub-acute rehab.

## 2022-09-14 NOTE — PROGRESS NOTE ADULT - SUBJECTIVE AND OBJECTIVE BOX
cc: etoh , anemia     interval hx: patient seen and evla. comfortable , in no acute distress. denies any n/v/abd pain , tearful    Vital Signs Last 24 Hrs  T(C): 36.6 (14 Sep 2022 11:09), Max: 36.6 (14 Sep 2022 11:09)  T(F): 97.8 (14 Sep 2022 11:09), Max: 97.8 (14 Sep 2022 11:09)  HR: 66 (14 Sep 2022 11:09) (60 - 90)  BP: 118/86 (14 Sep 2022 11:09) (100/69 - 139/91)  BP(mean): --  RR: 18 (14 Sep 2022 11:09) (18 - 18)  SpO2: 95% (14 Sep 2022 11:09) (94% - 97%)    Parameters below as of 14 Sep 2022 11:09  Patient On (Oxygen Delivery Method): room air      Constitutional: NAD, Resting, Chronically ill appearing female, Ecchymosis of left eye   ENT: Supple, No JVD  Lungs: CTA B/L, Non-labored breathing  Cardio: RRR, S1/S2, No murmur  Abdomen: Soft, Nontender, Nondistended; Bowel sounds present  Extremities: No calf tenderness, No pitting edema  Musculoskeletal:   No clubbing or cyanosis of digits; no joint swelling or tenderness to palpation  Psych: Calm, cooperative affect appropriate  Neuro: Awake and alert, oriented to name and location, states year is 2022, moves all extremities, Mumbles  Skin: No rashes; no palpable lesions       MEDICATIONS  (STANDING):  budesonide  80 MICROgram(s)/formoterol 4.5 MICROgram(s) Inhaler 2 Puff(s) Inhalation two times a day  diVALproex  milliGRAM(s) Oral two times a day  folic acid 1 milliGRAM(s) Oral daily  influenza   Vaccine 0.5 milliLiter(s) IntraMuscular once  multivitamin 1 Tablet(s) Oral daily  pantoprazole    Tablet 40 milliGRAM(s) Oral before breakfast  QUEtiapine 50 milliGRAM(s) Oral at bedtime  thiamine 100 milliGRAM(s) Oral daily  traZODone 50 milliGRAM(s) Oral at bedtime    MEDICATIONS  (PRN):  acetaminophen     Tablet .. 650 milliGRAM(s) Oral every 6 hours PRN Temp greater or equal to 38C (100.4F), Mild Pain (1 - 3)  ALBUTerol    90 MICROgram(s) HFA Inhaler 2 Puff(s) Inhalation every 6 hours PRN Shortness of Breath and/or Wheezing  aluminum hydroxide/magnesium hydroxide/simethicone Suspension 30 milliLiter(s) Oral every 4 hours PRN Dyspepsia  diazepam  Injectable 5 milliGRAM(s) IV Push every 1 hour PRN CIWA > 8  melatonin 3 milliGRAM(s) Oral at bedtime PRN Insomnia  ondansetron Injectable 4 milliGRAM(s) IV Push every 8 hours PRN Nausea and/or Vomiting                            11.6   5.62  )-----------( 244      ( 14 Sep 2022 06:22 )             38.5   09-14    132<L>  |  98  |  12.1  ----------------------------<  76  4.6   |  21.0<L>  |  0.60    Ca    9.9      14 Sep 2022 06:22  Mg     1.6     09-14    TPro  7.8  /  Alb  4.0  /  TBili  0.3<L>  /  DBili  x   /  AST  30  /  ALT  21  /  AlkPhos  80  09-14

## 2022-09-14 NOTE — PROGRESS NOTE ADULT - ASSESSMENT
52 year old female with alcohol use disorder presented with acute withdrawal now improving. She was found to have a microcytic anemia prompting GI evaluation.    Microcytic Anemia without overt GI bleeding   Continue PPI daily PO  Avoid nsaids  Ideally, she would benefit from an EGD/Colonoscopy to complete work up for microcytic anemia, however her current mental state, while improved is not conducive to a successful bowel prep  Once the patient is fully alert and able to consent for herself,  and tolerate the full bowel prep will pursue EGD/Colon due to the risk of being loss to follow up ideally would be performed prior to discharge  Continue diet as tolerated  Continue care per primary team    Alcohol Use Disorder   Plt/Alb within normal limits no evidence of cirrhosis at the time LAES wnl  Recommend social work evaluation for alcohol cessation assistance

## 2022-09-14 NOTE — PROGRESS NOTE ADULT - SUBJECTIVE AND OBJECTIVE BOX
Chief Complaint:  Patient is a 52y old  Female who presents with a chief complaint of Uncomplicated alcohol abuse.     (13 Sep 2022 13:23)      Interval Events / Subjective: Patient seen and examined at bedside. She is alert and cooperative but has bouts of tearful outbursts but is otherwise pleasant. She states she is tolerating diet and is requesting coffee. She denies nausea, vomiting or abdominal pain.   Hemoglobin today 11.6 from 9.5    REVIEW OF SYSTEMS:   General: Negative  HEENT: Negative  CV: Negative  Respiratory: Negative  GI: See HPI  : Negative  MSK: Negative  Hematologic: Negative  Skin: Negative    MEDICATIONS:   MEDICATIONS  (STANDING):  budesonide  80 MICROgram(s)/formoterol 4.5 MICROgram(s) Inhaler 2 Puff(s) Inhalation two times a day  diVALproex  milliGRAM(s) Oral two times a day  folic acid 1 milliGRAM(s) Oral daily  influenza   Vaccine 0.5 milliLiter(s) IntraMuscular once  multivitamin 1 Tablet(s) Oral daily  pantoprazole    Tablet 40 milliGRAM(s) Oral before breakfast  QUEtiapine 50 milliGRAM(s) Oral at bedtime  thiamine 100 milliGRAM(s) Oral daily  traZODone 50 milliGRAM(s) Oral at bedtime    MEDICATIONS  (PRN):  acetaminophen     Tablet .. 650 milliGRAM(s) Oral every 6 hours PRN Temp greater or equal to 38C (100.4F), Mild Pain (1 - 3)  ALBUTerol    90 MICROgram(s) HFA Inhaler 2 Puff(s) Inhalation every 6 hours PRN Shortness of Breath and/or Wheezing  aluminum hydroxide/magnesium hydroxide/simethicone Suspension 30 milliLiter(s) Oral every 4 hours PRN Dyspepsia  diazepam  Injectable 5 milliGRAM(s) IV Push every 1 hour PRN CIWA > 8  melatonin 3 milliGRAM(s) Oral at bedtime PRN Insomnia  ondansetron Injectable 4 milliGRAM(s) IV Push every 8 hours PRN Nausea and/or Vomiting      ALLERGIES:   Allergies  No Known Allergies        VITAL SIGNS:   Vital Signs Last 24 Hrs  T(C): 36.4 (14 Sep 2022 04:56), Max: 36.4 (13 Sep 2022 11:34)  T(F): 97.5 (14 Sep 2022 04:56), Max: 97.5 (13 Sep 2022 11:34)  HR: 63 (14 Sep 2022 04:56) (60 - 90)  BP: 100/69 (14 Sep 2022 04:56) (100/69 - 139/91)  BP(mean): --  RR: 18 (14 Sep 2022 04:56) (18 - 18)  SpO2: 94% (14 Sep 2022 04:56) (94% - 97%)    Parameters below as of 14 Sep 2022 09:26  Patient On (Oxygen Delivery Method): room air      PHYSICAL EXAM:   GENERAL:  No acute distress  HEENT:  NC/AT,  conjunctivae clear, sclera -anicteric  CHEST:  Full & symmetric excursion, no increased effort, breath sounds clear  HEART:  Regular rhythm,   ABDOMEN:  Soft, non-tender, non-distended, normoactive bowel sounds,  no rebound or guarding  EXTREMITIES: No cyanosis, clubbing or edema  SKIN:  warm/dry  NEURO: tearful, cooperative    LABS:  CBC Full  -  ( 14 Sep 2022 06:22 )  WBC Count : 5.62 K/uL  RBC Count : 4.67 M/uL  Hemoglobin : 11.6 g/dL  Hematocrit : 38.5 %  Platelet Count - Automated : 244 K/uL  Mean Cell Volume : 82.4 fl  Mean Cell Hemoglobin : 24.8 pg  Mean Cell Hemoglobin Concentration : 30.1 gm/dL  Auto Neutrophil # : x  Auto Lymphocyte # : x  Auto Monocyte # : x  Auto Eosinophil # : x  Auto Basophil # : x  Auto Neutrophil % : x  Auto Lymphocyte % : x  Auto Monocyte % : x  Auto Eosinophil % : x  Auto Basophil % : x    09-14    132<L>  |  98  |  12.1  ----------------------------<  76  4.6   |  21.0<L>  |  0.60    Ca    9.9      14 Sep 2022 06:22  Mg     1.6     09-14    TPro  7.8  /  Alb  4.0  /  TBili  0.3<L>  /  DBili  x   /  AST  30  /  ALT  21  /  AlkPhos  80  09-14    LIVER FUNCTIONS - ( 14 Sep 2022 06:22 )  Alb: 4.0 g/dL / Pro: 7.8 g/dL / ALK PHOS: 80 U/L / ALT: 21 U/L / AST: 30 U/L / GGT: x

## 2022-09-15 PROCEDURE — 99232 SBSQ HOSP IP/OBS MODERATE 35: CPT

## 2022-09-15 RX ADMIN — Medication 100 MILLIGRAM(S): at 13:25

## 2022-09-15 RX ADMIN — Medication 1 MILLIGRAM(S): at 13:25

## 2022-09-15 RX ADMIN — DIVALPROEX SODIUM 500 MILLIGRAM(S): 500 TABLET, DELAYED RELEASE ORAL at 17:34

## 2022-09-15 RX ADMIN — QUETIAPINE FUMARATE 50 MILLIGRAM(S): 200 TABLET, FILM COATED ORAL at 21:22

## 2022-09-15 RX ADMIN — DIVALPROEX SODIUM 500 MILLIGRAM(S): 500 TABLET, DELAYED RELEASE ORAL at 05:05

## 2022-09-15 RX ADMIN — Medication 1 TABLET(S): at 13:25

## 2022-09-15 RX ADMIN — Medication 50 MILLIGRAM(S): at 21:22

## 2022-09-15 RX ADMIN — BUDESONIDE AND FORMOTEROL FUMARATE DIHYDRATE 2 PUFF(S): 160; 4.5 AEROSOL RESPIRATORY (INHALATION) at 21:23

## 2022-09-15 RX ADMIN — BUDESONIDE AND FORMOTEROL FUMARATE DIHYDRATE 2 PUFF(S): 160; 4.5 AEROSOL RESPIRATORY (INHALATION) at 09:43

## 2022-09-15 RX ADMIN — PANTOPRAZOLE SODIUM 40 MILLIGRAM(S): 20 TABLET, DELAYED RELEASE ORAL at 05:06

## 2022-09-15 NOTE — PROGRESS NOTE ADULT - SUBJECTIVE AND OBJECTIVE BOX
cc: etoh , anemia     interval hx: patient seen and evla. comfortable , in no acute distress.  awake , alert x 2     Vital Signs Last 24 Hrs  T(C): 36.4 (15 Sep 2022 11:22), Max: 36.4 (14 Sep 2022 20:23)  T(F): 97.5 (15 Sep 2022 11:22), Max: 97.5 (14 Sep 2022 20:23)  HR: 71 (15 Sep 2022 11:22) (64 - 77)  BP: 115/78 (15 Sep 2022 11:22) (104/69 - 144/86)  BP(mean): --  RR: 18 (15 Sep 2022 11:22) (18 - 18)  SpO2: 99% (15 Sep 2022 11:22) (94% - 99%)    Parameters below as of 15 Sep 2022 11:22  Patient On (Oxygen Delivery Method): room air      Constitutional: NAD, Resting, Chronically ill appearing female,   ENT: Supple, No JVD  Lungs: CTA B/L, Non-labored breathing  Cardio: RRR, S1/S2, No murmur  Abdomen: Soft, Nontender, Nondistended; Bowel sounds present  Extremities: No calf tenderness, No pitting edema  Musculoskeletal:   No clubbing or cyanosis of digits; no joint swelling or tenderness to palpation  Psych: Calm, cooperative affect appropriate  Neuro: Awake and alert, oriented to name and location, states year is 2022, moves all extremities, Mumbles  Skin: No rashes; no palpable lesions                 MEDICATIONS  (STANDING):  budesonide  80 MICROgram(s)/formoterol 4.5 MICROgram(s) Inhaler 2 Puff(s) Inhalation two times a day  diVALproex  milliGRAM(s) Oral two times a day  folic acid 1 milliGRAM(s) Oral daily  influenza   Vaccine 0.5 milliLiter(s) IntraMuscular once  multivitamin 1 Tablet(s) Oral daily  pantoprazole    Tablet 40 milliGRAM(s) Oral before breakfast  QUEtiapine 50 milliGRAM(s) Oral at bedtime  thiamine 100 milliGRAM(s) Oral daily  traZODone 50 milliGRAM(s) Oral at bedtime    MEDICATIONS  (PRN):  acetaminophen     Tablet .. 650 milliGRAM(s) Oral every 6 hours PRN Temp greater or equal to 38C (100.4F), Mild Pain (1 - 3)  ALBUTerol    90 MICROgram(s) HFA Inhaler 2 Puff(s) Inhalation every 6 hours PRN Shortness of Breath and/or Wheezing  aluminum hydroxide/magnesium hydroxide/simethicone Suspension 30 milliLiter(s) Oral every 4 hours PRN Dyspepsia  diazepam  Injectable 5 milliGRAM(s) IV Push every 1 hour PRN CIWA > 8  melatonin 3 milliGRAM(s) Oral at bedtime PRN Insomnia  ondansetron Injectable 4 milliGRAM(s) IV Push every 8 hours PRN Nausea and/or Vomiting                            11.6   5.62  )-----------( 244      ( 14 Sep 2022 06:22 )             38.5   09-14    132<L>  |  98  |  12.1  ----------------------------<  76  4.6   |  21.0<L>  |  0.60    Ca    9.9      14 Sep 2022 06:22  Mg     1.6     09-14    TPro  7.8  /  Alb  4.0  /  TBili  0.3<L>  /  DBili  x   /  AST  30  /  ALT  21  /  AlkPhos  80  09-14

## 2022-09-15 NOTE — PROGRESS NOTE ADULT - ASSESSMENT
Patient is a 52 year old female with PMH of Seizure, PTSD, anxiety, depression, polysubstance abuse came to the ED for frequent falls. Patient said she came because she was having problem breathing; she was on nebulizer and albuterol but changed PCP, and the nebulizer was not prescribed. As per ED, patient's  said she has been falling more frequently, hit her head multiple times this week; does not feel safe with her at home; concerned she might die if she has a bad unwitnessed fall since he is not usually home with her. He also mentioned she has not been taken her seizure medication as prescribed. In the ED, found to have hb 6.3; fobt negative; alcohol level: 226; CT head cervical: CT: No acute hemorrhage, extra-axial collections or displaced calvarial fracture. Right parietal scalp hematoma and laceration. Bilateral periorbital soft tissue swelling.      >Acute on chronic microcytic anemia superimposed on iron deficiency with inappropriate retic response  -multifactorial- iron deficiency, bone marrow suppression from alcohol abuse   -Hb 8.1 s/p PRBC x 1   -Completed Venofer x 3 days  -FOBT neg  -Monitor CBC  -Continue PPI  -GI reconsulted , recommend colonoscopy / egd prior to dc     >Alcohol Withdrawal/ not in withdrawal anymore   -CIWA   -Valium tapered to PRN for CIWA >8  -continue thiamine/folic acid/MVI      > Frequent Falls due to Unsteady Gait - multifactorial likely due to alcohol intoxication, hyponatremia   -CTH on admission negative for acute pathology but right parietal scalp hematoma noted  -CT neck negative for acute fracture  -sodium improved  -ETOH level on admission > 200  -fall precautions   -PT evaluation prior to discharge    >Hyponatremia/ imrpoved   -likely secondary to low solute load/beer protomania   -Improving    >COPD not in acute exacerbation    -Continue Symbicort    -Duoneb prn    >hx of Seizure ( EEG in 2021- Interictal findings suggest potential epileptogenic focus and structural abnormality in the right temporal region)  -Valproic acid level WNL   -Continue Divalproex 500mg bid   -Seizure precautions    >Anxiety/depression/PTSD   -Seroquel 50mg HS   -Trazodone 50mg HS   -psych consulted     >Tobacco dependency  -Decline nicotine patch  -Cessation advised     >DVT prophylaxis: CSD     >Dispo - Remains acute, Needs EGD/ colonoscopy  prior to dc

## 2022-09-15 NOTE — PROGRESS NOTE ADULT - SUBJECTIVE AND OBJECTIVE BOX
Chief Complaint:  Patient is a 52y old  Female who presents with a chief complaint of falls (14 Sep 2022 10:40)      Interval Events / Subjective: Patient seen and examined at bedside. No acute events overnight. She remains easily tearful but is cooperative. HGB remains stable. She denies acute complaints.       REVIEW OF SYSTEMS:   General: Negative  HEENT: Negative  CV: Negative  Respiratory: Negative  GI: See HPI  : Negative  MSK: Negative  Hematologic: Negative  Skin: Negative    MEDICATIONS:   MEDICATIONS  (STANDING):  budesonide  80 MICROgram(s)/formoterol 4.5 MICROgram(s) Inhaler 2 Puff(s) Inhalation two times a day  diVALproex  milliGRAM(s) Oral two times a day  folic acid 1 milliGRAM(s) Oral daily  influenza   Vaccine 0.5 milliLiter(s) IntraMuscular once  multivitamin 1 Tablet(s) Oral daily  pantoprazole    Tablet 40 milliGRAM(s) Oral before breakfast  QUEtiapine 50 milliGRAM(s) Oral at bedtime  thiamine 100 milliGRAM(s) Oral daily  traZODone 50 milliGRAM(s) Oral at bedtime    MEDICATIONS  (PRN):  acetaminophen     Tablet .. 650 milliGRAM(s) Oral every 6 hours PRN Temp greater or equal to 38C (100.4F), Mild Pain (1 - 3)  ALBUTerol    90 MICROgram(s) HFA Inhaler 2 Puff(s) Inhalation every 6 hours PRN Shortness of Breath and/or Wheezing  aluminum hydroxide/magnesium hydroxide/simethicone Suspension 30 milliLiter(s) Oral every 4 hours PRN Dyspepsia  diazepam  Injectable 5 milliGRAM(s) IV Push every 1 hour PRN CIWA > 8  melatonin 3 milliGRAM(s) Oral at bedtime PRN Insomnia  ondansetron Injectable 4 milliGRAM(s) IV Push every 8 hours PRN Nausea and/or Vomiting      ALLERGIES:   Allergies    No Known Allergies    ITAL SIGNS:   Vital Signs Last 24 Hrs  T(C): 36.4 (15 Sep 2022 11:22), Max: 36.4 (14 Sep 2022 20:23)  T(F): 97.5 (15 Sep 2022 11:22), Max: 97.5 (14 Sep 2022 20:23)  HR: 71 (15 Sep 2022 11:22) (64 - 77)  BP: 115/78 (15 Sep 2022 11:22) (104/69 - 144/86)  BP(mean): --  RR: 18 (15 Sep 2022 11:22) (18 - 18)  SpO2: 99% (15 Sep 2022 11:22) (94% - 99%)    Parameters below as of 15 Sep 2022 11:22  Patient On (Oxygen Delivery Method): room air      I&O's Summary    14 Sep 2022 07:01  -  15 Sep 2022 07:00  --------------------------------------------------------  IN: 250 mL / OUT: 0 mL / NET: 250 mL        PHYSICAL EXAM:   GENERAL:  No acute distress  HEENT:  NC/AT,  conjunctivae clear, sclera -anicteric  CHEST:  Full & symmetric excursion, no increased effort, breath sounds clear  HEART:  Regular rhythm,  ABDOMEN:  Soft, non-tender, non-distended, normoactive bowel sounds,  no rebound or guarding  EXTREMITIES: No cyanosis, clubbing or edema  SKIN: warm/dry  NEURO: tearful, cooperative    LABS:  CBC Full  -  ( 14 Sep 2022 06:22 )  WBC Count : 5.62 K/uL  RBC Count : 4.67 M/uL  Hemoglobin : 11.6 g/dL  Hematocrit : 38.5 %  Platelet Count - Automated : 244 K/uL  Mean Cell Volume : 82.4 fl  Mean Cell Hemoglobin : 24.8 pg  Mean Cell Hemoglobin Concentration : 30.1 gm/dL  Auto Neutrophil # : x  Auto Lymphocyte # : x  Auto Monocyte # : x  Auto Eosinophil # : x  Auto Basophil # : x  Auto Neutrophil % : x  Auto Lymphocyte % : x  Auto Monocyte % : x  Auto Eosinophil % : x  Auto Basophil % : x    09-14    132<L>  |  98  |  12.1  ----------------------------<  76  4.6   |  21.0<L>  |  0.60    Ca    9.9      14 Sep 2022 06:22  Mg     1.6     09-14    TPro  7.8  /  Alb  4.0  /  TBili  0.3<L>  /  DBili  x   /  AST  30  /  ALT  21  /  AlkPhos  80  09-14    LIVER FUNCTIONS - ( 14 Sep 2022 06:22 )  Alb: 4.0 g/dL / Pro: 7.8 g/dL / ALK PHOS: 80 U/L / ALT: 21 U/L / AST: 30 U/L / GGT: x                   RADIOLOGY & ADDITIONAL STUDIES (The following images were personally reviewed):

## 2022-09-15 NOTE — PROGRESS NOTE ADULT - ASSESSMENT
52 year old female with alcohol use disorder presented with acute withdrawal now improving. She was found to have a microcytic anemia prompting GI evaluation.    Microcytic Anemia without overt GI bleeding   Continue PPI daily PO  Avoid nsaids  Ideally, she would benefit from an EGD/Colonoscopy to complete work up for microcytic anemia, however her current mental state, while improved is not conducive to a successful bowel prep. She is gradually improving, anticipate EGD/Colon early next week  Continue diet as tolerated  Continue care per primary team

## 2022-09-16 LAB
ALBUMIN SERPL ELPH-MCNC: 3.7 G/DL — SIGNIFICANT CHANGE UP (ref 3.3–5.2)
ALP SERPL-CCNC: 88 U/L — SIGNIFICANT CHANGE UP (ref 40–120)
ALT FLD-CCNC: 18 U/L — SIGNIFICANT CHANGE UP
ANION GAP SERPL CALC-SCNC: 13 MMOL/L — SIGNIFICANT CHANGE UP (ref 5–17)
AST SERPL-CCNC: 31 U/L — SIGNIFICANT CHANGE UP
BILIRUB SERPL-MCNC: <0.2 MG/DL — LOW (ref 0.4–2)
BUN SERPL-MCNC: 18.1 MG/DL — SIGNIFICANT CHANGE UP (ref 8–20)
CALCIUM SERPL-MCNC: 9.7 MG/DL — SIGNIFICANT CHANGE UP (ref 8.4–10.5)
CHLORIDE SERPL-SCNC: 100 MMOL/L — SIGNIFICANT CHANGE UP (ref 98–107)
CO2 SERPL-SCNC: 20 MMOL/L — LOW (ref 22–29)
CREAT SERPL-MCNC: 0.61 MG/DL — SIGNIFICANT CHANGE UP (ref 0.5–1.3)
EGFR: 107 ML/MIN/1.73M2 — SIGNIFICANT CHANGE UP
GLUCOSE SERPL-MCNC: 74 MG/DL — SIGNIFICANT CHANGE UP (ref 70–99)
HCT VFR BLD CALC: 35.9 % — SIGNIFICANT CHANGE UP (ref 34.5–45)
HGB BLD-MCNC: 10.4 G/DL — LOW (ref 11.5–15.5)
MAGNESIUM SERPL-MCNC: 1.7 MG/DL — LOW (ref 1.8–2.6)
MCHC RBC-ENTMCNC: 24.1 PG — LOW (ref 27–34)
MCHC RBC-ENTMCNC: 29 GM/DL — LOW (ref 32–36)
MCV RBC AUTO: 83.1 FL — SIGNIFICANT CHANGE UP (ref 80–100)
PLATELET # BLD AUTO: 294 K/UL — SIGNIFICANT CHANGE UP (ref 150–400)
POTASSIUM SERPL-MCNC: 4.8 MMOL/L — SIGNIFICANT CHANGE UP (ref 3.5–5.3)
POTASSIUM SERPL-SCNC: 4.8 MMOL/L — SIGNIFICANT CHANGE UP (ref 3.5–5.3)
PROT SERPL-MCNC: 7.4 G/DL — SIGNIFICANT CHANGE UP (ref 6.6–8.7)
RBC # BLD: 4.32 M/UL — SIGNIFICANT CHANGE UP (ref 3.8–5.2)
RBC # FLD: 25.9 % — HIGH (ref 10.3–14.5)
SODIUM SERPL-SCNC: 133 MMOL/L — LOW (ref 135–145)
WBC # BLD: 4.62 K/UL — SIGNIFICANT CHANGE UP (ref 3.8–10.5)
WBC # FLD AUTO: 4.62 K/UL — SIGNIFICANT CHANGE UP (ref 3.8–10.5)

## 2022-09-16 PROCEDURE — 99232 SBSQ HOSP IP/OBS MODERATE 35: CPT

## 2022-09-16 PROCEDURE — 99223 1ST HOSP IP/OBS HIGH 75: CPT

## 2022-09-16 RX ORDER — MAGNESIUM SULFATE 500 MG/ML
2 VIAL (ML) INJECTION ONCE
Refills: 0 | Status: COMPLETED | OUTPATIENT
Start: 2022-09-16 | End: 2022-09-16

## 2022-09-16 RX ORDER — TRAZODONE HCL 50 MG
100 TABLET ORAL AT BEDTIME
Refills: 0 | Status: DISCONTINUED | OUTPATIENT
Start: 2022-09-16 | End: 2022-09-27

## 2022-09-16 RX ADMIN — Medication 1 TABLET(S): at 19:57

## 2022-09-16 RX ADMIN — Medication 1 MILLIGRAM(S): at 19:58

## 2022-09-16 RX ADMIN — BUDESONIDE AND FORMOTEROL FUMARATE DIHYDRATE 2 PUFF(S): 160; 4.5 AEROSOL RESPIRATORY (INHALATION) at 09:22

## 2022-09-16 RX ADMIN — Medication 5 MILLIGRAM(S): at 21:01

## 2022-09-16 RX ADMIN — PANTOPRAZOLE SODIUM 40 MILLIGRAM(S): 20 TABLET, DELAYED RELEASE ORAL at 05:30

## 2022-09-16 RX ADMIN — DIVALPROEX SODIUM 500 MILLIGRAM(S): 500 TABLET, DELAYED RELEASE ORAL at 19:57

## 2022-09-16 RX ADMIN — QUETIAPINE FUMARATE 50 MILLIGRAM(S): 200 TABLET, FILM COATED ORAL at 21:17

## 2022-09-16 RX ADMIN — Medication 100 MILLIGRAM(S): at 21:17

## 2022-09-16 RX ADMIN — Medication 100 MILLIGRAM(S): at 19:57

## 2022-09-16 RX ADMIN — DIVALPROEX SODIUM 500 MILLIGRAM(S): 500 TABLET, DELAYED RELEASE ORAL at 05:30

## 2022-09-16 RX ADMIN — Medication 25 GRAM(S): at 15:58

## 2022-09-16 NOTE — BH CONSULTATION LIAISON ASSESSMENT NOTE - CURRENT MEDICATION
MEDICATIONS  (STANDING):  budesonide  80 MICROgram(s)/formoterol 4.5 MICROgram(s) Inhaler 2 Puff(s) Inhalation two times a day  diVALproex  milliGRAM(s) Oral two times a day  folic acid 1 milliGRAM(s) Oral daily  influenza   Vaccine 0.5 milliLiter(s) IntraMuscular once  multivitamin 1 Tablet(s) Oral daily  pantoprazole    Tablet 40 milliGRAM(s) Oral before breakfast  QUEtiapine 50 milliGRAM(s) Oral at bedtime  thiamine 100 milliGRAM(s) Oral daily  traZODone 100 milliGRAM(s) Oral at bedtime    MEDICATIONS  (PRN):  acetaminophen     Tablet .. 650 milliGRAM(s) Oral every 6 hours PRN Temp greater or equal to 38C (100.4F), Mild Pain (1 - 3)  ALBUTerol    90 MICROgram(s) HFA Inhaler 2 Puff(s) Inhalation every 6 hours PRN Shortness of Breath and/or Wheezing  aluminum hydroxide/magnesium hydroxide/simethicone Suspension 30 milliLiter(s) Oral every 4 hours PRN Dyspepsia  diazepam  Injectable 5 milliGRAM(s) IV Push every 1 hour PRN CIWA > 8  melatonin 3 milliGRAM(s) Oral at bedtime PRN Insomnia  ondansetron Injectable 4 milliGRAM(s) IV Push every 8 hours PRN Nausea and/or Vomiting
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
## 2022-09-16 NOTE — BH CONSULTATION LIAISON ASSESSMENT NOTE - NSBHCHARTREVIEWINVESTIGATE_PSY_A_CORE FT
< from: 12 Lead ECG (09.07.22 @ 09:25) >    Ventricular Rate 83 BPM    Atrial Rate 83 BPM    P-R Interval 180 ms    QRS Duration 62 ms    Q-T Interval 388 ms    QTC Calculation(Bazett) 455 ms    P Axis 55 degrees    R Axis 14 degrees    T Axis 6 degrees    Diagnosis Line Normal sinus rhythm  Normal ECG    Confirmed by ABRAHAM ALAS ([id number]) on 9/7/2022 12:37:31 PM    < end of copied text >

## 2022-09-16 NOTE — BH CONSULTATION LIAISON ASSESSMENT NOTE - NSBHCHARTREVIEWLAB_PSY_A_CORE FT
Basic Metabolic Panel in AM (09.13.22 @ 06:40)    Sodium, Serum: 138 mmol/L    Potassium, Serum: 4.3 mmol/L    Chloride, Serum: 103 mmol/L    Carbon Dioxide, Serum: 23.0 mmol/L    Anion Gap, Serum: 12 mmol/L    Blood Urea Nitrogen, Serum: 13.4 mg/dL    Creatinine, Serum: 0.62 mg/dL    Glucose, Serum: 82 mg/dL    Calcium, Total Serum: 9.7: Prior Reference Range of 8.6 – 10.2 was amended as of 7/26/2022 to 8.4 –  10.5. mg/dL    eGFR: 106: The estimated glomerular filtration rate (eGFR) is calculated using the  2021 CKD-EPI creatinine equation, which does not have a coefficient for  race and is validated in individuals 18 years of age and older (N Engl J  Med 2021; 385:6614-2784). Creatinine-based eGFR may be inaccurate in  various situations including but not limited to extremes of muscle mass,  altered dietary protein intake, or medications that affect renal tubular  creatinine secretion. mL/min/1.73m2
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
## 2022-09-16 NOTE — BH CONSULTATION LIAISON ASSESSMENT NOTE - NSELEVCHRSUICRISK_PSY_ALL_CORE
Airway  Urgency: elective    Date/Time: 9/15/2022 10:55 AM  Airway not difficult    General Information and Staff    Patient location during procedure: OR  CRNA/CAA: Montse Hernandez CRNA    Indications and Patient Condition  Indications for airway management: airway protection    Preoxygenated: yes  MILS not maintained throughout  Mask difficulty assessment: 2 - vent by mask + OA or adjuvant +/- NMBA    Final Airway Details  Final airway type: endotracheal airway      Successful airway: ETT  Cuffed: yes   Successful intubation technique: video laryngoscopy  Endotracheal tube insertion site: oral  Blade: Falk  Blade size: 3  ETT size (mm): 8.5  Cormack-Lehane Classification: grade I - full view of glottis  Placement verified by: chest auscultation and capnometry   Cuff volume (mL): 6  Measured from: lips  ETT/EBT  to lips (cm): 20  Number of attempts at approach: 1  Assessment: lips, teeth, and gum same as pre-op and atraumatic intubation    Additional Comments  Negative epigastric sounds, Breath sound equal bilaterally with symmetric chest rise and fall            
No

## 2022-09-16 NOTE — BH CONSULTATION LIAISON ASSESSMENT NOTE - NSBHMSEBEHAV_PSY_A_CORE
Continue Regimen: Triamcinolone cream bid x two weeks total, off x one week repeat prn\\nKetoconazole SH biw\\nClobetasol scalp solution qd-bid to scalp x two weeks on, one week off repeat prn Detail Level: Zone Otc Regimen: 24hr antihistamine Samples Given: Free & clear shampoo/conditioner Cooperative

## 2022-09-16 NOTE — PROGRESS NOTE ADULT - SUBJECTIVE AND OBJECTIVE BOX
cc: etoh , anemia     interval hx: patient seen and eval, comfortable , in no acute distress.  awake , alert x 2 , cooperative     Vital Signs Last 24 Hrs  T(C): 36.3 (16 Sep 2022 11:15), Max: 36.4 (15 Sep 2022 16:53)  T(F): 97.4 (16 Sep 2022 11:15), Max: 97.6 (15 Sep 2022 16:53)  HR: 69 (16 Sep 2022 11:15) (63 - 74)  BP: 124/93 (16 Sep 2022 11:15) (119/84 - 128/85)  BP(mean): --  RR: 18 (16 Sep 2022 11:15) (18 - 18)  SpO2: 97% (16 Sep 2022 11:15) (97% - 100%)    Parameters below as of 16 Sep 2022 11:15  Patient On (Oxygen Delivery Method): room air        Constitutional: NAD, Resting, Chronically ill appearing female,   ENT: Supple, No JVD  Lungs: CTA B/L, Non-labored breathing  Cardio: RRR, S1/S2, No murmur  Abdomen: Soft, Nontender, Nondistended; Bowel sounds present  Extremities: No calf tenderness, No pitting edema  Psych: Calm, cooperative affect appropriate  Neuro: Awake and alertx 2 , no motor  or sensory deficits     MEDICATIONS  (STANDING):  budesonide  80 MICROgram(s)/formoterol 4.5 MICROgram(s) Inhaler 2 Puff(s) Inhalation two times a day  diVALproex  milliGRAM(s) Oral two times a day  folic acid 1 milliGRAM(s) Oral daily  influenza   Vaccine 0.5 milliLiter(s) IntraMuscular once  magnesium sulfate  IVPB 2 Gram(s) IV Intermittent once  multivitamin 1 Tablet(s) Oral daily  pantoprazole    Tablet 40 milliGRAM(s) Oral before breakfast  QUEtiapine 50 milliGRAM(s) Oral at bedtime  thiamine 100 milliGRAM(s) Oral daily  traZODone 50 milliGRAM(s) Oral at bedtime    MEDICATIONS  (PRN):  acetaminophen     Tablet .. 650 milliGRAM(s) Oral every 6 hours PRN Temp greater or equal to 38C (100.4F), Mild Pain (1 - 3)  ALBUTerol    90 MICROgram(s) HFA Inhaler 2 Puff(s) Inhalation every 6 hours PRN Shortness of Breath and/or Wheezing  aluminum hydroxide/magnesium hydroxide/simethicone Suspension 30 milliLiter(s) Oral every 4 hours PRN Dyspepsia  diazepam  Injectable 5 milliGRAM(s) IV Push every 1 hour PRN CIWA > 8  melatonin 3 milliGRAM(s) Oral at bedtime PRN Insomnia  ondansetron Injectable 4 milliGRAM(s) IV Push every 8 hours PRN Nausea and/or Vomiting                            10.4   4.62  )-----------( 294      ( 16 Sep 2022 05:16 )             35.9   09-16    133<L>  |  100  |  18.1  ----------------------------<  74  4.8   |  20.0<L>  |  0.61    Ca    9.7      16 Sep 2022 05:16  Mg     1.7     09-16    TPro  7.4  /  Alb  3.7  /  TBili  <0.2<L>  /  DBili  x   /  AST  31  /  ALT  18  /  AlkPhos  88  09-16

## 2022-09-16 NOTE — PROGRESS NOTE ADULT - ASSESSMENT
52 year old female with alcohol use disorder presented with acute withdrawal now improving. She was found to have a microcytic anemia prompting GI evaluation.    Microcytic Anemia without overt GI bleeding   Continue PPI daily PO  Avoid nsaids  Ideally, she would benefit from an EGD/Colonoscopy to complete work up for microcytic anemia, however her current mental state, while improved is not conducive to a successful bowel prep.  EGD/Colon early next week if mental status improves to tolerate prep   Continue diet as tolerated  Continue care per primary team

## 2022-09-16 NOTE — BH CONSULTATION LIAISON ASSESSMENT NOTE - NSBHCHARTREVIEWVS_PSY_A_CORE FT
Vital Signs Last 24 Hrs  T(C): 37 (16 Sep 2022 16:18), Max: 37 (16 Sep 2022 16:18)  T(F): 98.6 (16 Sep 2022 16:18), Max: 98.6 (16 Sep 2022 16:18)  HR: 67 (16 Sep 2022 16:18) (63 - 74)  BP: 122/82 (16 Sep 2022 16:18) (119/84 - 128/85)  BP(mean): --  RR: 18 (16 Sep 2022 16:18) (18 - 18)  SpO2: 100% (16 Sep 2022 16:18) (97% - 100%)    Parameters below as of 16 Sep 2022 16:18  Patient On (Oxygen Delivery Method): room air
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
## 2022-09-17 LAB
ALBUMIN SERPL ELPH-MCNC: 3.6 G/DL — SIGNIFICANT CHANGE UP (ref 3.3–5.2)
ALP SERPL-CCNC: 75 U/L — SIGNIFICANT CHANGE UP (ref 40–120)
ALT FLD-CCNC: 17 U/L — SIGNIFICANT CHANGE UP
ANION GAP SERPL CALC-SCNC: 10 MMOL/L — SIGNIFICANT CHANGE UP (ref 5–17)
AST SERPL-CCNC: 24 U/L — SIGNIFICANT CHANGE UP
BILIRUB SERPL-MCNC: <0.2 MG/DL — LOW (ref 0.4–2)
BUN SERPL-MCNC: 23.3 MG/DL — HIGH (ref 8–20)
CALCIUM SERPL-MCNC: 9.5 MG/DL — SIGNIFICANT CHANGE UP (ref 8.4–10.5)
CHLORIDE SERPL-SCNC: 99 MMOL/L — SIGNIFICANT CHANGE UP (ref 98–107)
CO2 SERPL-SCNC: 23 MMOL/L — SIGNIFICANT CHANGE UP (ref 22–29)
CREAT SERPL-MCNC: 0.65 MG/DL — SIGNIFICANT CHANGE UP (ref 0.5–1.3)
EGFR: 105 ML/MIN/1.73M2 — SIGNIFICANT CHANGE UP
GLUCOSE SERPL-MCNC: 81 MG/DL — SIGNIFICANT CHANGE UP (ref 70–99)
HCT VFR BLD CALC: 31.2 % — LOW (ref 34.5–45)
HGB BLD-MCNC: 9.4 G/DL — LOW (ref 11.5–15.5)
MAGNESIUM SERPL-MCNC: 1.6 MG/DL — SIGNIFICANT CHANGE UP (ref 1.6–2.6)
MCHC RBC-ENTMCNC: 24.6 PG — LOW (ref 27–34)
MCHC RBC-ENTMCNC: 30.1 GM/DL — LOW (ref 32–36)
MCV RBC AUTO: 81.7 FL — SIGNIFICANT CHANGE UP (ref 80–100)
PLATELET # BLD AUTO: 329 K/UL — SIGNIFICANT CHANGE UP (ref 150–400)
POTASSIUM SERPL-MCNC: 4.5 MMOL/L — SIGNIFICANT CHANGE UP (ref 3.5–5.3)
POTASSIUM SERPL-SCNC: 4.5 MMOL/L — SIGNIFICANT CHANGE UP (ref 3.5–5.3)
PROT SERPL-MCNC: 6.9 G/DL — SIGNIFICANT CHANGE UP (ref 6.6–8.7)
RBC # BLD: 3.82 M/UL — SIGNIFICANT CHANGE UP (ref 3.8–5.2)
RBC # FLD: 25.2 % — HIGH (ref 10.3–14.5)
SODIUM SERPL-SCNC: 132 MMOL/L — LOW (ref 135–145)
WBC # BLD: 5.33 K/UL — SIGNIFICANT CHANGE UP (ref 3.8–10.5)
WBC # FLD AUTO: 5.33 K/UL — SIGNIFICANT CHANGE UP (ref 3.8–10.5)

## 2022-09-17 PROCEDURE — 99233 SBSQ HOSP IP/OBS HIGH 50: CPT

## 2022-09-17 RX ORDER — THIAMINE MONONITRATE (VIT B1) 100 MG
500 TABLET ORAL DAILY
Refills: 0 | Status: COMPLETED | OUTPATIENT
Start: 2022-09-17 | End: 2022-09-20

## 2022-09-17 RX ADMIN — Medication 100 MILLIGRAM(S): at 21:15

## 2022-09-17 RX ADMIN — QUETIAPINE FUMARATE 50 MILLIGRAM(S): 200 TABLET, FILM COATED ORAL at 21:15

## 2022-09-17 RX ADMIN — Medication 105 MILLIGRAM(S): at 16:16

## 2022-09-17 RX ADMIN — PANTOPRAZOLE SODIUM 40 MILLIGRAM(S): 20 TABLET, DELAYED RELEASE ORAL at 05:13

## 2022-09-17 RX ADMIN — Medication 1 MILLIGRAM(S): at 17:24

## 2022-09-17 RX ADMIN — Medication 1 TABLET(S): at 17:23

## 2022-09-17 RX ADMIN — DIVALPROEX SODIUM 500 MILLIGRAM(S): 500 TABLET, DELAYED RELEASE ORAL at 17:24

## 2022-09-17 RX ADMIN — DIVALPROEX SODIUM 500 MILLIGRAM(S): 500 TABLET, DELAYED RELEASE ORAL at 05:13

## 2022-09-17 NOTE — PROGRESS NOTE ADULT - ASSESSMENT
Patient is a 52 year old female with PMH of Seizure, PTSD, anxiety, depression, polysubstance abuse came to the ED for frequent falls.  patient's  said she has been falling more frequently, hit her head multiple times this week; does not feel safe with her at home;  In the ED, found to have hb 6.3; fobt negative; alcohol level: 226; CT head cervical: CT: No acute hemorrhage, extra-axial collections or displaced calvarial fracture. Right parietal scalp hematoma and laceration. Bilateral periorbital soft tissue swelling.  completed ciwa.  awaiting  consult for capacity eval/ mood disorder. now plan for egd/ clonoscopy early next week.     #Acute on chronic microcytic anemia superimposed on iron deficiency with inappropriate retic response  -multifactorial- iron deficiency, bone marrow suppression from alcohol abuse   -Hb 9.4, s/p 1u prbc this admission  -Completed Venofer x 3 days  -FOBT neg  -Monitor CBC  -Continue PPI  -GI reconsulted , recommend colonoscopy / egd early next week. awaiting  eval for capacity     #Alcohol Withdrawal/ not in withdrawal anymore   #Encephalopathy - ? delirium  -CIWA, s/p valium taper  -continue thiamine/folic acid/MVI    #Frequent Falls due to Unsteady Gait - multifactorial likely due to alcohol intoxication, hyponatremia   -CTH on admission negative for acute pathology but right parietal scalp hematoma noted  -CT neck negative for acute fracture  -sodium improved  -ETOH level on admission > 200  -fall precautions   -plan for claudio     #Hyponatremia/ improved   -likely secondary to low solute load/beer protomania   -monitor     #COPD not in acute exacerbation    -Continue Symbicort    -Duoneb prn    #hx of Seizure ( EEG in 2021- Interictal findings suggest potential epileptogenic focus and structural abnormality in the right temporal region)  -Valproic acid level WNL   -Continue Divalproex 500mg bid   -Seizure precautions    #Anxiety/depression/PTSD   -Seroquel 50mg HS   -Trazodone 50mg HS   -psych consulted for mood d/o , capacity eval.     #Tobacco dependency  -Decline nicotine patch  -Cessation advised     #DVT prophylaxis: CSD     #Dispo - plan for egd , colonoscopy early next week as per gi , claudio once medically stable.      Patient is a 52 year old female with PMH of Seizure, PTSD, anxiety, depression, polysubstance abuse came to the ED for frequent falls.  patient's  said she has been falling more frequently, hit her head multiple times this week; does not feel safe with her at home;  In the ED, found to have hb 6.3; fobt negative; alcohol level: 226; CT head cervical: CT: No acute hemorrhage, extra-axial collections or displaced calvarial fracture. Right parietal scalp hematoma and laceration. Bilateral periorbital soft tissue swelling.  completed ciwa.  awaiting  consult for capacity eval/ mood disorder. now plan for egd/ clonoscopy early next week.     #Acute on chronic microcytic anemia superimposed on iron deficiency with inappropriate retic response  -multifactorial- iron deficiency, bone marrow suppression from alcohol abuse   -Hb 9.4, s/p 1u prbc this admission  -Completed Venofer x 3 days  -FOBT neg  -Monitor CBC  -Continue PPI  -GI reconsulted , recommend colonoscopy / egd early next week. awaiting  eval for capacity     #Alcohol Withdrawal/ not in withdrawal anymore   #Encephalopathy - ? delirium  -CIWA, s/p valium taper  -continue thiamine/folic acid/MVI    #Frequent Falls due to Unsteady Gait - multifactorial likely due to alcohol intoxication, hyponatremia   -CTH on admission negative for acute pathology but right parietal scalp hematoma noted  -CT neck negative for acute fracture  -sodium improved  -ETOH level on admission > 200  -fall precautions   -plan for claudio     #Hyponatremia/ improved - not controlled  -likely secondary to low solute load/beer protomania   -monitor     #COPD not in acute exacerbation    -Continue Symbicort    -Duoneb prn    #hx of Seizure ( EEG in 2021- Interictal findings suggest potential epileptogenic focus and structural abnormality in the right temporal region)  -Valproic acid level WNL   -Continue Divalproex 500mg bid   -Seizure precautions    #Anxiety/depression/PTSD   -Seroquel 50mg HS   -Trazodone 50mg HS   -psych consulted for mood d/o , capacity eval.     #Tobacco dependency  -Decline nicotine patch  -Cessation advised     #DVT prophylaxis: CSD     #Dispo - plan for egd , colonoscopy early next week as per gi , claudio once medically stable.      Patient is a 52 year old female with PMH of Seizure, PTSD, anxiety, depression, polysubstance abuse came to the ED for frequent falls.  patient's  said she has been falling more frequently, hit her head multiple times this week; does not feel safe with her at home;  In the ED, found to have hb 6.3; fobt negative; alcohol level: 226; CT head cervical: CT: No acute hemorrhage, extra-axial collections or displaced calvarial fracture. Right parietal scalp hematoma and laceration. Bilateral periorbital soft tissue swelling.  completed ciwa.  awaiting  consult for capacity eval/ mood disorder. now plan for egd/ clonoscopy early next week.     #Acute on chronic microcytic anemia superimposed on iron deficiency with inappropriate retic response  -multifactorial- iron deficiency, bone marrow suppression from alcohol abuse   -Hb 9.4, s/p 1u prbc this admission  -Completed Venofer x 3 days  -FOBT neg  -Monitor CBC  -Continue PPI  -GI reconsulted , recommend colonoscopy / egd early next week. awaiting  eval for capacity     #Alcohol Withdrawal/ not in withdrawal anymore   #Encephalopathy - ? delirium  -CIWA, s/p valium taper  -continue thiamine/folic acid/MVI    #Frequent Falls due to Unsteady Gait - multifactorial likely due to alcohol intoxication, hyponatremia   -CTH on admission negative for acute pathology but right parietal scalp hematoma noted  -CT neck negative for acute fracture  -sodium improved  -ETOH level on admission > 200  -fall precautions   -plan for claudio     #Hyponatremia - not controlled  -likely secondary to low solute load/beer protomania   -monitor     #COPD not in acute exacerbation    -Continue Symbicort    -Duoneb prn    #hx of Seizure ( EEG in 2021- Interictal findings suggest potential epileptogenic focus and structural abnormality in the right temporal region)  -Valproic acid level WNL   -Continue Divalproex 500mg bid   -Seizure precautions    #Anxiety/depression/PTSD   -Seroquel 50mg HS   -Trazodone 50mg HS   -psych consulted for mood d/o , capacity eval.     #Tobacco dependency  -Decline nicotine patch  -Cessation advised     #DVT prophylaxis: CSD     #Dispo - plan for egd , colonoscopy early next week as per gi , claudio once medically stable.      Patient is a 52 year old female with PMH of Seizure, PTSD, anxiety, depression, polysubstance abuse came to the ED for frequent falls.  patient's  said she has been falling more frequently, hit her head multiple times this week; does not feel safe with her at home;  In the ED, found to have hb 6.3; fobt negative; alcohol level: 226; CT head cervical: CT: No acute hemorrhage, extra-axial collections or displaced calvarial fracture. Right parietal scalp hematoma and laceration. Bilateral periorbital soft tissue swelling.  completed ciwa.  awaiting  consult for capacity eval/ mood disorder. now plan for egd/ clonoscopy early next week.     #Acute on chronic microcytic anemia superimposed on iron deficiency with inappropriate retic response  -multifactorial- iron deficiency, bone marrow suppression from alcohol abuse   -Hb 9.4, s/p 1u prbc this admission  -Completed Venofer x 3 days  -FOBT neg  -Monitor CBC  -Continue PPI  -GI reconsulted , recommend colonoscopy / egd early next week. awaiting  eval for capacity     #Alcohol Withdrawal/ not in withdrawal anymore   #Encephalopathy - ? delirium  -CIWA, s/p valium taper  -continue thiamine/folic acid/MVI    #Frequent Falls due to Unsteady Gait - multifactorial likely due to alcohol intoxication, hyponatremia   -CTH on admission negative for acute pathology but right parietal scalp hematoma noted  -CT neck negative for acute fracture  -sodium improved  -ETOH level on admission > 200  -high dose thiamine x 3 days  -fall precautions   -plan for claudio     #Hyponatremia - not controlled  -likely secondary to low solute load/beer protomania   -monitor     #COPD not in acute exacerbation    -Continue Symbicort    -Duoneb prn    #hx of Seizure ( EEG in 2021- Interictal findings suggest potential epileptogenic focus and structural abnormality in the right temporal region)  -Valproic acid level WNL   -Continue Divalproex 500mg bid   -Seizure precautions    #Anxiety/depression/PTSD   -Seroquel 50mg HS   -Trazodone 50mg HS   -psych consulted for mood d/o , capacity eval.     #Tobacco dependency  -Decline nicotine patch  -Cessation advised     #DVT prophylaxis: CSD     #Dispo - plan for egd , colonoscopy early next week as per gi , claudio once medically stable.      Patient is a 52 year old female with PMH of Seizure, PTSD, anxiety, depression, polysubstance abuse came to the ED for frequent falls.  patient's  said she has been falling more frequently, hit her head multiple times this week; does not feel safe with her at home;  In the ED, found to have hb 6.3; fobt negative; alcohol level: 226; CT head cervical: CT: No acute hemorrhage, extra-axial collections or displaced calvarial fracture. Right parietal scalp hematoma and laceration. Bilateral periorbital soft tissue swelling.  completed ciwa.  awaiting  consult for capacity eval/ mood disorder. now plan for egd/ clonoscopy early next week.     #Acute on chronic microcytic anemia superimposed on iron deficiency with inappropriate retic response  -multifactorial- iron deficiency, bone marrow suppression from alcohol abuse   -Hb 9.4, s/p 1u prbc this admission  -Completed Venofer x 3 days  -FOBT neg  -Monitor CBC  -Continue PPI  -GI reconsulted, colonoscopy / egd early next week. awaiting  eval for capacity     #Alcohol Withdrawal/ not in withdrawal anymore   #Encephalopathy - ? delirium  -CIWA, s/p valium taper  -continue thiamine/folic acid/MVI    #Frequent Falls due to Unsteady Gait - multifactorial likely due to alcohol intoxication, hyponatremia   -CTH on admission negative for acute pathology but right parietal scalp hematoma noted  -CT neck negative for acute fracture  -sodium improved  -ETOH level on admission > 200  -high dose thiamine x 3 days  -fall precautions   -plan for claudio     #Hyponatremia - not controlled  -likely secondary to low solute load/beer protomania   -monitor     #COPD not in acute exacerbation    -Continue Symbicort    -Duoneb prn    #hx of Seizure ( EEG in 2021- Interictal findings suggest potential epileptogenic focus and structural abnormality in the right temporal region)  -Valproic acid level WNL   -Continue Divalproex 500mg bid   -Seizure precautions    #Anxiety/depression/PTSD   -Seroquel 50mg HS   -Trazodone 50mg HS   -psych consulted for mood d/o , capacity eval.     #Tobacco dependency  -Decline nicotine patch  -Cessation advised     #DVT prophylaxis: CSD     #Dispo - plan for egd, colonoscopy early next week as per gi, claudio once medically stable.

## 2022-09-17 NOTE — PROGRESS NOTE ADULT - SUBJECTIVE AND OBJECTIVE BOX
Patient is a 52y old  Female who presents with a chief complaint of fall (16 Sep 2022 10:56)      HPI:  53 y/o female with PMH of seizure, PTSD, anxiety, depression, polysubstance abuse came to the ED for frequent falls. Patient said she came because she was having problem breathing; she was on nebulizer and albuterol but changed PCP, and the nebulizer was not prescribed. As per ED, patient's  said she has been falling more frequently, hit her head multiple times this week; does not feel safe with her at home; concerned she might die if she has a bad unwitnessed fall since he is not usually home with her. He also mentioned she has not been taken her seizure medication as prescribed. Patient has no nausea, vomiting, chest pain, palpitation, abdominal pain, change in bowel/urinary habit, fever, chills, melena, hematuria.      (07 Sep 2022 02:12)      REVIEW OF SYSTEMS:  Constitutional: No fever, weight loss or fatigue  ENMT:  No difficulty hearing, tinnitus, vertigo; No sinus or throat pain  Respiratory: No cough, wheezing, chills or hemoptysis  Cardiovascular: No chest pain, palpitations, dizziness or leg swelling  Gastrointestinal: No abdominal or epigastric pain. No nausea, vomiting or hematemesis; No diarrhea or constipation. No melena or hematochezia.  Skin: No itching, burning, rashes or lesions   Musculoskeletal: No joint pain or swelling; No muscle, back or extremity pain    PAST MEDICAL & SURGICAL HISTORY:  Seizure      ETOH abuse      Tobacco dependence      Hypertension      H/O tracheostomy          FAMILY HISTORY:  Family history of osteoarthritis (Mother)        SOCIAL HISTORY:  Smoking Status: [ ] Current, [ ] Former, [ ] Never  Pack Years:  [  ] EtOH  [  ] IVDA    MEDICATIONS:  MEDICATIONS  (STANDING):  budesonide  80 MICROgram(s)/formoterol 4.5 MICROgram(s) Inhaler 2 Puff(s) Inhalation two times a day  diVALproex  milliGRAM(s) Oral two times a day  folic acid 1 milliGRAM(s) Oral daily  influenza   Vaccine 0.5 milliLiter(s) IntraMuscular once  multivitamin 1 Tablet(s) Oral daily  pantoprazole    Tablet 40 milliGRAM(s) Oral before breakfast  QUEtiapine 50 milliGRAM(s) Oral at bedtime  thiamine 100 milliGRAM(s) Oral daily  traZODone 100 milliGRAM(s) Oral at bedtime    MEDICATIONS  (PRN):  acetaminophen     Tablet .. 650 milliGRAM(s) Oral every 6 hours PRN Temp greater or equal to 38C (100.4F), Mild Pain (1 - 3)  ALBUTerol    90 MICROgram(s) HFA Inhaler 2 Puff(s) Inhalation every 6 hours PRN Shortness of Breath and/or Wheezing  aluminum hydroxide/magnesium hydroxide/simethicone Suspension 30 milliLiter(s) Oral every 4 hours PRN Dyspepsia  melatonin 3 milliGRAM(s) Oral at bedtime PRN Insomnia  ondansetron Injectable 4 milliGRAM(s) IV Push every 8 hours PRN Nausea and/or Vomiting      Allergies    No Known Allergies    Intolerances        Vital Signs Last 24 Hrs  T(C): 36.4 (17 Sep 2022 03:21), Max: 37 (16 Sep 2022 16:18)  T(F): 97.6 (17 Sep 2022 03:21), Max: 98.6 (16 Sep 2022 16:18)  HR: 66 (17 Sep 2022 04:52) (66 - 77)  BP: 109/72 (17 Sep 2022 04:52) (109/72 - 125/90)  BP(mean): --  RR: 19 (17 Sep 2022 04:52) (18 - 19)  SpO2: 96% (17 Sep 2022 04:52) (96% - 100%)    Parameters below as of 17 Sep 2022 04:52  Patient On (Oxygen Delivery Method): room air            PHYSICAL EXAM:    General: ; in no acute distress  HEENT: MMM, conjunctiva and sclera clear  H-RRR  L-CTA  Gastrointestinal: Soft, non-tender non-distended; Normal bowel sounds; No rebound or guarding  Extremities: Normal range of motion, No clubbing, cyanosis or edema  Neurological: Alert and oriented x3  Skin: Warm and dry. No obvious rash      LABS:                        9.4    5.33  )-----------( 329      ( 17 Sep 2022 07:20 )             31.2     17 Sep 2022 07:20    132    |  99     |  23.3   ----------------------------<  81     4.5     |  23.0   |  0.65     Ca    9.5        17 Sep 2022 07:20  Mg     1.6       17 Sep 2022 07:20    TPro  6.9    /  Alb  3.6    /  TBili  <0.2   /  DBili  x      /  AST  24     /  ALT  17     /  AlkPhos  75     / Amylase x      /Lipase x      17 Sep 2022 07:20              RADIOLOGY & ADDITIONAL STUDIES:    Patient is a 52y old  Female who presents with a chief complaint of fall (16 Sep 2022 10:56)      HPI:  51 y/o female with PMH of seizure, PTSD, anxiety, depression, polysubstance abuse came to the ED for frequent falls.               Patient  awake and alert. Thought is was 2020. No abdominal pain. Slid last night and landed on sacrum. NO pain today. No Tremor. Seen by ally yesterday . Delerium vs Delerium with underlying dementia              REVIEW OF SYSTEMS:  Constitutional: No fever, weight loss or fatigue  ENMT:  No difficulty hearing, tinnitus, vertigo; No sinus or throat pain  Respiratory: No cough, wheezing, chills or hemoptysis  Cardiovascular: No chest pain, palpitations, dizziness or leg swelling  Gastrointestinal: No abdominal or epigastric pain. No nausea, vomiting or hematemesis; No diarrhea or constipation. No melena or hematochezia.  Skin: No itching, burning, rashes or lesions   Musculoskeletal: No joint pain or swelling; No muscle, back or extremity pain    PAST MEDICAL & SURGICAL HISTORY:  Seizure      ETOH abuse      Tobacco dependence      Hypertension      H/O tracheostomy          FAMILY HISTORY:  Family history of osteoarthritis (Mother)        SOCIAL HISTORY:  Smoking Status: [ ] Current, [ ] Former, [ ] Never  Pack Years:  [X  ] EtOH  [  ] IVDA    MEDICATIONS:  MEDICATIONS  (STANDING):  budesonide  80 MICROgram(s)/formoterol 4.5 MICROgram(s) Inhaler 2 Puff(s) Inhalation two times a day  diVALproex  milliGRAM(s) Oral two times a day  folic acid 1 milliGRAM(s) Oral daily  influenza   Vaccine 0.5 milliLiter(s) IntraMuscular once  multivitamin 1 Tablet(s) Oral daily  pantoprazole    Tablet 40 milliGRAM(s) Oral before breakfast  QUEtiapine 50 milliGRAM(s) Oral at bedtime  thiamine 100 milliGRAM(s) Oral daily  traZODone 100 milliGRAM(s) Oral at bedtime    MEDICATIONS  (PRN):  acetaminophen     Tablet .. 650 milliGRAM(s) Oral every 6 hours PRN Temp greater or equal to 38C (100.4F), Mild Pain (1 - 3)  ALBUTerol    90 MICROgram(s) HFA Inhaler 2 Puff(s) Inhalation every 6 hours PRN Shortness of Breath and/or Wheezing  aluminum hydroxide/magnesium hydroxide/simethicone Suspension 30 milliLiter(s) Oral every 4 hours PRN Dyspepsia  melatonin 3 milliGRAM(s) Oral at bedtime PRN Insomnia  ondansetron Injectable 4 milliGRAM(s) IV Push every 8 hours PRN Nausea and/or Vomiting      Allergies    No Known Allergies    Intolerances        Vital Signs Last 24 Hrs  T(C): 36.4 (17 Sep 2022 03:21), Max: 37 (16 Sep 2022 16:18)  T(F): 97.6 (17 Sep 2022 03:21), Max: 98.6 (16 Sep 2022 16:18)  HR: 66 (17 Sep 2022 04:52) (66 - 77)  BP: 109/72 (17 Sep 2022 04:52) (109/72 - 125/90)  BP(mean): --  RR: 19 (17 Sep 2022 04:52) (18 - 19)  SpO2: 96% (17 Sep 2022 04:52) (96% - 100%)    Parameters below as of 17 Sep 2022 04:52  Patient On (Oxygen Delivery Method): room air            PHYSICAL EXAM:    General: ; in no acute distress  HEENT: MMM, conjunctiva and sclera clear  H-RRR  L-CTA  Gastrointestinal: Soft, non-tender non-distended; Normal bowel sounds; No rebound or guarding  Extremities: Normal range of motion, No clubbing, cyanosis or edema  Neurological: Alert and oriented x 2   Skin: Warm and dry. No obvious rash      LABS:                        9.4    5.33  )-----------( 329      ( 17 Sep 2022 07:20 )             31.2     17 Sep 2022 07:20    132    |  99     |  23.3   ----------------------------<  81     4.5     |  23.0   |  0.65     Ca    9.5        17 Sep 2022 07:20  Mg     1.6       17 Sep 2022 07:20    TPro  6.9    /  Alb  3.6    /  TBili  <0.2   /  DBili  x      /  AST  24     /  ALT  17     /  AlkPhos  75     / Amylase x      /Lipase x      17 Sep 2022 07:20              RADIOLOGY & ADDITIONAL STUDIES:     < from: US Abdomen Upper Quadrant Right (09.08.22 @ 11:47) >  MPRESSION:  Fatty liver.  No morphologic evidence of cirrhosis.      < end of copied text >

## 2022-09-17 NOTE — CHART NOTE - NSCHARTNOTEFT_GEN_A_CORE
RN called for witnessed pt fall. Nurse state that the patient was trying to get out of bed and slid, landing on her sacrum. Patient is a 52 year old female with PMH of Seizure, PTSD, anxiety, depression, polysubstance abuse came to the ED for frequent falls.  patient's  said she has been falling more frequently.     T(C): 36.4   HR: 66  BP: 109/72   RR: 19   SpO2: 96%     CONSTITUTIONAL: no apparent distress  EYES: PERRLA and symmetric, EOMI, No conjunctival or scleral injection, non-icteric  RESP: No respiratory distress, no use of accessory muscles; CTA b/l, no WRR  CV: RRR, +S1S2, no MRG; no JVD; no peripheral edema  MSK: 5/5 strength of LE, no ecchymosis noted of the sacral region; mild tenderness to palpation of the LT gluteus  SKIN: No rashes or ulcers noted; no subcutaneous nodules or induration palpable    Assessment:   -Witnessed fall, gluteal injury RN called for witnessed pt fall. Nurse state that the patient was trying to get out of bed and slid, landing on her sacrum. Patient is a 52 year old female with PMH of Seizure, PTSD, anxiety, depression, polysubstance abuse came to the ED for frequent falls.  patient's  said she has been falling more frequently.     T(C): 36.4   HR: 66  BP: 109/72   RR: 19   SpO2: 96%     CONSTITUTIONAL: no apparent distress, pt asking for coffee   EYES: PERRLA and symmetric, EOMI, No conjunctival or scleral injection, non-icteric  RESP: No respiratory distress, no use of accessory muscles; CTA b/l, no WRR  CV: RRR, +S1S2, no MRG; no JVD; no peripheral edema  MSK: 5/5 strength of LE, no ecchymosis noted of the sacral region; mild tenderness to palpation of the LT gluteus  SKIN: No rashes or ulcers noted; no subcutaneous nodules or induration palpable    Assessment:   1. Witnessed fall, gluteal injury  - no x-ray need appreciated at this time  -heat for pain  -continue to monitor    2. Acute on chronic microcytic anemia superimposed on iron deficiency with inappropriate retic response  -multifactorial- iron deficiency, bone marrow suppression from alcohol abuse   -GI reconsulted , recommend colonoscopy / egd early next week. awaiting  eval for capacity     3. Alcohol Withdrawal, w/o sx   -CIWA   -Valium tapered to PRN for CIWA >8  -continue thiamine/folic acid/MVI      4.  Frequent Falls due to Unsteady Gait - multifactorial likely due to alcohol intoxication, hyponatremia   -fall precautions   -plan for claudio     5. Hyponatremia/ improved   -likely secondary to low solute load/beer protomania   - monitor     6. COPD not in acute exacerbation    -Continue Symbicort    -Duoneb prn    7. hx of Seizure ( EEG in 2021- Interictal findings suggest potential epileptogenic focus and structural abnormality in the right temporal region)  -Valproic acid level WNL   -Continue Divalproex 500mg bid   -Seizure precautions    8. Anxiety/depression/PTSD   -Seroquel 50mg HS   -Trazodone 50mg HS   -psych consulted for mood d/o , capacity eval.     9. Tobacco dependency  -Decline nicotine patch  -Cessation advised

## 2022-09-17 NOTE — PROGRESS NOTE ADULT - SUBJECTIVE AND OBJECTIVE BOX
Catia Tobin M.D.    Patient is a 52y old  Female who presents with a chief complaint of frequent falls. ETOH abuse, anemia (17 Sep 2022 10:39)      SUBJECTIVE / OVERNIGHT EVENTS:    Patient denies chest pain, SOB, abd pain, N/V, fever, chills, dysuria or any other complaints. All remainder ROS negative.     MEDICATIONS  (STANDING):  budesonide  80 MICROgram(s)/formoterol 4.5 MICROgram(s) Inhaler 2 Puff(s) Inhalation two times a day  diVALproex  milliGRAM(s) Oral two times a day  folic acid 1 milliGRAM(s) Oral daily  influenza   Vaccine 0.5 milliLiter(s) IntraMuscular once  multivitamin 1 Tablet(s) Oral daily  pantoprazole    Tablet 40 milliGRAM(s) Oral before breakfast  QUEtiapine 50 milliGRAM(s) Oral at bedtime  thiamine 100 milliGRAM(s) Oral daily  traZODone 100 milliGRAM(s) Oral at bedtime    MEDICATIONS  (PRN):  acetaminophen     Tablet .. 650 milliGRAM(s) Oral every 6 hours PRN Temp greater or equal to 38C (100.4F), Mild Pain (1 - 3)  ALBUTerol    90 MICROgram(s) HFA Inhaler 2 Puff(s) Inhalation every 6 hours PRN Shortness of Breath and/or Wheezing  aluminum hydroxide/magnesium hydroxide/simethicone Suspension 30 milliLiter(s) Oral every 4 hours PRN Dyspepsia  melatonin 3 milliGRAM(s) Oral at bedtime PRN Insomnia  ondansetron Injectable 4 milliGRAM(s) IV Push every 8 hours PRN Nausea and/or Vomiting      I&O's Summary      PHYSICAL EXAM:  Vital Signs Last 24 Hrs  T(C): 36.5 (17 Sep 2022 11:24), Max: 37 (16 Sep 2022 16:18)  T(F): 97.7 (17 Sep 2022 11:24), Max: 98.6 (16 Sep 2022 16:18)  HR: 646 (17 Sep 2022 11:24) (66 - 646)  BP: 98/68 (17 Sep 2022 11:24) (98/68 - 125/90)  BP(mean): --  RR: 19 (17 Sep 2022 11:24) (18 - 19)  SpO2: 96% (17 Sep 2022 11:24) (96% - 100%)    Parameters below as of 17 Sep 2022 11:24  Patient On (Oxygen Delivery Method): room air    Constitutional: NAD, Resting, Chronically ill appearing female,   ENT: Supple, No JVD  Lungs: CTA B/L, Non-labored breathing  Cardio: RRR, S1/S2, No murmur  Abdomen: Soft, Nontender, Nondistended; Bowel sounds present  Extremities: No calf tenderness, No pitting edema  Psych: Calm, cooperative affect appropriate  Neuro: Awake and alertx 2 (hospitial and name) , no motor  or sensory deficits     LABS:                        9.4    5.33  )-----------( 329      ( 17 Sep 2022 07:20 )             31.2     09-17    132<L>  |  99  |  23.3<H>  ----------------------------<  81  4.5   |  23.0  |  0.65    Ca    9.5      17 Sep 2022 07:20  Mg     1.6     09-17    TPro  6.9  /  Alb  3.6  /  TBili  <0.2<L>  /  DBili  x   /  AST  24  /  ALT  17  /  AlkPhos  75  09-17              CAPILLARY BLOOD GLUCOSE          RADIOLOGY & ADDITIONAL TESTS:  Results Reviewed:   Imaging Personally Reviewed:  Electrocardiogram Personally Reviewed:

## 2022-09-18 LAB
ANION GAP SERPL CALC-SCNC: 12 MMOL/L — SIGNIFICANT CHANGE UP (ref 5–17)
BUN SERPL-MCNC: 29.5 MG/DL — HIGH (ref 8–20)
CALCIUM SERPL-MCNC: 9.4 MG/DL — SIGNIFICANT CHANGE UP (ref 8.4–10.5)
CHLORIDE SERPL-SCNC: 102 MMOL/L — SIGNIFICANT CHANGE UP (ref 98–107)
CO2 SERPL-SCNC: 24 MMOL/L — SIGNIFICANT CHANGE UP (ref 22–29)
CREAT SERPL-MCNC: 0.73 MG/DL — SIGNIFICANT CHANGE UP (ref 0.5–1.3)
EGFR: 98 ML/MIN/1.73M2 — SIGNIFICANT CHANGE UP
GLUCOSE BLDC GLUCOMTR-MCNC: 72 MG/DL — SIGNIFICANT CHANGE UP (ref 70–99)
GLUCOSE SERPL-MCNC: 65 MG/DL — LOW (ref 70–99)
HCT VFR BLD CALC: 33.9 % — LOW (ref 34.5–45)
HGB BLD-MCNC: 10.2 G/DL — LOW (ref 11.5–15.5)
MCHC RBC-ENTMCNC: 24.8 PG — LOW (ref 27–34)
MCHC RBC-ENTMCNC: 30.1 GM/DL — LOW (ref 32–36)
MCV RBC AUTO: 82.3 FL — SIGNIFICANT CHANGE UP (ref 80–100)
PLATELET # BLD AUTO: 349 K/UL — SIGNIFICANT CHANGE UP (ref 150–400)
POTASSIUM SERPL-MCNC: 4.7 MMOL/L — SIGNIFICANT CHANGE UP (ref 3.5–5.3)
POTASSIUM SERPL-SCNC: 4.7 MMOL/L — SIGNIFICANT CHANGE UP (ref 3.5–5.3)
RBC # BLD: 4.12 M/UL — SIGNIFICANT CHANGE UP (ref 3.8–5.2)
RBC # FLD: 25.2 % — HIGH (ref 10.3–14.5)
SODIUM SERPL-SCNC: 138 MMOL/L — SIGNIFICANT CHANGE UP (ref 135–145)
WBC # BLD: 6.14 K/UL — SIGNIFICANT CHANGE UP (ref 3.8–10.5)
WBC # FLD AUTO: 6.14 K/UL — SIGNIFICANT CHANGE UP (ref 3.8–10.5)

## 2022-09-18 PROCEDURE — 99232 SBSQ HOSP IP/OBS MODERATE 35: CPT

## 2022-09-18 PROCEDURE — 99233 SBSQ HOSP IP/OBS HIGH 50: CPT

## 2022-09-18 RX ORDER — ALPRAZOLAM 0.25 MG
0.25 TABLET ORAL ONCE
Refills: 0 | Status: DISCONTINUED | OUTPATIENT
Start: 2022-09-18 | End: 2022-09-18

## 2022-09-18 RX ORDER — DEXTROSE 50 % IN WATER 50 %
15 SYRINGE (ML) INTRAVENOUS ONCE
Refills: 0 | Status: DISCONTINUED | OUTPATIENT
Start: 2022-09-18 | End: 2022-09-27

## 2022-09-18 RX ORDER — DEXTROSE 50 % IN WATER 50 %
25 SYRINGE (ML) INTRAVENOUS ONCE
Refills: 0 | Status: DISCONTINUED | OUTPATIENT
Start: 2022-09-18 | End: 2022-09-27

## 2022-09-18 RX ORDER — DEXTROSE 50 % IN WATER 50 %
12.5 SYRINGE (ML) INTRAVENOUS ONCE
Refills: 0 | Status: DISCONTINUED | OUTPATIENT
Start: 2022-09-18 | End: 2022-09-27

## 2022-09-18 RX ORDER — NICOTINE POLACRILEX 2 MG
1 GUM BUCCAL DAILY
Refills: 0 | Status: DISCONTINUED | OUTPATIENT
Start: 2022-09-18 | End: 2022-09-27

## 2022-09-18 RX ORDER — GLUCAGON INJECTION, SOLUTION 0.5 MG/.1ML
1 INJECTION, SOLUTION SUBCUTANEOUS ONCE
Refills: 0 | Status: DISCONTINUED | OUTPATIENT
Start: 2022-09-18 | End: 2022-09-27

## 2022-09-18 RX ADMIN — Medication 3 MILLIGRAM(S): at 23:44

## 2022-09-18 RX ADMIN — Medication 1 MILLIGRAM(S): at 17:07

## 2022-09-18 RX ADMIN — Medication 100 MILLIGRAM(S): at 21:23

## 2022-09-18 RX ADMIN — Medication 1 PATCH: at 19:19

## 2022-09-18 RX ADMIN — PANTOPRAZOLE SODIUM 40 MILLIGRAM(S): 20 TABLET, DELAYED RELEASE ORAL at 05:06

## 2022-09-18 RX ADMIN — QUETIAPINE FUMARATE 50 MILLIGRAM(S): 200 TABLET, FILM COATED ORAL at 21:23

## 2022-09-18 RX ADMIN — DIVALPROEX SODIUM 500 MILLIGRAM(S): 500 TABLET, DELAYED RELEASE ORAL at 05:06

## 2022-09-18 RX ADMIN — Medication 0.25 MILLIGRAM(S): at 23:44

## 2022-09-18 RX ADMIN — Medication 1 PATCH: at 17:07

## 2022-09-18 RX ADMIN — Medication 1 TABLET(S): at 17:07

## 2022-09-18 RX ADMIN — Medication 105 MILLIGRAM(S): at 18:33

## 2022-09-18 RX ADMIN — BUDESONIDE AND FORMOTEROL FUMARATE DIHYDRATE 2 PUFF(S): 160; 4.5 AEROSOL RESPIRATORY (INHALATION) at 20:11

## 2022-09-18 RX ADMIN — BUDESONIDE AND FORMOTEROL FUMARATE DIHYDRATE 2 PUFF(S): 160; 4.5 AEROSOL RESPIRATORY (INHALATION) at 09:41

## 2022-09-18 RX ADMIN — DIVALPROEX SODIUM 500 MILLIGRAM(S): 500 TABLET, DELAYED RELEASE ORAL at 17:07

## 2022-09-18 NOTE — PROGRESS NOTE ADULT - SUBJECTIVE AND OBJECTIVE BOX
Chief Complaint:  Patient is a 52y old  Female who presents with a chief complaint of frequent falls. ETOH abuse, anemia (17 Sep 2022 10:39)      Interval Events / Subjective: Patient seen and examined at bedside      REVIEW OF SYSTEMS:     Constitutional: reports fatigue    ENMT:  No difficulty hearing, tinnitus, vertigo; No sinus or throat pain    Respiratory: No cough, wheezing, chills or hemoptysis    Cardiovascular: No chest pain, palpitations, dizziness     Gastrointestinal: Denies abdominal or epigastric pain .States she "gets food stuck some times" while eating   No nausea, vomiting or hematemesis;   No diarrhea or constipation. No melena or hematochezia.    Skin: No itching, burning, rashes or lesions         MEDICATIONS:   MEDICATIONS  (STANDING):  budesonide  80 MICROgram(s)/formoterol 4.5 MICROgram(s) Inhaler 2 Puff(s) Inhalation two times a day  dextrose 50% Injectable 25 Gram(s) IV Push once  dextrose 50% Injectable 12.5 Gram(s) IV Push once  dextrose 50% Injectable 25 Gram(s) IV Push once  dextrose Oral Gel 15 Gram(s) Oral once  diVALproex  milliGRAM(s) Oral two times a day  folic acid 1 milliGRAM(s) Oral daily  glucagon  Injectable 1 milliGRAM(s) IntraMuscular once  influenza   Vaccine 0.5 milliLiter(s) IntraMuscular once  multivitamin 1 Tablet(s) Oral daily  pantoprazole    Tablet 40 milliGRAM(s) Oral before breakfast  QUEtiapine 50 milliGRAM(s) Oral at bedtime  thiamine IVPB 500 milliGRAM(s) IV Intermittent daily  traZODone 100 milliGRAM(s) Oral at bedtime    MEDICATIONS  (PRN):  acetaminophen     Tablet .. 650 milliGRAM(s) Oral every 6 hours PRN Temp greater or equal to 38C (100.4F), Mild Pain (1 - 3)  ALBUTerol    90 MICROgram(s) HFA Inhaler 2 Puff(s) Inhalation every 6 hours PRN Shortness of Breath and/or Wheezing  aluminum hydroxide/magnesium hydroxide/simethicone Suspension 30 milliLiter(s) Oral every 4 hours PRN Dyspepsia  melatonin 3 milliGRAM(s) Oral at bedtime PRN Insomnia  ondansetron Injectable 4 milliGRAM(s) IV Push every 8 hours PRN Nausea and/or Vomiting      ALLERGIES:   Allergies    No Known Allergies    Intolerances        VITAL SIGNS:   Vital Signs Last 24 Hrs  T(C): 36.8 (18 Sep 2022 08:58), Max: 36.9 (17 Sep 2022 20:38)  T(F): 98.2 (18 Sep 2022 08:58), Max: 98.5 (17 Sep 2022 20:38)  HR: 58 (18 Sep 2022 09:43) (58 - 646)  BP: 112/79 (18 Sep 2022 08:58) (98/68 - 114/72)  BP(mean): --  RR: 18 (18 Sep 2022 08:58) (18 - 19)  SpO2: 99% (18 Sep 2022 09:43) (95% - 99%)    Parameters below as of 18 Sep 2022 09:43  Patient On (Oxygen Delivery Method): room air      I&O's Summary      PHYSICAL EXAM:     General: ; in no acute distress    HEENT: MMM, conjunctiva and sclera clear    CV S1 S2  RRR  Lungs essentially clear, room air     Gastrointestinal: Soft, non-tender non-distended; Normal bowel sounds; No rebound or guarding    Extremities: Normal range of motion, No clubbing, cyanosis or edema    Neurological: Alert and oriented x 2 self/place, unclear about events leading to admission or current reason for hospitalization  Cannot accurately state year/ recent calendar details    Skin: Warm and dry. No obvious rash    GENERAL:  Appears stated age, no distress  HEENT:  NC/AT,  conjunctivae clear, sclera -anicteric  CHEST:  Full & symmetric excursion, no increased effort, breath sounds clear  HEART:  Regular rhythm, S1, S2, no murmur/rub/S3/S4,  no edema  ABDOMEN:  Soft, non-tender, non-distended, normoactive bowel sounds,  no masses, no hepatosplenomegaly,   EXTREMITIES: No cyanosis, clubbing or edema  SKIN:  No rash/erythema/ecchymoses/petechiae/wounds/abscess/warm/dry  NEURO:  Alert, oriented    LABS:  CBC Full  -  ( 18 Sep 2022 06:54 )  WBC Count : 6.14 K/uL  RBC Count : 4.12 M/uL  Hemoglobin : 10.2 g/dL  Hematocrit : 33.9 %  Platelet Count - Automated : 349 K/uL  Mean Cell Volume : 82.3 fl  Mean Cell Hemoglobin : 24.8 pg  Mean Cell Hemoglobin Concentration : 30.1 gm/dL  Auto Neutrophil # : x  Auto Lymphocyte # : x  Auto Monocyte # : x  Auto Eosinophil # : x  Auto Basophil # : x  Auto Neutrophil % : x  Auto Lymphocyte % : x  Auto Monocyte % : x  Auto Eosinophil % : x  Auto Basophil % : x    09-18    138  |  102  |  29.5<H>  ----------------------------<  65<L>  4.7   |  24.0  |  0.73    Ca    9.4      18 Sep 2022 06:54  Mg     1.6     09-17    TPro  6.9  /  Alb  3.6  /  TBili  <0.2<L>  /  DBili  x   /  AST  24  /  ALT  17  /  AlkPhos  75  09-17    LIVER FUNCTIONS - ( 17 Sep 2022 07:20 )  Alb: 3.6 g/dL / Pro: 6.9 g/dL / ALK PHOS: 75 U/L / ALT: 17 U/L / AST: 24 U/L / GGT: x                   RADIOLOGY & ADDITIONAL STUDIES (The following images were personally reviewed):         Chief Complaint:  Patient is a 52y old  Female who presents with a chief complaint of frequent falls. ETOH abuse, anemia (17 Sep 2022 10:39)      Interval Events / Subjective: Patient seen and examined at bedside.  at bedside. Patient is altert and oriented X 3. However, she is emotionally labile.  Started crying " I dont want to die " . We discussed her anemia. She recalls getting blood transfusion.       REVIEW OF SYSTEMS:     Constitutional: reports fatigue    ENMT:  No difficulty hearing, tinnitus, vertigo; No sinus or throat pain    Respiratory: No cough, wheezing, chills or hemoptysis    Cardiovascular: No chest pain, palpitations, dizziness     Gastrointestinal: Denies abdominal or epigastric pain .States she "gets food stuck some times" while eating   No nausea, vomiting or hematemesis;   No diarrhea or constipation. No melena or hematochezia.    Skin: No itching, burning, rashes or lesions         MEDICATIONS:   MEDICATIONS  (STANDING):  budesonide  80 MICROgram(s)/formoterol 4.5 MICROgram(s) Inhaler 2 Puff(s) Inhalation two times a day  dextrose 50% Injectable 25 Gram(s) IV Push once  dextrose 50% Injectable 12.5 Gram(s) IV Push once  dextrose 50% Injectable 25 Gram(s) IV Push once  dextrose Oral Gel 15 Gram(s) Oral once  diVALproex  milliGRAM(s) Oral two times a day  folic acid 1 milliGRAM(s) Oral daily  glucagon  Injectable 1 milliGRAM(s) IntraMuscular once  influenza   Vaccine 0.5 milliLiter(s) IntraMuscular once  multivitamin 1 Tablet(s) Oral daily  pantoprazole    Tablet 40 milliGRAM(s) Oral before breakfast  QUEtiapine 50 milliGRAM(s) Oral at bedtime  thiamine IVPB 500 milliGRAM(s) IV Intermittent daily  traZODone 100 milliGRAM(s) Oral at bedtime    MEDICATIONS  (PRN):  acetaminophen     Tablet .. 650 milliGRAM(s) Oral every 6 hours PRN Temp greater or equal to 38C (100.4F), Mild Pain (1 - 3)  ALBUTerol    90 MICROgram(s) HFA Inhaler 2 Puff(s) Inhalation every 6 hours PRN Shortness of Breath and/or Wheezing  aluminum hydroxide/magnesium hydroxide/simethicone Suspension 30 milliLiter(s) Oral every 4 hours PRN Dyspepsia  melatonin 3 milliGRAM(s) Oral at bedtime PRN Insomnia  ondansetron Injectable 4 milliGRAM(s) IV Push every 8 hours PRN Nausea and/or Vomiting      ALLERGIES:   Allergies    No Known Allergies    Intolerances        VITAL SIGNS:   Vital Signs Last 24 Hrs  T(C): 36.8 (18 Sep 2022 08:58), Max: 36.9 (17 Sep 2022 20:38)  T(F): 98.2 (18 Sep 2022 08:58), Max: 98.5 (17 Sep 2022 20:38)  HR: 58 (18 Sep 2022 09:43) (58 - 646)  BP: 112/79 (18 Sep 2022 08:58) (98/68 - 114/72)  BP(mean): --  RR: 18 (18 Sep 2022 08:58) (18 - 19)  SpO2: 99% (18 Sep 2022 09:43) (95% - 99%)    Parameters below as of 18 Sep 2022 09:43  Patient On (Oxygen Delivery Method): room air      I&O's Summary      PHYSICAL EXAM:     General: ; in no acute distress    HEENT: MMM, conjunctiva and sclera clear    CV S1 S2  RRR  Lungs essentially clear, room air     Gastrointestinal: Soft, non-tender non-distended; Normal bowel sounds; No rebound or guarding    Extremities: Normal range of motion, No clubbing, cyanosis or edema    Neurological: Alert and oriented x 2 self/place, unclear about events leading to admission or current reason for hospitalization  Cannot accurately state year/ recent calendar details    Skin: Warm and dry. No obvious rash    GENERAL:  Appears stated age, no distress  HEENT:  NC/AT,  conjunctivae clear, sclera -anicteric  CHEST:  Full & symmetric excursion, no increased effort, breath sounds clear  HEART:  Regular rhythm, S1, S2, no murmur/rub/S3/S4,  no edema  ABDOMEN:  Soft, non-tender, non-distended, normoactive bowel sounds,  no masses, no hepatosplenomegaly,   EXTREMITIES: No cyanosis, clubbing or edema  SKIN:  No rash/erythema/ecchymoses/petechiae/wounds/abscess/warm/dry  NEURO:  Alert, oriented    LABS:  CBC Full  -  ( 18 Sep 2022 06:54 )  WBC Count : 6.14 K/uL  RBC Count : 4.12 M/uL  Hemoglobin : 10.2 g/dL  Hematocrit : 33.9 %  Platelet Count - Automated : 349 K/uL  Mean Cell Volume : 82.3 fl  Mean Cell Hemoglobin : 24.8 pg  Mean Cell Hemoglobin Concentration : 30.1 gm/dL  Auto Neutrophil # : x  Auto Lymphocyte # : x  Auto Monocyte # : x  Auto Eosinophil # : x  Auto Basophil # : x  Auto Neutrophil % : x  Auto Lymphocyte % : x  Auto Monocyte % : x  Auto Eosinophil % : x  Auto Basophil % : x    09-18    138  |  102  |  29.5<H>  ----------------------------<  65<L>  4.7   |  24.0  |  0.73    Ca    9.4      18 Sep 2022 06:54  Mg     1.6     09-17    TPro  6.9  /  Alb  3.6  /  TBili  <0.2<L>  /  DBili  x   /  AST  24  /  ALT  17  /  AlkPhos  75  09-17    LIVER FUNCTIONS - ( 17 Sep 2022 07:20 )  Alb: 3.6 g/dL / Pro: 6.9 g/dL / ALK PHOS: 75 U/L / ALT: 17 U/L / AST: 24 U/L / GGT: x                   RADIOLOGY & ADDITIONAL STUDIES (The following images were personally reviewed):  < from: CT Head No Cont (09.06.22 @ 19:11) >  IMPRESSION:    Brain CT: No acute hemorrhage, extra-axial collections or displaced   calvarial fracture. Right parietal scalp hematoma and laceration.   Bilateral periorbital soft tissue swelling. Atrophy out of proportion for   stated age.    Cervical spine CT: No acute fracture traumatic subluxation. Mild   degenerative changes.    < end of copied text >

## 2022-09-18 NOTE — PROGRESS NOTE ADULT - ASSESSMENT
Patient is a 52 year old female with PMH of Seizure, PTSD, anxiety, depression, polysubstance abuse came to the ED for frequent falls.  patient's  said she has been falling more frequently, hit her head multiple times this week; does not feel safe with her at home;  In the ED, found to have hb 6.3; fobt negative; alcohol level: 226; CT head cervical: CT: No acute hemorrhage, extra-axial collections or displaced calvarial fracture. Right parietal scalp hematoma and laceration. Bilateral periorbital soft tissue swelling.  completed ciwa.  awaiting  consult for capacity eval/ mood disorder. now plan for egd/ clonoscopy early next week.     #Acute on chronic microcytic anemia superimposed on iron deficiency with inappropriate retic response  -multifactorial- iron deficiency, bone marrow suppression from alcohol abuse   -Hb 10.2, s/p 1u prbc this admission  -Completed Venofer x 3 days  -FOBT neg  -Monitor CBC  -Continue PPI  -GI reconsulted, colonoscopy / egd planned for next week    #Alcohol Withdrawal/ not in withdrawal anymore   #Encephalopathy - ? delirium  -CIWA, s/p valium taper  -continue thiamine/folic acid/MVI    #Frequent Falls due to Unsteady Gait - multifactorial likely due to alcohol intoxication, hyponatremia   -CTH on admission negative for acute pathology but right parietal scalp hematoma noted  -CT neck negative for acute fracture  -sodium improved  -ETOH level on admission > 200  -high dose thiamine x 3 days  -fall precautions   -plan for claudio     #COPD not in acute exacerbation    -Continue Symbicort    -Duoneb prn    #hx of Seizure ( EEG in 2021- Interictal findings suggest potential epileptogenic focus and structural abnormality in the right temporal region)  -Valproic acid level WNL   -Continue Divalproex 500mg bid   -Seizure precautions    #Anxiety/depression/PTSD   -Seroquel 50mg HS   -Trazodone 50mg HS   -psych consulted for mood d/o     #Tobacco dependency  -nicotine patch QD  -Cessation advised     #DVT prophylaxis: CSD     #Dispo - pending egd/colonoscopy early next week as per gi, will need CLAUDIO once medically stable

## 2022-09-18 NOTE — PROGRESS NOTE ADULT - SUBJECTIVE AND OBJECTIVE BOX
Catia Tobin M.D.    Patient is a 52y old  Female who presents with a chief complaint of Anemia ETOH w/ falls (18 Sep 2022 10:30)      SUBJECTIVE / OVERNIGHT EVENTS: no event overnight.     Patient denies chest pain, SOB, abd pain, N/V, fever, chills, dysuria or any other complaints. All remainder ROS negative.     MEDICATIONS  (STANDING):  budesonide  80 MICROgram(s)/formoterol 4.5 MICROgram(s) Inhaler 2 Puff(s) Inhalation two times a day  dextrose 50% Injectable 25 Gram(s) IV Push once  dextrose 50% Injectable 12.5 Gram(s) IV Push once  dextrose 50% Injectable 25 Gram(s) IV Push once  dextrose Oral Gel 15 Gram(s) Oral once  diVALproex  milliGRAM(s) Oral two times a day  folic acid 1 milliGRAM(s) Oral daily  glucagon  Injectable 1 milliGRAM(s) IntraMuscular once  influenza   Vaccine 0.5 milliLiter(s) IntraMuscular once  multivitamin 1 Tablet(s) Oral daily  nicotine -  14 mG/24Hr(s) Patch 1 Patch Transdermal daily  pantoprazole    Tablet 40 milliGRAM(s) Oral before breakfast  QUEtiapine 50 milliGRAM(s) Oral at bedtime  thiamine IVPB 500 milliGRAM(s) IV Intermittent daily  traZODone 100 milliGRAM(s) Oral at bedtime    MEDICATIONS  (PRN):  acetaminophen     Tablet .. 650 milliGRAM(s) Oral every 6 hours PRN Temp greater or equal to 38C (100.4F), Mild Pain (1 - 3)  ALBUTerol    90 MICROgram(s) HFA Inhaler 2 Puff(s) Inhalation every 6 hours PRN Shortness of Breath and/or Wheezing  aluminum hydroxide/magnesium hydroxide/simethicone Suspension 30 milliLiter(s) Oral every 4 hours PRN Dyspepsia  melatonin 3 milliGRAM(s) Oral at bedtime PRN Insomnia  ondansetron Injectable 4 milliGRAM(s) IV Push every 8 hours PRN Nausea and/or Vomiting      I&O's Summary      PHYSICAL EXAM:  Vital Signs Last 24 Hrs  T(C): 36.8 (18 Sep 2022 08:58), Max: 36.9 (17 Sep 2022 20:38)  T(F): 98.2 (18 Sep 2022 08:58), Max: 98.5 (17 Sep 2022 20:38)  HR: 58 (18 Sep 2022 09:43) (58 - 83)  BP: 112/79 (18 Sep 2022 08:58) (101/69 - 114/72)  BP(mean): --  RR: 18 (18 Sep 2022 08:58) (18 - 18)  SpO2: 99% (18 Sep 2022 09:43) (95% - 99%)    Parameters below as of 18 Sep 2022 09:43  Patient On (Oxygen Delivery Method): room air    Constitutional: NAD, Resting, Chronically ill appearing female, eating breakfast  ENT: Supple, No JVD  Lungs: CTA B/L, Non-labored breathing  Cardio: RRR, S1/S2, No murmur  Abdomen: Soft, Nontender, Nondistended; Bowel sounds present  Extremities: No calf tenderness, No pitting edema  Psych: Calm, cooperative affect appropriate  Neuro: Awake and alertx 2 (hospitial and name) , no motor  or sensory deficits     LABS:                        10.2   6.14  )-----------( 349      ( 18 Sep 2022 06:54 )             33.9     09-18    138  |  102  |  29.5<H>  ----------------------------<  65<L>  4.7   |  24.0  |  0.73    Ca    9.4      18 Sep 2022 06:54  Mg     1.6     09-17    TPro  6.9  /  Alb  3.6  /  TBili  <0.2<L>  /  DBili  x   /  AST  24  /  ALT  17  /  AlkPhos  75  09-17              CAPILLARY BLOOD GLUCOSE      POCT Blood Glucose.: 72 mg/dL (18 Sep 2022 08:49)      RADIOLOGY & ADDITIONAL TESTS:  Results Reviewed:   Imaging Personally Reviewed:  Electrocardiogram Personally Reviewed:

## 2022-09-18 NOTE — PROGRESS NOTE ADULT - PROBLEM SELECTOR PLAN 1
S/P PRBC x 1    Possible  EGD, colon Tues   Psych eval appreciated>pt lacks  capacity for consent  Will need consent from spouse S/P PRBC x 1    Possible  EGD, colonoscopy  Tues   Psych eval appreciated>pt lacks  capacity for consent  Will need consent from spouse>Dr New spoke w/ pt and her spouse at bedside

## 2022-09-18 NOTE — PROGRESS NOTE ADULT - ASSESSMENT
51 y/o female with PMH of seizure, PTSD, anxiety, depression, polysubstance abuse  w/ ETOH came to the ED for frequent fall, found to have anemia unclear source  S/P PRBC x 1

## 2022-09-18 NOTE — PROGRESS NOTE ADULT - NS ATTEND AMEND GEN_ALL_CORE FT
Patient seen and examined with NP  Pt is awake and oriented X 3, however is confused with more detailed questions.  says pt does get confused at home. Has take walks and gotl ost twice in the last few moths . This may be her baseline   - She has Fe def anemia. Will plan for EGD and colon on Tuesday

## 2022-09-19 LAB
ANION GAP SERPL CALC-SCNC: 11 MMOL/L — SIGNIFICANT CHANGE UP (ref 5–17)
APTT BLD: 27.6 SEC — SIGNIFICANT CHANGE UP (ref 27.5–35.5)
BLD GP AB SCN SERPL QL: SIGNIFICANT CHANGE UP
BUN SERPL-MCNC: 25.6 MG/DL — HIGH (ref 8–20)
CALCIUM SERPL-MCNC: 9.5 MG/DL — SIGNIFICANT CHANGE UP (ref 8.4–10.5)
CHLORIDE SERPL-SCNC: 100 MMOL/L — SIGNIFICANT CHANGE UP (ref 98–107)
CO2 SERPL-SCNC: 22 MMOL/L — SIGNIFICANT CHANGE UP (ref 22–29)
CREAT SERPL-MCNC: 0.84 MG/DL — SIGNIFICANT CHANGE UP (ref 0.5–1.3)
EGFR: 83 ML/MIN/1.73M2 — SIGNIFICANT CHANGE UP
GLUCOSE SERPL-MCNC: 73 MG/DL — SIGNIFICANT CHANGE UP (ref 70–99)
HCT VFR BLD CALC: 30.3 % — LOW (ref 34.5–45)
HGB BLD-MCNC: 9.3 G/DL — LOW (ref 11.5–15.5)
INR BLD: 1.06 RATIO — SIGNIFICANT CHANGE UP (ref 0.88–1.16)
MCHC RBC-ENTMCNC: 25.3 PG — LOW (ref 27–34)
MCHC RBC-ENTMCNC: 30.7 GM/DL — LOW (ref 32–36)
MCV RBC AUTO: 82.3 FL — SIGNIFICANT CHANGE UP (ref 80–100)
MRSA PCR RESULT.: SIGNIFICANT CHANGE UP
PLATELET # BLD AUTO: 341 K/UL — SIGNIFICANT CHANGE UP (ref 150–400)
POTASSIUM SERPL-MCNC: 4.5 MMOL/L — SIGNIFICANT CHANGE UP (ref 3.5–5.3)
POTASSIUM SERPL-SCNC: 4.5 MMOL/L — SIGNIFICANT CHANGE UP (ref 3.5–5.3)
PROTHROM AB SERPL-ACNC: 12.3 SEC — SIGNIFICANT CHANGE UP (ref 10.5–13.4)
RBC # BLD: 3.68 M/UL — LOW (ref 3.8–5.2)
RBC # FLD: 24.9 % — HIGH (ref 10.3–14.5)
S AUREUS DNA NOSE QL NAA+PROBE: SIGNIFICANT CHANGE UP
SARS-COV-2 RNA SPEC QL NAA+PROBE: SIGNIFICANT CHANGE UP
SODIUM SERPL-SCNC: 133 MMOL/L — LOW (ref 135–145)
WBC # BLD: 6.36 K/UL — SIGNIFICANT CHANGE UP (ref 3.8–10.5)
WBC # FLD AUTO: 6.36 K/UL — SIGNIFICANT CHANGE UP (ref 3.8–10.5)

## 2022-09-19 PROCEDURE — 99233 SBSQ HOSP IP/OBS HIGH 50: CPT

## 2022-09-19 PROCEDURE — 99232 SBSQ HOSP IP/OBS MODERATE 35: CPT

## 2022-09-19 RX ORDER — SOD SULF/SODIUM/NAHCO3/KCL/PEG
4000 SOLUTION, RECONSTITUTED, ORAL ORAL ONCE
Refills: 0 | Status: COMPLETED | OUTPATIENT
Start: 2022-09-19 | End: 2022-09-19

## 2022-09-19 RX ORDER — ALPRAZOLAM 0.25 MG
0.25 TABLET ORAL ONCE
Refills: 0 | Status: DISCONTINUED | OUTPATIENT
Start: 2022-09-19 | End: 2022-09-19

## 2022-09-19 RX ADMIN — DIVALPROEX SODIUM 500 MILLIGRAM(S): 500 TABLET, DELAYED RELEASE ORAL at 05:46

## 2022-09-19 RX ADMIN — Medication 1 PATCH: at 13:32

## 2022-09-19 RX ADMIN — Medication 100 MILLIGRAM(S): at 21:35

## 2022-09-19 RX ADMIN — Medication 0.25 MILLIGRAM(S): at 17:55

## 2022-09-19 RX ADMIN — Medication 1 PATCH: at 08:26

## 2022-09-19 RX ADMIN — DIVALPROEX SODIUM 500 MILLIGRAM(S): 500 TABLET, DELAYED RELEASE ORAL at 17:55

## 2022-09-19 RX ADMIN — Medication 4000 MILLILITER(S): at 17:54

## 2022-09-19 RX ADMIN — Medication 1 MILLIGRAM(S): at 08:36

## 2022-09-19 RX ADMIN — Medication 1 TABLET(S): at 08:36

## 2022-09-19 RX ADMIN — BUDESONIDE AND FORMOTEROL FUMARATE DIHYDRATE 2 PUFF(S): 160; 4.5 AEROSOL RESPIRATORY (INHALATION) at 21:36

## 2022-09-19 RX ADMIN — BUDESONIDE AND FORMOTEROL FUMARATE DIHYDRATE 2 PUFF(S): 160; 4.5 AEROSOL RESPIRATORY (INHALATION) at 08:37

## 2022-09-19 RX ADMIN — QUETIAPINE FUMARATE 50 MILLIGRAM(S): 200 TABLET, FILM COATED ORAL at 21:35

## 2022-09-19 RX ADMIN — Medication 1 PATCH: at 11:08

## 2022-09-19 RX ADMIN — Medication 105 MILLIGRAM(S): at 08:35

## 2022-09-19 RX ADMIN — PANTOPRAZOLE SODIUM 40 MILLIGRAM(S): 20 TABLET, DELAYED RELEASE ORAL at 05:46

## 2022-09-19 RX ADMIN — Medication 1 PATCH: at 19:00

## 2022-09-19 NOTE — PROGRESS NOTE ADULT - ASSESSMENT
Patient is a 52 year old female with PMH of Seizure, PTSD, anxiety, depression, polysubstance abuse came to the ED for frequent falls.  patient's  said she has been falling more frequently, hit her head multiple times this week; does not feel safe with her at home;  In the ED, found to have hb 6.3; fobt negative; alcohol level: 226; CT head cervical: CT: No acute hemorrhage, extra-axial collections or displaced calvarial fracture. Right parietal scalp hematoma and laceration. Bilateral periorbital soft tissue swelling.  completed ciwa.  awaiting  consult for capacity eval/ mood disorder. now plan for egd/ clonoscopy early next week.     #Acute on chronic microcytic anemia superimposed on iron deficiency with inappropriate retic response  -multifactorial- iron deficiency, bone marrow suppression from alcohol abuse   -Hb 10.2, s/p 1u prbc this admission  -Completed Venofer x 3 days  -FOBT neg  -Monitor CBC  -Continue PPI  -GI reconsulted, colonoscopy / egd planned for Tuesday    #Alcohol Withdrawal/ not in withdrawal anymore   #Encephalopathy - ? delirium  -CIWA, s/p valium taper  -continue thiamine/folic acid/MVI    #Frequent Falls due to Unsteady Gait - multifactorial likely due to alcohol intoxication, hyponatremia   -CTH on admission negative for acute pathology but right parietal scalp hematoma noted  -CT neck negative for acute fracture  -sodium improved  -ETOH level on admission > 200  -high dose thiamine x 3 days  -fall precautions   -plan for claudio     #COPD not in acute exacerbation    -Continue Symbicort    -Duoneb prn    #hx of Seizure ( EEG in 2021- Interictal findings suggest potential epileptogenic focus and structural abnormality in the right temporal region)  -Valproic acid level WNL   -Continue Divalproex 500mg bid   -Seizure precautions    #Anxiety/depression/PTSD   -Seroquel 50mg HS   -Trazodone 50mg HS   -psych consulted for mood d/o     #Tobacco dependency  -nicotine patch QD  -Cessation advised     #DVT prophylaxis: CSD     #Dispo - pending egd/colonoscopy Tuesday, will need CLAUDIO once medically stable

## 2022-09-19 NOTE — PROGRESS NOTE ADULT - NS ATTEND AMEND GEN_ALL_CORE FT
52 F  EtOH, admitted intoxicated, spent 1 week waking up and now seems to be baseline confabulation  Microcytic anemia, has been improving with stay here  For inpatient anemia eval with EGD/colon - we can start prepping today, anticipate will need a 2 day prep due to confusion but tentatively on endo schedule tomorrow

## 2022-09-19 NOTE — PROGRESS NOTE ADULT - SUBJECTIVE AND OBJECTIVE BOX
Catia Tobin M.D.    Patient is a 52y old  Female who presents with a chief complaint of Anemia ETOH w/ falls (18 Sep 2022 10:30)      SUBJECTIVE / OVERNIGHT EVENTS: no event overnight.     Patient denies chest pain, SOB, abd pain, N/V, fever, chills, dysuria or any other complaints. All remainder ROS negative.     MEDICATIONS  (STANDING):  budesonide  80 MICROgram(s)/formoterol 4.5 MICROgram(s) Inhaler 2 Puff(s) Inhalation two times a day  dextrose 50% Injectable 25 Gram(s) IV Push once  dextrose 50% Injectable 12.5 Gram(s) IV Push once  dextrose 50% Injectable 25 Gram(s) IV Push once  dextrose Oral Gel 15 Gram(s) Oral once  diVALproex  milliGRAM(s) Oral two times a day  folic acid 1 milliGRAM(s) Oral daily  glucagon  Injectable 1 milliGRAM(s) IntraMuscular once  influenza   Vaccine 0.5 milliLiter(s) IntraMuscular once  multivitamin 1 Tablet(s) Oral daily  nicotine -  14 mG/24Hr(s) Patch 1 Patch Transdermal daily  pantoprazole    Tablet 40 milliGRAM(s) Oral before breakfast  QUEtiapine 50 milliGRAM(s) Oral at bedtime  thiamine IVPB 500 milliGRAM(s) IV Intermittent daily  traZODone 100 milliGRAM(s) Oral at bedtime    MEDICATIONS  (PRN):  acetaminophen     Tablet .. 650 milliGRAM(s) Oral every 6 hours PRN Temp greater or equal to 38C (100.4F), Mild Pain (1 - 3)  ALBUTerol    90 MICROgram(s) HFA Inhaler 2 Puff(s) Inhalation every 6 hours PRN Shortness of Breath and/or Wheezing  aluminum hydroxide/magnesium hydroxide/simethicone Suspension 30 milliLiter(s) Oral every 4 hours PRN Dyspepsia  melatonin 3 milliGRAM(s) Oral at bedtime PRN Insomnia  ondansetron Injectable 4 milliGRAM(s) IV Push every 8 hours PRN Nausea and/or Vomiting      I&O's Summary    18 Sep 2022 07:01  -  19 Sep 2022 07:00  --------------------------------------------------------  IN: 1300 mL / OUT: 1200 mL / NET: 100 mL        PHYSICAL EXAM:  Vital Signs Last 24 Hrs  T(C): 36.4 (19 Sep 2022 08:40), Max: 36.8 (18 Sep 2022 20:29)  T(F): 97.6 (19 Sep 2022 08:40), Max: 98.2 (18 Sep 2022 20:29)  HR: 76 (19 Sep 2022 08:40) (60 - 76)  BP: 106/73 (19 Sep 2022 08:40) (104/71 - 119/79)  BP(mean): --  RR: 18 (19 Sep 2022 08:40) (18 - 20)  SpO2: 94% (19 Sep 2022 08:40) (94% - 98%)    Parameters below as of 19 Sep 2022 08:40  Patient On (Oxygen Delivery Method): room air    Constitutional: NAD, Resting, Chronically ill appearing female  ENT: Supple, No JVD  Lungs: CTA B/L, Non-labored breathing  Cardio: RRR, S1/S2, No murmur  Abdomen: Soft, Nontender, Nondistended; Bowel sounds present  Extremities: No calf tenderness, No pitting edema  Psych: Calm, cooperative affect appropriate  Neuro: Awake and alertx 2 (year and name) , no motor  or sensory deficits   LABS:                        9.3    6.36  )-----------( 341      ( 19 Sep 2022 06:25 )             30.3     09-19    133<L>  |  100  |  25.6<H>  ----------------------------<  73  4.5   |  22.0  |  0.84    Ca    9.5      19 Sep 2022 06:25      PT/INR - ( 19 Sep 2022 06:25 )   PT: 12.3 sec;   INR: 1.06 ratio         PTT - ( 19 Sep 2022 06:25 )  PTT:27.6 sec          CAPILLARY BLOOD GLUCOSE          RADIOLOGY & ADDITIONAL TESTS:  Results Reviewed:   Imaging Personally Reviewed:  Electrocardiogram Personally Reviewed:

## 2022-09-19 NOTE — PROGRESS NOTE ADULT - PROBLEM SELECTOR PLAN 1
Microcytic anemia, Her hemoglobin remain stable, today 9.3 gm  Will schedule for EGD/ colonoscopy tomorrow  Colon prep to start today evening  Clear liquid diet today and keep NPO after midnight.   Psych eval appreciated>pt lacks  capacity for consent  Will need consent from spouse  Continue Protonix  Continue Folic acid, MVI

## 2022-09-19 NOTE — PROGRESS NOTE ADULT - ASSESSMENT
51 y/o female with PMH of seizure, PTSD, anxiety, depression, polysubstance abuse  w/ ETOH came to the ED for frequent fall, found to have anemia unclear source  S/P PRBC x 1.

## 2022-09-19 NOTE — PROGRESS NOTE ADULT - SUBJECTIVE AND OBJECTIVE BOX
Chief Complaint:  Patient is a 52y old  Female who presents with a chief complaint of frequent falls. ETOH abuse, anemia (17 Sep 2022 10:39)      Interval Events / Subjective: Patient seen and examined at bedside, in no acute distress. Patient is alert, oriented x3, confused when get to detailed information. Denies abdominal pain, pressure, palpitation, shortness of breath, dizziness, nausea, vomiting, hematemesis, melena. Her hemoglobin remain stable, today 9.3 gm.     REVIEW OF SYSTEMS:     . Constitutional: No fever, chills,  · ENMT: no vertigo, no throat pain  . Neck: No pain or stiffness  · Respiratory and Thorax: No shortness of breath, no cough, no wheezing  · Cardiovascular: No chest pain, palpitation, no dizziness, no orthopnea,   · Gastrointestinal: see above.  · Genitourinary: No hematuria, no dysuria  · Musculoskeletal: Negative  · Neurological: no headache, no vision changes, no slurred speech  · Psychiatric: no agitation, no anxiety  · Hematology/Lymphatics: negative  · Endocrine: negative      PAST MEDICAL/SURGICAL HISTORY:  Seizure    ETOH abuse    Tobacco dependence    Hypertension    H/O tracheostomy      MEDICATIONS  (STANDING):  budesonide  80 MICROgram(s)/formoterol 4.5 MICROgram(s) Inhaler 2 Puff(s) Inhalation two times a day  dextrose 50% Injectable 25 Gram(s) IV Push once  dextrose 50% Injectable 12.5 Gram(s) IV Push once  dextrose 50% Injectable 25 Gram(s) IV Push once  dextrose Oral Gel 15 Gram(s) Oral once  diVALproex  milliGRAM(s) Oral two times a day  folic acid 1 milliGRAM(s) Oral daily  glucagon  Injectable 1 milliGRAM(s) IntraMuscular once  influenza   Vaccine 0.5 milliLiter(s) IntraMuscular once  multivitamin 1 Tablet(s) Oral daily  nicotine -  14 mG/24Hr(s) Patch 1 Patch Transdermal daily  pantoprazole    Tablet 40 milliGRAM(s) Oral before breakfast  QUEtiapine 50 milliGRAM(s) Oral at bedtime  thiamine IVPB 500 milliGRAM(s) IV Intermittent daily  traZODone 100 milliGRAM(s) Oral at bedtime    MEDICATIONS  (PRN):  acetaminophen     Tablet .. 650 milliGRAM(s) Oral every 6 hours PRN Temp greater or equal to 38C (100.4F), Mild Pain (1 - 3)  ALBUTerol    90 MICROgram(s) HFA Inhaler 2 Puff(s) Inhalation every 6 hours PRN Shortness of Breath and/or Wheezing  aluminum hydroxide/magnesium hydroxide/simethicone Suspension 30 milliLiter(s) Oral every 4 hours PRN Dyspepsia  melatonin 3 milliGRAM(s) Oral at bedtime PRN Insomnia  ondansetron Injectable 4 milliGRAM(s) IV Push every 8 hours PRN Nausea and/or Vomiting    No Known Allergies    T(C): 36.6 (09-19-22 @ 11:28), Max: 36.8 (09-18-22 @ 20:29)  HR: 76 (09-19-22 @ 11:28) (60 - 76)  BP: 116/80 (09-19-22 @ 11:28) (104/71 - 119/79)  RR: 18 (09-19-22 @ 11:28) (18 - 20)  SpO2: 94% (09-19-22 @ 11:28) (94% - 98%)    I&O's Summary    18 Sep 2022 07:01  -  19 Sep 2022 07:00  --------------------------------------------------------  IN: 1300 mL / OUT: 1200 mL / NET: 100 mL      PHYSICAL EXAM:    Constitutional: No acute distress  Neuro: Awake alert, oriented to person, place and situation, non-focal, speech clear and intact  HEENT: PERRL, anicteric sclerae, oral mucosa pink and moist  Neck: supple, no JVD  CV: regular rate, regular rhythm, +S1S2,   Pulm/chest: lung sounds diminished bilaterally, no accessory muscle use noted  Abd: soft, NT, ND, +BS  Ext:  no Cyanosis, clubbing or edema   Skin: warm, no jaundice   Psych: calm, appropriate affect      LABS:               9.3    6.36  )-----------( 341      ( 09-19 @ 06:25 )             30.3                10.2   6.14  )-----------( 349      ( 09-18 @ 06:54 )             33.9                9.4    5.33  )-----------( 329      ( 09-17 @ 07:20 )             31.2       09-19    133<L>  |  100  |  25.6<H>  ----------------------------<  73  4.5   |  22.0  |  0.84    Ca    9.5      19 Sep 2022 06:25    PT/INR - ( 19 Sep 2022 06:25 )   PT: 12.3 sec;   INR: 1.06 ratio        PTT - ( 19 Sep 2022 06:25 )  PTT:27.6 sec       Chief Complaint:  Patient is a 52y old  Female who presents with a chief complaint of frequent falls. ETOH abuse, anemia (17 Sep 2022 10:39)      Interval Events / Subjective: Patient seen and examined at bedside, in no acute distress. Patient is alert, oriented x3, confused when get to detailed information. Denies abdominal pain, pressure, palpitation, shortness of breath, dizziness, nausea, vomiting, hematemesis, melena. Her hemoglobin remain stable, today 9.3 gm.     REVIEW OF SYSTEMS:     . Constitutional: No fever, chills,  · ENMT: no vertigo, no throat pain  . Neck: No pain or stiffness  · Respiratory and Thorax: No shortness of breath, no cough, no wheezing  · Cardiovascular: No chest pain, palpitation, no dizziness, no orthopnea,   · Gastrointestinal: see above.  · Genitourinary: No hematuria, no dysuria  · Musculoskeletal: Negative  · Neurological: no headache, no vision changes, no slurred speech  · Psychiatric: no agitation, no anxiety  · Hematology/Lymphatics: negative  · Endocrine: negative      PAST MEDICAL/SURGICAL HISTORY:  Seizure    ETOH abuse    Tobacco dependence    Hypertension    H/O tracheostomy      MEDICATIONS  (STANDING):  budesonide  80 MICROgram(s)/formoterol 4.5 MICROgram(s) Inhaler 2 Puff(s) Inhalation two times a day  dextrose 50% Injectable 25 Gram(s) IV Push once  dextrose 50% Injectable 12.5 Gram(s) IV Push once  dextrose 50% Injectable 25 Gram(s) IV Push once  dextrose Oral Gel 15 Gram(s) Oral once  diVALproex  milliGRAM(s) Oral two times a day  folic acid 1 milliGRAM(s) Oral daily  glucagon  Injectable 1 milliGRAM(s) IntraMuscular once  influenza   Vaccine 0.5 milliLiter(s) IntraMuscular once  multivitamin 1 Tablet(s) Oral daily  nicotine -  14 mG/24Hr(s) Patch 1 Patch Transdermal daily  pantoprazole    Tablet 40 milliGRAM(s) Oral before breakfast  QUEtiapine 50 milliGRAM(s) Oral at bedtime  thiamine IVPB 500 milliGRAM(s) IV Intermittent daily  traZODone 100 milliGRAM(s) Oral at bedtime    MEDICATIONS  (PRN):  acetaminophen     Tablet .. 650 milliGRAM(s) Oral every 6 hours PRN Temp greater or equal to 38C (100.4F), Mild Pain (1 - 3)  ALBUTerol    90 MICROgram(s) HFA Inhaler 2 Puff(s) Inhalation every 6 hours PRN Shortness of Breath and/or Wheezing  aluminum hydroxide/magnesium hydroxide/simethicone Suspension 30 milliLiter(s) Oral every 4 hours PRN Dyspepsia  melatonin 3 milliGRAM(s) Oral at bedtime PRN Insomnia  ondansetron Injectable 4 milliGRAM(s) IV Push every 8 hours PRN Nausea and/or Vomiting    No Known Allergies    T(C): 36.6 (09-19-22 @ 11:28), Max: 36.8 (09-18-22 @ 20:29)  HR: 76 (09-19-22 @ 11:28) (60 - 76)  BP: 116/80 (09-19-22 @ 11:28) (104/71 - 119/79)  RR: 18 (09-19-22 @ 11:28) (18 - 20)  SpO2: 94% (09-19-22 @ 11:28) (94% - 98%)    I&O's Summary    18 Sep 2022 07:01  -  19 Sep 2022 07:00  --------------------------------------------------------  IN: 1300 mL / OUT: 1200 mL / NET: 100 mL      PHYSICAL EXAM:    Constitutional: No acute distress  Neuro: Awake alert, oriented to person, non-focal, speech mumbly but comprehensible  HEENT: PERRL, anicteric sclerae, oral mucosa pink and moist  Neck: supple, no JVD  CV: regular rate, regular rhythm, +S1S2,   Pulm/chest: lung sounds diminished bilaterally, no accessory muscle use noted  Abd: soft, NT, ND, +BS  Ext:  no Cyanosis, clubbing or edema   Skin: warm, no jaundice   Psych: calm, appropriate affect      LABS:               9.3    6.36  )-----------( 341      ( 09-19 @ 06:25 )             30.3                10.2   6.14  )-----------( 349      ( 09-18 @ 06:54 )             33.9                9.4    5.33  )-----------( 329      ( 09-17 @ 07:20 )             31.2       09-19    133<L>  |  100  |  25.6<H>  ----------------------------<  73  4.5   |  22.0  |  0.84    Ca    9.5      19 Sep 2022 06:25    PT/INR - ( 19 Sep 2022 06:25 )   PT: 12.3 sec;   INR: 1.06 ratio        PTT - ( 19 Sep 2022 06:25 )  PTT:27.6 sec

## 2022-09-20 LAB
ANION GAP SERPL CALC-SCNC: 17 MMOL/L — SIGNIFICANT CHANGE UP (ref 5–17)
BASE EXCESS BLDA CALC-SCNC: -0.4 MMOL/L — SIGNIFICANT CHANGE UP (ref -2–3)
BLOOD GAS COMMENTS ARTERIAL: SIGNIFICANT CHANGE UP
BUN SERPL-MCNC: 14.9 MG/DL — SIGNIFICANT CHANGE UP (ref 8–20)
CALCIUM SERPL-MCNC: 9.4 MG/DL — SIGNIFICANT CHANGE UP (ref 8.4–10.5)
CHLORIDE SERPL-SCNC: 93 MMOL/L — LOW (ref 98–107)
CO2 SERPL-SCNC: 22 MMOL/L — SIGNIFICANT CHANGE UP (ref 22–29)
CREAT SERPL-MCNC: 0.57 MG/DL — SIGNIFICANT CHANGE UP (ref 0.5–1.3)
D DIMER BLD IA.RAPID-MCNC: 954 NG/ML DDU — HIGH
EGFR: 109 ML/MIN/1.73M2 — SIGNIFICANT CHANGE UP
GAS PNL BLDA: SIGNIFICANT CHANGE UP
GAS PNL BLDA: SIGNIFICANT CHANGE UP
GLUCOSE BLDC GLUCOMTR-MCNC: 95 MG/DL — SIGNIFICANT CHANGE UP (ref 70–99)
GLUCOSE SERPL-MCNC: 98 MG/DL — SIGNIFICANT CHANGE UP (ref 70–99)
HCO3 BLDA-SCNC: 24 MMOL/L — SIGNIFICANT CHANGE UP (ref 21–28)
HCT VFR BLD CALC: 35.8 % — SIGNIFICANT CHANGE UP (ref 34.5–45)
HGB BLD-MCNC: 10.8 G/DL — LOW (ref 11.5–15.5)
LACTATE BLDV-MCNC: 3.4 MMOL/L — HIGH (ref 0.5–2)
LACTATE BLDV-MCNC: 5.3 MMOL/L — CRITICAL HIGH (ref 0.5–2)
MAGNESIUM SERPL-MCNC: 1.2 MG/DL — LOW (ref 1.6–2.6)
MCHC RBC-ENTMCNC: 24.3 PG — LOW (ref 27–34)
MCHC RBC-ENTMCNC: 30.2 GM/DL — LOW (ref 32–36)
MCV RBC AUTO: 80.4 FL — SIGNIFICANT CHANGE UP (ref 80–100)
PCO2 BLDA: 36 MMHG — SIGNIFICANT CHANGE UP (ref 32–45)
PH BLDA: 7.43 — SIGNIFICANT CHANGE UP (ref 7.35–7.45)
PHOSPHATE SERPL-MCNC: 3.4 MG/DL — SIGNIFICANT CHANGE UP (ref 2.4–4.7)
PLATELET # BLD AUTO: 343 K/UL — SIGNIFICANT CHANGE UP (ref 150–400)
PO2 BLDA: 59 MMHG — LOW (ref 83–108)
POTASSIUM SERPL-MCNC: 4 MMOL/L — SIGNIFICANT CHANGE UP (ref 3.5–5.3)
POTASSIUM SERPL-SCNC: 4 MMOL/L — SIGNIFICANT CHANGE UP (ref 3.5–5.3)
PROCALCITONIN SERPL-MCNC: 0.08 NG/ML — SIGNIFICANT CHANGE UP (ref 0.02–0.1)
RBC # BLD: 4.45 M/UL — SIGNIFICANT CHANGE UP (ref 3.8–5.2)
RBC # FLD: 24.7 % — HIGH (ref 10.3–14.5)
SAO2 % BLDA: 89.5 % — LOW (ref 94–98)
SARS-COV-2 RNA SPEC QL NAA+PROBE: SIGNIFICANT CHANGE UP
SODIUM SERPL-SCNC: 132 MMOL/L — LOW (ref 135–145)
WBC # BLD: 6.14 K/UL — SIGNIFICANT CHANGE UP (ref 3.8–10.5)
WBC # FLD AUTO: 6.14 K/UL — SIGNIFICANT CHANGE UP (ref 3.8–10.5)

## 2022-09-20 PROCEDURE — 71045 X-RAY EXAM CHEST 1 VIEW: CPT | Mod: 26

## 2022-09-20 PROCEDURE — 99233 SBSQ HOSP IP/OBS HIGH 50: CPT

## 2022-09-20 RX ORDER — PIPERACILLIN AND TAZOBACTAM 4; .5 G/20ML; G/20ML
3.38 INJECTION, POWDER, LYOPHILIZED, FOR SOLUTION INTRAVENOUS ONCE
Refills: 0 | Status: COMPLETED | OUTPATIENT
Start: 2022-09-21 | End: 2022-09-21

## 2022-09-20 RX ORDER — PIPERACILLIN AND TAZOBACTAM 4; .5 G/20ML; G/20ML
3.38 INJECTION, POWDER, LYOPHILIZED, FOR SOLUTION INTRAVENOUS ONCE
Refills: 0 | Status: COMPLETED | OUTPATIENT
Start: 2022-09-20 | End: 2022-09-20

## 2022-09-20 RX ORDER — PIPERACILLIN AND TAZOBACTAM 4; .5 G/20ML; G/20ML
3.38 INJECTION, POWDER, LYOPHILIZED, FOR SOLUTION INTRAVENOUS EVERY 8 HOURS
Refills: 0 | Status: DISCONTINUED | OUTPATIENT
Start: 2022-09-21 | End: 2022-09-23

## 2022-09-20 RX ORDER — IPRATROPIUM/ALBUTEROL SULFATE 18-103MCG
3 AEROSOL WITH ADAPTER (GRAM) INHALATION EVERY 6 HOURS
Refills: 0 | Status: DISCONTINUED | OUTPATIENT
Start: 2022-09-20 | End: 2022-09-27

## 2022-09-20 RX ORDER — FUROSEMIDE 40 MG
40 TABLET ORAL ONCE
Refills: 0 | Status: COMPLETED | OUTPATIENT
Start: 2022-09-20 | End: 2022-09-20

## 2022-09-20 RX ORDER — VANCOMYCIN HCL 1 G
1000 VIAL (EA) INTRAVENOUS ONCE
Refills: 0 | Status: COMPLETED | OUTPATIENT
Start: 2022-09-20 | End: 2022-09-20

## 2022-09-20 RX ADMIN — Medication 1 PATCH: at 08:00

## 2022-09-20 RX ADMIN — Medication 250 MILLIGRAM(S): at 07:40

## 2022-09-20 RX ADMIN — Medication 3 MILLILITER(S): at 14:30

## 2022-09-20 RX ADMIN — Medication 2 MILLIGRAM(S): at 07:07

## 2022-09-20 RX ADMIN — Medication 1 PATCH: at 17:57

## 2022-09-20 RX ADMIN — Medication 650 MILLIGRAM(S): at 12:38

## 2022-09-20 RX ADMIN — Medication 1 PATCH: at 19:00

## 2022-09-20 RX ADMIN — Medication 105 MILLIGRAM(S): at 17:58

## 2022-09-20 RX ADMIN — Medication 1 PATCH: at 12:00

## 2022-09-20 RX ADMIN — Medication 100 MILLIGRAM(S): at 21:21

## 2022-09-20 RX ADMIN — DIVALPROEX SODIUM 500 MILLIGRAM(S): 500 TABLET, DELAYED RELEASE ORAL at 05:34

## 2022-09-20 RX ADMIN — QUETIAPINE FUMARATE 50 MILLIGRAM(S): 200 TABLET, FILM COATED ORAL at 21:21

## 2022-09-20 RX ADMIN — Medication 40 MILLIGRAM(S): at 06:45

## 2022-09-20 RX ADMIN — Medication 1 MILLIGRAM(S): at 12:40

## 2022-09-20 RX ADMIN — PIPERACILLIN AND TAZOBACTAM 200 GRAM(S): 4; .5 INJECTION, POWDER, LYOPHILIZED, FOR SOLUTION INTRAVENOUS at 06:45

## 2022-09-20 RX ADMIN — Medication 1 TABLET(S): at 12:38

## 2022-09-20 RX ADMIN — PIPERACILLIN AND TAZOBACTAM 25 GRAM(S): 4; .5 INJECTION, POWDER, LYOPHILIZED, FOR SOLUTION INTRAVENOUS at 19:54

## 2022-09-20 RX ADMIN — PIPERACILLIN AND TAZOBACTAM 25 GRAM(S): 4; .5 INJECTION, POWDER, LYOPHILIZED, FOR SOLUTION INTRAVENOUS at 12:36

## 2022-09-20 RX ADMIN — DIVALPROEX SODIUM 500 MILLIGRAM(S): 500 TABLET, DELAYED RELEASE ORAL at 17:58

## 2022-09-20 NOTE — SWALLOW BEDSIDE ASSESSMENT ADULT - COMMENTS
As per MD note: "Patient is a 52 year old female with PMH of Seizure, PTSD, anxiety, depression, polysubstance abuse came to the ED for frequent falls.  patient's  said she has been falling more frequently, hit her head multiple times this week; does not feel safe with her at home;  In the ED, found to have hb 6.3; fobt negative; alcohol level: 226; CT head cervical: CT: No acute hemorrhage, extra-axial collections or displaced calvarial fracture. Right parietal scalp hematoma and laceration. Bilateral periorbital soft tissue swelling.  Completed ciwa.  awaiting  consult for capacity eval/ mood disorder. Planned  for egd/ clonoscopy. RRT 9/20 for hypoxia, possible aspiration event. Transferred to Stepdown."

## 2022-09-20 NOTE — PROGRESS NOTE ADULT - PROBLEM SELECTOR PLAN 1
Microcytic anemia. Patient now with hypoxic respiratory failure, ?aspiration pneumonia. Now requiring high FIO2 on 100% High-flow nasal cannula. Tachycardic. Will defer scheduled plan for EGD and colonoscopy at this time.   Her hemoglobin remain stable. No evidence of overt GI bleed. Today her Hgb 18.8gm.   Continue Protonix  No indication for any urgent endoscopic evaluation warranted at this time. Will have to reschedule endoscopic evaluation for microcytic anemia once her respiratory status improved and medically optimized. Can be done as outpatient.    Continue Folic acid, MVI

## 2022-09-20 NOTE — CONSULT NOTE ADULT - NS PANP COMMENT GEN_ALL_CORE FT
52-year-old  female with past medical history of traumatic brain injury remote tracheostomy feeding tube PTSD seizure disorder prior pulmonary embolism not on anticoagulation anxiety disorder depression alcohol use disorder polysubstance abuse initially admitted with mechanical fall possibly secondary to hyponatremia and alcohol intoxication with superficial trauma/laceration and hematoma treated through the hospital course for alcohol withdrawal with hydration as well as blood transfusion with no active bleeding with microcytic anemia was plan to get endoscopy and colonoscopy today but had unfortunate episode of aspiration  related  acute lung injury with possible pneumonitis with acute hypoxic respiratory failure requiring high flow nasal cannula which has been weaned down from 100% to 70% with 40 L/min flow.    Noted oropharyngeal dysphagia based on the swallow evaluation.   strict aspiration precaution.  Recommend percussion vest DuoNeb and inhaled corticosteroid for short duration.  Monitor temperature curve and WBC, low threshold to initiate antibiotics.  Endoscopy and colonoscopy deferred for later date which is more appropriate for now considering respiratory status.    Thank you for your consult.   Please call us back with any worsening clinical status or with concerns & questions.   We will Sign Off for now.     Bob Stapleton MD   Division of Critical Care Medicine  Department of Medicine   [facility]   Cell [phone]

## 2022-09-20 NOTE — SWALLOW BEDSIDE ASSESSMENT ADULT - ORAL PHASE
Within functional limits reduced attention to bolus observed/Decreased anterior-posterior movement of the bolus/Delayed oral transit time

## 2022-09-20 NOTE — PROGRESS NOTE ADULT - NS ATTEND AMEND GEN_ALL_CORE FT
Hospitalist attending  I have personally seen and examined the patient.  I fully participated in the care of this patient.  I have made amendments to the documentation where necessary, and agree with the history, physical exam, and plan as documented by the ACP/resident.    Physical exam:    Constitutional: NAD, lethargic but easily arousable, on HFNC  ENT: Supple, No JVD  Lungs: B/L rhonchi, no wheeze  Cardio: Tachycardic,  S1/S2, No murmur  Abdomen: Soft, Nontender, Nondistended; Bowel sounds present  Extremities: No calf tenderness, No pitting edema  Psych: Calm at present  Neuro: Non focal    52 year old female with PMH of Seizure, PTSD, anxiety, depression, polysubstance abuse came to the ED for frequent falls 2/2 chronic alcohol abuse s/p valium taper, c/b anemia and persistent poor mental status, slowly improving and Hg stable. c/b aspiration PNA with acute respiratory failure.     #Acute respiratory failure 2/2 asp PNA  #Anemia likely 2/2 GIB  #Chronic alcohol abuse with falls and intermittent confusion  -c/w Zosyn, f/u cultures and chest xray read  -on HFNC, wean  -EGD/colon delayed until respiratory status better  -swallow eval  -ultimately plan for ALEJANDRA  -rest of plan as above    Plan d/w patient, ACP. Hospitalist attending  I have personally seen and examined the patient.  I fully participated in the care of this patient.  I have made amendments to the documentation where necessary, and agree with the history, physical exam, and plan as documented by the ACP/resident.    Physical exam:    Constitutional: NAD, lethargic but easily arousable, on HFNC  ENT: Supple, No JVD  Lungs: B/L rhonchi, no wheeze  Cardio: Tachycardic,  S1/S2, No murmur  Abdomen: Soft, Nontender, Nondistended; Bowel sounds present  Extremities: No calf tenderness, No pitting edema  Psych: Calm at present  Neuro: Non focal    52 year old female with PMH of Seizure, PTSD, anxiety, depression, polysubstance abuse came to the ED for frequent falls 2/2 chronic alcohol abuse s/p valium taper, c/b anemia and persistent poor mental status, slowly improving and Hg stable. c/b aspiration PNA with acute respiratory failure.     #Acute respiratory failure 2/2 asp PNA  #Anemia likely 2/2 GIB  #Chronic alcohol abuse with falls and intermittent confusion  -c/w Zosyn, f/u cultures and chest xray read  -on HFNC, wean  -EGD/colon delayed until respiratory status better  -swallow eval  -ultimately plan for ALEJANDRA  -rest of plan as above    Plan d/w patient, ACP and  Dago on the phone

## 2022-09-20 NOTE — CONSULT NOTE ADULT - SUBJECTIVE AND OBJECTIVE BOX
Patient is a 52y old  Female who presents with a chief complaint of Anemia ETOH w/ falls (18 Sep 2022 10:30)      BRIEF HOSPITAL COURSE: 53 y/o F with a h/o TBI, remote trach/PEG, seizures, prior PE (not on a/c), PTSD, anxiety, depression, polysubstance abuse, admitted on 9/6 complaining of frequent falls with head strikes. In the ED, found to have Hgb of 6.3; FOB negative; alcohol level: 226. CT brain/c-spine unremarkable for significant acute pathology. Hospital course complicated by EtOH withdrawal. EGD and colonoscopy planned for today. She drank [place] bowel prep overnight and became severely hypoxemic this morning. CXR reveals large RML/RLL lung opacity. Started on HFNC. Upon evaluation patient is sitting up in bed without work of breathing, on HFNC with 100% FiO2 and 40 LPM. She is lethargic, but able to answer questions. Weak cough. Tolerated wean in FiO2 to 70%.          PAST MEDICAL & SURGICAL HISTORY:  Seizure      ETOH abuse      Tobacco dependence      Hypertension      H/O tracheostomy        Allergies    No Known Allergies    Intolerances      FAMILY HISTORY:  Family history of osteoarthritis (Mother)        Review of Systems:  CONSTITUTIONAL: No fever, chills, or fatigue  EYES: No eye pain, visual disturbances, or discharge  ENMT:  No difficulty hearing, tinnitus, vertigo; No sinus or throat pain  NECK: No pain or stiffness  RESPIRATORY: No cough, wheezing, chills or hemoptysis; No shortness of breath  CARDIOVASCULAR: No chest pain, palpitations, dizziness, or leg swelling  GASTROINTESTINAL: No abdominal or epigastric pain. No nausea, vomiting, or hematemesis; No diarrhea or constipation. No melena or hematochezia.  GENITOURINARY: No dysuria, frequency, hematuria, or incontinence  NEUROLOGICAL: No headaches, memory loss, loss of strength, numbness, or tremors  SKIN: No itching, burning, rashes, or lesions   MUSCULOSKELETAL: No joint pain or swelling; No muscle, back, or extremity pain  PSYCHIATRIC: No depression, anxiety, mood swings, or difficulty sleeping      Medications:  piperacillin/tazobactam IVPB.- 3.375 Gram(s) IV Intermittent once  piperacillin/tazobactam IVPB.- 3.375 Gram(s) IV Intermittent once  vancomycin  IVPB 1000 milliGRAM(s) IV Intermittent once  ALBUTerol    90 MICROgram(s) HFA Inhaler 2 Puff(s) Inhalation every 6 hours PRN  budesonide  80 MICROgram(s)/formoterol 4.5 MICROgram(s) Inhaler 2 Puff(s) Inhalation two times a day  acetaminophen     Tablet .. 650 milliGRAM(s) Oral every 6 hours PRN  diVALproex  milliGRAM(s) Oral two times a day  melatonin 3 milliGRAM(s) Oral at bedtime PRN  ondansetron Injectable 4 milliGRAM(s) IV Push every 8 hours PRN  QUEtiapine 50 milliGRAM(s) Oral at bedtime  traZODone 100 milliGRAM(s) Oral at bedtime  aluminum hydroxide/magnesium hydroxide/simethicone Suspension 30 milliLiter(s) Oral every 4 hours PRN  pantoprazole    Tablet 40 milliGRAM(s) Oral before breakfast  dextrose 50% Injectable 25 Gram(s) IV Push once  dextrose 50% Injectable 12.5 Gram(s) IV Push once  dextrose 50% Injectable 25 Gram(s) IV Push once  dextrose Oral Gel 15 Gram(s) Oral once  glucagon  Injectable 1 milliGRAM(s) IntraMuscular once  folic acid 1 milliGRAM(s) Oral daily  multivitamin 1 Tablet(s) Oral daily  thiamine IVPB 500 milliGRAM(s) IV Intermittent daily  influenza   Vaccine 0.5 milliLiter(s) IntraMuscular once  nicotine -  14 mG/24Hr(s) Patch 1 Patch Transdermal daily          ICU Vital Signs Last 24 Hrs  T(C): 37.6 (20 Sep 2022 06:30), Max: 37.6 (20 Sep 2022 06:30)  T(F): 99.6 (20 Sep 2022 06:30), Max: 99.6 (20 Sep 2022 06:30)  HR: 137 (20 Sep 2022 07:14) (70 - 146)  BP: 133/75 (20 Sep 2022 07:14) (116/80 - 143/88)  BP(mean): --  ABP: --  ABP(mean): --  RR: 28 (20 Sep 2022 07:14) (18 - 36)  SpO2: 95% (20 Sep 2022 08:00) (75% - 99%)    O2 Parameters below as of 20 Sep 2022 08:00  Patient On (Oxygen Delivery Method): nasal cannula, high flow          Vital Signs Last 24 Hrs  T(C): 37.6 (20 Sep 2022 06:30), Max: 37.6 (20 Sep 2022 06:30)  T(F): 99.6 (20 Sep 2022 06:30), Max: 99.6 (20 Sep 2022 06:30)  HR: 137 (20 Sep 2022 07:14) (70 - 146)  BP: 133/75 (20 Sep 2022 07:14) (116/80 - 143/88)  BP(mean): --  RR: 28 (20 Sep 2022 07:14) (18 - 36)  SpO2: 95% (20 Sep 2022 08:00) (75% - 99%)    Parameters below as of 20 Sep 2022 08:00  Patient On (Oxygen Delivery Method): nasal cannula, high flow        ABG - ( 20 Sep 2022 08:21 )  pH, Arterial: 7.510 pH, Blood: x     /  pCO2: 33    /  pO2: 66    / HCO3: 26    / Base Excess: 3.3   /  SaO2: 95.8                I&O's Detail    19 Sep 2022 07:01  -  20 Sep 2022 07:00  --------------------------------------------------------  IN:    Oral Fluid: 300 mL  Total IN: 300 mL    OUT:  Total OUT: 0 mL    Total NET: 300 mL            LABS:                        10.8   6.14  )-----------( 343      ( 20 Sep 2022 06:44 )             35.8     09-20    132<L>  |  93<L>  |  14.9  ----------------------------<  98  4.0   |  22.0  |  0.57    Ca    9.4      20 Sep 2022 06:44  Phos  3.4     09-20  Mg     1.2     09-20            CAPILLARY BLOOD GLUCOSE      POCT Blood Glucose.: 95 mg/dL (20 Sep 2022 06:34)    PT/INR - ( 19 Sep 2022 06:25 )   PT: 12.3 sec;   INR: 1.06 ratio         PTT - ( 19 Sep 2022 06:25 )  PTT:27.6 sec    CULTURES:        Physical Examination:    General: No acute distress.  Alert, lethargic, sitting up in bed on HFNC    HEENT: Pupils equal, reactive to light.  Symmetric.    PULM: Clear to auscultation bilaterally, no significant sputum production    CVS: tachycardic, reg rhythm, no murmurs, rubs, or gallops    ABD: Soft, nondistended, nontender, normoactive bowel sounds, no masses    EXT: No edema, nontender    SKIN: Warm and well perfused, no rashes noted.    NEURO: lethargic, but will answer questions and follow commands appropriately, moves all extremities      RADIOLOGY:     < from: CT Head No Cont (09.06.22 @ 19:11) >  FINDINGS:    Brain CT:    No acute hemorrhage, hydrocephalus, midline shift or extra-axial   collections are identified. Involutional changes are present    Proportion to the patient's stated age and have mildly progressed when   compared with the prior study. Periventricular white matter lucency   likely represents microvascular ischemic change.    There is a right parietal scalp hematoma and laceration with skin   staples. Bilateral periorbital soft tissue swelling is noted.    No displaced calvarial fracture is identified.    The visualized paranasal sinuses and tympanomastoid cavities are free of   acute disease.    Cervical spine CT:    There is straightening of the normal cervical lordosis. There is subtle   anterolisthesis of C2 on C3, unchanged compared with the prior study.   Severe disc space narrowing is present at the C3-4 level. A    No acute fracture traumatic subluxation is identified a degenerative   changes are present predominantly at the C5-6 level with disc space down,   osteophyte formation, joint and facet arthropathy with mild bilateral   neural foraminal stenosis.    The prevertebral soft tissues are within normal limits.    The lung apices are clear.    IMPRESSION:    Brain CT: No acute hemorrhage, extra-axial collections or displaced   calvarial fracture. Right parietal scalp hematoma and laceration.   Bilateral periorbital soft tissue swelling. Atrophy out of proportion for   stated age.    Cervical spine CT: No acute fracture traumatic subluxation. Mild   degenerative changes.          CRITICAL CARE TIME SPENT: 43 mins  Time spent evaluating/treating patient with medical issues that pose a high risk for life threatening deterioration and/or end-organ damage, reviewing data/labs/imaging, discussing case with multidisciplinary team, discussing plan/goals of care with patient/family. Non-inclusive of procedure time.

## 2022-09-20 NOTE — PROGRESS NOTE ADULT - ASSESSMENT
Patient is a 52 year old female with PMH of Seizure, PTSD, anxiety, depression, polysubstance abuse came to the ED for frequent falls.  patient's  said she has been falling more frequently, hit her head multiple times this week; does not feel safe with her at home;  In the ED, found to have hb 6.3; fobt negative; alcohol level: 226; CT head cervical: CT: No acute hemorrhage, extra-axial collections or displaced calvarial fracture. Right parietal scalp hematoma and laceration. Bilateral periorbital soft tissue swelling.  Completed ciwa.  awaiting  consult for capacity eval/ mood disorder. Planned  for egd/ clonoscopy. RRT 9/20 for hypoxia, possible aspiration event. Transferred to Stepdown.     # Acute hypoxic respiratory failure  RRT 9/20  Currently on Hi flow 100%  Possible aspiration event, NPO until seen by SLP  Lactate downtrending 5.3-34., repeat at 12 noon  Started on Zosyn, Vanc x 1.  EGD/Colonoscopy cancelled  Blood cultures 9/20  Duo neb    #Acute on chronic microcytic anemia superimposed on iron deficiency with inappropriate retic response  -multifactorial- iron deficiency, bone marrow suppression from alcohol abuse   -Hb 10.2, s/p 1u prbc this admission  -Completed Venofer x 3 days  -FOBT neg  -Monitor CBC  -Continue PPI  -GI following, colonoscopy / egd on hold due to above    #Alcohol Withdrawal/ not in withdrawal anymore   #Encephalopathy - ? delirium  -CIWA, s/p valium taper  -continue thiamine/folic acid/MVI    #Frequent Falls due to Unsteady Gait - multifactorial likely due to alcohol intoxication, hyponatremia   -CTH on admission negative for acute pathology but right parietal scalp hematoma noted  -CT neck negative for acute fracture  -sodium improved  -ETOH level on admission > 200  -high dose thiamine x 3 days  -fall precautions   -plan for eventual ALEJANDRA     #COPD   -Continue Symbicort    -Duoneb    #hx of Seizure ( EEG in 2021- Interictal findings suggest potential epileptogenic focus and structural abnormality in the right temporal region)  -Valproic acid level WNL   -Continue Divalproex 500mg bid   -Seizure precautions    #Anxiety/depression/PTSD   -Seroquel 50mg HS   -Trazodone 50mg HS   -psych consulted for mood d/o     #Tobacco dependency  -nicotine patch QD  -Cessation advised     #DVT prophylaxis: CSD     #Dispo - Medically acute Patient is a 52 year old female with PMH of Seizure, PTSD, anxiety, depression, polysubstance abuse came to the ED for frequent falls.  patient's  said she has been falling more frequently, hit her head multiple times this week; does not feel safe with her at home;  In the ED, found to have hb 6.3; fobt negative; alcohol level: 226; CT head cervical: CT: No acute hemorrhage, extra-axial collections or displaced calvarial fracture. Right parietal scalp hematoma and laceration. Bilateral periorbital soft tissue swelling.  Completed ciwa.  awaiting  consult for capacity eval/ mood disorder. Planned  for egd/ clonoscopy. RRT 9/20 for hypoxia, possible aspiration event. Transferred to Stepdown.     # Acute hypoxic respiratory failure suspect 2/2 aspiration with gram negative pneumonia  RRT 9/20  Currently on Hi flow 100%  Possible aspiration event, NPO until seen by SLP  Lactate downtrending 5.3-34., repeat at 12 noon  Started on Zosyn, Vanc x 1.  EGD/Colonoscopy cancelled  Blood cultures 9/20  Duo neb    #Acute on chronic microcytic anemia superimposed on iron deficiency with inappropriate retic response  -multifactorial- iron deficiency, bone marrow suppression from alcohol abuse   -Hb 10.2, s/p 1u prbc this admission  -Completed Venofer x 3 days  -FOBT neg  -Monitor CBC  -Continue PPI  -GI following, colonoscopy / egd on hold due to above    #Alcohol Withdrawal/ not in withdrawal anymore   #Encephalopathy - ? delirium  -CIWA, s/p valium taper  -continue thiamine/folic acid/MVI    #Frequent Falls due to Unsteady Gait - multifactorial likely due to alcohol intoxication, hyponatremia   -CTH on admission negative for acute pathology but right parietal scalp hematoma noted  -CT neck negative for acute fracture  -sodium improved  -ETOH level on admission > 200  -high dose thiamine x 3 days  -fall precautions   -plan for eventual ALEJANDRA     #COPD   -Continue Symbicort    -Duoneb    #hx of Seizure ( EEG in 2021- Interictal findings suggest potential epileptogenic focus and structural abnormality in the right temporal region)  -Valproic acid level WNL   -Continue Divalproex 500mg bid   -Seizure precautions    #Anxiety/depression/PTSD   -Seroquel 50mg HS   -Trazodone 50mg HS   -psych consulted for mood d/o     #Tobacco dependency  -nicotine patch QD  -Cessation advised     #DVT prophylaxis: CSD     #Dispo - Medically acute

## 2022-09-20 NOTE — RAPID RESPONSE TEAM SUMMARY - NSADDTLFINDINGSRRT_GEN_ALL_CORE
PT is desatting in 80's, rhonchorous on exam PT is desatting in 80's  General: patient is in distress, tachypneic   Skin: no lesions or masses   CV: S1 S2 heard   Lungs: rhonchi noted on exam

## 2022-09-20 NOTE — PROGRESS NOTE ADULT - NS ATTEND AMEND GEN_ALL_CORE FT
52 year old female with alcohol use disorder with microcytic anemia now with acute hypoxic respiratory anemia. She was planned for EGD/Colon today however, in the setting of aspiration and new oxygen requirements, will defer endoscopic evaluation at this time, particularly since her hemoglobin has remained stable and she has not evidence of active bleeding.

## 2022-09-20 NOTE — PROGRESS NOTE ADULT - SUBJECTIVE AND OBJECTIVE BOX
Chief Complaint:  Patient is a 52y old  Female who presents with a chief complaint of frequent falls. ETOH abuse, anemia. GI following for anemia.     Interval Events / Subjective: Patient seen and examined at bedside. Rapid response team at the bedside after acute respiratory distress, hypoxemia. Now with 100% FiO2 via highflow nasal cannula. Tachycardic, HR 130s. Patient denies abdominal pain, nausea, vomiting.      REVIEW OF SYSTEMS:     . Constitutional: No fever, chills,  · ENMT: negative  . Neck: No pain or stiffness  · Respiratory and Thorax: +shortness of breath.   · Cardiovascular: Denies   · Gastrointestinal: see above.  · Genitourinary: No hematuria, no dysuria  · Musculoskeletal: Negative  · Neurological: no headache, no vision changes,  · Psychiatric: negative  · Hematology/Lymphatics: negative  · Endocrine: negative    PAST MEDICAL/SURGICAL HISTORY:  Seizure    ETOH abuse    Tobacco dependence    Hypertension    H/O tracheostomy      MEDICATIONS  (STANDING):  albuterol/ipratropium for Nebulization 3 milliLiter(s) Nebulizer every 6 hours  budesonide  80 MICROgram(s)/formoterol 4.5 MICROgram(s) Inhaler 2 Puff(s) Inhalation two times a day  dextrose 50% Injectable 25 Gram(s) IV Push once  dextrose 50% Injectable 12.5 Gram(s) IV Push once  dextrose 50% Injectable 25 Gram(s) IV Push once  dextrose Oral Gel 15 Gram(s) Oral once  diVALproex  milliGRAM(s) Oral two times a day  folic acid 1 milliGRAM(s) Oral daily  glucagon  Injectable 1 milliGRAM(s) IntraMuscular once  influenza   Vaccine 0.5 milliLiter(s) IntraMuscular once  multivitamin 1 Tablet(s) Oral daily  nicotine -  14 mG/24Hr(s) Patch 1 Patch Transdermal daily  pantoprazole    Tablet 40 milliGRAM(s) Oral before breakfast  piperacillin/tazobactam IVPB.- 3.375 Gram(s) IV Intermittent once  piperacillin/tazobactam IVPB.- 3.375 Gram(s) IV Intermittent once  QUEtiapine 50 milliGRAM(s) Oral at bedtime  thiamine IVPB 500 milliGRAM(s) IV Intermittent daily  traZODone 100 milliGRAM(s) Oral at bedtime  vancomycin  IVPB 1000 milliGRAM(s) IV Intermittent once    MEDICATIONS  (PRN):  acetaminophen     Tablet .. 650 milliGRAM(s) Oral every 6 hours PRN Temp greater or equal to 38C (100.4F), Mild Pain (1 - 3)  ALBUTerol    90 MICROgram(s) HFA Inhaler 2 Puff(s) Inhalation every 6 hours PRN Shortness of Breath and/or Wheezing  aluminum hydroxide/magnesium hydroxide/simethicone Suspension 30 milliLiter(s) Oral every 4 hours PRN Dyspepsia  melatonin 3 milliGRAM(s) Oral at bedtime PRN Insomnia  ondansetron Injectable 4 milliGRAM(s) IV Push every 8 hours PRN Nausea and/or Vomiting    No Known Allergies    T(C): 37.2 (09-20-22 @ 07:55), Max: 37.6 (09-20-22 @ 06:30)  HR: 129 (09-20-22 @ 07:55) (70 - 146)  BP: 129/95 (09-20-22 @ 07:55) (116/80 - 143/88)  RR: 24 (09-20-22 @ 07:55) (18 - 36)  SpO2: 95% (09-20-22 @ 08:00) (75% - 99%)    I&O's Summary    19 Sep 2022 07:01  -  20 Sep 2022 07:00  --------------------------------------------------------  IN: 300 mL / OUT: 0 mL / NET: 300 mL      PHYSICAL EXAM:    Constitutional: No acute distress  Neuro: open eyes to verbal stimuli, minimal verbal response, answers simple questions.   HEENT: PERRL, anicteric sclerae  Neck: supple, no JVD  CV: regular rate, regular rhythm, +S1S2,   Pulm/chest: lung sounds diminished bilaterally, no accessory muscle use noted  Abd: soft, NT, ND, +BS  Ext:  no Cyanosis, clubbing or edema   Skin: warm, no jaundice   Psych: calm, cooperative      LABS:  ABG - ( 20 Sep 2022 08:21 )  pH, Arterial: 7.510 pH, Blood: x     /  pCO2: 33    /  pO2: 66    / HCO3: 26    / Base Excess: 3.3   /  SaO2: 95.8                     10.8   6.14  )-----------( 343      ( 09-20 @ 06:44 )             35.8                9.3    6.36  )-----------( 341      ( 09-19 @ 06:25 )             30.3                10.2   6.14  )-----------( 349      ( 09-18 @ 06:54 )             33.9       09-20    132<L>  |  93<L>  |  14.9  ----------------------------<  98  4.0   |  22.0  |  0.57    Ca    9.4      20 Sep 2022 06:44  Phos  3.4     09-20  Mg     1.2     09-20      PT/INR - ( 19 Sep 2022 06:25 )   PT: 12.3 sec;   INR: 1.06 ratio         PTT - ( 19 Sep 2022 06:25 )  PTT:27.6 sec     Chief Complaint:  Patient is a 52y old  Female who presents with a chief complaint of frequent falls. ETOH abuse, anemia. GI following for anemia.     Interval Events / Subjective: Patient seen and examined at bedside. Rapid response team at the bedside after acute respiratory distress, hypoxemia. Now with 100% FiO2 via highflow nasal cannula. Tachycardic, HR 130s. Patient denies abdominal pain, nausea, vomiting.      REVIEW OF SYSTEMS:     . Constitutional: No fever, chills,  · ENMT: negative  . Neck: No pain or stiffness  · Respiratory and Thorax: +shortness of breath.   · Cardiovascular: Denies   · Gastrointestinal: see above.  · Genitourinary: No hematuria, no dysuria  · Musculoskeletal: Negative  · Neurological: no headache, no vision changes,  · Psychiatric: negative  · Hematology/Lymphatics: negative  · Endocrine: negative    PAST MEDICAL/SURGICAL HISTORY:  Seizure    ETOH abuse    Tobacco dependence    Hypertension    H/O tracheostomy      MEDICATIONS  (STANDING):  albuterol/ipratropium for Nebulization 3 milliLiter(s) Nebulizer every 6 hours  budesonide  80 MICROgram(s)/formoterol 4.5 MICROgram(s) Inhaler 2 Puff(s) Inhalation two times a day  dextrose 50% Injectable 25 Gram(s) IV Push once  dextrose 50% Injectable 12.5 Gram(s) IV Push once  dextrose 50% Injectable 25 Gram(s) IV Push once  dextrose Oral Gel 15 Gram(s) Oral once  diVALproex  milliGRAM(s) Oral two times a day  folic acid 1 milliGRAM(s) Oral daily  glucagon  Injectable 1 milliGRAM(s) IntraMuscular once  influenza   Vaccine 0.5 milliLiter(s) IntraMuscular once  multivitamin 1 Tablet(s) Oral daily  nicotine -  14 mG/24Hr(s) Patch 1 Patch Transdermal daily  pantoprazole    Tablet 40 milliGRAM(s) Oral before breakfast  piperacillin/tazobactam IVPB.- 3.375 Gram(s) IV Intermittent once  piperacillin/tazobactam IVPB.- 3.375 Gram(s) IV Intermittent once  QUEtiapine 50 milliGRAM(s) Oral at bedtime  thiamine IVPB 500 milliGRAM(s) IV Intermittent daily  traZODone 100 milliGRAM(s) Oral at bedtime  vancomycin  IVPB 1000 milliGRAM(s) IV Intermittent once    MEDICATIONS  (PRN):  acetaminophen     Tablet .. 650 milliGRAM(s) Oral every 6 hours PRN Temp greater or equal to 38C (100.4F), Mild Pain (1 - 3)  ALBUTerol    90 MICROgram(s) HFA Inhaler 2 Puff(s) Inhalation every 6 hours PRN Shortness of Breath and/or Wheezing  aluminum hydroxide/magnesium hydroxide/simethicone Suspension 30 milliLiter(s) Oral every 4 hours PRN Dyspepsia  melatonin 3 milliGRAM(s) Oral at bedtime PRN Insomnia  ondansetron Injectable 4 milliGRAM(s) IV Push every 8 hours PRN Nausea and/or Vomiting    No Known Allergies    T(C): 37.2 (09-20-22 @ 07:55), Max: 37.6 (09-20-22 @ 06:30)  HR: 129 (09-20-22 @ 07:55) (70 - 146)  BP: 129/95 (09-20-22 @ 07:55) (116/80 - 143/88)  RR: 24 (09-20-22 @ 07:55) (18 - 36)  SpO2: 95% (09-20-22 @ 08:00) (75% - 99%)    I&O's Summary    19 Sep 2022 07:01  -  20 Sep 2022 07:00  --------------------------------------------------------  IN: 300 mL / OUT: 0 mL / NET: 300 mL      PHYSICAL EXAM:    Constitutional: moderate distress  Neuro: open eyes to verbal stimuli, minimal verbal response, answers simple questions.   HEENT: PERRL, anicteric sclerae  Neck: supple, no JVD  CV: tachycardic, regular rhythm, +S1S2,   Pulm/chest: lung sounds diminished bilaterally, no accessory muscle use noted  Abd: soft, NT, ND, +BS  Ext:  no edema   Skin: warm, no jaundice   Psych: restless      LABS:  ABG - ( 20 Sep 2022 08:21 )  pH, Arterial: 7.510 pH, Blood: x     /  pCO2: 33    /  pO2: 66    / HCO3: 26    / Base Excess: 3.3   /  SaO2: 95.8                     10.8   6.14  )-----------( 343      ( 09-20 @ 06:44 )             35.8                9.3    6.36  )-----------( 341      ( 09-19 @ 06:25 )             30.3                10.2   6.14  )-----------( 349      ( 09-18 @ 06:54 )             33.9       09-20    132<L>  |  93<L>  |  14.9  ----------------------------<  98  4.0   |  22.0  |  0.57    Ca    9.4      20 Sep 2022 06:44  Phos  3.4     09-20  Mg     1.2     09-20      PT/INR - ( 19 Sep 2022 06:25 )   PT: 12.3 sec;   INR: 1.06 ratio         PTT - ( 19 Sep 2022 06:25 )  PTT:27.6 sec

## 2022-09-20 NOTE — SWALLOW BEDSIDE ASSESSMENT ADULT - SWALLOW EVAL: PATIENT/FAMILY GOALS STATEMENT
"I choke on food sometimes" Upon further ?, pt reported meats can get stuck with subsequent spitting up, PTA

## 2022-09-20 NOTE — SWALLOW BEDSIDE ASSESSMENT ADULT - SWALLOW EVAL: DIAGNOSIS
Oral dysphagia noted for thin fluids & soft/bite-sized solids, impacted by cognition. Pharyngeal stage of swallow clinically unremarkable for assessed PO, with no overt s/s aspiration noted post intake

## 2022-09-20 NOTE — RAPID RESPONSE TEAM SUMMARY - NSSITUATIONBACKGROUNDRRT_GEN_ALL_CORE
52 year old female with PMH of Seizure, PTSD, anxiety, depression, polysubstance abuse came in for frequent falls. RN called pt was desating in 80's, non-rebreather placed, mild improvement to high 80's. PT was tachypneic and tachycardic.

## 2022-09-20 NOTE — CHART NOTE - NSCHARTNOTEFT_GEN_A_CORE
Source: Patient [ ]  Family [ ]   other [x ]    51 y/o female with PMH of seizure, PTSD, anxiety, depression, polysubstance abuse  w/ ETOH came to the ED for frequent fall, found to have anemia unclear source.    Current Diet:     Patient reports [ ] nausea  [ ] vomiting [ ] diarrhea [ ] constipation  [ ]chewing problems [ ] swallowing issues  [ ] other:     PO intake:  < 50% [ ]   50-75%  [ ]   %  [ ]  other :    Source for PO intake [ ] Patient [ ] family [ ] chart [ ] staff [ ] other        Current Weight:   9/13 62.3 kg  9/12 62 kg  9/7 64.9 kg      % Weight Change     Pertinent Medications: MEDICATIONS  (STANDING):  albuterol/ipratropium for Nebulization 3 milliLiter(s) Nebulizer every 6 hours  budesonide  80 MICROgram(s)/formoterol 4.5 MICROgram(s) Inhaler 2 Puff(s) Inhalation two times a day  dextrose 50% Injectable 25 Gram(s) IV Push once  dextrose 50% Injectable 12.5 Gram(s) IV Push once  dextrose 50% Injectable 25 Gram(s) IV Push once  dextrose Oral Gel 15 Gram(s) Oral once  diVALproex  milliGRAM(s) Oral two times a day  folic acid 1 milliGRAM(s) Oral daily  glucagon  Injectable 1 milliGRAM(s) IntraMuscular once  influenza   Vaccine 0.5 milliLiter(s) IntraMuscular once  multivitamin 1 Tablet(s) Oral daily  nicotine -  14 mG/24Hr(s) Patch 1 Patch Transdermal daily  pantoprazole    Tablet 40 milliGRAM(s) Oral before breakfast  piperacillin/tazobactam IVPB.- 3.375 Gram(s) IV Intermittent once  QUEtiapine 50 milliGRAM(s) Oral at bedtime  thiamine IVPB 500 milliGRAM(s) IV Intermittent daily  traZODone 100 milliGRAM(s) Oral at bedtime  vancomycin  IVPB 1000 milliGRAM(s) IV Intermittent once    MEDICATIONS  (PRN):  acetaminophen     Tablet .. 650 milliGRAM(s) Oral every 6 hours PRN Temp greater or equal to 38C (100.4F), Mild Pain (1 - 3)  ALBUTerol    90 MICROgram(s) HFA Inhaler 2 Puff(s) Inhalation every 6 hours PRN Shortness of Breath and/or Wheezing  aluminum hydroxide/magnesium hydroxide/simethicone Suspension 30 milliLiter(s) Oral every 4 hours PRN Dyspepsia  melatonin 3 milliGRAM(s) Oral at bedtime PRN Insomnia  ondansetron Injectable 4 milliGRAM(s) IV Push every 8 hours PRN Nausea and/or Vomiting    Pertinent Labs: CBC Full  -  ( 20 Sep 2022 06:44 )  WBC Count : 6.14 K/uL  RBC Count : 4.45 M/uL  Hemoglobin : 10.8 g/dL  Hematocrit : 35.8 %  Platelet Count - Automated : 343 K/uL  Mean Cell Volume : 80.4 fl  Mean Cell Hemoglobin : 24.3 pg  Mean Cell Hemoglobin Concentration : 30.2 gm/dL  Auto Neutrophil # : x  Auto Lymphocyte # : x  Auto Monocyte # : x  Auto Eosinophil # : x  Auto Basophil # : x  Auto Neutrophil % : x  Auto Lymphocyte % : x  Auto Monocyte % : x  Auto Eosinophil % : x  Auto Basophil % : x  09-20 Na132 mmol/L<L> Glu 98 mg/dL K+ 4.0 mmol/L Cr  0.57 mg/dL BUN 14.9 mg/dL Phos 3.4 mg/dL Alb n/a   PAB n/a               Skin: sx wound to head    Nutrition focused physical exam conducted - found signs of malnutrition [ x]absent [ ]present    Subcutaneous fat loss: [ ] Orbital fat pads region, [ ]Buccal fat region, [ ]Triceps region,  [ ]Ribs region    Muscle wasting: [ ]Temples region, [ ]Clavicle region, [ ]Shoulder region, [ ]Scapula region, [ ]Interosseous region,  [ ]thigh region, [ ]Calf region    Estimated Needs:   [ x] no change since previous assessment  [ ] recalculated:     Current Nutrition Diagnosis: Pt presents at risk secondary to increased protein calorie needs, related to increased  physiologic demand stress factor, as evidenced by Alcohol Withdrawal. Pt tolerating meals with fair to good PO intake. HBV protein foods encouraged.    Recommendations:   1) Continue Diet  2) Daily weight  3) Cont MVI, Thiamine folic acid    Monitoring and Evaluation:   [ ] PO intake [ ] Tolerance to diet prescription [X] Weights  [X] Follow up per protocol [X] Labs:

## 2022-09-20 NOTE — RAPID RESPONSE TEAM SUMMARY - NSMEDICATIONSRRT_GEN_ALL_CORE
ativan 2 mg    ativan 2 mg   Lasix 40 mg   Vanco   zosyn    ativan 2 mg   Lasix 40 mg   Vanco 1000   zosyn 3.37

## 2022-09-20 NOTE — PROGRESS NOTE ADULT - ASSESSMENT
51 y/o female with PMH of seizure, PTSD, anxiety, depression, polysubstance abuse  w/ ETOH came to the ED for frequent fall, found to have anemia unclear source.

## 2022-09-20 NOTE — PROGRESS NOTE ADULT - SUBJECTIVE AND OBJECTIVE BOX
CC: ETOH/Fall/Anemia/RRT follow up      INTERVAL HPI/OVERNIGHT EVENTS: Patient seen and examined. RRT this morning for hypoxia,  placed on non-rebreather placed, mild improvement to high 80's. PT was tachypneic and tachycardic. Had completed Bowel prep on overnight, possible aspiration event. Transferred to Step down unit. Currnty on 100% Hi Flow. Awake, answering questions. Denies chest pain, +ALVARENGA. No nausea, vomiting,  fever, chills.     Vital Signs Last 24 Hrs  T(C): 37.6 (20 Sep 2022 06:30), Max: 37.6 (20 Sep 2022 06:30)  T(F): 99.6 (20 Sep 2022 06:30), Max: 99.6 (20 Sep 2022 06:30)  HR: 137 (20 Sep 2022 07:14) (70 - 146)  BP: 133/75 (20 Sep 2022 07:14) (116/80 - 143/88)  BP(mean): --  RR: 28 (20 Sep 2022 07:14) (18 - 36)  SpO2: 95% (20 Sep 2022 08:00) (75% - 99%)    Parameters below as of 20 Sep 2022 08:00  Patient On (Oxygen Delivery Method): nasal cannula, high flow    PE:  Constitutional: NAD, Chronically ill appearing female  ENT: Supple, No JVD  Lungs: B/L rhonchi, no wheeze  Cardio: Tachycardic,  S1/S2, No murmur  Abdomen: Soft, Nontender, Nondistended; Bowel sounds present  Extremities: No calf tenderness, No pitting edema  Psych: Calm at present  Neuro: Non focal      I&O's Detail    19 Sep 2022 07:01  -  20 Sep 2022 07:00  --------------------------------------------------------  IN:    Oral Fluid: 300 mL  Total IN: 300 mL    OUT:  Total OUT: 0 mL    Total NET: 300 mL                                    10.8   6.14  )-----------( 343      ( 20 Sep 2022 06:44 )             35.8     20 Sep 2022 06:44    132    |  93     |  14.9   ----------------------------<  98     4.0     |  22.0   |  0.57     Ca    9.4        20 Sep 2022 06:44  Phos  3.4       20 Sep 2022 06:44  Mg     1.2       20 Sep 2022 06:44      PT/INR - ( 19 Sep 2022 06:25 )   PT: 12.3 sec;   INR: 1.06 ratio         PTT - ( 19 Sep 2022 06:25 )  PTT:27.6 sec  CAPILLARY BLOOD GLUCOSE      POCT Blood Glucose.: 95 mg/dL (20 Sep 2022 06:34)          MEDICATIONS  (STANDING):  budesonide  80 MICROgram(s)/formoterol 4.5 MICROgram(s) Inhaler 2 Puff(s) Inhalation two times a day  dextrose 50% Injectable 25 Gram(s) IV Push once  dextrose 50% Injectable 12.5 Gram(s) IV Push once  dextrose 50% Injectable 25 Gram(s) IV Push once  dextrose Oral Gel 15 Gram(s) Oral once  diVALproex  milliGRAM(s) Oral two times a day  folic acid 1 milliGRAM(s) Oral daily  glucagon  Injectable 1 milliGRAM(s) IntraMuscular once  influenza   Vaccine 0.5 milliLiter(s) IntraMuscular once  multivitamin 1 Tablet(s) Oral daily  nicotine -  14 mG/24Hr(s) Patch 1 Patch Transdermal daily  pantoprazole    Tablet 40 milliGRAM(s) Oral before breakfast  piperacillin/tazobactam IVPB.- 3.375 Gram(s) IV Intermittent once  piperacillin/tazobactam IVPB.- 3.375 Gram(s) IV Intermittent once  QUEtiapine 50 milliGRAM(s) Oral at bedtime  thiamine IVPB 500 milliGRAM(s) IV Intermittent daily  traZODone 100 milliGRAM(s) Oral at bedtime  vancomycin  IVPB 1000 milliGRAM(s) IV Intermittent once    MEDICATIONS  (PRN):  acetaminophen     Tablet .. 650 milliGRAM(s) Oral every 6 hours PRN Temp greater or equal to 38C (100.4F), Mild Pain (1 - 3)  ALBUTerol    90 MICROgram(s) HFA Inhaler 2 Puff(s) Inhalation every 6 hours PRN Shortness of Breath and/or Wheezing  aluminum hydroxide/magnesium hydroxide/simethicone Suspension 30 milliLiter(s) Oral every 4 hours PRN Dyspepsia  melatonin 3 milliGRAM(s) Oral at bedtime PRN Insomnia  ondansetron Injectable 4 milliGRAM(s) IV Push every 8 hours PRN Nausea and/or Vomiting      RADIOLOGY & ADDITIONAL TESTS:

## 2022-09-20 NOTE — SWALLOW BEDSIDE ASSESSMENT ADULT - SLP GENERAL OBSERVATIONS
Pt received & seen seated upright in bed, awake/alert, grossly oriented, on 70% HFNC (sats: 95-96% at baseline & t/o eval), reduced cognition, 0/10 pain

## 2022-09-21 LAB
-  COAGULASE NEGATIVE STAPHYLOCOCCUS: SIGNIFICANT CHANGE UP
ANION GAP SERPL CALC-SCNC: 14 MMOL/L — SIGNIFICANT CHANGE UP (ref 5–17)
BUN SERPL-MCNC: 16.7 MG/DL — SIGNIFICANT CHANGE UP (ref 8–20)
CALCIUM SERPL-MCNC: 9.2 MG/DL — SIGNIFICANT CHANGE UP (ref 8.4–10.5)
CHLORIDE SERPL-SCNC: 97 MMOL/L — LOW (ref 98–107)
CO2 SERPL-SCNC: 21 MMOL/L — LOW (ref 22–29)
CREAT SERPL-MCNC: 0.63 MG/DL — SIGNIFICANT CHANGE UP (ref 0.5–1.3)
EGFR: 106 ML/MIN/1.73M2 — SIGNIFICANT CHANGE UP
GLUCOSE SERPL-MCNC: 103 MG/DL — HIGH (ref 70–99)
GRAM STN FLD: SIGNIFICANT CHANGE UP
HCT VFR BLD CALC: 30.6 % — LOW (ref 34.5–45)
HGB BLD-MCNC: 9.4 G/DL — LOW (ref 11.5–15.5)
LACTATE SERPL-SCNC: 1.8 MMOL/L — SIGNIFICANT CHANGE UP (ref 0.5–2)
LACTATE SERPL-SCNC: 2.3 MMOL/L — HIGH (ref 0.5–2)
MCHC RBC-ENTMCNC: 24.9 PG — LOW (ref 27–34)
MCHC RBC-ENTMCNC: 30.7 GM/DL — LOW (ref 32–36)
MCV RBC AUTO: 81.2 FL — SIGNIFICANT CHANGE UP (ref 80–100)
METHOD TYPE: SIGNIFICANT CHANGE UP
PLATELET # BLD AUTO: 233 K/UL — SIGNIFICANT CHANGE UP (ref 150–400)
POTASSIUM SERPL-MCNC: 4.8 MMOL/L — SIGNIFICANT CHANGE UP (ref 3.5–5.3)
POTASSIUM SERPL-SCNC: 4.8 MMOL/L — SIGNIFICANT CHANGE UP (ref 3.5–5.3)
RBC # BLD: 3.77 M/UL — LOW (ref 3.8–5.2)
RBC # FLD: 25.1 % — HIGH (ref 10.3–14.5)
SODIUM SERPL-SCNC: 132 MMOL/L — LOW (ref 135–145)
WBC # BLD: 16.83 K/UL — HIGH (ref 3.8–10.5)
WBC # FLD AUTO: 16.83 K/UL — HIGH (ref 3.8–10.5)

## 2022-09-21 PROCEDURE — 99233 SBSQ HOSP IP/OBS HIGH 50: CPT

## 2022-09-21 RX ADMIN — PIPERACILLIN AND TAZOBACTAM 25 GRAM(S): 4; .5 INJECTION, POWDER, LYOPHILIZED, FOR SOLUTION INTRAVENOUS at 00:56

## 2022-09-21 RX ADMIN — Medication 100 MILLIGRAM(S): at 21:24

## 2022-09-21 RX ADMIN — Medication 3 MILLILITER(S): at 21:25

## 2022-09-21 RX ADMIN — Medication 3 MILLILITER(S): at 14:42

## 2022-09-21 RX ADMIN — PANTOPRAZOLE SODIUM 40 MILLIGRAM(S): 20 TABLET, DELAYED RELEASE ORAL at 05:20

## 2022-09-21 RX ADMIN — Medication 1 PATCH: at 07:15

## 2022-09-21 RX ADMIN — QUETIAPINE FUMARATE 50 MILLIGRAM(S): 200 TABLET, FILM COATED ORAL at 23:09

## 2022-09-21 RX ADMIN — Medication 1 MILLIGRAM(S): at 13:55

## 2022-09-21 RX ADMIN — DIVALPROEX SODIUM 500 MILLIGRAM(S): 500 TABLET, DELAYED RELEASE ORAL at 05:20

## 2022-09-21 RX ADMIN — Medication 1 PATCH: at 13:19

## 2022-09-21 RX ADMIN — DIVALPROEX SODIUM 500 MILLIGRAM(S): 500 TABLET, DELAYED RELEASE ORAL at 17:02

## 2022-09-21 RX ADMIN — PIPERACILLIN AND TAZOBACTAM 25 GRAM(S): 4; .5 INJECTION, POWDER, LYOPHILIZED, FOR SOLUTION INTRAVENOUS at 13:54

## 2022-09-21 RX ADMIN — BUDESONIDE AND FORMOTEROL FUMARATE DIHYDRATE 2 PUFF(S): 160; 4.5 AEROSOL RESPIRATORY (INHALATION) at 21:26

## 2022-09-21 RX ADMIN — Medication 1 TABLET(S): at 13:54

## 2022-09-21 RX ADMIN — PIPERACILLIN AND TAZOBACTAM 25 GRAM(S): 4; .5 INJECTION, POWDER, LYOPHILIZED, FOR SOLUTION INTRAVENOUS at 05:20

## 2022-09-21 RX ADMIN — BUDESONIDE AND FORMOTEROL FUMARATE DIHYDRATE 2 PUFF(S): 160; 4.5 AEROSOL RESPIRATORY (INHALATION) at 08:23

## 2022-09-21 RX ADMIN — Medication 3 MILLILITER(S): at 08:21

## 2022-09-21 NOTE — PROGRESS NOTE ADULT - PROBLEM SELECTOR PLAN 1
Microcytic anemia. Hemoglobin remain stable today 9.4 gm. No evidence of overt GI bleed.   Patient is still requiring high flow O2, 70%  Continue Protonix  No indication for any urgent endoscopic evaluation warranted at this time. Will have to reschedule endoscopic evaluation for microcytic anemia once her respiratory status improved and medically optimized. Can be done as outpatient.    Continue Folic acid, MVI

## 2022-09-21 NOTE — PROGRESS NOTE ADULT - NS ATTEND AMEND GEN_ALL_CORE FT
This morning the patient is awake, alert and oriented x2 but thinks it is 2020.  Nevertheless she is appropriate and answers questions appropriately.  There is no evidence of any overt or even occult bleeding at this time but her initial numbers were consistent with iron deficiency anemia with an element of microcytosis.  She will eventually require endoscopy and colonoscopy but the procedures are on hold due to pneumonia with respiratory compromise which may or may not have been due to aspiration.

## 2022-09-21 NOTE — PROGRESS NOTE ADULT - SUBJECTIVE AND OBJECTIVE BOX
[facility] Division of Hospital Medicine    Chief Complaint:  ETOH/Fall/Anemia/Acute Respiratory Failure    SUBJECTIVE / OVERNIGHT EVENTS:  No events overnight, Pt seen at bedside, in NAD, tolerating HFNC well.  Patient denies chest pain, abd pain, N/V, fever, chills, dysuria or any other complaints. All remainder ROS negative.     MEDICATIONS  (STANDING):  albuterol/ipratropium for Nebulization 3 milliLiter(s) Nebulizer every 6 hours  budesonide  80 MICROgram(s)/formoterol 4.5 MICROgram(s) Inhaler 2 Puff(s) Inhalation two times a day  dextrose 50% Injectable 25 Gram(s) IV Push once  dextrose 50% Injectable 12.5 Gram(s) IV Push once  dextrose 50% Injectable 25 Gram(s) IV Push once  dextrose Oral Gel 15 Gram(s) Oral once  diVALproex  milliGRAM(s) Oral two times a day  folic acid 1 milliGRAM(s) Oral daily  glucagon  Injectable 1 milliGRAM(s) IntraMuscular once  influenza   Vaccine 0.5 milliLiter(s) IntraMuscular once  multivitamin 1 Tablet(s) Oral daily  nicotine -  14 mG/24Hr(s) Patch 1 Patch Transdermal daily  pantoprazole    Tablet 40 milliGRAM(s) Oral before breakfast  piperacillin/tazobactam IVPB.. 3.375 Gram(s) IV Intermittent every 8 hours  QUEtiapine 50 milliGRAM(s) Oral at bedtime  traZODone 100 milliGRAM(s) Oral at bedtime    MEDICATIONS  (PRN):  acetaminophen     Tablet .. 650 milliGRAM(s) Oral every 6 hours PRN Temp greater or equal to 38C (100.4F), Mild Pain (1 - 3)  ALBUTerol    90 MICROgram(s) HFA Inhaler 2 Puff(s) Inhalation every 6 hours PRN Shortness of Breath and/or Wheezing  aluminum hydroxide/magnesium hydroxide/simethicone Suspension 30 milliLiter(s) Oral every 4 hours PRN Dyspepsia  melatonin 3 milliGRAM(s) Oral at bedtime PRN Insomnia  ondansetron Injectable 4 milliGRAM(s) IV Push every 8 hours PRN Nausea and/or Vomiting        I&O's Summary      PHYSICAL EXAM:  Vital Signs Last 24 Hrs  T(C): 36.9 (21 Sep 2022 07:15), Max: 38.1 (20 Sep 2022 11:59)  T(F): 98.5 (21 Sep 2022 07:15), Max: 100.5 (20 Sep 2022 11:59)  HR: 89 (21 Sep 2022 10:00) (88 - 105)  BP: 126/87 (21 Sep 2022 10:00) (95/60 - 134/90)  BP(mean): 99 (21 Sep 2022 10:00) (72 - 104)  RR: 18 (21 Sep 2022 10:00) (15 - 21)  SpO2: 98% (21 Sep 2022 10:00) (93% - 100%)    Parameters below as of 21 Sep 2022 10:00  Patient On (Oxygen Delivery Method): nasal cannula w/ humidification  O2 Flow (L/min): 40  O2 Concentration (%): 70      PE:  Constitutional: NAD, Chronically ill appearing female  ENT: Supple, No JVD  Lungs: B/L rhonchi, no wheeze  Cardio: Tachycardic,  S1/S2, No murmur  Abdomen: Soft, Nontender, Nondistended; Bowel sounds present  Extremities: No calf tenderness, No pitting edema  Psych: Calm at present  Neuro: Non focal, AAOx3    LABS:                        9.4    16.83 )-----------( 233      ( 21 Sep 2022 06:58 )             30.6     09-21    132<L>  |  97<L>  |  16.7  ----------------------------<  103<H>  4.8   |  21.0<L>  |  0.63    Ca    9.2      21 Sep 2022 06:58  Phos  3.4     09-20  Mg     1.2     09-20        CAPILLARY BLOOD GLUCOSE      RADIOLOGY & ADDITIONAL TESTS:  Results Reviewed: y  Imaging Personally Reviewed: n  Electrocardiogram Personally Reviewed: [email or web address]

## 2022-09-21 NOTE — PROGRESS NOTE ADULT - SUBJECTIVE AND OBJECTIVE BOX
Chief Complaint:  Patient is a 52y old  Female who presents with a chief complaint of frequent falls. ETOH abuse, anemia. GI following for anemia.     Interval Events / Subjective: Patient seen and examined at bedside. On Highflow nasal cannula 70%. Patient reports nausea, denies abdominal pain, vomiting, fever, chills. Her hemoglobin remain stable, today 9.4 gm.       REVIEW OF SYSTEMS:     . Constitutional: No fever, chills,  · ENMT: negative  · Respiratory and Thorax: +shortness of breath.   · Cardiovascular: Denies   · Gastrointestinal: see above.  · Genitourinary: No hematuria, no dysuria  · Musculoskeletal: Negative  · Neurological: no headache, no vision changes,  · Psychiatric: negative  · Hematology/Lymphatics: negative  · Endocrine: negative    PAST MEDICAL/SURGICAL HISTORY:  Seizure    ETOH abuse    Tobacco dependence    Hypertension    H/O tracheostomy      MEDICATIONS  (STANDING):  albuterol/ipratropium for Nebulization 3 milliLiter(s) Nebulizer every 6 hours  budesonide  80 MICROgram(s)/formoterol 4.5 MICROgram(s) Inhaler 2 Puff(s) Inhalation two times a day  dextrose 50% Injectable 25 Gram(s) IV Push once  dextrose 50% Injectable 12.5 Gram(s) IV Push once  dextrose 50% Injectable 25 Gram(s) IV Push once  dextrose Oral Gel 15 Gram(s) Oral once  diVALproex  milliGRAM(s) Oral two times a day  folic acid 1 milliGRAM(s) Oral daily  glucagon  Injectable 1 milliGRAM(s) IntraMuscular once  influenza   Vaccine 0.5 milliLiter(s) IntraMuscular once  multivitamin 1 Tablet(s) Oral daily  nicotine -  14 mG/24Hr(s) Patch 1 Patch Transdermal daily  pantoprazole    Tablet 40 milliGRAM(s) Oral before breakfast  piperacillin/tazobactam IVPB.. 3.375 Gram(s) IV Intermittent every 8 hours  QUEtiapine 50 milliGRAM(s) Oral at bedtime  traZODone 100 milliGRAM(s) Oral at bedtime    MEDICATIONS  (PRN):  acetaminophen     Tablet .. 650 milliGRAM(s) Oral every 6 hours PRN Temp greater or equal to 38C (100.4F), Mild Pain (1 - 3)  ALBUTerol    90 MICROgram(s) HFA Inhaler 2 Puff(s) Inhalation every 6 hours PRN Shortness of Breath and/or Wheezing  aluminum hydroxide/magnesium hydroxide/simethicone Suspension 30 milliLiter(s) Oral every 4 hours PRN Dyspepsia  melatonin 3 milliGRAM(s) Oral at bedtime PRN Insomnia  ondansetron Injectable 4 milliGRAM(s) IV Push every 8 hours PRN Nausea and/or Vomiting    No Known Allergies    T(C): 36.9 (09-21-22 @ 07:15), Max: 38.1 (09-20-22 @ 11:59)  HR: 89 (09-21-22 @ 10:00) (88 - 105)  BP: 126/87 (09-21-22 @ 10:00) (95/60 - 134/90)  RR: 18 (09-21-22 @ 10:00) (15 - 21)  SpO2: 98% (09-21-22 @ 10:00) (93% - 100%)      PHYSICAL EXAM:  Constitutional: In no acute distress, on High flow nasal cannula 70%  Neuro: Alert, oriented to self and place  HEENT: PERRL, anicteric sclerae  Neck: supple, no JVD  CV:  regular rhythm, +S1S2,   Pulm/chest: lung sounds diminished bilaterally, no accessory muscle use noted  Abd: soft, NT, ND, +BS  Ext:  no edema   Skin: warm, no jaundice   Psych: calm, cooperative       LABS:  ABG - ( 20 Sep 2022 08:21 )  pH, Arterial: 7.510 pH, Blood: x     /  pCO2: 33    /  pO2: 66    / HCO3: 26    / Base Excess: 3.3   /  SaO2: 95.8                     9.4    16.83 )-----------( 233      ( 09-21 @ 06:58 )             30.6                10.8   6.14  )-----------( 343      ( 09-20 @ 06:44 )             35.8                9.3    6.36  )-----------( 341      ( 09-19 @ 06:25 )             30.3       09-21    132<L>  |  97<L>  |  16.7  ----------------------------<  103<H>  4.8   |  21.0<L>  |  0.63    Ca    9.2      21 Sep 2022 06:58  Phos  3.4     09-20  Mg     1.2     09-20   Chief Complaint:  Patient is a 52y old  Female who presents with a chief complaint of frequent falls. ETOH abuse, anemia. GI following for anemia.     Interval Events / Subjective: Patient seen and examined at bedside. On Highflow nasal cannula 70%. Patient reports nausea, denies abdominal pain, vomiting, fever, chills. Her hemoglobin remain stable, today 9.4 gm. but varies significantly from day to day      REVIEW OF SYSTEMS:     . Constitutional: No fever, chills,  · ENMT: negative  · Respiratory and Thorax: +shortness of breath.   · Cardiovascular: Denies   · Gastrointestinal: see above.  · Genitourinary: No hematuria, no dysuria  · Musculoskeletal: Negative  · Neurological: no headache, no vision changes,  · Psychiatric: negative  · Hematology/Lymphatics: negative  · Endocrine: negative    PAST MEDICAL/SURGICAL HISTORY:  Seizure    ETOH abuse    Tobacco dependence    Hypertension    H/O tracheostomy      MEDICATIONS  (STANDING):  albuterol/ipratropium for Nebulization 3 milliLiter(s) Nebulizer every 6 hours  budesonide  80 MICROgram(s)/formoterol 4.5 MICROgram(s) Inhaler 2 Puff(s) Inhalation two times a day  dextrose 50% Injectable 25 Gram(s) IV Push once  dextrose 50% Injectable 12.5 Gram(s) IV Push once  dextrose 50% Injectable 25 Gram(s) IV Push once  dextrose Oral Gel 15 Gram(s) Oral once  diVALproex  milliGRAM(s) Oral two times a day  folic acid 1 milliGRAM(s) Oral daily  glucagon  Injectable 1 milliGRAM(s) IntraMuscular once  influenza   Vaccine 0.5 milliLiter(s) IntraMuscular once  multivitamin 1 Tablet(s) Oral daily  nicotine -  14 mG/24Hr(s) Patch 1 Patch Transdermal daily  pantoprazole    Tablet 40 milliGRAM(s) Oral before breakfast  piperacillin/tazobactam IVPB.. 3.375 Gram(s) IV Intermittent every 8 hours  QUEtiapine 50 milliGRAM(s) Oral at bedtime  traZODone 100 milliGRAM(s) Oral at bedtime    MEDICATIONS  (PRN):  acetaminophen     Tablet .. 650 milliGRAM(s) Oral every 6 hours PRN Temp greater or equal to 38C (100.4F), Mild Pain (1 - 3)  ALBUTerol    90 MICROgram(s) HFA Inhaler 2 Puff(s) Inhalation every 6 hours PRN Shortness of Breath and/or Wheezing  aluminum hydroxide/magnesium hydroxide/simethicone Suspension 30 milliLiter(s) Oral every 4 hours PRN Dyspepsia  melatonin 3 milliGRAM(s) Oral at bedtime PRN Insomnia  ondansetron Injectable 4 milliGRAM(s) IV Push every 8 hours PRN Nausea and/or Vomiting    No Known Allergies    T(C): 36.9 (09-21-22 @ 07:15), Max: 38.1 (09-20-22 @ 11:59)  HR: 89 (09-21-22 @ 10:00) (88 - 105)  BP: 126/87 (09-21-22 @ 10:00) (95/60 - 134/90)  RR: 18 (09-21-22 @ 10:00) (15 - 21)  SpO2: 98% (09-21-22 @ 10:00) (93% - 100%)      PHYSICAL EXAM:  Constitutional: In no acute distress, on High flow nasal cannula 70%  Neuro: Alert, oriented to self and place and answers uestions appropriately  HEENT: PERRL, anicteric sclerae  Neck: supple, no JVD  CV:  regular rhythm, +S1S2,   Pulm/chest: lung sounds diminished bilaterally, no accessory muscle use noted  Abd: soft, NT, ND, +BS  Ext:  no edema   Skin: warm, no jaundice   Psych: calm, cooperative       LABS:  ABG - ( 20 Sep 2022 08:21 )  pH, Arterial: 7.510 pH, Blood: x     /  pCO2: 33    /  pO2: 66    / HCO3: 26    / Base Excess: 3.3   /  SaO2: 95.8                     9.4    16.83 )-----------( 233      ( 09-21 @ 06:58 )             30.6                10.8   6.14  )-----------( 343      ( 09-20 @ 06:44 )             35.8                9.3    6.36  )-----------( 341      ( 09-19 @ 06:25 )             30.3       09-21    132<L>  |  97<L>  |  16.7  ----------------------------<  103<H>  4.8   |  21.0<L>  |  0.63    Ca    9.2      21 Sep 2022 06:58  Phos  3.4     09-20  Mg     1.2     09-20

## 2022-09-21 NOTE — PROVIDER CONTACT NOTE (CRITICAL VALUE NOTIFICATION) - BACKGROUND
If you are a smoker, it is important for your health to stop smoking. Please be aware that second hand smoke is also harmful.
HTN, Tobacco, Etoh, Sezuires

## 2022-09-21 NOTE — PROGRESS NOTE ADULT - ASSESSMENT
53 y/o female with PMH of seizure, PTSD, anxiety, depression, polysubstance abuse  w/ ETOH came to the ED for frequent fall. GI following for anemia.

## 2022-09-21 NOTE — PROGRESS NOTE ADULT - ASSESSMENT
Patient is a 52 year old female here for mgmt of Acute hypoxic respratory failure 2/2 aspiration w/ [place] Neg PNA    # Acute hypoxic respiratory failure suspect 2/2 aspiration with gram negative pneumonia  RRT 9/20  Currently on Hi flow 100%, titrate down as tolerated  As per SLP - Small bite sized solids w/ thin fluids  Lactate downtrending, repeat at noon  c/w Zosyn  EGD/Colonoscopy cancelled  Blood cultures 9/20  Duo neb    #Acute on chronic microcytic anemia superimposed on iron deficiency with inappropriate retic response  -multifactorial- iron deficiency, bone marrow suppression from alcohol abuse   -Hb 10.2, s/p 1u prbc this admission  -Completed Venofer x 3 days  -FOBT neg  -Monitor CBC  -Continue PPI  -GI following, colonoscopy / egd on hold due to above    #Alcohol Withdrawal/ not in withdrawal anymore   #Encephalopathy - ? delirium  -CIWA, s/p valium taper  -continue thiamine/folic acid/MVI    #Frequent Falls due to Unsteady Gait - multifactorial likely due to alcohol intoxication, hyponatremia   -CTH on admission negative for acute pathology but right parietal scalp hematoma noted  -CT neck negative for acute fracture  -sodium improved  -ETOH level on admission > 200  -high dose thiamine x 3 days  -fall precautions   -plan for eventual ALEJANDRA     #COPD   -Continue Symbicort    -Duoneb    #hx of Seizure ( EEG in 2021- Interictal findings suggest potential epileptogenic focus and structural abnormality in the right temporal region)  -Valproic acid level WNL   -Continue Divalproex 500mg bid   -Seizure precautions    #Anxiety/depression/PTSD   -Seroquel 50mg HS   -Trazodone 50mg HS   -psych consulted for mood d/o     #Tobacco dependency  -nicotine patch QD  -Cessation advised     #HCM  DVT PPx - SCDs  Diet - Soft/Bite sized w/ thin liquids  Dispo - Medically acute

## 2022-09-22 LAB
ANION GAP SERPL CALC-SCNC: 12 MMOL/L — SIGNIFICANT CHANGE UP (ref 5–17)
BUN SERPL-MCNC: 17.5 MG/DL — SIGNIFICANT CHANGE UP (ref 8–20)
CALCIUM SERPL-MCNC: 9.2 MG/DL — SIGNIFICANT CHANGE UP (ref 8.4–10.5)
CHLORIDE SERPL-SCNC: 101 MMOL/L — SIGNIFICANT CHANGE UP (ref 98–107)
CO2 SERPL-SCNC: 22 MMOL/L — SIGNIFICANT CHANGE UP (ref 22–29)
CREAT SERPL-MCNC: 0.65 MG/DL — SIGNIFICANT CHANGE UP (ref 0.5–1.3)
CULTURE RESULTS: SIGNIFICANT CHANGE UP
EGFR: 105 ML/MIN/1.73M2 — SIGNIFICANT CHANGE UP
GLUCOSE SERPL-MCNC: 82 MG/DL — SIGNIFICANT CHANGE UP (ref 70–99)
HCT VFR BLD CALC: 29.9 % — LOW (ref 34.5–45)
HGB BLD-MCNC: 9.2 G/DL — LOW (ref 11.5–15.5)
MAGNESIUM SERPL-MCNC: 1.9 MG/DL — SIGNIFICANT CHANGE UP (ref 1.6–2.6)
MCHC RBC-ENTMCNC: 24.8 PG — LOW (ref 27–34)
MCHC RBC-ENTMCNC: 30.8 GM/DL — LOW (ref 32–36)
MCV RBC AUTO: 80.6 FL — SIGNIFICANT CHANGE UP (ref 80–100)
ORGANISM # SPEC MICROSCOPIC CNT: SIGNIFICANT CHANGE UP
ORGANISM # SPEC MICROSCOPIC CNT: SIGNIFICANT CHANGE UP
PLATELET # BLD AUTO: 343 K/UL — SIGNIFICANT CHANGE UP (ref 150–400)
POTASSIUM SERPL-MCNC: 4.4 MMOL/L — SIGNIFICANT CHANGE UP (ref 3.5–5.3)
POTASSIUM SERPL-SCNC: 4.4 MMOL/L — SIGNIFICANT CHANGE UP (ref 3.5–5.3)
RBC # BLD: 3.71 M/UL — LOW (ref 3.8–5.2)
RBC # FLD: 24.5 % — HIGH (ref 10.3–14.5)
SODIUM SERPL-SCNC: 135 MMOL/L — SIGNIFICANT CHANGE UP (ref 135–145)
SPECIMEN SOURCE: SIGNIFICANT CHANGE UP
WBC # BLD: 13.4 K/UL — HIGH (ref 3.8–10.5)
WBC # FLD AUTO: 13.4 K/UL — HIGH (ref 3.8–10.5)

## 2022-09-22 PROCEDURE — 76937 US GUIDE VASCULAR ACCESS: CPT | Mod: 26,59

## 2022-09-22 PROCEDURE — 99233 SBSQ HOSP IP/OBS HIGH 50: CPT

## 2022-09-22 PROCEDURE — 99223 1ST HOSP IP/OBS HIGH 75: CPT

## 2022-09-22 PROCEDURE — 71275 CT ANGIOGRAPHY CHEST: CPT | Mod: 26

## 2022-09-22 PROCEDURE — 36000 PLACE NEEDLE IN VEIN: CPT

## 2022-09-22 PROCEDURE — 76942 ECHO GUIDE FOR BIOPSY: CPT | Mod: 26,59

## 2022-09-22 RX ADMIN — Medication 3 MILLILITER(S): at 15:39

## 2022-09-22 RX ADMIN — BUDESONIDE AND FORMOTEROL FUMARATE DIHYDRATE 2 PUFF(S): 160; 4.5 AEROSOL RESPIRATORY (INHALATION) at 08:36

## 2022-09-22 RX ADMIN — Medication 3 MILLILITER(S): at 21:14

## 2022-09-22 RX ADMIN — PIPERACILLIN AND TAZOBACTAM 25 GRAM(S): 4; .5 INJECTION, POWDER, LYOPHILIZED, FOR SOLUTION INTRAVENOUS at 21:10

## 2022-09-22 RX ADMIN — Medication 3 MILLIGRAM(S): at 21:10

## 2022-09-22 RX ADMIN — Medication 1 TABLET(S): at 11:08

## 2022-09-22 RX ADMIN — PANTOPRAZOLE SODIUM 40 MILLIGRAM(S): 20 TABLET, DELAYED RELEASE ORAL at 06:27

## 2022-09-22 RX ADMIN — DIVALPROEX SODIUM 500 MILLIGRAM(S): 500 TABLET, DELAYED RELEASE ORAL at 06:26

## 2022-09-22 RX ADMIN — BUDESONIDE AND FORMOTEROL FUMARATE DIHYDRATE 2 PUFF(S): 160; 4.5 AEROSOL RESPIRATORY (INHALATION) at 21:14

## 2022-09-22 RX ADMIN — Medication 1 MILLIGRAM(S): at 11:08

## 2022-09-22 RX ADMIN — Medication 3 MILLILITER(S): at 08:36

## 2022-09-22 RX ADMIN — PIPERACILLIN AND TAZOBACTAM 25 GRAM(S): 4; .5 INJECTION, POWDER, LYOPHILIZED, FOR SOLUTION INTRAVENOUS at 14:27

## 2022-09-22 RX ADMIN — DIVALPROEX SODIUM 500 MILLIGRAM(S): 500 TABLET, DELAYED RELEASE ORAL at 17:11

## 2022-09-22 RX ADMIN — QUETIAPINE FUMARATE 50 MILLIGRAM(S): 200 TABLET, FILM COATED ORAL at 21:10

## 2022-09-22 RX ADMIN — Medication 100 MILLIGRAM(S): at 21:10

## 2022-09-22 NOTE — PROCEDURE NOTE - NSPROCDETAILS_GEN_ALL_CORE
location identified, draped/prepped, sterile technique used/blood seen on insertion/dressing applied/flushes easily/secured in place/sterile technique, catheter placed

## 2022-09-22 NOTE — PROCEDURE NOTE - NSSITEPREP_SKIN_A_CORE
chlorhexidine
chlorhexidine/Adherence to aseptic technique: hand hygiene prior to donning barriers (gown, gloves), don cap and mask, sterile drape over patient
chlorhexidine/Adherence to aseptic technique: hand hygiene prior to donning barriers (gown, gloves), don cap and mask, sterile drape over patient
chlorhexidine

## 2022-09-22 NOTE — CONSULT NOTE ADULT - SUBJECTIVE AND OBJECTIVE BOX
[facility]  INFECTIOUS DISEASES at [place] [facility] [place]  =======================================================                               Dandre Tenorio MD#   Carito Oliver MD*                             Dayna Iraheta MD*   Daniela Morales MD*            Diplomates American Board of Internal Medicine & Infectious Diseases                # [place] Office - Appt - Tel  [phone] Fax [phone]                * [place] Office - Appt - Tel [phone] Fax [phone]                                  Hospital Consult line:  [phone]  =======================================================      [id number]  [facility]    CC: Patient is a 52y old  Female who presents with a chief complaint of fall (20 Sep 2022 09:46)      52y  Female with a h/o TBI, remote trach/PEG, seizures, prior PE (not on a/c), PTSD, anxiety, depression, polysubstance abuse, admitted on 9/6 complaining of frequent falls with head strikes. In the ED, found to have Hgb of 6.3; FOB negative; alcohol level: 226. CT brain/c-spine unremarkable for significant acute pathology. Hospital course complicated by ETOH withdrawal. EGD and colonoscopy was scheduled for 9/20 however patient had a RRT for hypoxemia. Patient had drank GoLytlely bowel prep overnight and became severely hypoxemic. CXR reveals large RML/RLL lung opacity. Started on HFNC with 100% FiO2 and 40 LPM. EGD and colonoscopy was cancelled. Patient was seen by MICU and FIO2 was weaned. Patient was started on Zosyn for possible PNA. Patient also was noted to have a fever to 100.5F on 9/20 and blood cultures obtained and 1 of 4 bottles with CoNS. ID input requested.       Past Medical & Surgical Hx:  Seizure  ETOH abuse  Tobacco dependence  Hypertension  H/O tracheostomy      Social Hx:  ETOH use  Cigarette smoker       FAMILY HISTORY:  Family history of osteoarthritis (Mother)      Allergies  No Known Allergies      REVIEW OF SYSTEMS:  CONSTITUTIONAL:  No Fever or chills  HEENT:  No diplopia or blurred vision.  No earache, sore throat or runny nose.  CARDIOVASCULAR:  No chest pain  RESPIRATORY:  No cough, shortness of breath  GASTROINTESTINAL:  No nausea, vomiting or diarrhea.  GENITOURINARY:  No dysuria, frequency or urgency.   MUSCULOSKELETAL:  no joint aches, no muscle pain  SKIN:  No change in skin, hair or nails.  NEUROLOGIC:  No Headaches, seizures  PSYCHIATRIC:  depressed and anxious   ENDOCRINE:  No heat or cold intolerance  HEMATOLOGICAL:  No easy bruising or bleeding.       Physical Exam:  GEN: NAD  HEENT: normocephalic and atraumatic. EOMI. PERRL.    NECK: Supple.   LUNGS: CTA B/L.  HEART: RRR  ABDOMEN: Soft, NT, ND.  +BS.    : No CVA tenderness  EXTREMITIES: Without  edema.  MSK: No joint swelling  NEUROLOGIC: No Focal Deficits   PSYCHIATRIC: depressed and anxious  SKIN: No rash      Vitals:  T(F): 98.5 (22 Sep 2022 08:00), Max: 99.2 (21 Sep 2022 21:18)  HR: 88 (22 Sep 2022 12:00)  BP: 117/82 (22 Sep 2022 12:00)  RR: 20 (22 Sep 2022 12:00)  SpO2: 99% (22 Sep 2022 12:00) (92% - 100%)  temp max in last 48H T(F): , Max: 99.2 (09-21-22 @ 21:18)      Current Antibiotics:  piperacillin/tazobactam IVPB.. 3.375 Gram(s) IV Intermittent every 8 hours    Other medications:  albuterol/ipratropium for Nebulization 3 milliLiter(s) Nebulizer every 6 hours  budesonide  80 MICROgram(s)/formoterol 4.5 MICROgram(s) Inhaler 2 Puff(s) Inhalation two times a day  dextrose 50% Injectable 25 Gram(s) IV Push once  dextrose 50% Injectable 12.5 Gram(s) IV Push once  dextrose 50% Injectable 25 Gram(s) IV Push once  dextrose Oral Gel 15 Gram(s) Oral once  diVALproex  milliGRAM(s) Oral two times a day  folic acid 1 milliGRAM(s) Oral daily  glucagon  Injectable 1 milliGRAM(s) IntraMuscular once  influenza   Vaccine 0.5 milliLiter(s) IntraMuscular once  multivitamin 1 Tablet(s) Oral daily  nicotine -  14 mG/24Hr(s) Patch 1 Patch Transdermal daily  pantoprazole    Tablet 40 milliGRAM(s) Oral before breakfast  QUEtiapine 50 milliGRAM(s) Oral at bedtime  traZODone 100 milliGRAM(s) Oral at bedtime                 9.2    13.40 )-----------( 343      ( 22 Sep 2022 06:54 )             29.9     09-22    135  |  101  |  17.5  ----------------------------<  82  4.4   |  22.0  |  0.65    Ca    9.2      22 Sep 2022 06:54  Mg     1.9     09-22      RECENT CULTURES:  09-20 @ 07:32 .Blood Blood-Peripheral     No growth to date.    09-20 @ 07:09 .Blood Blood-Peripheral Blood Culture PCR    Growth in anaerobic bottle: Staphylococcus caprae  ***Blood Panel PCR results on this specimen are available  approximately 3 hours after the Gram stain result.***  Gram stain, PCR, and/or culture results may not always  correspond due to difference in methodologies.  ************************************************************  This PCR assay was performed by multiplex PCR. This  Assay tests for 66 bacterial and resistance gene targets.  Please refer to the [facility] test directory  at [email or web address] for details.  Growth in anaerobic bottle: Gram Positive Cocci in Clusters      WBC Count: 13.40 K/uL (09-22-22 @ 06:54)  WBC Count: 16.83 K/uL (09-21-22 @ 06:58)  WBC Count: 6.14 K/uL (09-20-22 @ 06:44)  WBC Count: 6.36 K/uL (09-19-22 @ 06:25)  WBC Count: 6.14 K/uL (09-18-22 @ 06:54)    Creatinine, Serum: 0.65 mg/dL (09-22-22 @ 06:54)  Creatinine, Serum: 0.63 mg/dL (09-21-22 @ 06:58)  Creatinine, Serum: 0.57 mg/dL (09-20-22 @ 06:44)  Creatinine, Serum: 0.84 mg/dL (09-19-22 @ 06:25)  Creatinine, Serum: 0.73 mg/dL (09-18-22 @ 06:54)    Ferritin, Serum: 34 ng/mL (09-07-22 @ 06:05)    Procalcitonin, Serum: 0.08 ng/mL (09-20-22 @ 06:44)     COVID-19 PCR: NotDetec (09-20-22 @ 08:50)  COVID-19 PCR: NotDetec (09-19-22 @ 05:00)  COVID-19 PCR: NotDetec (09-13-22 @ 04:00)  SARS-CoV-2: NotDetec (09-06-22 @ 20:08)          < from: CT Angio Chest PE Protocol w/ IV Cont (09.22.22 @ 11:38) >  ACC: [id number] EXAM:  CT ANGIO CHEST PULM [facility]                          PROCEDURE DATE:  09/22/2022      INTERPRETATION:  Clinical indications: Evaluate for pulmonary embolism.    CT angiogram of the chest was performed following intravenous   administration of 59 cc of Omnipaque-350. 0 cc of contrast was discarded.   MIP images are submitted.    Comparison is made with the prior chest CT of Ar[place] 10, 2020.    The study is limited for evaluation of some of the subsegmental or   smaller pulmonary arterial branches due to superimposed respiratory   motion artifact. No pulmonary arterial emboli are identified within the   well visualized pulmonary arteries.    No enlarged axillary, mediastinal or hilar lymph nodes.    Heart size is normal. No pericardial effusion. Trace pleural effusions,   left greater than right.    Evaluation of the upper abdomen demonstrate a large hiatal hernia   containing majority of the stomach.    Evaluation of the lungs demonstrate bilateral mid to lower lung patchy   and linear dense and groundglass opacities most notable within the lower   lobes, lingula and the posterior segment of the right upper lobes. Right   upper lobe posterior segmental opacities have a nodular appearance. Small   left apical patchy opacity new since the cervical spine CT of September 6, 2022.    No central endobronchial lesions. No bronchiectasis or honeycombing.    Degenerative changes of the spine. Old healed bilateral rib fractures.    IMPRESSION: Limited for evaluation of some of the subsegmental pulmonary   arterial branches due to motion artifact. No pulmonary arterial emboli   within the well visualized pulmonary arteries.    Bilateral mid to lower lung predominant opacities as described above.   Differentialdiagnosis include pneumonia with superimposed areas of   atelectasis and/or pulmonary edema. 3 month follow-up noncontrast chest   CT is recommended to ensure resolution.    Large hiatal hernia.    --- End of Report ---  < end of copied text >

## 2022-09-22 NOTE — PROGRESS NOTE ADULT - PROBLEM SELECTOR PLAN 1
Microcytic anemia. Hemoglobin remain stable today 9.2 gm. No evidence of overt GI bleed.   Patient now on 6LNC  Continue Protonix  No indication for any urgent endoscopic evaluation warranted at this time. Will have to reschedule endoscopic evaluation for microcytic anemia once her respiratory status improved and medically optimized. Can be done as outpatient.    Continue to trend CBC, transfuse as needed

## 2022-09-22 NOTE — PROGRESS NOTE ADULT - PROBLEM SELECTOR PLAN 2
CIWA  Alcohol cessation Counselling  Thiamine, MVI, Folic Acid
Delirium vs dementia /delirium  Continue Thiamine, folic acid, MVI  need ETOH rehab as outpatient  F/u with our hepatologist Dr Marino as outpatient
delirium vs dementia /delirium  Continue Thiamin, folic acid, MVI  need ETOH rehab as outpatient  F/u with our hepatologist Dr Marino as outpatient
delirium vs dementia /delirium  Continue Thiamin, folic acid, MVI  need ETOH rehab as outpatient  F/u with our hepatologist Dr Marino as outpatient
delirium vs dementia /delirium  need ETOH rehab as outpatient  may F/u with Dr Marino
delirium vs dementia /delirium  Continue Thiamin, folic acid, MVI  need ETOH rehab as outpatient  F/u with our hepatologist Dr Marino as outpatient
CIWA  Alcohol cessation Counselling  Thiamine, MVI, Folic Acid
delerium vs dementia/delerium  need ETOH rehab as outpatient  may F/u with Dr Marino

## 2022-09-22 NOTE — CONSULT NOTE ADULT - ASSESSMENT
52y  Female with a h/o TBI, remote trach/PEG, seizures, prior PE (not on a/c), PTSD, anxiety, depression, polysubstance abuse, admitted on 9/6 complaining of frequent falls with head strikes. In the ED, found to have Hgb of 6.3; FOB negative; alcohol level: 226. CT brain/c-spine unremarkable for significant acute pathology. Hospital course complicated by ETOH withdrawal. EGD and colonoscopy was scheduled for 9/20 however patient had a RRT for hypoxemia. Patient had drank GoLytlely bowel prep overnight and became severely hypoxemic. CXR reveals large RML/RLL lung opacity. Started on HFNC with 100% FiO2 and 40 LPM. EGD and colonoscopy was cancelled. Patient was seen by MICU and FIO2 was weaned. Patient was started on Zosyn for possible PNA. Patient also was noted to have a fever to 100.5F on 9/20 and blood cultures obtained and 1 of 4 bottles with CoNS.      Aspiration PNA  Positive blood culture 1 of 4 bottles with CoNS  Leukocytosis         - Blood cultures 9/20 reporting  Staphylococcus caprae  - Repeat blood cultures pending  - RVP/COVID 19 PCR 9/20 negative   - CTA Chest  reporting ? PNA   - Procalcitonin level 0.08  - Continue Zosyn   - Last day will be 9/22/22  - Follow up cultures  - Trend Fever  - Trend WBC      Thank you for allowing me to participate in the care of your patient.   Will Follow    d/w Sharon RAMOS  
53 y/o F with a h/o TBI, remote trach/PEG, seizures, prior PE (not on a/c), PTSD, anxiety, depression, polysubstance abuse, with:    Acute hypoxemic respiratory failure, aspiration pneumonia/pneumonitis, delirium, s/p EtOH withdrawal, metabolic encephalopathy, anemia.    Patient does not require MICU level of care at this time. She is responding to current therapies. Please reconsult as appropriate if condition deteriorates.    It appears that Bhumika aspirated her bowel prep. Now with large RML/RLL lung opacity on CXR. Urgently started on HFNC for hypoxemia.    - titrate FiO2/flow rate to maintain SpO2 > 92%  - tolerated wean of FiO2 to 70%, continue slow wean as able  - elevated HOB > 30 degrees  - will need aggressive chest physiotherapy and pulmonary toileting, her cough reflex is intact but weakened  - recommend airway clearance vest  - would consider inserting NGT to decompress stomach given risk for recurrent aspiration  - agree with empiric broad spectrum antibiotic therapy for now  - avoid neurosuppressant agents as best as possible to optimize mental status and airway protection abilities  - would defer EGD/colonoscopy at this time until her respiratory status has stabilized  - no clinical evidence of active bleeding, H/H is stable    Case discussed in detail with MICU physician, Dr. Stapleton.

## 2022-09-22 NOTE — PROGRESS NOTE ADULT - SUBJECTIVE AND OBJECTIVE BOX
Chief Complaint: Patient is a 52y old  Female who presents with a chief complaint of frequent falls. ETOH abuse, anemia. GI following for anemia.       Interval Events / Subjective: Patient seen and evaluated at bedside, reporting no complaints. Now on 6L via NC. No reported BMs. Hemoglobin stable at 9.2gm. Denies nausea, vomiting, abdominal pain, chest pain, shortness of breath, hematemesis, hematochezia, melena.       REVIEW OF SYSTEMS:   General: Negative  HEENT: Negative  CV: Negative  Respiratory: Negative  GI: See HPI  : Negative  MSK: Negative  Hematologic: Negative  Skin: Negative    MEDICATIONS:   MEDICATIONS  (STANDING):  albuterol/ipratropium for Nebulization 3 milliLiter(s) Nebulizer every 6 hours  budesonide  80 MICROgram(s)/formoterol 4.5 MICROgram(s) Inhaler 2 Puff(s) Inhalation two times a day  dextrose 50% Injectable 25 Gram(s) IV Push once  dextrose 50% Injectable 12.5 Gram(s) IV Push once  dextrose 50% Injectable 25 Gram(s) IV Push once  dextrose Oral Gel 15 Gram(s) Oral once  diVALproex  milliGRAM(s) Oral two times a day  folic acid 1 milliGRAM(s) Oral daily  glucagon  Injectable 1 milliGRAM(s) IntraMuscular once  influenza   Vaccine 0.5 milliLiter(s) IntraMuscular once  multivitamin 1 Tablet(s) Oral daily  nicotine -  14 mG/24Hr(s) Patch 1 Patch Transdermal daily  pantoprazole    Tablet 40 milliGRAM(s) Oral before breakfast  piperacillin/tazobactam IVPB.. 3.375 Gram(s) IV Intermittent every 8 hours  QUEtiapine 50 milliGRAM(s) Oral at bedtime  traZODone 100 milliGRAM(s) Oral at bedtime    MEDICATIONS  (PRN):  acetaminophen     Tablet .. 650 milliGRAM(s) Oral every 6 hours PRN Temp greater or equal to 38C (100.4F), Mild Pain (1 - 3)  ALBUTerol    90 MICROgram(s) HFA Inhaler 2 Puff(s) Inhalation every 6 hours PRN Shortness of Breath and/or Wheezing  aluminum hydroxide/magnesium hydroxide/simethicone Suspension 30 milliLiter(s) Oral every 4 hours PRN Dyspepsia  melatonin 3 milliGRAM(s) Oral at bedtime PRN Insomnia  ondansetron Injectable 4 milliGRAM(s) IV Push every 8 hours PRN Nausea and/or Vomiting      ALLERGIES:   Allergies    No Known Allergies    Intolerances        VITAL SIGNS:   Vital Signs Last 24 Hrs  T(C): 36.9 (22 Sep 2022 08:00), Max: 37.3 (21 Sep 2022 21:18)  T(F): 98.5 (22 Sep 2022 08:00), Max: 99.2 (21 Sep 2022 21:18)  HR: 70 (22 Sep 2022 08:40) (70 - 93)  BP: 100/68 (22 Sep 2022 08:00) (94/59 - 115/89)  BP(mean): 80 (22 Sep 2022 06:00) (71 - 99)  RR: 20 (22 Sep 2022 08:00) (17 - 23)  SpO2: 100% (22 Sep 2022 08:40) (92% - 100%)    Parameters below as of 22 Sep 2022 08:40  Patient On (Oxygen Delivery Method): nasal cannula w/ humidification,5L      I&O's Summary      PHYSICAL EXAM:   GENERAL:  No acute distress  HEENT:  NC/AT,  conjunctivae clear, sclera -anicteric  CHEST:  Full & symmetric excursion, no increased effort, breath sounds diminished b/l   HEART:  Regular rhythm, S1, S2, no murmur/rub/S3/S4,  no edema  ABDOMEN:  Soft, non-tender, non-distended, normoactive bowel sounds,  no rebound or guarding  EXTREMITIES: No cyanosis, clubbing or edema  SKIN:  No rash/erythema/ecchymoses/petechiae/wounds/abscess/warm/dry  NEURO:  calm, cooperative      LABS:  CBC Full  -  ( 22 Sep 2022 06:54 )  WBC Count : 13.40 K/uL  RBC Count : 3.71 M/uL  Hemoglobin : 9.2 g/dL  Hematocrit : 29.9 %  Platelet Count - Automated : 343 K/uL  Mean Cell Volume : 80.6 fl  Mean Cell Hemoglobin : 24.8 pg  Mean Cell Hemoglobin Concentration : 30.8 gm/dL  Auto Neutrophil # : x  Auto Lymphocyte # : x  Auto Monocyte # : x  Auto Eosinophil # : x  Auto Basophil # : x  Auto Neutrophil % : x  Auto Lymphocyte % : x  Auto Monocyte % : x  Auto Eosinophil % : x  Auto Basophil % : x    09-22    135  |  101  |  17.5  ----------------------------<  82  4.4   |  22.0  |  0.65    Ca    9.2      22 Sep 2022 06:54  Mg     1.9     09-22              Culture - Blood (collected 20 Sep 2022 07:32)  Source: .Blood Blood-Peripheral  Preliminary Report (21 Sep 2022 13:01):    No growth to date.    Culture - Blood (collected 20 Sep 2022 07:09)  Source: .Blood Blood-Peripheral  Gram Stain (21 Sep 2022 13:04):    Growth in anaerobic bottle: Gram Positive Cocci in Clusters  Preliminary Report (21 Sep 2022 13:05):    Growth in anaerobic bottle: Gram Positive Cocci in Clusters    ***Blood Panel PCR results on this specimen are available    approximately 3 hours after the Gram stain result.***    Gram stain, PCR, and/or culture results may not always    correspond due todifference in methodologies.    ************************************************************    This PCR assay was performed by multiplex PCR. This    Assay tests for 66 bacterial and resistance gene targets.    Please refer to the [facility] testdirectory    at [email or web address] for details.  Organism: Blood Culture PCR (21 Sep 2022 15:27)  Organism: Blood Culture PCR (21 Sep 2022 15:27)        RADIOLOGY & ADDITIONAL STUDIES (The following images were personally reviewed):         Chief Complaint: Patient is a 52y old  Female who presents with a chief complaint of frequent falls. ETOH abuse, anemia. GI following for anemia.       Interval Events / Subjective: Patient seen and evaluated at bedside, reporting no complaints. Now on 6L via NC. No reported BMs. Hemoglobin stable at 9.2gm. Denies nausea, vomiting, abdominal pain, chest pain, shortness of breath, hematemesis, hematochezia, melena. 1:1 at bedside       REVIEW OF SYSTEMS:   General: Negative  HEENT: Negative  CV: Negative  Respiratory: Negative  GI: See HPI  : Negative  MSK: Negative  Hematologic: Negative  Skin: Negative    MEDICATIONS:   MEDICATIONS  (STANDING):  albuterol/ipratropium for Nebulization 3 milliLiter(s) Nebulizer every 6 hours  budesonide  80 MICROgram(s)/formoterol 4.5 MICROgram(s) Inhaler 2 Puff(s) Inhalation two times a day  dextrose 50% Injectable 25 Gram(s) IV Push once  dextrose 50% Injectable 12.5 Gram(s) IV Push once  dextrose 50% Injectable 25 Gram(s) IV Push once  dextrose Oral Gel 15 Gram(s) Oral once  diVALproex  milliGRAM(s) Oral two times a day  folic acid 1 milliGRAM(s) Oral daily  glucagon  Injectable 1 milliGRAM(s) IntraMuscular once  influenza   Vaccine 0.5 milliLiter(s) IntraMuscular once  multivitamin 1 Tablet(s) Oral daily  nicotine -  14 mG/24Hr(s) Patch 1 Patch Transdermal daily  pantoprazole    Tablet 40 milliGRAM(s) Oral before breakfast  piperacillin/tazobactam IVPB.. 3.375 Gram(s) IV Intermittent every 8 hours  QUEtiapine 50 milliGRAM(s) Oral at bedtime  traZODone 100 milliGRAM(s) Oral at bedtime    MEDICATIONS  (PRN):  acetaminophen     Tablet .. 650 milliGRAM(s) Oral every 6 hours PRN Temp greater or equal to 38C (100.4F), Mild Pain (1 - 3)  ALBUTerol    90 MICROgram(s) HFA Inhaler 2 Puff(s) Inhalation every 6 hours PRN Shortness of Breath and/or Wheezing  aluminum hydroxide/magnesium hydroxide/simethicone Suspension 30 milliLiter(s) Oral every 4 hours PRN Dyspepsia  melatonin 3 milliGRAM(s) Oral at bedtime PRN Insomnia  ondansetron Injectable 4 milliGRAM(s) IV Push every 8 hours PRN Nausea and/or Vomiting      ALLERGIES:   Allergies    No Known Allergies    Intolerances        VITAL SIGNS:   Vital Signs Last 24 Hrs  T(C): 36.9 (22 Sep 2022 08:00), Max: 37.3 (21 Sep 2022 21:18)  T(F): 98.5 (22 Sep 2022 08:00), Max: 99.2 (21 Sep 2022 21:18)  HR: 70 (22 Sep 2022 08:40) (70 - 93)  BP: 100/68 (22 Sep 2022 08:00) (94/59 - 115/89)  BP(mean): 80 (22 Sep 2022 06:00) (71 - 99)  RR: 20 (22 Sep 2022 08:00) (17 - 23)  SpO2: 100% (22 Sep 2022 08:40) (92% - 100%)    Parameters below as of 22 Sep 2022 08:40  Patient On (Oxygen Delivery Method): nasal cannula w/ humidification,5L      I&O's Summary      PHYSICAL EXAM:   GENERAL:  No acute distress  HEENT:  NC/AT,  conjunctivae clear, sclera -anicteric  CHEST:  Full & symmetric excursion, no increased effort, breath sounds diminished b/l   HEART:  Regular rhythm, S1, S2, no murmur/rub/S3/S4,  no edema  ABDOMEN:  Soft, non-tender, non-distended, normoactive bowel sounds,  no rebound or guarding  EXTREMITIES: No cyanosis, clubbing or edema  SKIN:  No rash/erythema/ecchymoses/petechiae/wounds/abscess/warm/dry  NEURO:  calm, cooperative- altert and oriented X 2       LABS:  CBC Full  -  ( 22 Sep 2022 06:54 )  WBC Count : 13.40 K/uL  RBC Count : 3.71 M/uL  Hemoglobin : 9.2 g/dL  Hematocrit : 29.9 %  Platelet Count - Automated : 343 K/uL  Mean Cell Volume : 80.6 fl  Mean Cell Hemoglobin : 24.8 pg  Mean Cell Hemoglobin Concentration : 30.8 gm/dL  Auto Neutrophil # : x  Auto Lymphocyte # : x  Auto Monocyte # : x  Auto Eosinophil # : x  Auto Basophil # : x  Auto Neutrophil % : x  Auto Lymphocyte % : x  Auto Monocyte % : x  Auto Eosinophil % : x  Auto Basophil % : x    09-22    135  |  101  |  17.5  ----------------------------<  82  4.4   |  22.0  |  0.65    Ca    9.2      22 Sep 2022 06:54  Mg     1.9     09-22              Culture - Blood (collected 20 Sep 2022 07:32)  Source: .Blood Blood-Peripheral  Preliminary Report (21 Sep 2022 13:01):    No growth to date.    Culture - Blood (collected 20 Sep 2022 07:09)  Source: .Blood Blood-Peripheral  Gram Stain (21 Sep 2022 13:04):    Growth in anaerobic bottle: Gram Positive Cocci in Clusters  Preliminary Report (21 Sep 2022 13:05):    Growth in anaerobic bottle: Gram Positive Cocci in Clusters    ***Blood Panel PCR results on this specimen are available    approximately 3 hours after the Gram stain result.***    Gram stain, PCR, and/or culture results may not always    correspond due todifference in methodologies.    ************************************************************    This PCR assay was performed by multiplex PCR. This    Assay tests for 66 bacterial and resistance gene targets.    Please refer to the [facility] testdirectory    at [email or web address] for details.  Organism: Blood Culture PCR (21 Sep 2022 15:27)  Organism: Blood Culture PCR (21 Sep 2022 15:27)        RADIOLOGY & ADDITIONAL STUDIES (The following images were personally reviewed):

## 2022-09-22 NOTE — PROGRESS NOTE ADULT - TIME BILLING
I reviewed notes, labs
reviewed the labs and and notes.  Plan discussed with GI NP.  I also spoke to the bedside one-to-one aide
I reviewed the labs, notes  plan discussed with  at GI NP bedside
I reviewed notes, labs  plan discussed  with bedside nurse and GI NP

## 2022-09-22 NOTE — PROCEDURE NOTE - ADDITIONAL PROCEDURE DETAILS
US Guided 18g IV placed in right upper arm. +flash, flushes easily. Patient tolerated procedure well with no immediate complications. Tourniquet removed, all sharps disposed of appropriately.
#20 gauge IV sono guided with normal saline flush good heme return to right basilic vein.  Patient tolerated well.
10 scalp staples placed by [facility] ED on 8/14/2022 admission.  10 scalp staples removed without issue.     d/w Dr. Stapleton and LEXI Schaefer.
bed lowered, tourniquet removed. Pt tolerated well.

## 2022-09-22 NOTE — PROCEDURE NOTE - NSPOSTCAREGUIDE_GEN_A_CORE
Verbal/written post procedure instructions were given to patient/caregiver/Instructed patient/caregiver to follow-up with primary care physician/Instructed patient/caregiver regarding signs and symptoms of infection/Keep the cast/splint/dressing clean and dry/Care for catheter as per unit/ICU protocols
Verbal/written post procedure instructions were given to patient/caregiver
Verbal/written post procedure instructions were given to patient/caregiver/Instructed patient/caregiver regarding signs and symptoms of infection
Verbal/written post procedure instructions were given to patient/caregiver/Instructed patient/caregiver to follow-up with primary care physician/Instructed patient/caregiver regarding signs and symptoms of infection/Keep the cast/splint/dressing clean and dry/Care for catheter as per unit/ICU protocols

## 2022-09-22 NOTE — PROGRESS NOTE ADULT - SUBJECTIVE AND OBJECTIVE BOX
[facility] Division of Hospital Medicine    Chief Complaint:  ETOH/Fall/Anemia/Acute Respiratory Failure    SUBJECTIVE / OVERNIGHT EVENTS:  Pt pulled IV line yesterday, nurses unable to obtain line overnight. USG Peripheral placed by PA this morning. Pt seen at bedside, in NAD, titrated down from HFNC to NC @6LPM.  Patient denies chest pain, abd pain, N/V, fever, chills, dysuria or any other complaints. All remainder ROS negative.       MEDICATIONS  (STANDING):  albuterol/ipratropium for Nebulization 3 milliLiter(s) Nebulizer every 6 hours  budesonide  80 MICROgram(s)/formoterol 4.5 MICROgram(s) Inhaler 2 Puff(s) Inhalation two times a day  dextrose 50% Injectable 25 Gram(s) IV Push once  dextrose 50% Injectable 12.5 Gram(s) IV Push once  dextrose 50% Injectable 25 Gram(s) IV Push once  dextrose Oral Gel 15 Gram(s) Oral once  diVALproex  milliGRAM(s) Oral two times a day  folic acid 1 milliGRAM(s) Oral daily  glucagon  Injectable 1 milliGRAM(s) IntraMuscular once  influenza   Vaccine 0.5 milliLiter(s) IntraMuscular once  multivitamin 1 Tablet(s) Oral daily  nicotine -  14 mG/24Hr(s) Patch 1 Patch Transdermal daily  pantoprazole    Tablet 40 milliGRAM(s) Oral before breakfast  piperacillin/tazobactam IVPB.. 3.375 Gram(s) IV Intermittent every 8 hours  QUEtiapine 50 milliGRAM(s) Oral at bedtime  traZODone 100 milliGRAM(s) Oral at bedtime    MEDICATIONS  (PRN):  acetaminophen     Tablet .. 650 milliGRAM(s) Oral every 6 hours PRN Temp greater or equal to 38C (100.4F), Mild Pain (1 - 3)  ALBUTerol    90 MICROgram(s) HFA Inhaler 2 Puff(s) Inhalation every 6 hours PRN Shortness of Breath and/or Wheezing  aluminum hydroxide/magnesium hydroxide/simethicone Suspension 30 milliLiter(s) Oral every 4 hours PRN Dyspepsia  melatonin 3 milliGRAM(s) Oral at bedtime PRN Insomnia  ondansetron Injectable 4 milliGRAM(s) IV Push every 8 hours PRN Nausea and/or Vomiting        I&O's Summary      PHYSICAL EXAM:  Vital Signs Last 24 Hrs  T(C): 36.9 (22 Sep 2022 08:00), Max: 37.3 (21 Sep 2022 21:18)  T(F): 98.5 (22 Sep 2022 08:00), Max: 99.2 (21 Sep 2022 21:18)  HR: 80 (22 Sep 2022 10:00) (70 - 93)  BP: 101/65 (22 Sep 2022 10:00) (94/59 - 115/89)  BP(mean): 80 (22 Sep 2022 06:00) (71 - 99)  RR: 18 (22 Sep 2022 10:00) (17 - 23)  SpO2: 95% (22 Sep 2022 10:00) (92% - 100%)    Parameters below as of 22 Sep 2022 10:00  Patient On (Oxygen Delivery Method): nasal cannula  O2 Flow (L/min): 6    PE:  Constitutional: NAD, Chronically ill appearing female  ENT: Supple, No JVD  Lungs: B/L rhonchi, no wheeze  Cardio: Tachycardic,  S1/S2, No murmur  Abdomen: Soft, Nontender, Nondistended; Bowel sounds present  Extremities: No calf tenderness, No pitting edema  Psych: Calm at present  Neuro: Non focal, AAOx3    LABS:                        9.2    13.40 )-----------( 343      ( 22 Sep 2022 06:54 )             29.9     09-22    135  |  101  |  17.5  ----------------------------<  82  4.4   |  22.0  |  0.65    Ca    9.2      22 Sep 2022 06:54  Mg     1.9     09-22                Culture - Blood (collected 20 Sep 2022 07:32)  Source: .Blood Blood-Peripheral  Preliminary Report (21 Sep 2022 13:01):    No growth to date.    Culture - Blood (collected 20 Sep 2022 07:09)  Source: .Blood Blood-Peripheral  Gram Stain (21 Sep 2022 13:04):    Growth in anaerobic bottle: Gram Positive Cocci in Clusters  Final Report (22 Sep 2022 11:41):    Growth in anaerobic bottle: Staphylococcus caprae    ***Blood Panel PCR results on this specimen are available    approximately 3 hours after the Gram stain result.***    Gram stain, PCR, and/or culture results may not always    correspond due to difference in methodologies.    ************************************************************    This PCR assay was performed by multiplex PCR. This    Assay tests for 66 bacterial and resistance gene targets.    Please refer to the [facility] test directory    at [email or web address] for details.  Organism: Blood Culture PCR (22 Sep 2022 11:41)  Organism: Blood Culture PCR (22 Sep 2022 11:41)      CAPILLARY BLOOD GLUCOSE            RADIOLOGY & ADDITIONAL TESTS:  Results Reviewed: y  Imaging Personally Reviewed: y  Electrocardiogram Personally Reviewed: y        CTPE Study -   Limited for evaluation of some of the subsegmental pulmonary   arterial branches due to motion artifact. No pulmonary arterial emboli   within the well visualized pulmonary arteries.    Bilateral mid to lower lung predominant opacities as described above.   Differential diagnosis include pneumonia with superimposed areas of   atelectasis and/or pulmonary edema. 3 month follow-up noncontrast chest   CT is recommended to ensure resolution.    Large hiatal hernia.

## 2022-09-22 NOTE — PROGRESS NOTE ADULT - ASSESSMENT
Patient is a 52 year old female here for mgmt of Acute hypoxic respratory failure 2/2 aspiration w/ Gentry Neg PNA    # Acute hypoxic respiratory failure suspect 2/2 aspiration with gram negative pneumonia  RRT 9/20  Titrated off HFNC to NC @6LPM  As per SLP - Small bite sized solids w/ thin fluids  Lactate downtrending, repeat at noon  c/w Zosyn Q8  EGD/Colonoscopy cancelled  Blood cultures 9/20 - Glenda Dickerson  ID Consulted - Awaiting recs  Duo neb    #Acute on chronic microcytic anemia superimposed on iron deficiency with inappropriate retic response  -multifactorial- iron deficiency, bone marrow suppression from alcohol abuse   -Hb 10.2, s/p 1u prbc this admission  -FOBT neg  -Monitor CBC  -Continue PPI  -CTPE study negative for PE  -GI following, [place] likely on monday as per GI     #Encephalopathy - ? delirium  likely 2/2 alcohol withdrawal, no longer in withdrawal  Mild improvement in Mental Status  -CIWA, s/p valium taper  -continue thiamine/folic acid/MVI    #Frequent Falls due to Unsteady Gait Likely 2/2 ETOH  Hyponatremia resolved  -CTH on admission negative for acute pathology but right parietal scalp hematoma noted  -CT neck negative for acute fracture  -sodium improved  -fall precautions   -plan for eventual ALEJANDRA     #COPD   -c/w  Symbicort q12    -c/w Duoneb q6    #hx of Seizure ( EEG in 2021- Interictal findings suggest potential epileptogenic focus and structural abnormality in the right temporal region)  -c/w Divalproex 500mg q12  -Seizure precautions    #Anxiety/depression/PTSD   -c/w Seroquel 50mg QHS   -c/w Trazodone 50mg QHS   -psych consulted for mood d/o     #Tobacco dependency  -nicotine patch QD  -Cessation advised     #HCM  DVT PPx - SCDs  Diet - Soft/Bite sized w/ thin liquids  Dispo - Medically acute

## 2022-09-22 NOTE — PROGRESS NOTE ADULT - NS ATTEND AMEND GEN_ALL_CORE FT
Patient seen and examined at bedside with GI NP  No overt bleeding.  No abdominal pain nausea vomiting.  One-to-one at bedside.  No melena noted.    Patient with iron deficiency anemia.  Cannot do EGD colonoscopy when patient respiratory status improves or even can be done as an outpatient

## 2022-09-22 NOTE — CHART NOTE - NSCHARTNOTEFT_GEN_A_CORE
PA NOTE-MEDICINE    Called for IV Access for Zosyn Abx   Attempted IV with Sono x 3-Not successful  Pt not fully cooperative  Midline ordered for AM

## 2022-09-23 LAB
ANION GAP SERPL CALC-SCNC: 11 MMOL/L — SIGNIFICANT CHANGE UP (ref 5–17)
BUN SERPL-MCNC: 16.9 MG/DL — SIGNIFICANT CHANGE UP (ref 8–20)
CALCIUM SERPL-MCNC: 9.3 MG/DL — SIGNIFICANT CHANGE UP (ref 8.4–10.5)
CHLORIDE SERPL-SCNC: 100 MMOL/L — SIGNIFICANT CHANGE UP (ref 98–107)
CO2 SERPL-SCNC: 25 MMOL/L — SIGNIFICANT CHANGE UP (ref 22–29)
CREAT SERPL-MCNC: 0.68 MG/DL — SIGNIFICANT CHANGE UP (ref 0.5–1.3)
EGFR: 104 ML/MIN/1.73M2 — SIGNIFICANT CHANGE UP
GLUCOSE SERPL-MCNC: 76 MG/DL — SIGNIFICANT CHANGE UP (ref 70–99)
HCT VFR BLD CALC: 30.1 % — LOW (ref 34.5–45)
HGB BLD-MCNC: 9 G/DL — LOW (ref 11.5–15.5)
MCHC RBC-ENTMCNC: 24.9 PG — LOW (ref 27–34)
MCHC RBC-ENTMCNC: 29.9 GM/DL — LOW (ref 32–36)
MCV RBC AUTO: 83.1 FL — SIGNIFICANT CHANGE UP (ref 80–100)
PLATELET # BLD AUTO: 281 K/UL — SIGNIFICANT CHANGE UP (ref 150–400)
POTASSIUM SERPL-MCNC: 4.8 MMOL/L — SIGNIFICANT CHANGE UP (ref 3.5–5.3)
POTASSIUM SERPL-SCNC: 4.8 MMOL/L — SIGNIFICANT CHANGE UP (ref 3.5–5.3)
RBC # BLD: 3.62 M/UL — LOW (ref 3.8–5.2)
RBC # FLD: 24.9 % — HIGH (ref 10.3–14.5)
SODIUM SERPL-SCNC: 136 MMOL/L — SIGNIFICANT CHANGE UP (ref 135–145)
WBC # BLD: 9.32 K/UL — SIGNIFICANT CHANGE UP (ref 3.8–10.5)
WBC # FLD AUTO: 9.32 K/UL — SIGNIFICANT CHANGE UP (ref 3.8–10.5)

## 2022-09-23 PROCEDURE — 99233 SBSQ HOSP IP/OBS HIGH 50: CPT

## 2022-09-23 PROCEDURE — 99232 SBSQ HOSP IP/OBS MODERATE 35: CPT

## 2022-09-23 RX ORDER — OLANZAPINE 15 MG/1
5 TABLET, FILM COATED ORAL DAILY
Refills: 0 | Status: DISCONTINUED | OUTPATIENT
Start: 2022-09-23 | End: 2022-09-24

## 2022-09-23 RX ADMIN — Medication 1 TABLET(S): at 11:11

## 2022-09-23 RX ADMIN — Medication 1 PATCH: at 11:11

## 2022-09-23 RX ADMIN — Medication 3 MILLILITER(S): at 20:47

## 2022-09-23 RX ADMIN — Medication 3 MILLILITER(S): at 17:03

## 2022-09-23 RX ADMIN — BUDESONIDE AND FORMOTEROL FUMARATE DIHYDRATE 2 PUFF(S): 160; 4.5 AEROSOL RESPIRATORY (INHALATION) at 21:01

## 2022-09-23 RX ADMIN — Medication 1 MILLIGRAM(S): at 11:12

## 2022-09-23 RX ADMIN — Medication 1 PATCH: at 20:14

## 2022-09-23 RX ADMIN — PANTOPRAZOLE SODIUM 40 MILLIGRAM(S): 20 TABLET, DELAYED RELEASE ORAL at 04:41

## 2022-09-23 RX ADMIN — Medication 3 MILLILITER(S): at 08:52

## 2022-09-23 RX ADMIN — Medication 3 MILLILITER(S): at 02:54

## 2022-09-23 RX ADMIN — PIPERACILLIN AND TAZOBACTAM 25 GRAM(S): 4; .5 INJECTION, POWDER, LYOPHILIZED, FOR SOLUTION INTRAVENOUS at 04:40

## 2022-09-23 RX ADMIN — DIVALPROEX SODIUM 500 MILLIGRAM(S): 500 TABLET, DELAYED RELEASE ORAL at 18:52

## 2022-09-23 RX ADMIN — DIVALPROEX SODIUM 500 MILLIGRAM(S): 500 TABLET, DELAYED RELEASE ORAL at 04:41

## 2022-09-23 RX ADMIN — Medication 100 MILLIGRAM(S): at 21:48

## 2022-09-23 RX ADMIN — QUETIAPINE FUMARATE 50 MILLIGRAM(S): 200 TABLET, FILM COATED ORAL at 21:48

## 2022-09-23 RX ADMIN — BUDESONIDE AND FORMOTEROL FUMARATE DIHYDRATE 2 PUFF(S): 160; 4.5 AEROSOL RESPIRATORY (INHALATION) at 08:52

## 2022-09-23 RX ADMIN — OLANZAPINE 5 MILLIGRAM(S): 15 TABLET, FILM COATED ORAL at 18:53

## 2022-09-23 NOTE — PROGRESS NOTE ADULT - ASSESSMENT
Patient is a 52 year old female here for mgmt of Acute hypoxic respratory failure 2/2 aspiration w/ Gentry Neg PNA    # Acute hypoxic respiratory failure suspect 2/2 aspiration with gram negative pneumonia  Aspiration PNA resolved  NC @6LPM, titrate down as tolerated  As per SLP - Small bite sized solids w/ thin fluids  EGD/Colonoscopy cancelled  ID recs followed, Zosyn completed and DC'd  Duo neb    #Acute on chronic microcytic anemia superimposed on iron deficiency with inappropriate retic response  -multifactorial- iron deficiency, bone marrow suppression from alcohol abuse   -Hb 9.0  -Monitor CBC  -Continue PPI  -GI following, [place] likely on monday as per GI     #Encephalopathy - ? delirium  Less likely alcohol withdrawal  Somnolent at bedside however easily arousable, mental status unchanged  Mild improvement in Mental Status  -continue thiamine/folic acid/MVI    #Frequent Falls due to Unsteady Gait Likely 2/2 ETOH  CIWA completed  -CTH on admission negative for acute pathology but right parietal scalp hematoma noted  -CT neck negative for acute fracture  -sodium normalized  -fall precautions   -plan for eventual ALEJANDRA     #COPD   -c/w Symbicort q12    -c/w Duoneb q6  -c/w supplementary O2 as tolerated    #hx of Seizure ( EEG in 2021- Interictal findings suggest potential epileptogenic focus and structural abnormality in the right temporal region)  -c/w Divalproex 500mg q12  -Seizure precautions    #Anxiety/depression/PTSD   -c/w Seroquel 50mg QHS   -c/w Trazodone 100mg QHS   -psych consulted for mood d/o     #Tobacco dependency  -nicotine patch QD  -Cessation advised     #HCM  DVT PPx - SCDs  Diet - Regular  Dispo - Likely ALEJANDRA pending [place]

## 2022-09-23 NOTE — PROGRESS NOTE ADULT - SUBJECTIVE AND OBJECTIVE BOX
[facility] Division of Hospital Medicine    Chief Complaint:  ETOH/Fall/Anemia/Acute Respiratory Failure    SUBJECTIVE / OVERNIGHT EVENTS:  No events overnight. Abx completed. Pt seen at bedside, in NAD, satting well on NC.  Patient denies chest pain, abd pain, N/V, fever, chills, dysuria or any other complaints. All remainder ROS negative.     MEDICATIONS  (STANDING):  albuterol/ipratropium for Nebulization 3 milliLiter(s) Nebulizer every 6 hours  budesonide  80 MICROgram(s)/formoterol 4.5 MICROgram(s) Inhaler 2 Puff(s) Inhalation two times a day  dextrose 50% Injectable 25 Gram(s) IV Push once  dextrose 50% Injectable 12.5 Gram(s) IV Push once  dextrose 50% Injectable 25 Gram(s) IV Push once  dextrose Oral Gel 15 Gram(s) Oral once  diVALproex  milliGRAM(s) Oral two times a day  folic acid 1 milliGRAM(s) Oral daily  glucagon  Injectable 1 milliGRAM(s) IntraMuscular once  influenza   Vaccine 0.5 milliLiter(s) IntraMuscular once  multivitamin 1 Tablet(s) Oral daily  nicotine -  14 mG/24Hr(s) Patch 1 Patch Transdermal daily  pantoprazole    Tablet 40 milliGRAM(s) Oral before breakfast  QUEtiapine 50 milliGRAM(s) Oral at bedtime  traZODone 100 milliGRAM(s) Oral at bedtime    MEDICATIONS  (PRN):  acetaminophen     Tablet .. 650 milliGRAM(s) Oral every 6 hours PRN Temp greater or equal to 38C (100.4F), Mild Pain (1 - 3)  ALBUTerol    90 MICROgram(s) HFA Inhaler 2 Puff(s) Inhalation every 6 hours PRN Shortness of Breath and/or Wheezing  aluminum hydroxide/magnesium hydroxide/simethicone Suspension 30 milliLiter(s) Oral every 4 hours PRN Dyspepsia  melatonin 3 milliGRAM(s) Oral at bedtime PRN Insomnia  ondansetron Injectable 4 milliGRAM(s) IV Push every 8 hours PRN Nausea and/or Vomiting        I&O's Summary    22 Sep 2022 07:01  -  23 Sep 2022 07:00  --------------------------------------------------------  IN: 920 mL / OUT: 0 mL / NET: 920 mL    23 Sep 2022 07:01  -  23 Sep 2022 11:47  --------------------------------------------------------  IN: 0 mL / OUT: 200 mL / NET: -200 mL        PHYSICAL EXAM:  Vital Signs Last 24 Hrs  T(C): 37.5 (23 Sep 2022 04:32), Max: 37.5 (23 Sep 2022 04:32)  T(F): 99.5 (23 Sep 2022 04:32), Max: 99.5 (23 Sep 2022 04:32)  HR: 73 (23 Sep 2022 10:00) (69 - 93)  BP: 131/85 (23 Sep 2022 10:00) (108/90 - 140/100)  BP(mean): 99 (23 Sep 2022 10:00) (97 - 101)  RR: 16 (23 Sep 2022 10:00) (16 - 20)  SpO2: 98% (23 Sep 2022 10:00) (96% - 100%)    Parameters below as of 23 Sep 2022 10:00  Patient On (Oxygen Delivery Method): nasal cannula w/ humidification  O2 Flow (L/min): 4          PE:  Constitutional: NAD, Chronically ill appearing female  ENT: Supple, No JVD  Lungs: B/L rhonchi, no wheeze  Cardio: Tachycardic,  S1/S2, No murmur  Abdomen: Soft, Nontender, Nondistended; Bowel sounds present  Extremities: No calf tenderness, No pitting edema  Psych: Calm at present  Neuro: Non focal, AAOx3    LABS:                        9.0    9.32  )-----------( 281      ( 23 Sep 2022 06:54 )             30.1     09-23    136  |  100  |  16.9  ----------------------------<  76  4.8   |  25.0  |  0.68    Ca    9.3      23 Sep 2022 06:54  Mg     1.9     09-22                CAPILLARY BLOOD GLUCOSE            RADIOLOGY & ADDITIONAL TESTS:  Results Reviewed: y  Imaging Personally Reviewed: n  Electrocardiogram Personally Reviewed: [email or web address]

## 2022-09-23 NOTE — PROGRESS NOTE ADULT - SUBJECTIVE AND OBJECTIVE BOX
[facility]  INFECTIOUS DISEASES at [place] and [place]  =======================================================                               Dandre Tenorio MD#   Carito Oliver MD*                             Dayna Iraheta MD*   Daniela Morales MD*            Diplomates American Board of Internal Medicine & Infectious Diseases                # [place] Office - Appt - Tel  [phone] Fax [phone]                * [place] Office - Appt - Tel [phone] Fax [phone]                                  Hospital Consult line:  [phone]  =======================================================    [facility] [id number]    Follow up: Aspiration PNA    No complaints       Allergies:  No Known Allergies       REVIEW OF SYSTEMS:  CONSTITUTIONAL:  No Fever or chills  HEENT:  No diplopia or blurred vision.  No earache, sore throat or runny nose.  CARDIOVASCULAR:  No chest pain  RESPIRATORY:  No cough, shortness of breath  GASTROINTESTINAL:  No nausea, vomiting or diarrhea.  GENITOURINARY:  No dysuria, frequency or urgency.   MUSCULOSKELETAL:  no joint aches, no muscle pain  SKIN:  No change in skin, hair or nails.  NEUROLOGIC:  No Headaches, seizures  PSYCHIATRIC:  depressed and anxious   ENDOCRINE:  No heat or cold intolerance  HEMATOLOGICAL:  No easy bruising or bleeding.       Physical Exam:  GEN: NAD  HEENT: normocephalic and atraumatic. EOMI. PERRL.    NECK: Supple.   LUNGS: CTA B/L.  HEART: RRR  ABDOMEN: Soft, NT, ND.  +BS.    : No CVA tenderness  EXTREMITIES: Without  edema.  MSK: No joint swelling  NEUROLOGIC: No Focal Deficits   PSYCHIATRIC: depressed and anxious  SKIN: No rash      Vitals:  T(F): 99.5 (23 Sep 2022 04:32), Max: 99.5 (23 Sep 2022 04:32)  HR: 73 (23 Sep 2022 10:00)  BP: 131/85 (23 Sep 2022 10:00)  RR: 16 (23 Sep 2022 10:00)  SpO2: 98% (23 Sep 2022 10:00) (96% - 100%)  temp max in last 48H T(F): , Max: 99.5 (09-23-22 @ 04:32)      Current Antibiotics:    Other medications:  albuterol/ipratropium for Nebulization 3 milliLiter(s) Nebulizer every 6 hours  budesonide  80 MICROgram(s)/formoterol 4.5 MICROgram(s) Inhaler 2 Puff(s) Inhalation two times a day  dextrose 50% Injectable 25 Gram(s) IV Push once  dextrose 50% Injectable 12.5 Gram(s) IV Push once  dextrose 50% Injectable 25 Gram(s) IV Push once  dextrose Oral Gel 15 Gram(s) Oral once  diVALproex  milliGRAM(s) Oral two times a day  folic acid 1 milliGRAM(s) Oral daily  glucagon  Injectable 1 milliGRAM(s) IntraMuscular once  influenza   Vaccine 0.5 milliLiter(s) IntraMuscular once  multivitamin 1 Tablet(s) Oral daily  nicotine -  14 mG/24Hr(s) Patch 1 Patch Transdermal daily  pantoprazole    Tablet 40 milliGRAM(s) Oral before breakfast  QUEtiapine 50 milliGRAM(s) Oral at bedtime  traZODone 100 milliGRAM(s) Oral at bedtime                            9.0    9.32  )-----------( 281      ( 23 Sep 2022 06:54 )             30.1     09-23    136  |  100  |  16.9  ----------------------------<  76  4.8   |  25.0  |  0.68    Ca    9.3      23 Sep 2022 06:54  Mg     1.9     09-22      RECENT CULTURES:  09-22 @ 08:05 .Blood Blood     No growth to date.    09-22 @ 07:55 .Blood Blood     No growth to date.    09-20 @ 07:32 .Blood Blood-Peripheral     No growth to date.    09-20 @ 07:09 .Blood Blood-Peripheral Blood Culture PCR    Growth in anaerobic bottle: Staphylococcus caprae  ***Blood Panel PCR results on this specimen are available  approximately 3 hours after the Gram stain result.***  Gram stain, PCR, and/or culture results may not always  correspond due to difference in methodologies.  ************************************************************  This PCR assay was performed by multiplex PCR. This  Assay tests for 66 bacterial and resistance gene targets.  Please refer to the [facility] test directory  at [email or web address] for details.  Growth in anaerobic bottle: Gram Positive Cocci in Clusters      WBC Count: 9.32 K/uL (09-23-22 @ 06:54)  WBC Count: 13.40 K/uL (09-22-22 @ 06:54)  WBC Count: 16.83 K/uL (09-21-22 @ 06:58)  WBC Count: 6.14 K/uL (09-20-22 @ 06:44)  WBC Count: 6.36 K/uL (09-19-22 @ 06:25)    Creatinine, Serum: 0.68 mg/dL (09-23-22 @ 06:54)  Creatinine, Serum: 0.65 mg/dL (09-22-22 @ 06:54)  Creatinine, Serum: 0.63 mg/dL (09-21-22 @ 06:58)  Creatinine, Serum: 0.57 mg/dL (09-20-22 @ 06:44)  Creatinine, Serum: 0.84 mg/dL (09-19-22 @ 06:25)    Ferritin, Serum: 34 ng/mL (09-07-22 @ 06:05)    Procalcitonin, Serum: 0.08 ng/mL (09-20-22 @ 06:44)     COVID-19 PCR: NotDetec (09-20-22 @ 08:50)  COVID-19 PCR: NotDetec (09-19-22 @ 05:00)  COVID-19 PCR: NotDetec (09-13-22 @ 04:00)  SARS-CoV-2: NotDetec (09-06-22 @ 20:08)      < from: CT Angio Chest PE Protocol w/ IV Cont (09.22.22 @ 11:38) >  ACC: [id number] EXAM:  CT ANGIO CHEST PULCHRISTEN SCOTT                          PROCEDURE DATE:  09/22/2022      INTERPRETATION:  Clinical indications: Evaluate for pulmonary embolism.    CT angiogram of the chest was performed following intravenous   administration of 59 cc of Omnipaque-350. 0 cc of contrast was discarded.   MIP images are submitted.    Comparison is made with the prior chest CT of [place] 10, 2020.    The study is limited for evaluation of some of the subsegmental or   smaller pulmonary arterial branches due to superimposed respiratory   motion artifact. No pulmonary arterial emboli are identified within the   well visualized pulmonary arteries.    No enlarged axillary, mediastinal or hilar lymph nodes.    Heart size is normal. No pericardial effusion. Trace pleural effusions,   left greater than right.    Evaluation of the upper abdomen demonstrate a large hiatal hernia   containing majority of the stomach.    Evaluation of the lungs demonstrate bilateral mid to lower lung patchy   and linear dense and groundglass opacities most notable within the lower   lobes, lingula and the posterior segment of the right upper lobes. Right   upper lobe posterior segmental opacities have a nodular appearance. Small   left apical patchy opacity new since the cervical spine CT of September 6, 2022.    No central endobronchial lesions. No bronchiectasis or honeycombing.    Degenerative changes of the spine. Old healed bilateral rib fractures.    IMPRESSION: Limited for evaluation of some of the subsegmental pulmonary   arterial branches due to motion artifact. No pulmonary arterial emboli   within the well visualized pulmonary arteries.    Bilateral mid to lower lung predominant opacities as described above.   Differentialdiagnosis include pneumonia with superimposed areas of   atelectasis and/or pulmonary edema. 3 month follow-up noncontrast chest   CT is recommended to ensure resolution.    Large hiatal hernia.    --- End of Report ---  < end of copied text >

## 2022-09-23 NOTE — PROGRESS NOTE ADULT - ASSESSMENT
51 y/o female with PMH of seizure, PTSD, anxiety, depression, alcohol use disorder who presented with falls. GI following for anemia.    Anemia without evidence of acute blood loss  Given her recent pneumonia and ongoing altered mental status in the absence of acute bleeding will defer endoscopic evaluation at this time  She has completed her antibiotic course  If she remains in the hospital early next week, will plan for EGD/Colon with a slow monitored prep to prevent recurrent aspiration  Otherwise, she may follow up as an outpatient with GI for elective endoscopy  Monitor HGB and maintain >7.0  Optimize nutrition  Avoid nsaids

## 2022-09-23 NOTE — PROGRESS NOTE ADULT - SUBJECTIVE AND OBJECTIVE BOX
Chief Complaint:  Patient is a 52y old  Female who presents with a chief complaint of fall (20 Sep 2022 09:46)      Interval Events / Subjective: Patient seen and examined at bedside. No acute events overnight. She is on a 1:1. She is still ordered for 6L. At time of exam, O2 was not on and her sats were %. She states that she is at home and needs to prepare for a visit from her friend. ROS is limited due to mental status. She denies acute complaints.       MEDICATIONS:   MEDICATIONS  (STANDING):  albuterol/ipratropium for Nebulization 3 milliLiter(s) Nebulizer every 6 hours  budesonide  80 MICROgram(s)/formoterol 4.5 MICROgram(s) Inhaler 2 Puff(s) Inhalation two times a day  dextrose 50% Injectable 25 Gram(s) IV Push once  dextrose 50% Injectable 12.5 Gram(s) IV Push once  dextrose 50% Injectable 25 Gram(s) IV Push once  dextrose Oral Gel 15 Gram(s) Oral once  diVALproex  milliGRAM(s) Oral two times a day  folic acid 1 milliGRAM(s) Oral daily  glucagon  Injectable 1 milliGRAM(s) IntraMuscular once  influenza   Vaccine 0.5 milliLiter(s) IntraMuscular once  multivitamin 1 Tablet(s) Oral daily  nicotine -  14 mG/24Hr(s) Patch 1 Patch Transdermal daily  pantoprazole    Tablet 40 milliGRAM(s) Oral before breakfast  QUEtiapine 50 milliGRAM(s) Oral at bedtime  traZODone 100 milliGRAM(s) Oral at bedtime    MEDICATIONS  (PRN):  acetaminophen     Tablet .. 650 milliGRAM(s) Oral every 6 hours PRN Temp greater or equal to 38C (100.4F), Mild Pain (1 - 3)  ALBUTerol    90 MICROgram(s) HFA Inhaler 2 Puff(s) Inhalation every 6 hours PRN Shortness of Breath and/or Wheezing  aluminum hydroxide/magnesium hydroxide/simethicone Suspension 30 milliLiter(s) Oral every 4 hours PRN Dyspepsia  melatonin 3 milliGRAM(s) Oral at bedtime PRN Insomnia  ondansetron Injectable 4 milliGRAM(s) IV Push every 8 hours PRN Nausea and/or Vomiting      ALLERGIES:   Allergies    No Known Allergies    Intolerances        VITAL SIGNS:   Vital Signs Last 24 Hrs  T(C): 37.5 (23 Sep 2022 04:32), Max: 37.5 (23 Sep 2022 04:32)  T(F): 99.5 (23 Sep 2022 04:32), Max: 99.5 (23 Sep 2022 04:32)  HR: 76 (23 Sep 2022 04:32) (69 - 93)  BP: 128/88 (23 Sep 2022 04:32) (101/65 - 140/100)  BP(mean): 101 (23 Sep 2022 04:32) (97 - 101)  RR: 17 (23 Sep 2022 04:32) (16 - 20)  SpO2: 99% (23 Sep 2022 04:32) (95% - 100%)    Parameters below as of 23 Sep 2022 04:32  Patient On (Oxygen Delivery Method): nasal cannula  O2 Flow (L/min): 6    I&O's Summary    22 Sep 2022 07:01  -  23 Sep 2022 07:00  --------------------------------------------------------  IN: 920 mL / OUT: 0 mL / NET: 920 mL        PHYSICAL EXAM:   GENERAL:  No acute distress  HEENT:  NC/AT,  conjunctivae clear, sclera -anicteric  CHEST:  Full & symmetric excursion, no increased effort, decreased right sided breath sounds   HEART:  Regular rhythm rate regular no edema  ABDOMEN:  Soft, non-tender, non-distended, normoactive bowel sounds,  no rebound or guarding  EXTREMITIES: No cyanosis, clubbing or edema  SKIN:  warm/dry  NEURO:  calm, cooperative oriented to self only    LABS:  CBC Full  -  ( 23 Sep 2022 06:54 )  WBC Count : 9.32 K/uL  RBC Count : 3.62 M/uL  Hemoglobin : 9.0 g/dL  Hematocrit : 30.1 %  Platelet Count - Automated : 281 K/uL  Mean Cell Volume : 83.1 fl  Mean Cell Hemoglobin : 24.9 pg  Mean Cell Hemoglobin Concentration : 29.9 gm/dL  Auto Neutrophil # : x  Auto Lymphocyte # : x  Auto Monocyte # : x  Auto Eosinophil # : x  Auto Basophil # : x  Auto Neutrophil % : x  Auto Lymphocyte % : x  Auto Monocyte % : x  Auto Eosinophil % : x  Auto Basophil % : x    09-23    136  |  100  |  16.9  ----------------------------<  76  4.8   |  25.0  |  0.68    Ca    9.3      23 Sep 2022 06:54  Mg     1.9     09-22                RADIOLOGY & ADDITIONAL STUDIES (The following images were personally reviewed):

## 2022-09-23 NOTE — PROGRESS NOTE ADULT - ASSESSMENT
52y  Female with a h/o TBI, remote trach/PEG, seizures, prior PE (not on a/c), PTSD, anxiety, depression, polysubstance abuse, admitted on 9/6 complaining of frequent falls with head strikes. In the ED, found to have Hgb of 6.3; FOB negative; alcohol level: 226. CT brain/c-spine unremarkable for significant acute pathology. Hospital course complicated by ETOH withdrawal. EGD and colonoscopy was scheduled for 9/20 however patient had a RRT for hypoxemia. Patient had drank Go[place] bowel prep overnight and became severely hypoxemic. CXR reveals large RML/RLL lung opacity. Started on HFNC with 100% FiO2 and 40 LPM. EGD and colonoscopy was cancelled. Patient was seen by MICU and FIO2 was weaned. Patient was started on Zosyn for possible PNA. Patient also was noted to have a fever to 100.5F on 9/20 and blood cultures obtained and 1 of 4 bottles with CoNS.      Aspiration PNA  Positive blood culture 1 of 4 bottles with CoNS  Leukocytosis         - Blood cultures 9/20 reporting  Staphylococcus caprae, likely contamination   - Repeat blood 9/22 no growth   - RVP/COVID 19 PCR 9/20 negative   - CTA Chest  reporting ? PNA   - Procalcitonin level 0.08  - Completed  Zosyn   - monitor off antibiotics   - Trend Fever  - Trend WBC      Will sign off. Please call PRN     d/w Sharon RICHARD

## 2022-09-24 LAB
ANION GAP SERPL CALC-SCNC: 9 MMOL/L — SIGNIFICANT CHANGE UP (ref 5–17)
BUN SERPL-MCNC: 14.2 MG/DL — SIGNIFICANT CHANGE UP (ref 8–20)
CALCIUM SERPL-MCNC: 10.2 MG/DL — SIGNIFICANT CHANGE UP (ref 8.4–10.5)
CHLORIDE SERPL-SCNC: 96 MMOL/L — LOW (ref 98–107)
CO2 SERPL-SCNC: 28 MMOL/L — SIGNIFICANT CHANGE UP (ref 22–29)
CREAT SERPL-MCNC: 0.76 MG/DL — SIGNIFICANT CHANGE UP (ref 0.5–1.3)
EGFR: 94 ML/MIN/1.73M2 — SIGNIFICANT CHANGE UP
GLUCOSE SERPL-MCNC: 69 MG/DL — LOW (ref 70–99)
HCT VFR BLD CALC: 29.9 % — LOW (ref 34.5–45)
HGB BLD-MCNC: 9 G/DL — LOW (ref 11.5–15.5)
MAGNESIUM SERPL-MCNC: 1.6 MG/DL — SIGNIFICANT CHANGE UP (ref 1.6–2.6)
MCHC RBC-ENTMCNC: 24.5 PG — LOW (ref 27–34)
MCHC RBC-ENTMCNC: 30.1 GM/DL — LOW (ref 32–36)
MCV RBC AUTO: 81.5 FL — SIGNIFICANT CHANGE UP (ref 80–100)
PHOSPHATE SERPL-MCNC: 3.6 MG/DL — SIGNIFICANT CHANGE UP (ref 2.4–4.7)
PLATELET # BLD AUTO: 403 K/UL — HIGH (ref 150–400)
POTASSIUM SERPL-MCNC: 4.8 MMOL/L — SIGNIFICANT CHANGE UP (ref 3.5–5.3)
POTASSIUM SERPL-SCNC: 4.8 MMOL/L — SIGNIFICANT CHANGE UP (ref 3.5–5.3)
RBC # BLD: 3.67 M/UL — LOW (ref 3.8–5.2)
RBC # FLD: 24.2 % — HIGH (ref 10.3–14.5)
SODIUM SERPL-SCNC: 133 MMOL/L — LOW (ref 135–145)
WBC # BLD: 7.92 K/UL — SIGNIFICANT CHANGE UP (ref 3.8–10.5)
WBC # FLD AUTO: 7.92 K/UL — SIGNIFICANT CHANGE UP (ref 3.8–10.5)

## 2022-09-24 PROCEDURE — 99233 SBSQ HOSP IP/OBS HIGH 50: CPT

## 2022-09-24 RX ORDER — OLANZAPINE 15 MG/1
5 TABLET, FILM COATED ORAL EVERY 6 HOURS
Refills: 0 | Status: DISCONTINUED | OUTPATIENT
Start: 2022-09-24 | End: 2022-09-27

## 2022-09-24 RX ORDER — MAGNESIUM SULFATE 500 MG/ML
2 VIAL (ML) INJECTION EVERY 4 HOURS
Refills: 0 | Status: COMPLETED | OUTPATIENT
Start: 2022-09-24 | End: 2022-09-24

## 2022-09-24 RX ADMIN — Medication 1 PATCH: at 19:15

## 2022-09-24 RX ADMIN — Medication 25 GRAM(S): at 11:08

## 2022-09-24 RX ADMIN — Medication 100 MILLIGRAM(S): at 22:00

## 2022-09-24 RX ADMIN — Medication 1 MILLIGRAM(S): at 11:09

## 2022-09-24 RX ADMIN — Medication 1 PATCH: at 11:00

## 2022-09-24 RX ADMIN — PANTOPRAZOLE SODIUM 40 MILLIGRAM(S): 20 TABLET, DELAYED RELEASE ORAL at 05:12

## 2022-09-24 RX ADMIN — DIVALPROEX SODIUM 500 MILLIGRAM(S): 500 TABLET, DELAYED RELEASE ORAL at 17:22

## 2022-09-24 RX ADMIN — Medication 1 PATCH: at 13:16

## 2022-09-24 RX ADMIN — Medication 1 TABLET(S): at 11:09

## 2022-09-24 RX ADMIN — BUDESONIDE AND FORMOTEROL FUMARATE DIHYDRATE 2 PUFF(S): 160; 4.5 AEROSOL RESPIRATORY (INHALATION) at 20:09

## 2022-09-24 RX ADMIN — OLANZAPINE 5 MILLIGRAM(S): 15 TABLET, FILM COATED ORAL at 11:16

## 2022-09-24 RX ADMIN — Medication 25 GRAM(S): at 13:13

## 2022-09-24 RX ADMIN — Medication 1 PATCH: at 11:10

## 2022-09-24 RX ADMIN — QUETIAPINE FUMARATE 50 MILLIGRAM(S): 200 TABLET, FILM COATED ORAL at 21:59

## 2022-09-24 RX ADMIN — Medication 3 MILLILITER(S): at 16:57

## 2022-09-24 RX ADMIN — DIVALPROEX SODIUM 500 MILLIGRAM(S): 500 TABLET, DELAYED RELEASE ORAL at 05:12

## 2022-09-24 RX ADMIN — Medication 3 MILLILITER(S): at 20:09

## 2022-09-24 RX ADMIN — Medication 3 MILLILITER(S): at 04:37

## 2022-09-24 NOTE — PROGRESS NOTE ADULT - ASSESSMENT
Patient is a 52 year old female here for mgmt of Acute hypoxic respratory failure 2/2 aspiration w/ [place] Neg PNA    Acute hypoxic respiratory failure suspect 2/2 aspiration with gram negative pneumonia  - Aspiration PNA resolved  - SLP eval appreciated - small bite sized solids w/ thin fluids  - ID recs appreciated  - Completed Zosyn  - Bronchodilators  - Supplemental oxygen PRN, wean as tolerated    Anemia  -multifactorial- iron deficiency, bone marrow suppression from alcohol abuse   -Hgb stable  -Monitor CBC  -Continue PPI  -GI recs appreciated, outpt EGD/Colonoscopy    Encephalopathy - ? delirium  - A&O x 3 today  - Per notes, Psych was consulted, will await follow up.  - Unclear why patient on standing Zyprexa IM daily, will d/c and switch to PRN for agitation    Frequent Falls due to Unsteady Gait Likely 2/2 ETOH  - CIWA completed  - CTH on admission negative for acute pathology but right parietal scalp hematoma noted  - CT neck negative for acute fracture  - sodium normalized  - fall precautions   - plan for eventual ALEJANDRA     COPD   - c/w Symbicort q12    - c/w Duoneb q6  - c/w supplementary O2 as tolerated    Hx of Seizure ( EEG in 2021- Interictal findings suggest potential epileptogenic focus and structural abnormality in the right temporal region)  -c/w Divalproex 500mg q12  -Seizure precautions    Anxiety/depression/PTSD   -c/w Seroquel 50mg QHS   -c/w Trazodone 100mg QHS   -psych consulted for mood d/o     Tobacco dependency  -nicotine patch QD  -Cessation advised     DVT PPx - SCDs  Diet - Regular  Dispo - Likely ALEJANDRA

## 2022-09-24 NOTE — PROGRESS NOTE ADULT - NS ATTEND AMEND GEN_ALL_CORE FT
See above assessment and management plan. I evaluated this pt. with my NP this AM. I evaluated this pt. with my NP. Pt remains unsuitable with regard to her altered mental status for bowel prep for colonoscopy for further evaluation of her NANCY and also for EGD. These tests can and should be done as OPT at this point as she is not actively bleeding and her Hb remains stable. Upon discharge she should go home on Pantoprazole 40 mg./d. and oral iron TID with OPT f/u with our office hepatologist, Dr. Marino. No plans or need for continued GI f/u in house. Signing off. Reconsult as needed. Thank you.

## 2022-09-24 NOTE — PROGRESS NOTE ADULT - SUBJECTIVE AND OBJECTIVE BOX
Chief Complaint:  Patient is a 52y old  Female who presents with a chief complaint of fall (23 Sep 2022 08:42)      Interval Events / Subjective: Patient seen and examined at bedside    Pt alert, eating regular diet, Reports she is waiting on visit from her children  Unsure of whereabouts she states  she   is staying at a friends house  Denies abdominal pain or nausea    REVIEW OF SYSTEMS:   General: Negative  HEENT: Negative  CV: Negative  Respiratory: Negative  GI: See HPI  : Negative  MSK: Negative  Hematologic: Negative  Skin: Negative    MEDICATIONS:   MEDICATIONS  (STANDING):  albuterol/ipratropium for Nebulization 3 milliLiter(s) Nebulizer every 6 hours  budesonide  80 MICROgram(s)/formoterol 4.5 MICROgram(s) Inhaler 2 Puff(s) Inhalation two times a day  dextrose 50% Injectable 25 Gram(s) IV Push once  dextrose 50% Injectable 12.5 Gram(s) IV Push once  dextrose 50% Injectable 25 Gram(s) IV Push once  dextrose Oral Gel 15 Gram(s) Oral once  diVALproex  milliGRAM(s) Oral two times a day  folic acid 1 milliGRAM(s) Oral daily  glucagon  Injectable 1 milliGRAM(s) IntraMuscular once  influenza   Vaccine 0.5 milliLiter(s) IntraMuscular once  magnesium sulfate  IVPB 2 Gram(s) IV Intermittent every 4 hours  multivitamin 1 Tablet(s) Oral daily  nicotine -  14 mG/24Hr(s) Patch 1 Patch Transdermal daily  OLANZapine Injectable 5 milliGRAM(s) IntraMuscular daily  pantoprazole    Tablet 40 milliGRAM(s) Oral before breakfast  QUEtiapine 50 milliGRAM(s) Oral at bedtime  traZODone 100 milliGRAM(s) Oral at bedtime    MEDICATIONS  (PRN):  acetaminophen     Tablet .. 650 milliGRAM(s) Oral every 6 hours PRN Temp greater or equal to 38C (100.4F), Mild Pain (1 - 3)  ALBUTerol    90 MICROgram(s) HFA Inhaler 2 Puff(s) Inhalation every 6 hours PRN Shortness of Breath and/or Wheezing  aluminum hydroxide/magnesium hydroxide/simethicone Suspension 30 milliLiter(s) Oral every 4 hours PRN Dyspepsia  melatonin 3 milliGRAM(s) Oral at bedtime PRN Insomnia  ondansetron Injectable 4 milliGRAM(s) IV Push every 8 hours PRN Nausea and/or Vomiting      ALLERGIES:   Allergies    No Known Allergies    Intolerances        VITAL SIGNS:   Vital Signs Last 24 Hrs  T(C): 36.6 (24 Sep 2022 04:29), Max: 36.9 (23 Sep 2022 16:21)  T(F): 97.8 (24 Sep 2022 04:29), Max: 98.5 (23 Sep 2022 16:21)  HR: 76 (24 Sep 2022 04:29) (73 - 83)  BP: 127/80 (24 Sep 2022 04:29) (127/80 - 151/95)  BP(mean): 99 (23 Sep 2022 10:00) (99 - 99)  RR: 18 (24 Sep 2022 04:29) (16 - 18)  SpO2: 96% (24 Sep 2022 04:29) (95% - 98%)    Parameters below as of 24 Sep 2022 04:29  Patient On (Oxygen Delivery Method): nasal cannula  O2 Flow (L/min): 4    I&O's Summary    23 Sep 2022 07:01  -  24 Sep 2022 07:00  --------------------------------------------------------  IN: 600 mL / OUT: 200 mL / NET: 400 mL        PHYSICAL EXAM:   GENERAL:  Appears older stated age,  no distress  HEENT:  NC/AT,  conjunctivae clear, sclera -anicteric  CHEST:  Room air , no increased effort, breath sounds clear  HEART:  Regular rhythm, S1, S2, n  ABDOMEN:  Soft, non-tender, non-distended, normoactive bowel sounds,  no masses, no hepatosplenomegaly,   EXTREMITIES: No cyanosis, or edema  SKIN: warm, well perfused  NEURO:  Alert, oriented to self, not place or events  Cooperative Calm    LABS:  CBC Full  -  ( 24 Sep 2022 07:20 )  WBC Count : 7.92 K/uL  RBC Count : 3.67 M/uL  Hemoglobin : 9.0 g/dL  Hematocrit : 29.9 %  Platelet Count - Automated : 403 K/uL  Mean Cell Volume : 81.5 fl  Mean Cell Hemoglobin : 24.5 pg  Mean Cell Hemoglobin Concentration : 30.1 gm/dL  Auto Neutrophil # : x  Auto Lymphocyte # : x  Auto Monocyte # : x  Auto Eosinophil # : x  Auto Basophil # : x  Auto Neutrophil % : x  Auto Lymphocyte % : x  Auto Monocyte % : x  Auto Eosinophil % : x  Auto Basophil % : x    09-24    133<L>  |  96<L>  |  14.2  ----------------------------<  69<L>  4.8   |  28.0  |  0.76    Ca    10.2      24 Sep 2022 07:20  Phos  3.6     09-24  Mg     1.6     09-24              Culture - Blood (collected 22 Sep 2022 08:05)  Source: .Blood Blood  Preliminary Report (23 Sep 2022 13:02):    No growth to date.    Culture - Blood (collected 22 Sep 2022 07:55)  Source: .Blood Blood  Preliminary Report (23 Sep 2022 13:02):    No growth to date.        RADIOLOGY & ADDITIONAL STUDIES (The following images were personally reviewed):    51 y/o female with PMH of seizure, PTSD, anxiety, depression, alcohol use disorder who presented with falls. GI following for anemia.      Anemia without evidence of acute blood loss  Given her recent pneumonia and ongoing altered mental status in the absence of acute bleeding will defer endoscopic evaluation at this time  She has completed her antibiotic course Afebrile w/o leukocytosis  If she remains in the hospital early next week, will plan for EGD/Colon with a slow monitored prep to prevent recurrent aspiration  Otherwise, she may follow up as an outpatient with GI for elective endoscopy  Monitor HGB and maintain >7.0  Optimize nutrition  Avoid nsaids   Replete magnesium

## 2022-09-24 NOTE — PROGRESS NOTE ADULT - REASON FOR ADMISSION
fall
Anemia ETOH w/ falls
Fall  ,alcohol abuse
falls
frequent falls. ETOH abuse, anemia
fall

## 2022-09-24 NOTE — PROGRESS NOTE ADULT - ASSESSMENT
Pt with decompensated alcoholic cirrhosis and iron deficiency anemia who remains altered and not suitable for endoscopic evaluation in house. Would recommend OPT EGD / Colonoscopy. No plans or need for continued inpatient GI f/u. Signing off. Reconsult as needed. Thank you.

## 2022-09-24 NOTE — PROGRESS NOTE ADULT - SUBJECTIVE AND OBJECTIVE BOX
[facility] Division of Hospital Medicine  Sushant Dodd MD    Chief Complaint:  Patient is a 52y old  Female who presents with a chief complaint of Fall  ,alcohol abuse (24 Sep 2022 09:47)      SUBJECTIVE / OVERNIGHT EVENTS:  Patient seen and examined at bedside. No acute events reported overnight. No new complaints. Wants to go home.     MEDICATIONS  (STANDING):  albuterol/ipratropium for Nebulization 3 milliLiter(s) Nebulizer every 6 hours  budesonide  80 MICROgram(s)/formoterol 4.5 MICROgram(s) Inhaler 2 Puff(s) Inhalation two times a day  dextrose 50% Injectable 25 Gram(s) IV Push once  dextrose 50% Injectable 12.5 Gram(s) IV Push once  dextrose 50% Injectable 25 Gram(s) IV Push once  dextrose Oral Gel 15 Gram(s) Oral once  diVALproex  milliGRAM(s) Oral two times a day  folic acid 1 milliGRAM(s) Oral daily  glucagon  Injectable 1 milliGRAM(s) IntraMuscular once  influenza   Vaccine 0.5 milliLiter(s) IntraMuscular once  multivitamin 1 Tablet(s) Oral daily  nicotine -  14 mG/24Hr(s) Patch 1 Patch Transdermal daily  pantoprazole    Tablet 40 milliGRAM(s) Oral before breakfast  QUEtiapine 50 milliGRAM(s) Oral at bedtime  traZODone 100 milliGRAM(s) Oral at bedtime    MEDICATIONS  (PRN):  acetaminophen     Tablet .. 650 milliGRAM(s) Oral every 6 hours PRN Temp greater or equal to 38C (100.4F), Mild Pain (1 - 3)  ALBUTerol    90 MICROgram(s) HFA Inhaler 2 Puff(s) Inhalation every 6 hours PRN Shortness of Breath and/or Wheezing  aluminum hydroxide/magnesium hydroxide/simethicone Suspension 30 milliLiter(s) Oral every 4 hours PRN Dyspepsia  melatonin 3 milliGRAM(s) Oral at bedtime PRN Insomnia  OLANZapine Injectable 5 milliGRAM(s) IntraMuscular every 6 hours PRN Agitation  ondansetron Injectable 4 milliGRAM(s) IV Push every 8 hours PRN Nausea and/or Vomiting        I&O's Summary    23 Sep 2022 07:01  -  24 Sep 2022 07:00  --------------------------------------------------------  IN: 600 mL / OUT: 200 mL / NET: 400 mL        PHYSICAL EXAM:  Vital Signs Last 24 Hrs  T(C): 37.3 (24 Sep 2022 10:13), Max: 37.3 (24 Sep 2022 10:13)  T(F): 99.1 (24 Sep 2022 10:13), Max: 99.1 (24 Sep 2022 10:13)  HR: 78 (24 Sep 2022 10:13) (76 - 83)  BP: 136/91 (24 Sep 2022 10:13) (127/80 - 151/95)  BP(mean): --  RR: 18 (24 Sep 2022 10:13) (17 - 18)  SpO2: 95% (24 Sep 2022 10:13) (95% - 98%)    Parameters below as of 24 Sep 2022 10:13  Patient On (Oxygen Delivery Method): nasal cannula            CONSTITUTIONAL: NAD  HEENT: NC/AT, PERRL, no JVD  RESPIRATORY: CTA bilaterally, normal effort  CARDIOVASCULAR: RRR, S1/S2+, no m/g/r  ABDOMEN: Nontender to palpation, normoactive bowel sounds, no rebound/guarding  MUSCULOSKELETAL: No edema, cyanosis or deformities.  PSYCH: Calm, affect appropriate.  NEUROLOGY: Awake, alert, no focal neurological deficits.   SKIN: No rashes; no palpable lesions  VASC: Distal pulses palpable    LABS:                        9.0    7.92  )-----------( 403      ( 24 Sep 2022 07:20 )             29.9     09-24    133<L>  |  96<L>  |  14.2  ----------------------------<  69<L>  4.8   |  28.0  |  0.76    Ca    10.2      24 Sep 2022 07:20  Phos  3.6     09-24  Mg     1.6     09-24                Culture - Blood (collected 22 Sep 2022 08:05)  Source: .Blood Blood  Preliminary Report (23 Sep 2022 13:02):    No growth to date.    Culture - Blood (collected 22 Sep 2022 07:55)  Source: .Blood Blood  Preliminary Report (23 Sep 2022 13:02):    No growth to date.      CAPILLARY BLOOD GLUCOSE            RADIOLOGY & ADDITIONAL TESTS:  Results Reviewed:   Imaging Personally Reviewed:  Electrocardiogram Personally Reviewed:

## 2022-09-25 LAB
CULTURE RESULTS: SIGNIFICANT CHANGE UP
SARS-COV-2 RNA SPEC QL NAA+PROBE: SIGNIFICANT CHANGE UP
SPECIMEN SOURCE: SIGNIFICANT CHANGE UP

## 2022-09-25 PROCEDURE — 99232 SBSQ HOSP IP/OBS MODERATE 35: CPT

## 2022-09-25 RX ADMIN — Medication 3 MILLILITER(S): at 08:40

## 2022-09-25 RX ADMIN — Medication 3 MILLILITER(S): at 20:34

## 2022-09-25 RX ADMIN — DIVALPROEX SODIUM 500 MILLIGRAM(S): 500 TABLET, DELAYED RELEASE ORAL at 05:58

## 2022-09-25 RX ADMIN — OLANZAPINE 5 MILLIGRAM(S): 15 TABLET, FILM COATED ORAL at 05:58

## 2022-09-25 RX ADMIN — OLANZAPINE 5 MILLIGRAM(S): 15 TABLET, FILM COATED ORAL at 20:07

## 2022-09-25 RX ADMIN — Medication 1 PATCH: at 22:04

## 2022-09-25 RX ADMIN — Medication 1 PATCH: at 12:54

## 2022-09-25 RX ADMIN — BUDESONIDE AND FORMOTEROL FUMARATE DIHYDRATE 2 PUFF(S): 160; 4.5 AEROSOL RESPIRATORY (INHALATION) at 20:34

## 2022-09-25 RX ADMIN — PANTOPRAZOLE SODIUM 40 MILLIGRAM(S): 20 TABLET, DELAYED RELEASE ORAL at 06:05

## 2022-09-25 RX ADMIN — Medication 100 MILLIGRAM(S): at 22:04

## 2022-09-25 RX ADMIN — Medication 2 MILLIGRAM(S): at 22:51

## 2022-09-25 RX ADMIN — BUDESONIDE AND FORMOTEROL FUMARATE DIHYDRATE 2 PUFF(S): 160; 4.5 AEROSOL RESPIRATORY (INHALATION) at 08:40

## 2022-09-25 RX ADMIN — DIVALPROEX SODIUM 500 MILLIGRAM(S): 500 TABLET, DELAYED RELEASE ORAL at 18:16

## 2022-09-25 RX ADMIN — Medication 1 MILLIGRAM(S): at 12:53

## 2022-09-25 RX ADMIN — Medication 1 TABLET(S): at 12:54

## 2022-09-25 RX ADMIN — Medication 3 MILLILITER(S): at 01:25

## 2022-09-25 RX ADMIN — Medication 3 MILLILITER(S): at 16:57

## 2022-09-25 NOTE — CHART NOTE - NSCHARTNOTESELECT_GEN_ALL_CORE
Event Note
Nutrition Services
Nutrition Services
Event Note

## 2022-09-25 NOTE — PROGRESS NOTE ADULT - NS ATTEST RISK PROBLEM GEN_ALL_CORE FT
Prescription Drug Mgmt
Anemia, ETOH withdrawal and delirium/encephalopathy
Prescription Drug Mgmt
Active alcohol withdrawal
Acute respiratory failure requiring HFNC, low threshold for MICU consultation
Acute respiratory failure, low threshold for MICU consultation
Prescription Drug Mgmt

## 2022-09-25 NOTE — PROGRESS NOTE ADULT - SUBJECTIVE AND OBJECTIVE BOX
[facility] Division of Hospital Medicine  Sushant Dodd MD    Chief Complaint:  Patient is a 52y old  Female who presents with a chief complaint of Fall  ,alcohol abuse (24 Sep 2022 09:47)      SUBJECTIVE / OVERNIGHT EVENTS:  Patient seen and examined at bedside. No acute events reported overnight. No new complaints.    MEDICATIONS  (STANDING):  albuterol/ipratropium for Nebulization 3 milliLiter(s) Nebulizer every 6 hours  budesonide  80 MICROgram(s)/formoterol 4.5 MICROgram(s) Inhaler 2 Puff(s) Inhalation two times a day  dextrose 50% Injectable 12.5 Gram(s) IV Push once  dextrose 50% Injectable 25 Gram(s) IV Push once  dextrose 50% Injectable 25 Gram(s) IV Push once  dextrose Oral Gel 15 Gram(s) Oral once  diVALproex  milliGRAM(s) Oral two times a day  folic acid 1 milliGRAM(s) Oral daily  glucagon  Injectable 1 milliGRAM(s) IntraMuscular once  influenza   Vaccine 0.5 milliLiter(s) IntraMuscular once  multivitamin 1 Tablet(s) Oral daily  nicotine -  14 mG/24Hr(s) Patch 1 Patch Transdermal daily  pantoprazole    Tablet 40 milliGRAM(s) Oral before breakfast  QUEtiapine 50 milliGRAM(s) Oral at bedtime  traZODone 100 milliGRAM(s) Oral at bedtime    MEDICATIONS  (PRN):  acetaminophen     Tablet .. 650 milliGRAM(s) Oral every 6 hours PRN Temp greater or equal to 38C (100.4F), Mild Pain (1 - 3)  ALBUTerol    90 MICROgram(s) HFA Inhaler 2 Puff(s) Inhalation every 6 hours PRN Shortness of Breath and/or Wheezing  aluminum hydroxide/magnesium hydroxide/simethicone Suspension 30 milliLiter(s) Oral every 4 hours PRN Dyspepsia  melatonin 3 milliGRAM(s) Oral at bedtime PRN Insomnia  OLANZapine Injectable 5 milliGRAM(s) IntraMuscular every 6 hours PRN Agitation  ondansetron Injectable 4 milliGRAM(s) IV Push every 8 hours PRN Nausea and/or Vomiting        I&O's Summary      PHYSICAL EXAM:  Vital Signs Last 24 Hrs  T(C): 36.6 (25 Sep 2022 10:04), Max: 37.1 (24 Sep 2022 18:53)  T(F): 97.9 (25 Sep 2022 10:04), Max: 98.8 (24 Sep 2022 18:53)  HR: 94 (25 Sep 2022 10:04) (72 - 94)  BP: 122/85 (25 Sep 2022 10:04) (113/82 - 126/89)  BP(mean): --  RR: 18 (25 Sep 2022 10:04) (16 - 18)  SpO2: 98% (25 Sep 2022 10:04) (93% - 98%)    Parameters below as of 25 Sep 2022 10:04  Patient On (Oxygen Delivery Method): nasal cannula            CONSTITUTIONAL: NAD  HEENT: NC/AT, PERRL, no JVD  RESPIRATORY: CTA bilaterally, normal effort  CARDIOVASCULAR: RRR, S1/S2+, no m/g/r  ABDOMEN: Nontender to palpation, normoactive bowel sounds, no rebound/guarding  MUSCULOSKELETAL: No edema, cyanosis or deformities.  PSYCH: Calm, affect appropriate.  NEUROLOGY: Awake, alert, no focal neurological deficits.   SKIN: No rashes; no palpable lesions  VASC: Distal pulses palpable    LABS:                        9.0    7.92  )-----------( 403      ( 24 Sep 2022 07:20 )             29.9     09-24    133<L>  |  96<L>  |  14.2  ----------------------------<  69<L>  4.8   |  28.0  |  0.76    Ca    10.2      24 Sep 2022 07:20  Phos  3.6     09-24  Mg     1.6     09-24                CAPILLARY BLOOD GLUCOSE            RADIOLOGY & ADDITIONAL TESTS:  Results Reviewed:   Imaging Personally Reviewed:  Electrocardiogram Personally Reviewed:

## 2022-09-25 NOTE — CHART NOTE - NSCHARTNOTEFT_GEN_A_CORE
Patient with increased agitated, attempting to get out of bed   Very aggressive and unable to be redirected   On camera room supervision   No effect with PRN Zyprexa   Refusing PO medication   Psych consult pending for AM   Ativan 2mg IVP x1 for patient and staff safety   RN to notify with any acute changes

## 2022-09-26 LAB
ALBUMIN SERPL ELPH-MCNC: 3.7 G/DL — SIGNIFICANT CHANGE UP (ref 3.3–5.2)
ALP SERPL-CCNC: 72 U/L — SIGNIFICANT CHANGE UP (ref 40–120)
ALT FLD-CCNC: 11 U/L — SIGNIFICANT CHANGE UP
ANION GAP SERPL CALC-SCNC: 14 MMOL/L — SIGNIFICANT CHANGE UP (ref 5–17)
AST SERPL-CCNC: 17 U/L — SIGNIFICANT CHANGE UP
BILIRUB SERPL-MCNC: <0.2 MG/DL — LOW (ref 0.4–2)
BUN SERPL-MCNC: 27.9 MG/DL — HIGH (ref 8–20)
CALCIUM SERPL-MCNC: 10.2 MG/DL — SIGNIFICANT CHANGE UP (ref 8.4–10.5)
CHLORIDE SERPL-SCNC: 95 MMOL/L — LOW (ref 98–107)
CO2 SERPL-SCNC: 23 MMOL/L — SIGNIFICANT CHANGE UP (ref 22–29)
CREAT SERPL-MCNC: 0.75 MG/DL — SIGNIFICANT CHANGE UP (ref 0.5–1.3)
EGFR: 95 ML/MIN/1.73M2 — SIGNIFICANT CHANGE UP
GLUCOSE SERPL-MCNC: 88 MG/DL — SIGNIFICANT CHANGE UP (ref 70–99)
HCT VFR BLD CALC: 32.6 % — LOW (ref 34.5–45)
HGB BLD-MCNC: 10.4 G/DL — LOW (ref 11.5–15.5)
MCHC RBC-ENTMCNC: 24.8 PG — LOW (ref 27–34)
MCHC RBC-ENTMCNC: 31.9 GM/DL — LOW (ref 32–36)
MCV RBC AUTO: 77.6 FL — LOW (ref 80–100)
PLATELET # BLD AUTO: 502 K/UL — HIGH (ref 150–400)
POTASSIUM SERPL-MCNC: 4.1 MMOL/L — SIGNIFICANT CHANGE UP (ref 3.5–5.3)
POTASSIUM SERPL-SCNC: 4.1 MMOL/L — SIGNIFICANT CHANGE UP (ref 3.5–5.3)
PROT SERPL-MCNC: 7.7 G/DL — SIGNIFICANT CHANGE UP (ref 6.6–8.7)
RBC # BLD: 4.2 M/UL — SIGNIFICANT CHANGE UP (ref 3.8–5.2)
RBC # FLD: 23.7 % — HIGH (ref 10.3–14.5)
SODIUM SERPL-SCNC: 131 MMOL/L — LOW (ref 135–145)
WBC # BLD: 7.24 K/UL — SIGNIFICANT CHANGE UP (ref 3.8–10.5)
WBC # FLD AUTO: 7.24 K/UL — SIGNIFICANT CHANGE UP (ref 3.8–10.5)

## 2022-09-26 PROCEDURE — 99232 SBSQ HOSP IP/OBS MODERATE 35: CPT

## 2022-09-26 PROCEDURE — 99233 SBSQ HOSP IP/OBS HIGH 50: CPT

## 2022-09-26 RX ORDER — THIAMINE MONONITRATE (VIT B1) 100 MG
500 TABLET ORAL EVERY 8 HOURS
Refills: 0 | Status: DISCONTINUED | OUTPATIENT
Start: 2022-09-26 | End: 2022-09-27

## 2022-09-26 RX ADMIN — Medication 1 TABLET(S): at 11:44

## 2022-09-26 RX ADMIN — PANTOPRAZOLE SODIUM 40 MILLIGRAM(S): 20 TABLET, DELAYED RELEASE ORAL at 06:26

## 2022-09-26 RX ADMIN — Medication 1 MILLIGRAM(S): at 11:45

## 2022-09-26 RX ADMIN — Medication 100 MILLIGRAM(S): at 21:43

## 2022-09-26 RX ADMIN — Medication 3 MILLILITER(S): at 20:25

## 2022-09-26 RX ADMIN — Medication 1 PATCH: at 11:43

## 2022-09-26 RX ADMIN — Medication 105 MILLIGRAM(S): at 21:43

## 2022-09-26 RX ADMIN — OLANZAPINE 5 MILLIGRAM(S): 15 TABLET, FILM COATED ORAL at 15:24

## 2022-09-26 RX ADMIN — OLANZAPINE 5 MILLIGRAM(S): 15 TABLET, FILM COATED ORAL at 04:45

## 2022-09-26 RX ADMIN — DIVALPROEX SODIUM 500 MILLIGRAM(S): 500 TABLET, DELAYED RELEASE ORAL at 17:16

## 2022-09-26 RX ADMIN — QUETIAPINE FUMARATE 50 MILLIGRAM(S): 200 TABLET, FILM COATED ORAL at 21:43

## 2022-09-26 RX ADMIN — BUDESONIDE AND FORMOTEROL FUMARATE DIHYDRATE 2 PUFF(S): 160; 4.5 AEROSOL RESPIRATORY (INHALATION) at 20:25

## 2022-09-26 RX ADMIN — DIVALPROEX SODIUM 500 MILLIGRAM(S): 500 TABLET, DELAYED RELEASE ORAL at 06:25

## 2022-09-26 NOTE — PROGRESS NOTE ADULT - SUBJECTIVE AND OBJECTIVE BOX
[facility] Division of Hospital Medicine  Sushant Dodd MD    Chief Complaint:  Patient is a 52y old  Female who presents with a chief complaint of Fall  ,alcohol abuse (24 Sep 2022 09:47)      SUBJECTIVE / OVERNIGHT EVENTS:  Patient seen and examined at bedside. No acute events reported overnight. States she feels depressed.     MEDICATIONS  (STANDING):  albuterol/ipratropium for Nebulization 3 milliLiter(s) Nebulizer every 6 hours  budesonide  80 MICROgram(s)/formoterol 4.5 MICROgram(s) Inhaler 2 Puff(s) Inhalation two times a day  dextrose 50% Injectable 25 Gram(s) IV Push once  dextrose 50% Injectable 12.5 Gram(s) IV Push once  dextrose 50% Injectable 25 Gram(s) IV Push once  dextrose Oral Gel 15 Gram(s) Oral once  diVALproex  milliGRAM(s) Oral two times a day  folic acid 1 milliGRAM(s) Oral daily  glucagon  Injectable 1 milliGRAM(s) IntraMuscular once  influenza   Vaccine 0.5 milliLiter(s) IntraMuscular once  multivitamin 1 Tablet(s) Oral daily  nicotine -  14 mG/24Hr(s) Patch 1 Patch Transdermal daily  pantoprazole    Tablet 40 milliGRAM(s) Oral before breakfast  QUEtiapine 50 milliGRAM(s) Oral at bedtime  traZODone 100 milliGRAM(s) Oral at bedtime    MEDICATIONS  (PRN):  acetaminophen     Tablet .. 650 milliGRAM(s) Oral every 6 hours PRN Temp greater or equal to 38C (100.4F), Mild Pain (1 - 3)  ALBUTerol    90 MICROgram(s) HFA Inhaler 2 Puff(s) Inhalation every 6 hours PRN Shortness of Breath and/or Wheezing  aluminum hydroxide/magnesium hydroxide/simethicone Suspension 30 milliLiter(s) Oral every 4 hours PRN Dyspepsia  melatonin 3 milliGRAM(s) Oral at bedtime PRN Insomnia  OLANZapine Injectable 5 milliGRAM(s) IntraMuscular every 6 hours PRN Agitation  ondansetron Injectable 4 milliGRAM(s) IV Push every 8 hours PRN Nausea and/or Vomiting        I&O's Summary      PHYSICAL EXAM:  Vital Signs Last 24 Hrs  T(C): 36.8 (26 Sep 2022 05:01), Max: 36.8 (26 Sep 2022 05:01)  T(F): 98.2 (26 Sep 2022 05:01), Max: 98.2 (26 Sep 2022 05:01)  HR: 94 (26 Sep 2022 05:01) (78 - 94)  BP: 122/70 (26 Sep 2022 05:01) (120/85 - 124/81)  BP(mean): --  RR: 18 (26 Sep 2022 05:01) (18 - 18)  SpO2: 98% (26 Sep 2022 05:01) (96% - 98%)    Parameters below as of 26 Sep 2022 05:01  Patient On (Oxygen Delivery Method): nasal cannula  O2 Flow (L/min): 3          CONSTITUTIONAL: NAD  HEENT: NC/AT, PERRL, no JVD  RESPIRATORY: CTA bilaterally, normal effort  CARDIOVASCULAR: RRR, S1/S2+, no m/g/r  ABDOMEN: Nontender to palpation, normoactive bowel sounds, no rebound/guarding  MUSCULOSKELETAL: No edema, cyanosis or deformities.  PSYCH: Calm, affect appropriate.  NEUROLOGY: Awake, alert, no focal neurological deficits.   SKIN: No rashes; no palpable lesions  VASC: Distal pulses palpable    LABS:                        10.4   7.24  )-----------( 502      ( 26 Sep 2022 05:34 )             32.6     09-26    131<L>  |  95<L>  |  27.9<H>  ----------------------------<  88  4.1   |  23.0  |  0.75    Ca    10.2      26 Sep 2022 05:34    TPro  7.7  /  Alb  3.7  /  TBili  <0.2<L>  /  DBili  x   /  AST  17  /  ALT  11  /  AlkPhos  72  09-26              CAPILLARY BLOOD GLUCOSE            RADIOLOGY & ADDITIONAL TESTS:  Results Reviewed:   Imaging Personally Reviewed:  Electrocardiogram Personally Reviewed:

## 2022-09-26 NOTE — PHYSICAL THERAPY INITIAL EVALUATION ADULT - ADDITIONAL COMMENTS
Pt reports that she lives with family and uses a SAC or a walker as needed. Pt has ~2STE the home. Pt declines to give additional information, as she becomes upset and states she has PTSD, RN aware.
Pt reports that she lives with family and uses a SAC or a walker as needed. Pt has ~2STE the home. Pt declines to give additional information, as she becomes upset and states she has PTSD, RN aware

## 2022-09-26 NOTE — PROGRESS NOTE ADULT - PROVIDER SPECIALTY LIST ADULT
Gastroenterology
Hospitalist
Internal Medicine
Gastroenterology
Hospitalist
Infectious Disease
Gastroenterology
Gastroenterology
Hospitalist
Hospitalist
Gastroenterology

## 2022-09-26 NOTE — PROGRESS NOTE ADULT - ASSESSMENT
Patient is a 52 year old female here for mgmt of Acute hypoxic respratory failure 2/2 aspiration w/ Gentry Neg PNA    Encephalopathy - ? delirium  - A&O x 3 today, however agitated overnight and given IM Zyprexa.  - Per notes, Psych was consulted, will await follow up.  - Unclear why patient on standing Zyprexa IM daily, will d/c and switch to PRN for agitation      Acute hypoxic respiratory failure suspect 2/2 aspiration with gram negative pneumonia  - Aspiration PNA resolved  - SLP eval appreciated - small bite sized solids w/ thin fluids  - ID recs appreciated  - Completed Zosyn  - Bronchodilators  - Supplemental oxygen PRN, wean as tolerated    Anemia  - multifactorial- iron deficiency, bone marrow suppression from alcohol abuse   - Hgb stable  - Monitor CBC  - Continue PPI  - GI recs appreciated, outpt EGD/Colonoscopy    Frequent Falls due to Unsteady Gait Likely 2/2 ETOH  - CIWA completed  - CTH on admission negative for acute pathology but right parietal scalp hematoma noted  - CT neck negative for acute fracture  - sodium normalized  - fall precautions   - plan for eventual ALEJANDRA     COPD   - c/w Symbicort q12    - c/w Duoneb q6  - c/w supplementary O2 as tolerated    Hx of Seizure ( EEG in 2021- Interictal findings suggest potential epileptogenic focus and structural abnormality in the right temporal region)  -c/w Divalproex 500mg q12  -Seizure precautions    Anxiety/depression/PTSD   - c/w Seroquel 50mg QHS   - c/w Trazodone 100mg QHS   - psych consulted for mood d/o     Tobacco dependency  - nicotine patch QD  - Cessation advised     DVT PPx - SCDs  Diet - Regular  Dispo - Likely ALEJANDRA    Patient is a 52 year old female here for mgmt of Acute hypoxic respratory failure 2/2 aspiration w/ Gentry Neg PNA    Encephalopathy - ? delirium  - A&O x 3 today, however agitated overnight and given IM Zyprexa.  - Per notes, Psych was consulted, will await follow up.  - Unclear why patient on standing Zyprexa IM daily, will d/c and switch to PRN for agitation    Acute hypoxic respiratory failure suspect 2/2 aspiration with gram negative pneumonia  - Aspiration PNA resolved  - SLP eval appreciated - small bite sized solids w/ thin fluids  - ID recs appreciated  - Completed Zosyn  - Bronchodilators  - Supplemental oxygen PRN, wean as tolerated    Anemia  - multifactorial- iron deficiency, bone marrow suppression from alcohol abuse   - Hgb stable  - Monitor CBC  - Continue PPI  - GI recs appreciated, outpt EGD/Colonoscopy    Frequent Falls due to Unsteady Gait Likely 2/2 ETOH  - CIWA completed  - CTH on admission negative for acute pathology but right parietal scalp hematoma noted  - CT neck negative for acute fracture  - sodium normalized  - fall precautions   - plan for eventual ALEJANDRA     COPD   - c/w Symbicort q12    - c/w Duoneb q6  - c/w supplementary O2 as tolerated    Hx of Seizure ( EEG in 2021- Interictal findings suggest potential epileptogenic focus and structural abnormality in the right temporal region)  -c/w Divalproex 500mg q12  -Seizure precautions    Anxiety/depression/PTSD   - c/w Seroquel 50mg QHS   - c/w Trazodone 100mg QHS   - psych consulted for mood d/o     Tobacco dependency  - nicotine patch QD  - Cessation advised     DVT PPx - SCDs  Diet - Regular  Dispo - Likely ALEJANDRA

## 2022-09-26 NOTE — PHYSICAL THERAPY INITIAL EVALUATION ADULT - PERTINENT HX OF CURRENT PROBLEM, REHAB EVAL
Pt presents to [facility] with reports of increased frequency of falls
Pt Presents to [facility] with reports of increased frequency of falling

## 2022-09-26 NOTE — PHYSICAL THERAPY INITIAL EVALUATION ADULT - GENERAL OBSERVATIONS, REHAB EVAL
Pt rec'd OOB ambulating without a device, breathing RA in NAD
Pt rec'd upright at the EOB breathing RA in NAD

## 2022-09-26 NOTE — BH CONSULTATION LIAISON PROGRESS NOTE - NSBHCHARTREVIEWVS_PSY_A_CORE FT
Vital Signs Last 24 Hrs  T(C): 36.8 (26 Sep 2022 05:01), Max: 36.8 (26 Sep 2022 05:01)  T(F): 98.2 (26 Sep 2022 05:01), Max: 98.2 (26 Sep 2022 05:01)  HR: 94 (26 Sep 2022 05:01) (78 - 94)  BP: 122/70 (26 Sep 2022 05:01) (120/85 - 124/81)  BP(mean): --  RR: 18 (26 Sep 2022 05:01) (18 - 18)  SpO2: 98% (26 Sep 2022 05:01) (96% - 98%)    Parameters below as of 26 Sep 2022 05:01  Patient On (Oxygen Delivery Method): nasal cannula  O2 Flow (L/min): 3
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
## 2022-09-26 NOTE — BH CONSULTATION LIAISON PROGRESS NOTE - NSBHCHARTREVIEWLAB_PSY_A_CORE FT
CBC:            10.4   7.24  )-----------( 502      ( 09-26-22 @ 05:34 )             32.6         Chem:         ( 09-26-22 @ 05:34 )    131<L>  |  95<L>  |  27.9<H>  ----------------------------<  88  4.1   |  23.0  |  0.75        Liver Functions: ( 09-26-22 @ 05:34 )  Alb: 3.7 g/dL / Pro: 7.7 g/dL / ALK PHOS: 72 U/L / ALT: 11 U/L / AST: 17 U/L / GGT: x              Type & Screen:

## 2022-09-26 NOTE — BH CONSULTATION LIAISON PROGRESS NOTE - CURRENT MEDICATION
MEDICATIONS  (STANDING):  albuterol/ipratropium for Nebulization 3 milliLiter(s) Nebulizer every 6 hours  budesonide  80 MICROgram(s)/formoterol 4.5 MICROgram(s) Inhaler 2 Puff(s) Inhalation two times a day  dextrose 50% Injectable 25 Gram(s) IV Push once  dextrose 50% Injectable 12.5 Gram(s) IV Push once  dextrose 50% Injectable 25 Gram(s) IV Push once  dextrose Oral Gel 15 Gram(s) Oral once  diVALproex  milliGRAM(s) Oral two times a day  folic acid 1 milliGRAM(s) Oral daily  glucagon  Injectable 1 milliGRAM(s) IntraMuscular once  influenza   Vaccine 0.5 milliLiter(s) IntraMuscular once  multivitamin 1 Tablet(s) Oral daily  nicotine -  14 mG/24Hr(s) Patch 1 Patch Transdermal daily  pantoprazole    Tablet 40 milliGRAM(s) Oral before breakfast  QUEtiapine 50 milliGRAM(s) Oral at bedtime  traZODone 100 milliGRAM(s) Oral at bedtime    MEDICATIONS  (PRN):  acetaminophen     Tablet .. 650 milliGRAM(s) Oral every 6 hours PRN Temp greater or equal to 38C (100.4F), Mild Pain (1 - 3)  ALBUTerol    90 MICROgram(s) HFA Inhaler 2 Puff(s) Inhalation every 6 hours PRN Shortness of Breath and/or Wheezing  aluminum hydroxide/magnesium hydroxide/simethicone Suspension 30 milliLiter(s) Oral every 4 hours PRN Dyspepsia  melatonin 3 milliGRAM(s) Oral at bedtime PRN Insomnia  OLANZapine Injectable 5 milliGRAM(s) IntraMuscular every 6 hours PRN Agitation  ondansetron Injectable 4 milliGRAM(s) IV Push every 8 hours PRN Nausea and/or Vomiting
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
## 2022-09-26 NOTE — PHYSICAL THERAPY INITIAL EVALUATION ADULT - GAIT DEVIATIONS NOTED, PT EVAL
decreased step length/decreased stride length
wide CATHY/decreased step length/decreased stride length

## 2022-09-27 ENCOUNTER — TRANSCRIPTION ENCOUNTER (OUTPATIENT)
Age: 52
End: 2022-09-27

## 2022-09-27 VITALS — OXYGEN SATURATION: 95 %

## 2022-09-27 LAB
ALBUMIN SERPL ELPH-MCNC: 3.6 G/DL — SIGNIFICANT CHANGE UP (ref 3.3–5.2)
ALP SERPL-CCNC: 95 U/L — SIGNIFICANT CHANGE UP (ref 40–120)
ALT FLD-CCNC: 9 U/L — SIGNIFICANT CHANGE UP
ANION GAP SERPL CALC-SCNC: 9 MMOL/L — SIGNIFICANT CHANGE UP (ref 5–17)
AST SERPL-CCNC: 22 U/L — SIGNIFICANT CHANGE UP
BILIRUB SERPL-MCNC: <0.2 MG/DL — LOW (ref 0.4–2)
BUN SERPL-MCNC: 47.3 MG/DL — HIGH (ref 8–20)
CALCIUM SERPL-MCNC: 9.6 MG/DL — SIGNIFICANT CHANGE UP (ref 8.4–10.5)
CHLORIDE SERPL-SCNC: 102 MMOL/L — SIGNIFICANT CHANGE UP (ref 98–107)
CO2 SERPL-SCNC: 24 MMOL/L — SIGNIFICANT CHANGE UP (ref 22–29)
CREAT SERPL-MCNC: 0.72 MG/DL — SIGNIFICANT CHANGE UP (ref 0.5–1.3)
CULTURE RESULTS: SIGNIFICANT CHANGE UP
CULTURE RESULTS: SIGNIFICANT CHANGE UP
EGFR: 100 ML/MIN/1.73M2 — SIGNIFICANT CHANGE UP
GLUCOSE SERPL-MCNC: 71 MG/DL — SIGNIFICANT CHANGE UP (ref 70–99)
HCT VFR BLD CALC: 32.3 % — LOW (ref 34.5–45)
HGB BLD-MCNC: 9.9 G/DL — LOW (ref 11.5–15.5)
MCHC RBC-ENTMCNC: 24.8 PG — LOW (ref 27–34)
MCHC RBC-ENTMCNC: 30.7 GM/DL — LOW (ref 32–36)
MCV RBC AUTO: 81 FL — SIGNIFICANT CHANGE UP (ref 80–100)
PLATELET # BLD AUTO: 512 K/UL — HIGH (ref 150–400)
POTASSIUM SERPL-MCNC: 4.3 MMOL/L — SIGNIFICANT CHANGE UP (ref 3.5–5.3)
POTASSIUM SERPL-SCNC: 4.3 MMOL/L — SIGNIFICANT CHANGE UP (ref 3.5–5.3)
PROT SERPL-MCNC: 7.6 G/DL — SIGNIFICANT CHANGE UP (ref 6.6–8.7)
RBC # BLD: 3.99 M/UL — SIGNIFICANT CHANGE UP (ref 3.8–5.2)
RBC # FLD: 24.2 % — HIGH (ref 10.3–14.5)
SODIUM SERPL-SCNC: 135 MMOL/L — SIGNIFICANT CHANGE UP (ref 135–145)
SPECIMEN SOURCE: SIGNIFICANT CHANGE UP
SPECIMEN SOURCE: SIGNIFICANT CHANGE UP
WBC # BLD: 8.83 K/UL — SIGNIFICANT CHANGE UP (ref 3.8–10.5)
WBC # FLD AUTO: 8.83 K/UL — SIGNIFICANT CHANGE UP (ref 3.8–10.5)

## 2022-09-27 PROCEDURE — 84132 ASSAY OF SERUM POTASSIUM: CPT

## 2022-09-27 PROCEDURE — 85014 HEMATOCRIT: CPT

## 2022-09-27 PROCEDURE — 99285 EMERGENCY DEPT VISIT HI MDM: CPT | Mod: 25

## 2022-09-27 PROCEDURE — 85610 PROTHROMBIN TIME: CPT

## 2022-09-27 PROCEDURE — 86900 BLOOD TYPING SEROLOGIC ABO: CPT

## 2022-09-27 PROCEDURE — 84100 ASSAY OF PHOSPHORUS: CPT

## 2022-09-27 PROCEDURE — 86901 BLOOD TYPING SEROLOGIC RH(D): CPT

## 2022-09-27 PROCEDURE — 85045 AUTOMATED RETICULOCYTE COUNT: CPT

## 2022-09-27 PROCEDURE — 71045 X-RAY EXAM CHEST 1 VIEW: CPT

## 2022-09-27 PROCEDURE — 80164 ASSAY DIPROPYLACETIC ACD TOT: CPT

## 2022-09-27 PROCEDURE — 93005 ELECTROCARDIOGRAM TRACING: CPT

## 2022-09-27 PROCEDURE — 87640 STAPH A DNA AMP PROBE: CPT

## 2022-09-27 PROCEDURE — 92610 EVALUATE SWALLOWING FUNCTION: CPT

## 2022-09-27 PROCEDURE — 85730 THROMBOPLASTIN TIME PARTIAL: CPT

## 2022-09-27 PROCEDURE — 72125 CT NECK SPINE W/O DYE: CPT | Mod: MG

## 2022-09-27 PROCEDURE — 85025 COMPLETE CBC W/AUTO DIFF WBC: CPT

## 2022-09-27 PROCEDURE — 84145 PROCALCITONIN (PCT): CPT

## 2022-09-27 PROCEDURE — 94640 AIRWAY INHALATION TREATMENT: CPT

## 2022-09-27 PROCEDURE — 84295 ASSAY OF SERUM SODIUM: CPT

## 2022-09-27 PROCEDURE — 87077 CULTURE AEROBIC IDENTIFY: CPT

## 2022-09-27 PROCEDURE — 80307 DRUG TEST PRSMV CHEM ANLYZR: CPT

## 2022-09-27 PROCEDURE — 83605 ASSAY OF LACTIC ACID: CPT

## 2022-09-27 PROCEDURE — U0005: CPT

## 2022-09-27 PROCEDURE — 82435 ASSAY OF BLOOD CHLORIDE: CPT

## 2022-09-27 PROCEDURE — 82962 GLUCOSE BLOOD TEST: CPT

## 2022-09-27 PROCEDURE — 87641 MR-STAPH DNA AMP PROBE: CPT

## 2022-09-27 PROCEDURE — 76705 ECHO EXAM OF ABDOMEN: CPT

## 2022-09-27 PROCEDURE — 80053 COMPREHEN METABOLIC PANEL: CPT

## 2022-09-27 PROCEDURE — 83550 IRON BINDING TEST: CPT

## 2022-09-27 PROCEDURE — 82803 BLOOD GASES ANY COMBINATION: CPT

## 2022-09-27 PROCEDURE — 99239 HOSP IP/OBS DSCHRG MGMT >30: CPT

## 2022-09-27 PROCEDURE — 83690 ASSAY OF LIPASE: CPT

## 2022-09-27 PROCEDURE — 70450 CT HEAD/BRAIN W/O DYE: CPT | Mod: MG

## 2022-09-27 PROCEDURE — P9040: CPT

## 2022-09-27 PROCEDURE — 82272 OCCULT BLD FECES 1-3 TESTS: CPT

## 2022-09-27 PROCEDURE — 92526 ORAL FUNCTION THERAPY: CPT

## 2022-09-27 PROCEDURE — 36415 COLL VENOUS BLD VENIPUNCTURE: CPT

## 2022-09-27 PROCEDURE — 86850 RBC ANTIBODY SCREEN: CPT

## 2022-09-27 PROCEDURE — C8929: CPT

## 2022-09-27 PROCEDURE — 86923 COMPATIBILITY TEST ELECTRIC: CPT

## 2022-09-27 PROCEDURE — 83540 ASSAY OF IRON: CPT

## 2022-09-27 PROCEDURE — 80048 BASIC METABOLIC PNL TOTAL CA: CPT

## 2022-09-27 PROCEDURE — G1004: CPT

## 2022-09-27 PROCEDURE — 85379 FIBRIN DEGRADATION QUANT: CPT

## 2022-09-27 PROCEDURE — U0003: CPT

## 2022-09-27 PROCEDURE — 0225U NFCT DS DNA&RNA 21 SARSCOV2: CPT

## 2022-09-27 PROCEDURE — 87040 BLOOD CULTURE FOR BACTERIA: CPT

## 2022-09-27 PROCEDURE — 82746 ASSAY OF FOLIC ACID SERUM: CPT

## 2022-09-27 PROCEDURE — 87150 DNA/RNA AMPLIFIED PROBE: CPT

## 2022-09-27 PROCEDURE — 71275 CT ANGIOGRAPHY CHEST: CPT

## 2022-09-27 PROCEDURE — 82728 ASSAY OF FERRITIN: CPT

## 2022-09-27 PROCEDURE — 82607 VITAMIN B-12: CPT

## 2022-09-27 PROCEDURE — 83735 ASSAY OF MAGNESIUM: CPT

## 2022-09-27 PROCEDURE — 82947 ASSAY GLUCOSE BLOOD QUANT: CPT

## 2022-09-27 PROCEDURE — 85018 HEMOGLOBIN: CPT

## 2022-09-27 PROCEDURE — 36430 TRANSFUSION BLD/BLD COMPNT: CPT

## 2022-09-27 PROCEDURE — 82330 ASSAY OF CALCIUM: CPT

## 2022-09-27 PROCEDURE — 85027 COMPLETE CBC AUTOMATED: CPT

## 2022-09-27 PROCEDURE — 84466 ASSAY OF TRANSFERRIN: CPT

## 2022-09-27 RX ORDER — THIAMINE MONONITRATE (VIT B1) 100 MG
1 TABLET ORAL
Qty: 30 | Refills: 0
Start: 2022-09-27 | End: 2022-10-26

## 2022-09-27 RX ORDER — TRAZODONE HCL 50 MG
1 TABLET ORAL
Qty: 0 | Refills: 0 | DISCHARGE
Start: 2022-09-27

## 2022-09-27 RX ORDER — FOLIC ACID 0.8 MG
1 TABLET ORAL
Qty: 30 | Refills: 0
Start: 2022-09-27 | End: 2022-10-26

## 2022-09-27 RX ORDER — PANTOPRAZOLE SODIUM 20 MG/1
1 TABLET, DELAYED RELEASE ORAL
Qty: 30 | Refills: 0
Start: 2022-09-27 | End: 2022-10-26

## 2022-09-27 RX ADMIN — DIVALPROEX SODIUM 500 MILLIGRAM(S): 500 TABLET, DELAYED RELEASE ORAL at 05:58

## 2022-09-27 RX ADMIN — DIVALPROEX SODIUM 500 MILLIGRAM(S): 500 TABLET, DELAYED RELEASE ORAL at 17:03

## 2022-09-27 RX ADMIN — PANTOPRAZOLE SODIUM 40 MILLIGRAM(S): 20 TABLET, DELAYED RELEASE ORAL at 05:58

## 2022-09-27 RX ADMIN — Medication 1 PATCH: at 17:04

## 2022-09-27 RX ADMIN — BUDESONIDE AND FORMOTEROL FUMARATE DIHYDRATE 2 PUFF(S): 160; 4.5 AEROSOL RESPIRATORY (INHALATION) at 09:50

## 2022-09-27 RX ADMIN — Medication 3 MILLILITER(S): at 09:50

## 2022-09-27 RX ADMIN — Medication 1 MILLIGRAM(S): at 17:03

## 2022-09-27 RX ADMIN — Medication 3 MILLILITER(S): at 03:09

## 2022-09-27 RX ADMIN — Medication 3 MILLILITER(S): at 16:33

## 2022-09-27 RX ADMIN — Medication 1 TABLET(S): at 17:03

## 2022-09-27 RX ADMIN — Medication 105 MILLIGRAM(S): at 05:59

## 2022-09-27 RX ADMIN — Medication 105 MILLIGRAM(S): at 17:04

## 2022-09-27 NOTE — DISCHARGE NOTE PROVIDER - HOSPITAL COURSE
Patient is a 52 year old female here for mgmt of Acute hypoxic respratory failure 2/2 aspiration w/ Gentry Neg PNA    Encephalopathy - ? delirium  - A&O x 3 today, however agitated overnight and given IM Zyprexa.  - Per notes, Psych was consulted, will await follow up.  - Unclear why patient on standing Zyprexa IM daily, will d/c and switch to PRN for agitation    Acute hypoxic respiratory failure suspect 2/2 aspiration with gram negative pneumonia  - Aspiration PNA resolved  - SLP eval appreciated - small bite sized solids w/ thin fluids, then advanced to regular diet  - ID recs appreciated  - Completed Zosyn    Anemia  - multifactorial- iron deficiency, bone marrow suppression from alcohol abuse   - Hgb stable  - Monitor CBC  - Continue PPI  - GI recs appreciated, outpt EGD/Colonoscopy    Frequent Falls due to Unsteady Gait Likely 2/2 ETOH  - CIWA completed  - CTH on admission negative for acute pathology but right parietal scalp hematoma noted  - CT neck negative for acute fracture  - sodium normalized  - fall precautions   - plan for eventual ALEJANDRA     COPD   - c/w Symbicort q12    - c/w nebs    Hx of Seizure ( EEG in 2021- Interictal findings suggest potential epileptogenic focus and structural abnormality in the right temporal region)  -c/w Divalproex 500mg q12  -Seizure precautions    Anxiety/depression/PTSD   - c/w Seroquel 50mg QHS   - c/w Trazodone 100mg QHS   - psych consult appreciated    Tobacco dependency  - nicotine patch QD  - Cessation advised Patient is a 52 year old female here for mgmt of Acute hypoxic respratory failure 2/2 aspiration w/ Gentry Neg PNA    Encephalopathy - ? delirium  - A&O x 3 today, however agitated overnight and given IM Zyprexa.  - Psych consult apprecaited, like delirium superimposed on neurocognitive disorder. Delirium now improved, no psychiatric barriers to d/c    Acute hypoxic respiratory failure suspect 2/2 aspiration with gram negative pneumonia  - Aspiration PNA resolved  - SLP eval appreciated - small bite sized solids w/ thin fluids, then advanced to regular diet  - ID recs appreciated  - Completed Zosyn    Anemia  - multifactorial- iron deficiency, bone marrow suppression from alcohol abuse   - Hgb stable  - Monitor CBC  - Continue PPI  - GI recs appreciated, outpt EGD/Colonoscopy    Frequent Falls due to Unsteady Gait Likely 2/2 ETOH  - CIWA completed  - CTH on admission negative for acute pathology but right parietal scalp hematoma noted  - CT neck negative for acute fracture  - sodium normalized  - fall precautions   - plan for eventual ALEJANDRA     COPD   - c/w Symbicort q12    - c/w nebs    Hx of Seizure ( EEG in 2021- Interictal findings suggest potential epileptogenic focus and structural abnormality in the right temporal region)  -c/w Divalproex 500mg q12  -Seizure precautions    Anxiety/depression/PTSD   - c/w Seroquel 50mg QHS   - c/w Trazodone 100mg QHS   - psych consult appreciated    Tobacco dependency  - nicotine patch QD  - Cessation advised

## 2022-09-27 NOTE — DISCHARGE NOTE NURSING/CASE MANAGEMENT/SOCIAL WORK - NSDCPEFALRISK_GEN_ALL_CORE
For information on Fall & Injury Prevention, visit: [email or web address] OR  [email or web address] OR  https://www.cdc.gov/steadi/patient.html

## 2022-09-27 NOTE — DISCHARGE NOTE PROVIDER - NSDCMRMEDTOKEN_GEN_ALL_CORE_FT
Albuterol (Eqv-ProAir HFA) 90 mcg/inh inhalation aerosol: 2 puff(s) inhaled every 6 hours  divalproex sodium 500 mg oral delayed release tablet: 1 tab(s) orally 2 times a day  folic acid 1 mg oral tablet: 1 tab(s) orally once a day  Multiple Vitamins oral tablet: 1 tab(s) orally once a day  pantoprazole 40 mg oral delayed release tablet: 1 tab(s) orally once a day (before a meal)  QUEtiapine 50 mg oral tablet: 1 tab(s) orally once a day (at bedtime)  Symbicort 80 mcg-4.5 mcg/inh inhalation aerosol: 2 puff(s) inhaled 2 times a day  traZODone 100 mg oral tablet: 1 tab(s) orally once a day (at bedtime)  Vitamin B1 100 mg oral tablet: 1 tab(s) orally once a day

## 2022-09-27 NOTE — DISCHARGE NOTE NURSING/CASE MANAGEMENT/SOCIAL WORK - PATIENT PORTAL LINK FT
You can access the FollowMyHealth Patient Portal offered by [facility] by registering at the following website: [email or web address]. By joining [facility]’s FollowMyHealth portal, you will also be able to view your health information using other applications (apps) compatible with our system.

## 2022-09-27 NOTE — DISCHARGE NOTE PROVIDER - ATTENDING DISCHARGE PHYSICAL EXAMINATION:
Vital Signs Last 24 Hrs  T(F): 97.3 (27 Sep 2022 10:26), Max: 98.8 (26 Sep 2022 13:59)  HR: 110 (27 Sep 2022 10:26) (77 - 110)  BP: 114/73 (27 Sep 2022 10:26) (99/63 - 130/90)  RR: 17 (27 Sep 2022 10:26) (17 - 20)  SpO2: 96% (27 Sep 2022 10:26) (94% - 98%)    Physical Exam:  Constitutional: NAD  HEENT: NC/AT, PERRL, EOMI, trachea midline, no JVD  Respiratory: CTA bilaterally, symmetrical chest rise  Cardiovascular: RRR, no m/g/r  Gastrointestinal: Soft, NT/ND, BS+  Vascular: 2+ peripheral pulses  Neurological: A/O x 3, no focal neurological deficits  Psych: Fair mood/affect  Musculoskeletal: No edema, cyanosis, deformities. ROM normal  Skin: No obvious rash, lesions. No jaundice.

## 2022-09-27 NOTE — DISCHARGE NOTE PROVIDER - NSDCCPCAREPLAN_GEN_ALL_CORE_FT
PRINCIPAL DISCHARGE DIAGNOSIS  Diagnosis: Alcohol abuse  Assessment and Plan of Treatment: Please refrain from ETOH use. Follow up with a Psychiatrist.      SECONDARY DISCHARGE DIAGNOSES  Diagnosis: Frequent falls  Assessment and Plan of Treatment: Seen by PT, no needs.    Diagnosis: Hyponatremia  Assessment and Plan of Treatment: Resolved    Diagnosis: Anemia  Assessment and Plan of Treatment: Stabilzied. Follow up with GI outpatient.

## 2022-10-04 ENCOUNTER — INPATIENT (INPATIENT)
Facility: HOSPITAL | Age: 52
LOS: 5 days | Discharge: HOME | DRG: 641 | End: 2022-10-10
Attending: STUDENT IN AN ORGANIZED HEALTH CARE EDUCATION/TRAINING PROGRAM | Admitting: STUDENT IN AN ORGANIZED HEALTH CARE EDUCATION/TRAINING PROGRAM
Payer: MEDICARE

## 2022-10-04 VITALS
HEART RATE: 71 BPM | DIASTOLIC BLOOD PRESSURE: 94 MMHG | TEMPERATURE: 98 F | OXYGEN SATURATION: 96 % | RESPIRATION RATE: 18 BRPM | WEIGHT: 147.93 LBS | HEIGHT: 65 IN | SYSTOLIC BLOOD PRESSURE: 135 MMHG

## 2022-10-04 DIAGNOSIS — Z98.890 OTHER SPECIFIED POSTPROCEDURAL STATES: Chronic | ICD-10-CM

## 2022-10-04 DIAGNOSIS — Z93.1 GASTROSTOMY STATUS: Chronic | ICD-10-CM

## 2022-10-04 LAB
ALBUMIN SERPL ELPH-MCNC: 3.6 G/DL — SIGNIFICANT CHANGE UP (ref 3.3–5.2)
ALP SERPL-CCNC: 67 U/L — SIGNIFICANT CHANGE UP (ref 40–120)
ALT FLD-CCNC: 12 U/L — SIGNIFICANT CHANGE UP
ANION GAP SERPL CALC-SCNC: 20 MMOL/L — HIGH (ref 5–17)
AST SERPL-CCNC: 26 U/L — SIGNIFICANT CHANGE UP
BASOPHILS # BLD AUTO: 0.03 K/UL — SIGNIFICANT CHANGE UP (ref 0–0.2)
BASOPHILS NFR BLD AUTO: 0.6 % — SIGNIFICANT CHANGE UP (ref 0–2)
BILIRUB SERPL-MCNC: 0.2 MG/DL — LOW (ref 0.4–2)
BUN SERPL-MCNC: 8.3 MG/DL — SIGNIFICANT CHANGE UP (ref 8–20)
CALCIUM SERPL-MCNC: 9 MG/DL — SIGNIFICANT CHANGE UP (ref 8.4–10.5)
CHLORIDE SERPL-SCNC: 87 MMOL/L — LOW (ref 98–107)
CO2 SERPL-SCNC: 17 MMOL/L — LOW (ref 22–29)
CREAT SERPL-MCNC: 0.45 MG/DL — LOW (ref 0.5–1.3)
EGFR: 115 ML/MIN/1.73M2 — SIGNIFICANT CHANGE UP
EOSINOPHIL # BLD AUTO: 0 K/UL — SIGNIFICANT CHANGE UP (ref 0–0.5)
EOSINOPHIL NFR BLD AUTO: 0 % — SIGNIFICANT CHANGE UP (ref 0–6)
FLUAV AG NPH QL: SIGNIFICANT CHANGE UP
FLUBV AG NPH QL: SIGNIFICANT CHANGE UP
GLUCOSE SERPL-MCNC: 103 MG/DL — HIGH (ref 70–99)
HCT VFR BLD CALC: 27.3 % — LOW (ref 34.5–45)
HGB BLD-MCNC: 8.7 G/DL — LOW (ref 11.5–15.5)
IMM GRANULOCYTES NFR BLD AUTO: 0.4 % — SIGNIFICANT CHANGE UP (ref 0–0.9)
LYMPHOCYTES # BLD AUTO: 1.27 K/UL — SIGNIFICANT CHANGE UP (ref 1–3.3)
LYMPHOCYTES # BLD AUTO: 23.3 % — SIGNIFICANT CHANGE UP (ref 13–44)
MCHC RBC-ENTMCNC: 24.9 PG — LOW (ref 27–34)
MCHC RBC-ENTMCNC: 31.9 GM/DL — LOW (ref 32–36)
MCV RBC AUTO: 78 FL — LOW (ref 80–100)
MONOCYTES # BLD AUTO: 0.54 K/UL — SIGNIFICANT CHANGE UP (ref 0–0.9)
MONOCYTES NFR BLD AUTO: 9.9 % — SIGNIFICANT CHANGE UP (ref 2–14)
NEUTROPHILS # BLD AUTO: 3.59 K/UL — SIGNIFICANT CHANGE UP (ref 1.8–7.4)
NEUTROPHILS NFR BLD AUTO: 65.8 % — SIGNIFICANT CHANGE UP (ref 43–77)
PLATELET # BLD AUTO: 387 K/UL — SIGNIFICANT CHANGE UP (ref 150–400)
POTASSIUM SERPL-MCNC: 3.1 MMOL/L — LOW (ref 3.5–5.3)
POTASSIUM SERPL-SCNC: 3.1 MMOL/L — LOW (ref 3.5–5.3)
PROT SERPL-MCNC: 7.1 G/DL — SIGNIFICANT CHANGE UP (ref 6.6–8.7)
RBC # BLD: 3.5 M/UL — LOW (ref 3.8–5.2)
RBC # FLD: 23.9 % — HIGH (ref 10.3–14.5)
RSV RNA NPH QL NAA+NON-PROBE: SIGNIFICANT CHANGE UP
SARS-COV-2 RNA SPEC QL NAA+PROBE: SIGNIFICANT CHANGE UP
SODIUM SERPL-SCNC: 123 MMOL/L — LOW (ref 135–145)
VALPROATE SERPL-MCNC: 18.9 UG/ML — LOW (ref 50–100)
WBC # BLD: 5.45 K/UL — SIGNIFICANT CHANGE UP (ref 3.8–10.5)
WBC # FLD AUTO: 5.45 K/UL — SIGNIFICANT CHANGE UP (ref 3.8–10.5)

## 2022-10-04 PROCEDURE — 99285 EMERGENCY DEPT VISIT HI MDM: CPT

## 2022-10-04 RX ORDER — ALBUTEROL 90 UG/1
2 AEROSOL, METERED ORAL EVERY 6 HOURS
Refills: 0 | Status: DISCONTINUED | OUTPATIENT
Start: 2022-10-04 | End: 2022-10-10

## 2022-10-04 RX ORDER — DIVALPROEX SODIUM 500 MG/1
500 TABLET, DELAYED RELEASE ORAL
Refills: 0 | Status: DISCONTINUED | OUTPATIENT
Start: 2022-10-04 | End: 2022-10-10

## 2022-10-04 RX ORDER — QUETIAPINE FUMARATE 200 MG/1
100 TABLET, FILM COATED ORAL ONCE
Refills: 0 | Status: COMPLETED | OUTPATIENT
Start: 2022-10-04 | End: 2022-10-04

## 2022-10-04 RX ADMIN — QUETIAPINE FUMARATE 100 MILLIGRAM(S): 200 TABLET, FILM COATED ORAL at 21:01

## 2022-10-04 RX ADMIN — ALBUTEROL 2 PUFF(S): 90 AEROSOL, METERED ORAL at 18:36

## 2022-10-04 RX ADMIN — DIVALPROEX SODIUM 500 MILLIGRAM(S): 500 TABLET, DELAYED RELEASE ORAL at 22:50

## 2022-10-04 NOTE — ED ADULT TRIAGE NOTE - CHIEF COMPLAINT QUOTE
patient c/o shortness of breath "for a while" patient admitted to drinking alcohol. patient placed in yellow gown and belonging locked up.

## 2022-10-04 NOTE — ED ADULT NURSE NOTE - OBJECTIVE STATEMENT
Pt A&O x 2 c/o SOB "for a while now." Pt denies sick contacts. Reports smoking cigarettes daily. Pt reports drinking 3-4 beers daily. Airway patent. Respirations even and unlabored. BL lung sounds clear. Will continue to monitor.

## 2022-10-04 NOTE — ED PROVIDER NOTE - PHYSICAL EXAMINATION
Alert, lucid, and in no apparent distress. Pt is normocephalic, atraumatic.  Pupils are equal, round, lips pink, moist mucous membranes, tongue midline. Neck supple.   Lungs clear to auscultation. Heart regular rate and rhythm, normal S1, S2,   Abdomen is soft, nontender, no pulsatile mass, no masses, no distension,   Non-focal sensory, 5 out of 5 motor strength, no dysmetria, fluent, goal directed speech.  Skin without rash,   Normal mentation, does not appear agitated

## 2022-10-04 NOTE — ED PROVIDER NOTE - OBJECTIVE STATEMENT
53 yo female pmh copd,  etoh use comes to ed admits to drinking alcohol today; pt states she feels short of breath; pt noted running out of her MDi

## 2022-10-05 DIAGNOSIS — E87.1 HYPO-OSMOLALITY AND HYPONATREMIA: ICD-10-CM

## 2022-10-05 LAB
AMPHET UR-MCNC: NEGATIVE — SIGNIFICANT CHANGE UP
ANION GAP SERPL CALC-SCNC: 13 MMOL/L — SIGNIFICANT CHANGE UP (ref 5–17)
APPEARANCE UR: CLEAR — SIGNIFICANT CHANGE UP
BACTERIA # UR AUTO: ABNORMAL
BARBITURATES UR SCN-MCNC: NEGATIVE — SIGNIFICANT CHANGE UP
BENZODIAZ UR-MCNC: NEGATIVE — SIGNIFICANT CHANGE UP
BILIRUB UR-MCNC: NEGATIVE — SIGNIFICANT CHANGE UP
BUN SERPL-MCNC: 7.4 MG/DL — LOW (ref 8–20)
CALCIUM SERPL-MCNC: 9 MG/DL — SIGNIFICANT CHANGE UP (ref 8.4–10.5)
CHLORIDE SERPL-SCNC: 91 MMOL/L — LOW (ref 98–107)
CO2 SERPL-SCNC: 21 MMOL/L — LOW (ref 22–29)
COCAINE METAB.OTHER UR-MCNC: NEGATIVE — SIGNIFICANT CHANGE UP
COLOR SPEC: YELLOW — SIGNIFICANT CHANGE UP
CREAT SERPL-MCNC: 0.54 MG/DL — SIGNIFICANT CHANGE UP (ref 0.5–1.3)
DIFF PNL FLD: NEGATIVE — SIGNIFICANT CHANGE UP
EGFR: 110 ML/MIN/1.73M2 — SIGNIFICANT CHANGE UP
EPI CELLS # UR: SIGNIFICANT CHANGE UP
GLUCOSE SERPL-MCNC: 86 MG/DL — SIGNIFICANT CHANGE UP (ref 70–99)
GLUCOSE UR QL: NEGATIVE MG/DL — SIGNIFICANT CHANGE UP
KETONES UR-MCNC: NEGATIVE — SIGNIFICANT CHANGE UP
LEUKOCYTE ESTERASE UR-ACNC: NEGATIVE — SIGNIFICANT CHANGE UP
METHADONE UR-MCNC: NEGATIVE — SIGNIFICANT CHANGE UP
NITRITE UR-MCNC: NEGATIVE — SIGNIFICANT CHANGE UP
OPIATES UR-MCNC: NEGATIVE — SIGNIFICANT CHANGE UP
PCP SPEC-MCNC: SIGNIFICANT CHANGE UP
PCP UR-MCNC: NEGATIVE — SIGNIFICANT CHANGE UP
PH UR: 7 — SIGNIFICANT CHANGE UP (ref 5–8)
POTASSIUM SERPL-MCNC: 4.3 MMOL/L — SIGNIFICANT CHANGE UP (ref 3.5–5.3)
POTASSIUM SERPL-SCNC: 4.3 MMOL/L — SIGNIFICANT CHANGE UP (ref 3.5–5.3)
PROT UR-MCNC: NEGATIVE — SIGNIFICANT CHANGE UP
RBC CASTS # UR COMP ASSIST: SIGNIFICANT CHANGE UP /HPF (ref 0–4)
SODIUM SERPL-SCNC: 125 MMOL/L — LOW (ref 135–145)
SP GR SPEC: 1.01 — SIGNIFICANT CHANGE UP (ref 1.01–1.02)
THC UR QL: NEGATIVE — SIGNIFICANT CHANGE UP
UROBILINOGEN FLD QL: NEGATIVE MG/DL — SIGNIFICANT CHANGE UP
WBC UR QL: SIGNIFICANT CHANGE UP /HPF (ref 0–5)

## 2022-10-05 PROCEDURE — 99223 1ST HOSP IP/OBS HIGH 75: CPT

## 2022-10-05 RX ORDER — ALPRAZOLAM 0.25 MG
0.25 TABLET ORAL ONCE
Refills: 0 | Status: DISCONTINUED | OUTPATIENT
Start: 2022-10-05 | End: 2022-10-05

## 2022-10-05 RX ORDER — POTASSIUM CHLORIDE 20 MEQ
40 PACKET (EA) ORAL EVERY 4 HOURS
Refills: 0 | Status: COMPLETED | OUTPATIENT
Start: 2022-10-05 | End: 2022-10-05

## 2022-10-05 RX ORDER — TRAZODONE HCL 50 MG
100 TABLET ORAL AT BEDTIME
Refills: 0 | Status: DISCONTINUED | OUTPATIENT
Start: 2022-10-05 | End: 2022-10-10

## 2022-10-05 RX ORDER — QUETIAPINE FUMARATE 200 MG/1
50 TABLET, FILM COATED ORAL AT BEDTIME
Refills: 0 | Status: DISCONTINUED | OUTPATIENT
Start: 2022-10-05 | End: 2022-10-06

## 2022-10-05 RX ORDER — ACETAMINOPHEN 500 MG
650 TABLET ORAL EVERY 6 HOURS
Refills: 0 | Status: DISCONTINUED | OUTPATIENT
Start: 2022-10-05 | End: 2022-10-10

## 2022-10-05 RX ORDER — SENNA PLUS 8.6 MG/1
2 TABLET ORAL AT BEDTIME
Refills: 0 | Status: DISCONTINUED | OUTPATIENT
Start: 2022-10-05 | End: 2022-10-10

## 2022-10-05 RX ORDER — FERROUS SULFATE 325(65) MG
325 TABLET ORAL DAILY
Refills: 0 | Status: DISCONTINUED | OUTPATIENT
Start: 2022-10-05 | End: 2022-10-10

## 2022-10-05 RX ORDER — FOLIC ACID 0.8 MG
1 TABLET ORAL DAILY
Refills: 0 | Status: DISCONTINUED | OUTPATIENT
Start: 2022-10-05 | End: 2022-10-10

## 2022-10-05 RX ORDER — LANOLIN ALCOHOL/MO/W.PET/CERES
3 CREAM (GRAM) TOPICAL AT BEDTIME
Refills: 0 | Status: DISCONTINUED | OUTPATIENT
Start: 2022-10-05 | End: 2022-10-10

## 2022-10-05 RX ORDER — THIAMINE MONONITRATE (VIT B1) 100 MG
100 TABLET ORAL DAILY
Refills: 0 | Status: DISCONTINUED | OUTPATIENT
Start: 2022-10-05 | End: 2022-10-10

## 2022-10-05 RX ORDER — OLANZAPINE 15 MG/1
5 TABLET, FILM COATED ORAL ONCE
Refills: 0 | Status: COMPLETED | OUTPATIENT
Start: 2022-10-05 | End: 2022-10-05

## 2022-10-05 RX ORDER — ONDANSETRON 8 MG/1
4 TABLET, FILM COATED ORAL EVERY 8 HOURS
Refills: 0 | Status: DISCONTINUED | OUTPATIENT
Start: 2022-10-05 | End: 2022-10-10

## 2022-10-05 RX ORDER — POLYETHYLENE GLYCOL 3350 17 G/17G
17 POWDER, FOR SOLUTION ORAL ONCE
Refills: 0 | Status: COMPLETED | OUTPATIENT
Start: 2022-10-05 | End: 2022-10-05

## 2022-10-05 RX ORDER — SODIUM CHLORIDE 9 MG/ML
1000 INJECTION INTRAMUSCULAR; INTRAVENOUS; SUBCUTANEOUS ONCE
Refills: 0 | Status: COMPLETED | OUTPATIENT
Start: 2022-10-05 | End: 2022-10-05

## 2022-10-05 RX ORDER — PANTOPRAZOLE SODIUM 20 MG/1
40 TABLET, DELAYED RELEASE ORAL
Refills: 0 | Status: DISCONTINUED | OUTPATIENT
Start: 2022-10-05 | End: 2022-10-10

## 2022-10-05 RX ORDER — BUDESONIDE AND FORMOTEROL FUMARATE DIHYDRATE 160; 4.5 UG/1; UG/1
2 AEROSOL RESPIRATORY (INHALATION)
Refills: 0 | Status: DISCONTINUED | OUTPATIENT
Start: 2022-10-05 | End: 2022-10-10

## 2022-10-05 RX ADMIN — Medication 2 MILLIGRAM(S): at 12:52

## 2022-10-05 RX ADMIN — SENNA PLUS 2 TABLET(S): 8.6 TABLET ORAL at 20:06

## 2022-10-05 RX ADMIN — Medication 40 MILLIEQUIVALENT(S): at 06:19

## 2022-10-05 RX ADMIN — DIVALPROEX SODIUM 500 MILLIGRAM(S): 500 TABLET, DELAYED RELEASE ORAL at 16:39

## 2022-10-05 RX ADMIN — DIVALPROEX SODIUM 500 MILLIGRAM(S): 500 TABLET, DELAYED RELEASE ORAL at 06:19

## 2022-10-05 RX ADMIN — OLANZAPINE 5 MILLIGRAM(S): 15 TABLET, FILM COATED ORAL at 21:48

## 2022-10-05 RX ADMIN — Medication 100 MILLIGRAM(S): at 20:46

## 2022-10-05 RX ADMIN — Medication 2 MILLIGRAM(S): at 21:16

## 2022-10-05 RX ADMIN — Medication 0.25 MILLIGRAM(S): at 16:40

## 2022-10-05 RX ADMIN — Medication 100 MILLIGRAM(S): at 12:53

## 2022-10-05 RX ADMIN — Medication 2 MILLIGRAM(S): at 20:00

## 2022-10-05 RX ADMIN — QUETIAPINE FUMARATE 50 MILLIGRAM(S): 200 TABLET, FILM COATED ORAL at 20:46

## 2022-10-05 RX ADMIN — POLYETHYLENE GLYCOL 3350 17 GRAM(S): 17 POWDER, FOR SOLUTION ORAL at 20:06

## 2022-10-05 RX ADMIN — Medication 40 MILLIEQUIVALENT(S): at 12:53

## 2022-10-05 RX ADMIN — Medication 325 MILLIGRAM(S): at 16:40

## 2022-10-05 RX ADMIN — Medication 1 MILLIGRAM(S): at 12:53

## 2022-10-05 RX ADMIN — PANTOPRAZOLE SODIUM 40 MILLIGRAM(S): 20 TABLET, DELAYED RELEASE ORAL at 06:41

## 2022-10-05 RX ADMIN — Medication 2 MILLIGRAM(S): at 22:19

## 2022-10-05 RX ADMIN — Medication 1 TABLET(S): at 12:53

## 2022-10-05 RX ADMIN — Medication 40 MILLIEQUIVALENT(S): at 02:54

## 2022-10-05 RX ADMIN — SODIUM CHLORIDE 1000 MILLILITER(S): 9 INJECTION INTRAMUSCULAR; INTRAVENOUS; SUBCUTANEOUS at 02:54

## 2022-10-05 NOTE — H&P ADULT - ASSESSMENT
53 y/o female with PMH of seizure, PTSD, anxiety, depression, polysubstance abuse came to the ED complaining of difficulty breathing. Patient said it is chronic, has been going on for a long time. She also endorse drinking beer. Found to have Na: 123, K: 3.1, CIWA: 4     Electrolytes abnormalities   Admit to telemetry  Na: 123 likely secondary to poor oral intake/beer potomania   Patient already received IVF in the ED, urine studies might not be accurate   On prior admission she came in with Na of 122, responded to IVF   K: 3.1-supplemented   Monitor BMP     EtOH abuse   CIWA 4  Symptom trigger CIWA initiated   MVT  Folic acid   Thiamine for possible thiamine deficiency due to EtOH abuse   Seizure/aspiration/fall precaution   Cessation advised     Hx of microcytic anemia   Hb:  8.7  Patient was seen by GI during prior admission, scheduled for EGD/colonoscopy but not done because patient developed hypoxia due to PNA   Hb stable and was told to follow GI outpatient     Seizure   Valproic acid level  low (18.9)  Patient reported not taking her medication for few days   Continue Divalproex 500mg bid   Seizure precaution     COPD   Not in acute exacerbation    Continue Symbicort    Albuterol PRN     Anxiety/depression/PTSD   Seroquel 50mg HS   Trazodone 50mg HS     Tobacco use   Nicotine offered, patient declined   Cessation advised     Supportive   DVT prophylaxis: CSD   Diet: regular     Plan of care discussed with patient

## 2022-10-05 NOTE — H&P ADULT - NSHPPHYSICALEXAM_GEN_ALL_CORE
Vital Signs Last 24 Hrs  T(C): 36.8 (05 Oct 2022 02:34), Max: 36.8 (05 Oct 2022 02:34)  T(F): 98.2 (05 Oct 2022 02:34), Max: 98.2 (05 Oct 2022 02:34)  HR: 89 (05 Oct 2022 02:34) (71 - 100)  BP: 102/70 (05 Oct 2022 02:34) (102/70 - 135/94)  BP(mean): --  RR: 16 (05 Oct 2022 02:34) (16 - 19)  SpO2: 97% (05 Oct 2022 02:34) (95% - 97%)    Parameters below as of 05 Oct 2022 02:34  Patient On (Oxygen Delivery Method): room air

## 2022-10-05 NOTE — H&P ADULT - HISTORY OF PRESENT ILLNESS
51 y/o female with PMH of seizure, PTSD, anxiety, depression, polysubstance abuse came to the ED complaining of difficulty breathing. Patient said it is chronic, has been going on for a long time. She also endorse drinking beer. She has no chest pain, palpitation, nausea, vomiting, abdominal pain, change in bowel/urinary habit, fever, chills, cough, recent travel, sick contact.

## 2022-10-06 LAB
ALBUMIN SERPL ELPH-MCNC: 3.8 G/DL — SIGNIFICANT CHANGE UP (ref 3.3–5.2)
ALP SERPL-CCNC: 71 U/L — SIGNIFICANT CHANGE UP (ref 40–120)
ALT FLD-CCNC: 11 U/L — SIGNIFICANT CHANGE UP
ANION GAP SERPL CALC-SCNC: 16 MMOL/L — SIGNIFICANT CHANGE UP (ref 5–17)
AST SERPL-CCNC: 24 U/L — SIGNIFICANT CHANGE UP
BASOPHILS # BLD AUTO: 0.04 K/UL — SIGNIFICANT CHANGE UP (ref 0–0.2)
BASOPHILS NFR BLD AUTO: 1 % — SIGNIFICANT CHANGE UP (ref 0–2)
BILIRUB SERPL-MCNC: 0.3 MG/DL — LOW (ref 0.4–2)
BLD GP AB SCN SERPL QL: SIGNIFICANT CHANGE UP
BUN SERPL-MCNC: 7.3 MG/DL — LOW (ref 8–20)
CALCIUM SERPL-MCNC: 9.7 MG/DL — SIGNIFICANT CHANGE UP (ref 8.4–10.5)
CHLORIDE SERPL-SCNC: 99 MMOL/L — SIGNIFICANT CHANGE UP (ref 98–107)
CO2 SERPL-SCNC: 21 MMOL/L — LOW (ref 22–29)
CREAT SERPL-MCNC: 0.55 MG/DL — SIGNIFICANT CHANGE UP (ref 0.5–1.3)
EGFR: 110 ML/MIN/1.73M2 — SIGNIFICANT CHANGE UP
EOSINOPHIL # BLD AUTO: 0.05 K/UL — SIGNIFICANT CHANGE UP (ref 0–0.5)
EOSINOPHIL NFR BLD AUTO: 1.3 % — SIGNIFICANT CHANGE UP (ref 0–6)
GLUCOSE SERPL-MCNC: 84 MG/DL — SIGNIFICANT CHANGE UP (ref 70–99)
HCT VFR BLD CALC: 33.1 % — LOW (ref 34.5–45)
HGB BLD-MCNC: 10.2 G/DL — LOW (ref 11.5–15.5)
IMM GRANULOCYTES NFR BLD AUTO: 0.3 % — SIGNIFICANT CHANGE UP (ref 0–0.9)
LYMPHOCYTES # BLD AUTO: 1.45 K/UL — SIGNIFICANT CHANGE UP (ref 1–3.3)
LYMPHOCYTES # BLD AUTO: 36.3 % — SIGNIFICANT CHANGE UP (ref 13–44)
MAGNESIUM SERPL-MCNC: 1.4 MG/DL — LOW (ref 1.6–2.6)
MCHC RBC-ENTMCNC: 25 PG — LOW (ref 27–34)
MCHC RBC-ENTMCNC: 30.8 GM/DL — LOW (ref 32–36)
MCV RBC AUTO: 81.1 FL — SIGNIFICANT CHANGE UP (ref 80–100)
MONOCYTES # BLD AUTO: 0.4 K/UL — SIGNIFICANT CHANGE UP (ref 0–0.9)
MONOCYTES NFR BLD AUTO: 10 % — SIGNIFICANT CHANGE UP (ref 2–14)
NEUTROPHILS # BLD AUTO: 2.04 K/UL — SIGNIFICANT CHANGE UP (ref 1.8–7.4)
NEUTROPHILS NFR BLD AUTO: 51.1 % — SIGNIFICANT CHANGE UP (ref 43–77)
PLATELET # BLD AUTO: 353 K/UL — SIGNIFICANT CHANGE UP (ref 150–400)
POTASSIUM SERPL-MCNC: 4.3 MMOL/L — SIGNIFICANT CHANGE UP (ref 3.5–5.3)
POTASSIUM SERPL-SCNC: 4.3 MMOL/L — SIGNIFICANT CHANGE UP (ref 3.5–5.3)
PROT SERPL-MCNC: 7.5 G/DL — SIGNIFICANT CHANGE UP (ref 6.6–8.7)
RBC # BLD: 4.08 M/UL — SIGNIFICANT CHANGE UP (ref 3.8–5.2)
RBC # FLD: 24.8 % — HIGH (ref 10.3–14.5)
SODIUM SERPL-SCNC: 135 MMOL/L — SIGNIFICANT CHANGE UP (ref 135–145)
WBC # BLD: 3.99 K/UL — SIGNIFICANT CHANGE UP (ref 3.8–10.5)
WBC # FLD AUTO: 3.99 K/UL — SIGNIFICANT CHANGE UP (ref 3.8–10.5)

## 2022-10-06 PROCEDURE — 99233 SBSQ HOSP IP/OBS HIGH 50: CPT

## 2022-10-06 PROCEDURE — 99223 1ST HOSP IP/OBS HIGH 75: CPT

## 2022-10-06 RX ORDER — OLANZAPINE 15 MG/1
5 TABLET, FILM COATED ORAL EVERY 8 HOURS
Refills: 0 | Status: DISCONTINUED | OUTPATIENT
Start: 2022-10-06 | End: 2022-10-10

## 2022-10-06 RX ORDER — INFLUENZA VIRUS VACCINE 15; 15; 15; 15 UG/.5ML; UG/.5ML; UG/.5ML; UG/.5ML
0.5 SUSPENSION INTRAMUSCULAR ONCE
Refills: 0 | Status: DISCONTINUED | OUTPATIENT
Start: 2022-10-06 | End: 2022-10-10

## 2022-10-06 RX ORDER — MAGNESIUM SULFATE 500 MG/ML
2 VIAL (ML) INJECTION ONCE
Refills: 0 | Status: COMPLETED | OUTPATIENT
Start: 2022-10-06 | End: 2022-10-06

## 2022-10-06 RX ORDER — OLANZAPINE 15 MG/1
2.5 TABLET, FILM COATED ORAL AT BEDTIME
Refills: 0 | Status: DISCONTINUED | OUTPATIENT
Start: 2022-10-06 | End: 2022-10-10

## 2022-10-06 RX ADMIN — OLANZAPINE 5 MILLIGRAM(S): 15 TABLET, FILM COATED ORAL at 22:55

## 2022-10-06 RX ADMIN — Medication 2 MILLIGRAM(S): at 03:08

## 2022-10-06 RX ADMIN — Medication 30 MILLILITER(S): at 13:54

## 2022-10-06 RX ADMIN — DIVALPROEX SODIUM 500 MILLIGRAM(S): 500 TABLET, DELAYED RELEASE ORAL at 21:05

## 2022-10-06 RX ADMIN — BUDESONIDE AND FORMOTEROL FUMARATE DIHYDRATE 2 PUFF(S): 160; 4.5 AEROSOL RESPIRATORY (INHALATION) at 20:24

## 2022-10-06 RX ADMIN — DIVALPROEX SODIUM 500 MILLIGRAM(S): 500 TABLET, DELAYED RELEASE ORAL at 10:32

## 2022-10-06 RX ADMIN — Medication 2 MILLIGRAM(S): at 23:28

## 2022-10-06 RX ADMIN — Medication 325 MILLIGRAM(S): at 11:41

## 2022-10-06 RX ADMIN — Medication 2 MILLIGRAM(S): at 06:20

## 2022-10-06 RX ADMIN — Medication 3 MILLIGRAM(S): at 22:54

## 2022-10-06 RX ADMIN — Medication 1 MILLIGRAM(S): at 11:42

## 2022-10-06 RX ADMIN — Medication 25 GRAM(S): at 15:44

## 2022-10-06 RX ADMIN — Medication 2 MILLIGRAM(S): at 10:27

## 2022-10-06 RX ADMIN — PANTOPRAZOLE SODIUM 40 MILLIGRAM(S): 20 TABLET, DELAYED RELEASE ORAL at 10:35

## 2022-10-06 RX ADMIN — Medication 1 TABLET(S): at 11:42

## 2022-10-06 RX ADMIN — OLANZAPINE 2.5 MILLIGRAM(S): 15 TABLET, FILM COATED ORAL at 21:05

## 2022-10-06 RX ADMIN — Medication 100 MILLIGRAM(S): at 21:04

## 2022-10-06 RX ADMIN — Medication 2 MILLIGRAM(S): at 21:42

## 2022-10-06 RX ADMIN — Medication 100 MILLIGRAM(S): at 11:41

## 2022-10-06 NOTE — BH CONSULTATION LIAISON ASSESSMENT NOTE - CURRENT MEDICATION
MEDICATIONS  (STANDING):  budesonide  80 MICROgram(s)/formoterol 4.5 MICROgram(s) Inhaler 2 Puff(s) Inhalation two times a day  diVALproex  milliGRAM(s) Oral two times a day  ferrous    sulfate 325 milliGRAM(s) Oral daily  folic acid 1 milliGRAM(s) Oral daily  influenza   Vaccine 0.5 milliLiter(s) IntraMuscular once  multivitamin 1 Tablet(s) Oral daily  OLANZapine 2.5 milliGRAM(s) Oral at bedtime  pantoprazole    Tablet 40 milliGRAM(s) Oral before breakfast  thiamine 100 milliGRAM(s) Oral daily  traZODone 100 milliGRAM(s) Oral at bedtime    MEDICATIONS  (PRN):  acetaminophen     Tablet .. 650 milliGRAM(s) Oral every 6 hours PRN Temp greater or equal to 38C (100.4F), Mild Pain (1 - 3)  ALBUTerol    90 MICROgram(s) HFA Inhaler 2 Puff(s) Inhalation every 6 hours PRN Bronchospasm  aluminum hydroxide/magnesium hydroxide/simethicone Suspension 30 milliLiter(s) Oral every 4 hours PRN Dyspepsia  LORazepam   Injectable 2 milliGRAM(s) IntraMuscular every 1 hour PRN Symptom-triggered: each CIWA -Ar score 8 or GREATER  melatonin 3 milliGRAM(s) Oral at bedtime PRN Insomnia  OLANZapine Injectable 5 milliGRAM(s) IntraMuscular every 8 hours PRN agitation  ondansetron Injectable 4 milliGRAM(s) IV Push every 8 hours PRN Nausea and/or Vomiting  senna 2 Tablet(s) Oral at bedtime PRN Constipation

## 2022-10-06 NOTE — PROCEDURE NOTE - ADDITIONAL PROCEDURE DETAILS
#20 gauge IV sono guided with normal saline flush good heme return to right brachial vein.  Patient tolerated well.

## 2022-10-06 NOTE — CHART NOTE - NSCHARTNOTEFT_GEN_A_CORE
Called by RN, patients  who was present at bedside earlier stated patient is not receiving her appropriate psych meds  I called pts  (as pt is not a reliable source per RN) to obtain pharmacy information where pt supposedly fills her psych meds, no answer  [phone]   To be tried again tomorrow
Pt seen at bedside with RN present. Pleasantly confused.   With multiple attempts to get out of bed. Appears unsteady on feet.   Received Seroquel and Trazodone.   WIll place on 1:1 for safety overnight.
With multiple attempts to get out of bed. Appears unsteady on feet.   VSS NAD  WIll place on 1:1 for safety overnight.
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
## 2022-10-06 NOTE — BH CONSULTATION LIAISON ASSESSMENT NOTE - NSBHCHARTREVIEWVS_PSY_A_CORE FT
Vital Signs Last 24 Hrs  T(C): 37.2 (06 Oct 2022 02:20), Max: 37.3 (05 Oct 2022 19:26)  T(F): 98.9 (06 Oct 2022 02:20), Max: 99.2 (05 Oct 2022 19:26)  HR: 92 (05 Oct 2022 19:26) (92 - 92)  BP: 138/84 (06 Oct 2022 02:20) (126/84 - 140/90)  BP(mean): --  RR: 20 (06 Oct 2022 02:20) (18 - 20)  SpO2: 99% (06 Oct 2022 02:20) (99% - 100%)    Parameters below as of 06 Oct 2022 02:20  Patient On (Oxygen Delivery Method): room air

## 2022-10-06 NOTE — BH CONSULTATION LIAISON ASSESSMENT NOTE - NSBHCHARTREVIEWLAB_PSY_A_CORE FT
Basic Metabolic Panel in AM (09.13.22 @ 06:40)    Sodium, Serum: 138 mmol/L    Potassium, Serum: 4.3 mmol/L    Chloride, Serum: 103 mmol/L    Carbon Dioxide, Serum: 23.0 mmol/L    Anion Gap, Serum: 12 mmol/L    Blood Urea Nitrogen, Serum: 13.4 mg/dL    Creatinine, Serum: 0.62 mg/dL    Glucose, Serum: 82 mg/dL    Calcium, Total Serum: 9.7: Prior Reference Range of 8.6 – 10.2 was amended as of 7/26/2022 to 8.4 –  10.5. mg/dL    eGFR: 106: The estimated glomerular filtration rate (eGFR) is calculated using the  2021 CKD-EPI creatinine equation, which does not have a coefficient for  race and is validated in individuals 18 years of age and older (N Engl J  Med 2021; 385:8406-1041). Creatinine-based eGFR may be inaccurate in  various situations including but not limited to extremes of muscle mass,  altered dietary protein intake, or medications that affect renal tubular  creatinine secretion. mL/min/1.73m2

## 2022-10-06 NOTE — PATIENT PROFILE ADULT - FALL HARM RISK - HARM RISK INTERVENTIONS
Assistance with ambulation/Assistance OOB with selected safe patient handling equipment/Communicate Risk of Fall with Harm to all staff/Monitor for mental status changes/Monitor gait and stability/Reinforce activity limits and safety measures with patient and family/Tailored Fall Risk Interventions/Toileting schedule using arm’s reach rule for commode and bathroom/Use of alarms - bed, chair and/or voice tab/Visual Cue: Yellow wristband and red socks/Bed in lowest position, wheels locked, appropriate side rails in place/Call bell, personal items and telephone in reach/Instruct patient to call for assistance before getting out of bed or chair/Non-slip footwear when patient is out of bed/[place] to call system/Physically safe environment - no spills, clutter or unnecessary equipment/Purposeful Proactive Rounding/Room/bathroom lighting operational, light cord in reach
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
## 2022-10-07 LAB
ALBUMIN SERPL ELPH-MCNC: 3.3 G/DL — SIGNIFICANT CHANGE UP (ref 3.3–5.2)
ALP SERPL-CCNC: 81 U/L — SIGNIFICANT CHANGE UP (ref 40–120)
ALT FLD-CCNC: 10 U/L — SIGNIFICANT CHANGE UP
ANION GAP SERPL CALC-SCNC: 13 MMOL/L — SIGNIFICANT CHANGE UP (ref 5–17)
AST SERPL-CCNC: 24 U/L — SIGNIFICANT CHANGE UP
BILIRUB SERPL-MCNC: <0.2 MG/DL — LOW (ref 0.4–2)
BUN SERPL-MCNC: 13.2 MG/DL — SIGNIFICANT CHANGE UP (ref 8–20)
CALCIUM SERPL-MCNC: 8.9 MG/DL — SIGNIFICANT CHANGE UP (ref 8.4–10.5)
CHLORIDE SERPL-SCNC: 98 MMOL/L — SIGNIFICANT CHANGE UP (ref 98–107)
CO2 SERPL-SCNC: 21 MMOL/L — LOW (ref 22–29)
CREAT SERPL-MCNC: 0.61 MG/DL — SIGNIFICANT CHANGE UP (ref 0.5–1.3)
CULTURE RESULTS: SIGNIFICANT CHANGE UP
CULTURE RESULTS: SIGNIFICANT CHANGE UP
EGFR: 107 ML/MIN/1.73M2 — SIGNIFICANT CHANGE UP
GLUCOSE SERPL-MCNC: 82 MG/DL — SIGNIFICANT CHANGE UP (ref 70–99)
MAGNESIUM SERPL-MCNC: 1.8 MG/DL — SIGNIFICANT CHANGE UP (ref 1.6–2.6)
POTASSIUM SERPL-MCNC: 4.3 MMOL/L — SIGNIFICANT CHANGE UP (ref 3.5–5.3)
POTASSIUM SERPL-SCNC: 4.3 MMOL/L — SIGNIFICANT CHANGE UP (ref 3.5–5.3)
PROT SERPL-MCNC: 6.3 G/DL — LOW (ref 6.6–8.7)
SODIUM SERPL-SCNC: 132 MMOL/L — LOW (ref 135–145)
SPECIMEN SOURCE: SIGNIFICANT CHANGE UP

## 2022-10-07 PROCEDURE — 99232 SBSQ HOSP IP/OBS MODERATE 35: CPT

## 2022-10-07 PROCEDURE — 99233 SBSQ HOSP IP/OBS HIGH 50: CPT

## 2022-10-07 RX ADMIN — BUDESONIDE AND FORMOTEROL FUMARATE DIHYDRATE 2 PUFF(S): 160; 4.5 AEROSOL RESPIRATORY (INHALATION) at 21:09

## 2022-10-07 RX ADMIN — DIVALPROEX SODIUM 500 MILLIGRAM(S): 500 TABLET, DELAYED RELEASE ORAL at 18:11

## 2022-10-07 RX ADMIN — BUDESONIDE AND FORMOTEROL FUMARATE DIHYDRATE 2 PUFF(S): 160; 4.5 AEROSOL RESPIRATORY (INHALATION) at 08:16

## 2022-10-07 RX ADMIN — Medication 650 MILLIGRAM(S): at 18:11

## 2022-10-07 RX ADMIN — Medication 100 MILLIGRAM(S): at 12:44

## 2022-10-07 RX ADMIN — Medication 325 MILLIGRAM(S): at 12:45

## 2022-10-07 RX ADMIN — DIVALPROEX SODIUM 500 MILLIGRAM(S): 500 TABLET, DELAYED RELEASE ORAL at 05:39

## 2022-10-07 RX ADMIN — OLANZAPINE 2.5 MILLIGRAM(S): 15 TABLET, FILM COATED ORAL at 21:13

## 2022-10-07 RX ADMIN — Medication 100 MILLIGRAM(S): at 21:13

## 2022-10-07 RX ADMIN — Medication 1 MILLIGRAM(S): at 12:45

## 2022-10-07 RX ADMIN — Medication 650 MILLIGRAM(S): at 18:56

## 2022-10-07 RX ADMIN — Medication 1 TABLET(S): at 12:45

## 2022-10-07 RX ADMIN — PANTOPRAZOLE SODIUM 40 MILLIGRAM(S): 20 TABLET, DELAYED RELEASE ORAL at 05:39

## 2022-10-07 RX ADMIN — OLANZAPINE 5 MILLIGRAM(S): 15 TABLET, FILM COATED ORAL at 19:59
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
## 2022-10-07 NOTE — BH CONSULTATION LIAISON PROGRESS NOTE - NSBHCHARTREVIEWLAB_PSY_A_CORE FT
Basic Metabolic Panel in AM (09.13.22 @ 06:40)    Sodium, Serum: 138 mmol/L    Potassium, Serum: 4.3 mmol/L    Chloride, Serum: 103 mmol/L    Carbon Dioxide, Serum: 23.0 mmol/L    Anion Gap, Serum: 12 mmol/L    Blood Urea Nitrogen, Serum: 13.4 mg/dL    Creatinine, Serum: 0.62 mg/dL    Glucose, Serum: 82 mg/dL    Calcium, Total Serum: 9.7: Prior Reference Range of 8.6 – 10.2 was amended as of 7/26/2022 to 8.4 –  10.5. mg/dL    eGFR: 106: The estimated glomerular filtration rate (eGFR) is calculated using the  2021 CKD-EPI creatinine equation, which does not have a coefficient for  race and is validated in individuals 18 years of age and older (N Engl J  Med 2021; 385:2028-9724). Creatinine-based eGFR may be inaccurate in  various situations including but not limited to extremes of muscle mass,  altered dietary protein intake, or medications that affect renal tubular  creatinine secretion. mL/min/1.73m2

## 2022-10-07 NOTE — BH CONSULTATION LIAISON PROGRESS NOTE - NSBHCHARTREVIEWVS_PSY_A_CORE FT
Vital Signs Last 24 Hrs  T(C): 36.6 (07 Oct 2022 12:52), Max: 36.8 (06 Oct 2022 19:53)  T(F): 97.9 (07 Oct 2022 12:52), Max: 98.2 (06 Oct 2022 19:53)  HR: 86 (07 Oct 2022 12:52) (67 - 125)  BP: 122/88 (07 Oct 2022 12:52) (106/73 - 154/92)  BP(mean): --  RR: 18 (07 Oct 2022 12:52) (18 - 18)  SpO2: 98% (07 Oct 2022 12:52) (95% - 98%)    Parameters below as of 07 Oct 2022 12:52  Patient On (Oxygen Delivery Method): room air

## 2022-10-08 PROCEDURE — 99233 SBSQ HOSP IP/OBS HIGH 50: CPT

## 2022-10-08 RX ADMIN — Medication 325 MILLIGRAM(S): at 12:55

## 2022-10-08 RX ADMIN — Medication 1 TABLET(S): at 12:56

## 2022-10-08 RX ADMIN — DIVALPROEX SODIUM 500 MILLIGRAM(S): 500 TABLET, DELAYED RELEASE ORAL at 06:03

## 2022-10-08 RX ADMIN — BUDESONIDE AND FORMOTEROL FUMARATE DIHYDRATE 2 PUFF(S): 160; 4.5 AEROSOL RESPIRATORY (INHALATION) at 08:50

## 2022-10-08 RX ADMIN — Medication 1 MILLIGRAM(S): at 12:55

## 2022-10-08 RX ADMIN — PANTOPRAZOLE SODIUM 40 MILLIGRAM(S): 20 TABLET, DELAYED RELEASE ORAL at 06:03

## 2022-10-08 RX ADMIN — Medication 100 MILLIGRAM(S): at 12:56

## 2022-10-08 RX ADMIN — DIVALPROEX SODIUM 500 MILLIGRAM(S): 500 TABLET, DELAYED RELEASE ORAL at 18:14

## 2022-10-08 RX ADMIN — Medication 100 MILLIGRAM(S): at 20:44

## 2022-10-08 RX ADMIN — OLANZAPINE 2.5 MILLIGRAM(S): 15 TABLET, FILM COATED ORAL at 20:43

## 2022-10-08 RX ADMIN — BUDESONIDE AND FORMOTEROL FUMARATE DIHYDRATE 2 PUFF(S): 160; 4.5 AEROSOL RESPIRATORY (INHALATION) at 20:43

## 2022-10-09 LAB
ANION GAP SERPL CALC-SCNC: 16 MMOL/L — SIGNIFICANT CHANGE UP (ref 5–17)
BASOPHILS # BLD AUTO: 0.05 K/UL — SIGNIFICANT CHANGE UP (ref 0–0.2)
BASOPHILS NFR BLD AUTO: 0.4 % — SIGNIFICANT CHANGE UP (ref 0–2)
BUN SERPL-MCNC: 13.3 MG/DL — SIGNIFICANT CHANGE UP (ref 8–20)
CALCIUM SERPL-MCNC: 9.4 MG/DL — SIGNIFICANT CHANGE UP (ref 8.4–10.5)
CHLORIDE SERPL-SCNC: 97 MMOL/L — LOW (ref 98–107)
CO2 SERPL-SCNC: 19 MMOL/L — LOW (ref 22–29)
CREAT SERPL-MCNC: 0.72 MG/DL — SIGNIFICANT CHANGE UP (ref 0.5–1.3)
EGFR: 100 ML/MIN/1.73M2 — SIGNIFICANT CHANGE UP
EOSINOPHIL # BLD AUTO: 0 K/UL — SIGNIFICANT CHANGE UP (ref 0–0.5)
EOSINOPHIL NFR BLD AUTO: 0 % — SIGNIFICANT CHANGE UP (ref 0–6)
GLUCOSE SERPL-MCNC: 77 MG/DL — SIGNIFICANT CHANGE UP (ref 70–99)
HCT VFR BLD CALC: 30.8 % — LOW (ref 34.5–45)
HGB BLD-MCNC: 10 G/DL — LOW (ref 11.5–15.5)
IMM GRANULOCYTES NFR BLD AUTO: 0.4 % — SIGNIFICANT CHANGE UP (ref 0–0.9)
LYMPHOCYTES # BLD AUTO: 0.58 K/UL — LOW (ref 1–3.3)
LYMPHOCYTES # BLD AUTO: 5.1 % — LOW (ref 13–44)
MAGNESIUM SERPL-MCNC: 1.5 MG/DL — LOW (ref 1.6–2.6)
MCHC RBC-ENTMCNC: 25.3 PG — LOW (ref 27–34)
MCHC RBC-ENTMCNC: 32.5 GM/DL — SIGNIFICANT CHANGE UP (ref 32–36)
MCV RBC AUTO: 77.8 FL — LOW (ref 80–100)
MONOCYTES # BLD AUTO: 0.69 K/UL — SIGNIFICANT CHANGE UP (ref 0–0.9)
MONOCYTES NFR BLD AUTO: 6 % — SIGNIFICANT CHANGE UP (ref 2–14)
NEUTROPHILS # BLD AUTO: 10.07 K/UL — HIGH (ref 1.8–7.4)
NEUTROPHILS NFR BLD AUTO: 88.1 % — HIGH (ref 43–77)
PLATELET # BLD AUTO: 336 K/UL — SIGNIFICANT CHANGE UP (ref 150–400)
POTASSIUM SERPL-MCNC: 4.2 MMOL/L — SIGNIFICANT CHANGE UP (ref 3.5–5.3)
POTASSIUM SERPL-SCNC: 4.2 MMOL/L — SIGNIFICANT CHANGE UP (ref 3.5–5.3)
RBC # BLD: 3.96 M/UL — SIGNIFICANT CHANGE UP (ref 3.8–5.2)
RBC # FLD: 25.1 % — HIGH (ref 10.3–14.5)
SODIUM SERPL-SCNC: 132 MMOL/L — LOW (ref 135–145)
WBC # BLD: 11.44 K/UL — HIGH (ref 3.8–10.5)
WBC # FLD AUTO: 11.44 K/UL — HIGH (ref 3.8–10.5)

## 2022-10-09 PROCEDURE — 99232 SBSQ HOSP IP/OBS MODERATE 35: CPT

## 2022-10-09 RX ORDER — MAGNESIUM SULFATE 500 MG/ML
1 VIAL (ML) INJECTION ONCE
Refills: 0 | Status: COMPLETED | OUTPATIENT
Start: 2022-10-09 | End: 2022-10-09

## 2022-10-09 RX ADMIN — OLANZAPINE 2.5 MILLIGRAM(S): 15 TABLET, FILM COATED ORAL at 23:15

## 2022-10-09 RX ADMIN — PANTOPRAZOLE SODIUM 40 MILLIGRAM(S): 20 TABLET, DELAYED RELEASE ORAL at 05:55

## 2022-10-09 RX ADMIN — Medication 1 MILLIGRAM(S): at 12:52

## 2022-10-09 RX ADMIN — Medication 325 MILLIGRAM(S): at 12:51

## 2022-10-09 RX ADMIN — Medication 100 MILLIGRAM(S): at 23:15

## 2022-10-09 RX ADMIN — Medication 1 TABLET(S): at 12:51

## 2022-10-09 RX ADMIN — BUDESONIDE AND FORMOTEROL FUMARATE DIHYDRATE 2 PUFF(S): 160; 4.5 AEROSOL RESPIRATORY (INHALATION) at 21:41

## 2022-10-09 RX ADMIN — Medication 100 MILLIGRAM(S): at 12:51

## 2022-10-09 RX ADMIN — Medication 100 GRAM(S): at 15:23

## 2022-10-09 RX ADMIN — DIVALPROEX SODIUM 500 MILLIGRAM(S): 500 TABLET, DELAYED RELEASE ORAL at 18:39

## 2022-10-09 RX ADMIN — DIVALPROEX SODIUM 500 MILLIGRAM(S): 500 TABLET, DELAYED RELEASE ORAL at 05:55

## 2022-10-09 RX ADMIN — Medication 3 MILLIGRAM(S): at 23:16

## 2022-10-09 NOTE — PROGRESS NOTE ADULT - ASSESSMENT
53 y/o female with PMH of seizure, PTSD, anxiety, depression, polysubstance abuse came to the ED complaining of difficulty breathing. Patient said it is chronic, has been going on for a long time. She also endorse drinking beer. Found to have Na: 123, K: 3.1, CIWA: 4. Patient admitted to medicine and course now complicated by severe agitation. Patient is now on 1:1    Electrolytes abnormalities   supplement magnesium     EtOH abuse   Thiamine for possible thiamine deficiency due to EtOH abuse   advised to quit    Hx of microcytic anemia   Patient was seen by GI during prior admission, scheduled for EGD/colonoscopy but not done because patient developed hypoxia due to PNA   Hb stable and was told to follow GI outpatient     Seizure   Continue Divalproex 500mg bid   Seizure precautions     COPD   Not in acute exacerbation      Anxiety/depression/PTSD   Trazodone 50mg HS   psych consult  - zyprexa at [facility]     
53 y/o female with PMH of seizure, PTSD, anxiety, depression, polysubstance abuse came to the ED complaining of difficulty breathing. Patient said it is chronic, has been going on for a long time. She also endorse drinking beer. Found to have Na: 123, K: 3.1, CIWA: 4. Patient admitted to medicine and course now complicated by severe agitation. Patient is now on 1:1    #Electrolytes abnormalities -hypomagnesemia    mg supplementation ordered       #EtOH abuse with possible withdrawal  On CIWA protocol   CIWA score is 1-4   Still on 1 to 1 observation   c/w Thiamin and folic acid     #Hx of microcytic anemia   Patient was seen by GI during prior admission, scheduled for EGD/colonoscopy but not done because patient developed hypoxia due to PNA   Hb stable and was told to follow GI outpatient     #Seizure   Continue Divalproex 500mg bid   Seizure precautions     #COPD   Not in acute exacerbation      #Anxiety/depression/PTSD   Trazodone 50mg HS   psych consult  zyprexa at [facility]     DISPO: ambulates with standby assist -as mentation improves can plan for home eventually  
51 y/o female with PMH of seizure, PTSD, anxiety, depression, polysubstance abuse came to the ED complaining of difficulty breathing. Patient said it is chronic, has been going on for a long time. She also endorse drinking beer. Found to have Na: 123, K: 3.1, CIWA: 4. Patient admitted to medicine and course now complicated by severe agitation. Patient is now on 1:1    #Electrolytes abnormalities   supplement magnesium     #EtOH abuse with possible withdrawal  Thiamine for possible thiamine deficiency due to EtOH abuse  advised to quit  CIWA while here    #Hx of microcytic anemia   Patient was seen by GI during prior admission, scheduled for EGD/colonoscopy but not done because patient developed hypoxia due to PNA   Hb stable and was told to follow GI outpatient     #Seizure   Continue Divalproex 500mg bid   Seizure precautions     #COPD   Not in acute exacerbation      #Anxiety/depression/PTSD   Trazodone 50mg HS   psych consult  zyprexa at [facility]     DISPO: ambulates with standby assist -as mentation improves can plan for home eventually  
51 y/o female with PMH of seizure, PTSD, anxiety, depression, polysubstance abuse came to the ED complaining of difficulty breathing. Patient said it is chronic, has been going on for a long time. She also endorse drinking beer. Found to have Na: 123, K: 3.1, CIWA: 4. Patient admitted to medicine and course now complicated by severe agitation. Patient is now on 1:1    #Electrolytes abnormalities -hypomagnesemia   Improved s/p mg supplementation       #EtOH abuse with possible withdrawal  On CIWA protocol   CIWA score is 1-4   Still on 1 to 1 observation   c/w Thiamin and folic acid     #Hx of microcytic anemia   Patient was seen by GI during prior admission, scheduled for EGD/colonoscopy but not done because patient developed hypoxia due to PNA   Hb stable and was told to follow GI outpatient     #Seizure   Continue Divalproex 500mg bid   Seizure precautions     #COPD   Not in acute exacerbation      #Anxiety/depression/PTSD   Trazodone 50mg HS   psych consult  zyprexa at [facility]     DISPO: ambulates with standby assist -as mentation improves can plan for home eventually

## 2022-10-09 NOTE — PROGRESS NOTE ADULT - SUBJECTIVE AND OBJECTIVE BOX
CC: Electrolytes abnormalities/ EtOH (06 Oct 2022 11:26)    INTERVAL HPI/OVERNIGHT EVENTS:  no acute events overnight    Vital Signs Last 24 Hrs  T(C): 36.6 (07 Oct 2022 12:52), Max: 36.8 (06 Oct 2022 19:53)  T(F): 97.9 (07 Oct 2022 12:52), Max: 98.2 (06 Oct 2022 19:53)  HR: 86 (07 Oct 2022 12:52) (67 - 125)  BP: 122/88 (07 Oct 2022 12:52) (106/73 - 154/92)  BP(mean): --  RR: 18 (07 Oct 2022 12:52) (18 - 18)  SpO2: 98% (07 Oct 2022 12:52) (95% - 98%)    Parameters below as of 07 Oct 2022 12:52  Patient On (Oxygen Delivery Method): room air    PHYSICAL EXAM:  General: in no acute distress  Eyes: PERRLA, EOMI; conjunctiva and sclera clear  Head: Normocephalic; atraumatic  ENMT: No nasal discharge; airway clear  Neck: Supple; non tender; no masses  Respiratory: No wheezes, rales or rhonchi  Cardiovascular: Regular rate and rhythm. S1 and S2 Normal; No murmurs, gallops or rubs  Gastrointestinal: Soft non-tender non-distended; Normal bowel sounds  Genitourinary: No costovertebral angle tenderness  Extremities: Normal range of motion, No clubbing, cyanosis or edema  Vascular: Peripheral pulses palpable 2+ bilaterally  Neurological: Alert and oriented x4  Skin: Warm and dry. No acute rash  Psychiatric: Cooperative and appropriate    I&O's Detail    06 Oct 2022 07:01  -  07 Oct 2022 07:00  --------------------------------------------------------  IN:    Oral Fluid: 236 mL  Total IN: 236 mL    OUT:  Total OUT: 0 mL    Total NET: 236 mL                        10.2   3.99  )-----------( 353      ( 06 Oct 2022 08:45 )             33.1     07 Oct 2022 05:45    132    |  98     |  13.2   ----------------------------<  82     4.3     |  21.0   |  0.61     Ca    8.9        07 Oct 2022 05:45  Mg     1.8       07 Oct 2022 05:45    TPro  6.3    /  Alb  3.3    /  TBili  <0.2   /  DBili  x      /  AST  24     /  ALT  10     /  AlkPhos  81     07 Oct 2022 05:45    CAPILLARY BLOOD GLUCOSE    LIVER FUNCTIONS - ( 07 Oct 2022 05:45 )  Alb: 3.3 g/dL / Pro: 6.3 g/dL / ALK PHOS: 81 U/L / ALT: 10 U/L / AST: 24 U/L / GGT: x           MEDICATIONS  (STANDING):  budesonide  80 MICROgram(s)/formoterol 4.5 MICROgram(s) Inhaler 2 Puff(s) Inhalation two times a day  diVALproex  milliGRAM(s) Oral two times a day  ferrous    sulfate 325 milliGRAM(s) Oral daily  folic acid 1 milliGRAM(s) Oral daily  influenza   Vaccine 0.5 milliLiter(s) IntraMuscular once  multivitamin 1 Tablet(s) Oral daily  OLANZapine 2.5 milliGRAM(s) Oral at bedtime  pantoprazole    Tablet 40 milliGRAM(s) Oral before breakfast  thiamine 100 milliGRAM(s) Oral daily  traZODone 100 milliGRAM(s) Oral at bedtime    MEDICATIONS  (PRN):  acetaminophen     Tablet .. 650 milliGRAM(s) Oral every 6 hours PRN Temp greater or equal to 38C (100.4F), Mild Pain (1 - 3)  ALBUTerol    90 MICROgram(s) HFA Inhaler 2 Puff(s) Inhalation every 6 hours PRN Bronchospasm  aluminum hydroxide/magnesium hydroxide/simethicone Suspension 30 milliLiter(s) Oral every 4 hours PRN Dyspepsia  LORazepam   Injectable 2 milliGRAM(s) IntraMuscular every 1 hour PRN Symptom-triggered: each CIWA -Ar score 8 or GREATER  melatonin 3 milliGRAM(s) Oral at bedtime PRN Insomnia  OLANZapine Injectable 5 milliGRAM(s) IntraMuscular every 8 hours PRN agitation  ondansetron Injectable 4 milliGRAM(s) IV Push every 8 hours PRN Nausea and/or Vomiting  senna 2 Tablet(s) Oral at bedtime PRN Constipation      RADIOLOGY & ADDITIONAL TESTS:
Patient is a 52y old  Female who presents with a chief complaint of Electrolytes abnormalities/ EtOH (07 Oct 2022 15:26)      INTERVAL HPI/OVERNIGHT EVENTS: seen and examined. no complains.   Per aid at bedside, pt is calm and cooperative now  CIWA 1-4    MEDICATIONS  (STANDING):  budesonide  80 MICROgram(s)/formoterol 4.5 MICROgram(s) Inhaler 2 Puff(s) Inhalation two times a day  diVALproex  milliGRAM(s) Oral two times a day  ferrous    sulfate 325 milliGRAM(s) Oral daily  folic acid 1 milliGRAM(s) Oral daily  influenza   Vaccine 0.5 milliLiter(s) IntraMuscular once  multivitamin 1 Tablet(s) Oral daily  OLANZapine 2.5 milliGRAM(s) Oral at bedtime  pantoprazole    Tablet 40 milliGRAM(s) Oral before breakfast  thiamine 100 milliGRAM(s) Oral daily  traZODone 100 milliGRAM(s) Oral at bedtime    MEDICATIONS  (PRN):  acetaminophen     Tablet .. 650 milliGRAM(s) Oral every 6 hours PRN Temp greater or equal to 38C (100.4F), Mild Pain (1 - 3)  ALBUTerol    90 MICROgram(s) HFA Inhaler 2 Puff(s) Inhalation every 6 hours PRN Bronchospasm  aluminum hydroxide/magnesium hydroxide/simethicone Suspension 30 milliLiter(s) Oral every 4 hours PRN Dyspepsia  LORazepam   Injectable 2 milliGRAM(s) IntraMuscular every 1 hour PRN Symptom-triggered: each CIWA -Ar score 8 or GREATER  melatonin 3 milliGRAM(s) Oral at bedtime PRN Insomnia  OLANZapine Injectable 5 milliGRAM(s) IntraMuscular every 8 hours PRN agitation  ondansetron Injectable 4 milliGRAM(s) IV Push every 8 hours PRN Nausea and/or Vomiting  senna 2 Tablet(s) Oral at bedtime PRN Constipation      Allergies    No Known Allergies    Intolerances        REVIEW OF SYSTEMS:  CONSTITUTIONAL: No fever, weight loss, or fatigue  RESPIRATORY: No cough, wheezing, chills or hemoptysis; No shortness of breath  CARDIOVASCULAR: No chest pain, palpitations, dizziness, or leg swelling  GASTROINTESTINAL: No abdominal or epigastric pain. No nausea, vomiting, or hematemesis; No diarrhea or constipation. No melena or hematochezia.  NEUROLOGICAL: No headaches, memory loss, loss of strength, numbness, or tremors  MUSCULOSKELETAL: No joint pain or swelling; No muscle, back, or extremity pain      Vital Signs Last 24 Hrs  T(C): 36.4 (08 Oct 2022 05:34), Max: 36.7 (07 Oct 2022 21:00)  T(F): 97.6 (08 Oct 2022 05:34), Max: 98 (07 Oct 2022 21:00)  HR: 82 (08 Oct 2022 05:34) (69 - 82)  BP: 125/83 (08 Oct 2022 05:34) (125/83 - 164/106)  BP(mean): --  RR: 20 (08 Oct 2022 05:34) (19 - 20)  SpO2: 96% (08 Oct 2022 05:34) (96% - 98%)    Parameters below as of 08 Oct 2022 09:00  Patient On (Oxygen Delivery Method): room air        PHYSICAL EXAM:  GENERAL: NAD,   HEAD:  Atraumatic, Normocephalic  EYES: EOMI, PERRLA, conjunctiva and sclera clear  NECK: Supple, No JVD, Normal thyroid  NERVOUS SYSTEM:  Alert & Oriented X3, No gross focal deficits  CHEST/LUNG: Clear to percussion bilaterally; No rales, rhonchi, wheezing, or rubs  HEART: Regular rate and rhythm; No murmurs, rubs, or gallops  ABDOMEN: Soft, Nontender, Nondistended; Bowel sounds present  EXTREMITIES:  hand tremors   SKIN: No rashes or lesions    LABS:    10-07    132<L>  |  98  |  13.2  ----------------------------<  82  4.3   |  21.0<L>  |  0.61    Ca    8.9      07 Oct 2022 05:45  Mg     1.8     10-07    TPro  6.3<L>  /  Alb  3.3  /  TBili  <0.2<L>  /  DBili  x   /  AST  24  /  ALT  10  /  AlkPhos  81  10-07        CAPILLARY BLOOD GLUCOSE          RADIOLOGY & ADDITIONAL TESTS:    Imaging Personally Reviewed:  [ ] YES  [ ] NO    Consultant(s) Notes Reviewed:  [ ] YES  [ ] NO    Care Discussed with Consultants/Other Providers [ ] YES  [ ] NO    Plan of Care discussed with Housestaff [ ]YES [ ] NO
Patient is a 52y old  Female who presents with a chief complaint of Electrolytes abnormalities/ EtOH (07 Oct 2022 15:26)      INTERVAL HPI/OVERNIGHT EVENTS: seen and examined. no complains.   Per aid at bedside, pt is calm and cooperative now, but gets very anxious around 5 PM   CIWA 1-4    MEDICATIONS  (STANDING):  budesonide  80 MICROgram(s)/formoterol 4.5 MICROgram(s) Inhaler 2 Puff(s) Inhalation two times a day  diVALproex  milliGRAM(s) Oral two times a day  ferrous    sulfate 325 milliGRAM(s) Oral daily  folic acid 1 milliGRAM(s) Oral daily  influenza   Vaccine 0.5 milliLiter(s) IntraMuscular once  multivitamin 1 Tablet(s) Oral daily  OLANZapine 2.5 milliGRAM(s) Oral at bedtime  pantoprazole    Tablet 40 milliGRAM(s) Oral before breakfast  thiamine 100 milliGRAM(s) Oral daily  traZODone 100 milliGRAM(s) Oral at bedtime    MEDICATIONS  (PRN):  acetaminophen     Tablet .. 650 milliGRAM(s) Oral every 6 hours PRN Temp greater or equal to 38C (100.4F), Mild Pain (1 - 3)  ALBUTerol    90 MICROgram(s) HFA Inhaler 2 Puff(s) Inhalation every 6 hours PRN Bronchospasm  aluminum hydroxide/magnesium hydroxide/simethicone Suspension 30 milliLiter(s) Oral every 4 hours PRN Dyspepsia  LORazepam   Injectable 2 milliGRAM(s) IntraMuscular every 1 hour PRN Symptom-triggered: each CIWA -Ar score 8 or GREATER  melatonin 3 milliGRAM(s) Oral at bedtime PRN Insomnia  OLANZapine Injectable 5 milliGRAM(s) IntraMuscular every 8 hours PRN agitation  ondansetron Injectable 4 milliGRAM(s) IV Push every 8 hours PRN Nausea and/or Vomiting  senna 2 Tablet(s) Oral at bedtime PRN Constipation      Allergies    No Known Allergies    Intolerances        REVIEW OF SYSTEMS:  CONSTITUTIONAL: No fever, weight loss, or fatigue  RESPIRATORY: No cough, wheezing, chills or hemoptysis; No shortness of breath  CARDIOVASCULAR: No chest pain, palpitations, dizziness, or leg swelling  GASTROINTESTINAL: No abdominal or epigastric pain. No nausea, vomiting, or hematemesis; No diarrhea or constipation. No melena or hematochezia.  NEUROLOGICAL: No headaches, memory loss, loss of strength, numbness, or tremors  MUSCULOSKELETAL: No joint pain or swelling; No muscle, back, or extremity pain      Vital Signs Last 24 Hrs  T(C): 36.4 (08 Oct 2022 05:34), Max: 36.7 (07 Oct 2022 21:00)  T(F): 97.6 (08 Oct 2022 05:34), Max: 98 (07 Oct 2022 21:00)  HR: 82 (08 Oct 2022 05:34) (69 - 82)  BP: 125/83 (08 Oct 2022 05:34) (125/83 - 164/106)  BP(mean): --  RR: 20 (08 Oct 2022 05:34) (19 - 20)  SpO2: 96% (08 Oct 2022 05:34) (96% - 98%)    Parameters below as of 08 Oct 2022 09:00  Patient On (Oxygen Delivery Method): room air        PHYSICAL EXAM:  GENERAL: NAD,   HEAD:  Atraumatic, Normocephalic  EYES: EOMI, PERRLA, conjunctiva and sclera clear  NECK: Supple, No JVD, Normal thyroid  NERVOUS SYSTEM:  Alert & Oriented X3, No gross focal deficits  CHEST/LUNG: Clear to percussion bilaterally; No rales, rhonchi, wheezing, or rubs  HEART: Regular rate and rhythm; No murmurs, rubs, or gallops  ABDOMEN: Soft, Nontender, Nondistended; Bowel sounds present  EXTREMITIES:  hand tremors   SKIN: No rashes or lesions    LABS:    10-07    132<L>  |  98  |  13.2  ----------------------------<  82  4.3   |  21.0<L>  |  0.61    Ca    8.9      07 Oct 2022 05:45  Mg     1.8     10-07    TPro  6.3<L>  /  Alb  3.3  /  TBili  <0.2<L>  /  DBili  x   /  AST  24  /  ALT  10  /  AlkPhos  81  10-07        CAPILLARY BLOOD GLUCOSE          RADIOLOGY & ADDITIONAL TESTS:    Imaging Personally Reviewed:  [ ] YES  [ ] NO    Consultant(s) Notes Reviewed:  [ ] YES  [ ] NO    Care Discussed with Consultants/Other Providers [ ] YES  [ ] NO    Plan of Care discussed with Housestaff [ ]YES [ ] NO
[facility]    [id number]    52y      Female    INTERVAL HPI/OVERNIGHT EVENTS: patient being seen for psych issues and alcohol withdrawal. Patient seen at bedside and is agitated    patient is asking for ritalin.       REVIEW OF SYSTEMS:    CONSTITUTIONAL: No fever, weight loss, or fatigue  RESPIRATORY: No cough, wheezing, hemoptysis; No shortness of breath  CARDIOVASCULAR: No chest pain, palpitations  GASTROINTESTINAL: No abdominal or epigastric pain. No nausea, vomiting  NEUROLOGICAL: No headaches, memory loss, loss of strength.  MISCELLANEOUS:      Vital Signs Last 24 Hrs  T(C): 37.2 (06 Oct 2022 02:20), Max: 37.3 (05 Oct 2022 19:26)  T(F): 98.9 (06 Oct 2022 02:20), Max: 99.2 (05 Oct 2022 19:26)  HR: 92 (05 Oct 2022 19:26) (92 - 94)  BP: 138/84 (06 Oct 2022 02:20) (126/84 - 140/90)  BP(mean): --  RR: 20 (06 Oct 2022 02:20) (18 - 20)  SpO2: 99% (06 Oct 2022 02:20) (94% - 100%)    Parameters below as of 06 Oct 2022 02:20  Patient On (Oxygen Delivery Method): room air        PHYSICAL EXAM:  · Constitutional	agitated  · Eyes	PERRL; EOMI; conjunctiva clear  · Respiratory	clear to auscultation bilaterally; no wheezes; no respiratory distress  · Respiratory Comments	speaking in full sentence, on RA  · Cardiovascular	regular rate and rhythm; S1 S2 present; no pedal edema  · Gastrointestinal	soft; nontender; nondistended; normal active bowel sounds  · Neurological	sensation intact; responds to verbal commands  · Skin	warm and dry; color normal; no rashes  · Musculoskeletal	ROM intact; no joint swelling; no joint erythema; no joint warmth; no calf tenderness  · Psychiatric	agitated        LABS:                        10.2   3.99  )-----------( 353      ( 06 Oct 2022 08:45 )             33.1     10-    135  |  99  |  7.3<L>  ----------------------------<  84  4.3   |  21.0<L>  |  0.55    Ca    9.7      06 Oct 2022 08:45  Mg     1.4     10-06    TPro  7.5  /  Alb  3.8  /  TBili  0.3<L>  /  DBili  x   /  AST  24  /  ALT  11  /  AlkPhos  71  10-06      Urinalysis Basic - ( 05 Oct 2022 11:45 )    Color: Yellow / Appearance: Clear / S.010 / pH: x  Gluc: x / Ketone: Negative  / Bili: Negative / Urobili: Negative mg/dL   Blood: x / Protein: Negative / Nitrite: Negative   Leuk Esterase: Negative / RBC: 0-2 /HPF / WBC 0-2 /HPF   Sq Epi: x / Non Sq Epi: Occasional / Bacteria: Occasional          MEDICATIONS  (STANDING):  budesonide  80 MICROgram(s)/formoterol 4.5 MICROgram(s) Inhaler 2 Puff(s) Inhalation two times a day  diVALproex  milliGRAM(s) Oral two times a day  ferrous    sulfate 325 milliGRAM(s) Oral daily  folic acid 1 milliGRAM(s) Oral daily  influenza   Vaccine 0.5 milliLiter(s) IntraMuscular once  magnesium sulfate  IVPB 2 Gram(s) IV Intermittent once  multivitamin 1 Tablet(s) Oral daily  OLANZapine 2.5 milliGRAM(s) Oral at bedtime  pantoprazole    Tablet 40 milliGRAM(s) Oral before breakfast  thiamine 100 milliGRAM(s) Oral daily  traZODone 100 milliGRAM(s) Oral at bedtime    MEDICATIONS  (PRN):  acetaminophen     Tablet .. 650 milliGRAM(s) Oral every 6 hours PRN Temp greater or equal to 38C (100.4F), Mild Pain (1 - 3)  ALBUTerol    90 MICROgram(s) HFA Inhaler 2 Puff(s) Inhalation every 6 hours PRN Bronchospasm  aluminum hydroxide/magnesium hydroxide/simethicone Suspension 30 milliLiter(s) Oral every 4 hours PRN Dyspepsia  LORazepam   Injectable 2 milliGRAM(s) IntraMuscular every 1 hour PRN Symptom-triggered: each CIWA -Ar score 8 or GREATER  melatonin 3 milliGRAM(s) Oral at bedtime PRN Insomnia  OLANZapine Injectable 5 milliGRAM(s) IntraMuscular every 8 hours PRN agitation  ondansetron Injectable 4 milliGRAM(s) IV Push every 8 hours PRN Nausea and/or Vomiting  senna 2 Tablet(s) Oral at bedtime PRN Constipation      RADIOLOGY & ADDITIONAL TESTS:

## 2022-10-09 NOTE — PROGRESS NOTE ADULT - REASON FOR ADMISSION
Electrolytes abnormalities/ EtOH

## 2022-10-10 ENCOUNTER — TRANSCRIPTION ENCOUNTER (OUTPATIENT)
Age: 52
End: 2022-10-10

## 2022-10-10 VITALS
DIASTOLIC BLOOD PRESSURE: 68 MMHG | OXYGEN SATURATION: 94 % | HEART RATE: 89 BPM | SYSTOLIC BLOOD PRESSURE: 112 MMHG | RESPIRATION RATE: 20 BRPM | TEMPERATURE: 98 F

## 2022-10-10 PROCEDURE — 99239 HOSP IP/OBS DSCHRG MGMT >30: CPT

## 2022-10-10 RX ADMIN — Medication 1 TABLET(S): at 11:26

## 2022-10-10 RX ADMIN — PANTOPRAZOLE SODIUM 40 MILLIGRAM(S): 20 TABLET, DELAYED RELEASE ORAL at 06:45

## 2022-10-10 RX ADMIN — Medication 1 MILLIGRAM(S): at 11:25

## 2022-10-10 RX ADMIN — DIVALPROEX SODIUM 500 MILLIGRAM(S): 500 TABLET, DELAYED RELEASE ORAL at 06:13

## 2022-10-10 RX ADMIN — Medication 100 MILLIGRAM(S): at 11:26

## 2022-10-10 RX ADMIN — BUDESONIDE AND FORMOTEROL FUMARATE DIHYDRATE 2 PUFF(S): 160; 4.5 AEROSOL RESPIRATORY (INHALATION) at 09:25

## 2022-10-10 RX ADMIN — Medication 325 MILLIGRAM(S): at 11:26

## 2022-10-10 NOTE — DISCHARGE NOTE PROVIDER - HOSPITAL COURSE
53 y/o female with PMH of seizure, PTSD, anxiety, depression, polysubstance abuse came to the ED complaining of difficulty breathing. Patient said it is chronic, has been going on for a long time. She also endorsed drinking beer. In the ED, found to have Na: 123, K: 3.1, CIWA: 4. Patient admitted to medicine for electrolyte abnormalities and ETOH abuse/withdrawal. Pt was started on ciwa protocol. Electrolytes were repleted/corrected. Withdrawal symptoms improved. Pt now stable for DC home. Pt requesting alcohol rehab on discharge. SW made aware. 53 y/o female with PMH of seizure, PTSD, anxiety, depression, polysubstance abuse came to the ED complaining of difficulty breathing. Patient said it is chronic, has been going on for a long time. She also endorsed drinking beer. In the ED, found to have Na: 123, K: 3.1, CIWA: 4. Patient admitted to medicine for electrolyte abnormalities and ETOH abuse/withdrawal. Pt was started on ciwa protocol. Electrolytes were repleted/corrected. Withdrawal symptoms improved. Pt now stable for DC.  Pt requesting alcohol rehab on discharge. SW made aware. 53 y/o female with PMH of seizure, PTSD, anxiety, depression, polysubstance abuse came to the ED complaining of difficulty breathing. Patient said it is chronic, has been going on for a long time. She also endorsed drinking beer. In the ED, found to have Na: 123, K: 3.1, CIWA: 4. Patient admitted to medicine for electrolyte abnormalities and ETOH abuse/withdrawal. Pt was started on ciwa protocol. Electrolytes were repleted/corrected. Withdrawal symptoms improved. Pt now stable for DC.  Pt requesting alcohol rehab and later changed her mind

## 2022-10-10 NOTE — DISCHARGE NOTE PROVIDER - ATTENDING DISCHARGE PHYSICAL EXAMINATION:
Patient seen and examined by myself. NO complains, denies any pain or discomfort. Crying at times. Asking to male arrangement for rehab.   General: sitting on the bed, NAD   Head and neck: normocephalic, no JVD   Cardio: regular rate and rhythm, no murmur   Pulm: clear breath sounds, no wheezing   GI: abdomen is soft, BS (+)   Extr: no edema, no tremors   Neuro: AOx3, no focal weakness   ENT: normal

## 2022-10-10 NOTE — DISCHARGE NOTE PROVIDER - NSDCCPCAREPLAN_GEN_ALL_CORE_FT
PRINCIPAL DISCHARGE DIAGNOSIS  Diagnosis: Hyponatremia  Assessment and Plan of Treatment: -Due to alcohol abuse  -Now resolved      SECONDARY DISCHARGE DIAGNOSES  Diagnosis: Alcohol dependence with withdrawal  Assessment and Plan of Treatment: -Counselled on the importance of abstaining from Alcohol use  -Withdrawal symptoms resolved    Diagnosis: History of seizure  Assessment and Plan of Treatment: -Resume Divalproex    Diagnosis: Anxiety and depression  Assessment and Plan of Treatment: -Resume trazadone

## 2022-10-10 NOTE — DISCHARGE NOTE NURSING/CASE MANAGEMENT/SOCIAL WORK - PATIENT PORTAL LINK FT
You can access the FollowMyHealth Patient Portal offered by [facility] by registering at the following website: [email or web address]. By joining [facility]’s FollowMyHealth portal, you will also be able to view your health information using other applications (apps) compatible with our system.

## 2022-10-20 PROCEDURE — 83735 ASSAY OF MAGNESIUM: CPT

## 2022-10-20 PROCEDURE — 94640 AIRWAY INHALATION TREATMENT: CPT

## 2022-10-20 PROCEDURE — 80048 BASIC METABOLIC PNL TOTAL CA: CPT

## 2022-10-20 PROCEDURE — 86901 BLOOD TYPING SEROLOGIC RH(D): CPT

## 2022-10-20 PROCEDURE — 99285 EMERGENCY DEPT VISIT HI MDM: CPT

## 2022-10-20 PROCEDURE — 81001 URINALYSIS AUTO W/SCOPE: CPT

## 2022-10-20 PROCEDURE — 36415 COLL VENOUS BLD VENIPUNCTURE: CPT

## 2022-10-20 PROCEDURE — 80164 ASSAY DIPROPYLACETIC ACD TOT: CPT

## 2022-10-20 PROCEDURE — 85025 COMPLETE CBC W/AUTO DIFF WBC: CPT

## 2022-10-20 PROCEDURE — 86900 BLOOD TYPING SEROLOGIC ABO: CPT

## 2022-10-20 PROCEDURE — 80307 DRUG TEST PRSMV CHEM ANLYZR: CPT

## 2022-10-20 PROCEDURE — 86850 RBC ANTIBODY SCREEN: CPT

## 2022-10-20 PROCEDURE — 80053 COMPREHEN METABOLIC PANEL: CPT

## 2022-10-20 PROCEDURE — 93005 ELECTROCARDIOGRAM TRACING: CPT

## 2022-10-20 PROCEDURE — 87086 URINE CULTURE/COLONY COUNT: CPT

## 2022-10-20 PROCEDURE — 87637 SARSCOV2&INF A&B&RSV AMP PRB: CPT

## 2022-12-04 DIAGNOSIS — Z12.11 ENCOUNTER FOR SCREENING FOR MALIGNANT NEOPLASM OF COLON: ICD-10-CM

## 2022-12-05 ENCOUNTER — APPOINTMENT (OUTPATIENT)
Dept: GASTROENTEROLOGY | Facility: CLINIC | Age: 52
End: 2022-12-05

## 2022-12-05 ENCOUNTER — INPATIENT (INPATIENT)
Facility: HOSPITAL | Age: 52
LOS: 14 days | Discharge: HOME | DRG: 689 | End: 2022-12-20
Attending: STUDENT IN AN ORGANIZED HEALTH CARE EDUCATION/TRAINING PROGRAM | Admitting: HOSPITALIST
Payer: MEDICARE

## 2022-12-05 VITALS
HEART RATE: 68 BPM | RESPIRATION RATE: 18 BRPM | TEMPERATURE: 97 F | OXYGEN SATURATION: 98 % | WEIGHT: 128.09 LBS | DIASTOLIC BLOOD PRESSURE: 66 MMHG | SYSTOLIC BLOOD PRESSURE: 106 MMHG

## 2022-12-05 DIAGNOSIS — Z98.890 OTHER SPECIFIED POSTPROCEDURAL STATES: Chronic | ICD-10-CM

## 2022-12-05 DIAGNOSIS — I63.9 CEREBRAL INFARCTION, UNSPECIFIED: ICD-10-CM

## 2022-12-05 DIAGNOSIS — Z93.1 GASTROSTOMY STATUS: Chronic | ICD-10-CM

## 2022-12-05 LAB
ALBUMIN SERPL ELPH-MCNC: 3.7 G/DL — SIGNIFICANT CHANGE UP (ref 3.3–5.2)
ALP SERPL-CCNC: 56 U/L — SIGNIFICANT CHANGE UP (ref 40–120)
ALT FLD-CCNC: 17 U/L — SIGNIFICANT CHANGE UP
ANION GAP SERPL CALC-SCNC: 13 MMOL/L — SIGNIFICANT CHANGE UP (ref 5–17)
APPEARANCE UR: ABNORMAL
AST SERPL-CCNC: 36 U/L — HIGH
BACTERIA # UR AUTO: ABNORMAL
BASOPHILS # BLD AUTO: 0.06 K/UL — SIGNIFICANT CHANGE UP (ref 0–0.2)
BASOPHILS NFR BLD AUTO: 0.9 % — SIGNIFICANT CHANGE UP (ref 0–2)
BILIRUB SERPL-MCNC: 0.7 MG/DL — SIGNIFICANT CHANGE UP (ref 0.4–2)
BILIRUB UR-MCNC: ABNORMAL
BUN SERPL-MCNC: 20.6 MG/DL — HIGH (ref 8–20)
CALCIUM SERPL-MCNC: 9.6 MG/DL — SIGNIFICANT CHANGE UP (ref 8.4–10.5)
CHLORIDE SERPL-SCNC: 99 MMOL/L — SIGNIFICANT CHANGE UP (ref 96–108)
CO2 SERPL-SCNC: 29 MMOL/L — SIGNIFICANT CHANGE UP (ref 22–29)
COLOR SPEC: YELLOW — SIGNIFICANT CHANGE UP
CREAT SERPL-MCNC: 0.71 MG/DL — SIGNIFICANT CHANGE UP (ref 0.5–1.3)
DIFF PNL FLD: ABNORMAL
EGFR: 102 ML/MIN/1.73M2 — SIGNIFICANT CHANGE UP
EOSINOPHIL # BLD AUTO: 0.32 K/UL — SIGNIFICANT CHANGE UP (ref 0–0.5)
EOSINOPHIL NFR BLD AUTO: 5.2 % — SIGNIFICANT CHANGE UP (ref 0–6)
EPI CELLS # UR: SIGNIFICANT CHANGE UP
FERRITIN SERPL-MCNC: 395 NG/ML — HIGH (ref 15–150)
FOLATE SERPL-MCNC: >20 NG/ML — SIGNIFICANT CHANGE UP
GLUCOSE SERPL-MCNC: 97 MG/DL — SIGNIFICANT CHANGE UP (ref 70–99)
GLUCOSE UR QL: NEGATIVE — SIGNIFICANT CHANGE UP
HCT VFR BLD CALC: 24.2 % — LOW (ref 34.5–45)
HGB BLD-MCNC: 7.5 G/DL — LOW (ref 11.5–15.5)
IRON SATN MFR SERPL: 12 % — LOW (ref 14–50)
IRON SATN MFR SERPL: 37 UG/DL — SIGNIFICANT CHANGE UP (ref 37–145)
KETONES UR-MCNC: ABNORMAL
LEUKOCYTE ESTERASE UR-ACNC: ABNORMAL
LYMPHOCYTES # BLD AUTO: 1.12 K/UL — SIGNIFICANT CHANGE UP (ref 1–3.3)
LYMPHOCYTES # BLD AUTO: 18.2 % — SIGNIFICANT CHANGE UP (ref 13–44)
MAGNESIUM SERPL-MCNC: 1.4 MG/DL — LOW (ref 1.6–2.6)
MCHC RBC-ENTMCNC: 29.2 PG — SIGNIFICANT CHANGE UP (ref 27–34)
MCHC RBC-ENTMCNC: 31 GM/DL — LOW (ref 32–36)
MCV RBC AUTO: 94.2 FL — SIGNIFICANT CHANGE UP (ref 80–100)
MONOCYTES # BLD AUTO: 0.75 K/UL — SIGNIFICANT CHANGE UP (ref 0–0.9)
MONOCYTES NFR BLD AUTO: 12.2 % — SIGNIFICANT CHANGE UP (ref 2–14)
NEUTROPHILS # BLD AUTO: 3.91 K/UL — SIGNIFICANT CHANGE UP (ref 1.8–7.4)
NEUTROPHILS NFR BLD AUTO: 63.5 % — SIGNIFICANT CHANGE UP (ref 43–77)
NITRITE UR-MCNC: POSITIVE
PH UR: 7 — SIGNIFICANT CHANGE UP (ref 5–8)
PLATELET # BLD AUTO: 511 K/UL — HIGH (ref 150–400)
POTASSIUM SERPL-MCNC: 2.4 MMOL/L — CRITICAL LOW (ref 3.5–5.3)
POTASSIUM SERPL-SCNC: 2.4 MMOL/L — CRITICAL LOW (ref 3.5–5.3)
PROT SERPL-MCNC: 6.9 G/DL — SIGNIFICANT CHANGE UP (ref 6.6–8.7)
PROT UR-MCNC: NEGATIVE — SIGNIFICANT CHANGE UP
RBC # BLD: 2.57 M/UL — LOW (ref 3.8–5.2)
RBC # FLD: 31.1 % — HIGH (ref 10.3–14.5)
RBC CASTS # UR COMP ASSIST: SIGNIFICANT CHANGE UP /HPF (ref 0–4)
SODIUM SERPL-SCNC: 140 MMOL/L — SIGNIFICANT CHANGE UP (ref 135–145)
SP GR SPEC: 1.01 — SIGNIFICANT CHANGE UP (ref 1.01–1.02)
TIBC SERPL-MCNC: 296 UG/DL — SIGNIFICANT CHANGE UP (ref 220–430)
TRANSFERRIN SERPL-MCNC: 207 MG/DL — SIGNIFICANT CHANGE UP (ref 192–382)
UROBILINOGEN FLD QL: 4
VIT B12 SERPL-MCNC: 1287 PG/ML — HIGH (ref 232–1245)
WBC # BLD: 6.16 K/UL — SIGNIFICANT CHANGE UP (ref 3.8–10.5)
WBC # FLD AUTO: 6.16 K/UL — SIGNIFICANT CHANGE UP (ref 3.8–10.5)
WBC UR QL: ABNORMAL /HPF (ref 0–5)

## 2022-12-05 PROCEDURE — 71045 X-RAY EXAM CHEST 1 VIEW: CPT | Mod: 26

## 2022-12-05 PROCEDURE — 70450 CT HEAD/BRAIN W/O DYE: CPT | Mod: 26,ME

## 2022-12-05 PROCEDURE — 99223 1ST HOSP IP/OBS HIGH 75: CPT

## 2022-12-05 PROCEDURE — G1004: CPT

## 2022-12-05 RX ORDER — ALPRAZOLAM 0.25 MG
0.25 TABLET ORAL ONCE
Refills: 0 | Status: DISCONTINUED | OUTPATIENT
Start: 2022-12-05 | End: 2022-12-05

## 2022-12-05 RX ORDER — POTASSIUM CHLORIDE 20 MEQ
10 PACKET (EA) ORAL
Refills: 0 | Status: COMPLETED | OUTPATIENT
Start: 2022-12-05 | End: 2022-12-06

## 2022-12-05 RX ORDER — ASPIRIN/CALCIUM CARB/MAGNESIUM 324 MG
325 TABLET ORAL ONCE
Refills: 0 | Status: COMPLETED | OUTPATIENT
Start: 2022-12-05 | End: 2022-12-05

## 2022-12-05 RX ORDER — MAGNESIUM SULFATE 500 MG/ML
2 VIAL (ML) INJECTION ONCE
Refills: 0 | Status: COMPLETED | OUTPATIENT
Start: 2022-12-05 | End: 2022-12-05

## 2022-12-05 RX ORDER — CEFTRIAXONE 500 MG/1
1000 INJECTION, POWDER, FOR SOLUTION INTRAMUSCULAR; INTRAVENOUS ONCE
Refills: 0 | Status: COMPLETED | OUTPATIENT
Start: 2022-12-05 | End: 2022-12-05

## 2022-12-05 RX ORDER — CEFTRIAXONE 500 MG/1
1000 INJECTION, POWDER, FOR SOLUTION INTRAMUSCULAR; INTRAVENOUS ONCE
Refills: 0 | Status: DISCONTINUED | OUTPATIENT
Start: 2022-12-05 | End: 2022-12-05

## 2022-12-05 RX ORDER — ASPIRIN/CALCIUM CARB/MAGNESIUM 324 MG
300 TABLET ORAL DAILY
Refills: 0 | Status: DISCONTINUED | OUTPATIENT
Start: 2022-12-05 | End: 2022-12-05

## 2022-12-05 RX ADMIN — CEFTRIAXONE 1000 MILLIGRAM(S): 500 INJECTION, POWDER, FOR SOLUTION INTRAMUSCULAR; INTRAVENOUS at 23:21

## 2022-12-05 RX ADMIN — Medication 0.25 MILLIGRAM(S): at 21:59

## 2022-12-05 RX ADMIN — Medication 100 MILLIEQUIVALENT(S): at 23:21

## 2022-12-05 RX ADMIN — Medication 325 MILLIGRAM(S): at 21:58

## 2022-12-05 NOTE — H&P ADULT - NSHPPHYSICALEXAM_GEN_ALL_CORE
Vital Signs Last 24 Hrs  T(C): 36.4 (05 Dec 2022 23:48), Max: 36.4 (05 Dec 2022 23:48)  T(F): 97.5 (05 Dec 2022 23:48), Max: 97.5 (05 Dec 2022 23:48)  HR: 77 (05 Dec 2022 23:48) (68 - 77)  BP: 100/59 (05 Dec 2022 23:48) (100/59 - 106/66)  BP(mean): --  RR: 17 (05 Dec 2022 23:48) (17 - 18)  SpO2: 100% (05 Dec 2022 23:48) (98% - 100%)    Parameters below as of 05 Dec 2022 23:48  Patient On (Oxygen Delivery Method): room air    GENERAL:  Disheveled middle aged woman, appears older than stated age, not in acute distress  EYES:  Clear conjunctiva, extraocular movement intact  ENT: Moist mucous membranes  RESP:  Non-labored breathing pattern, lungs clear to ausculation  CV: Regular rate and rhythm, no murmurs appreciated, no lower extremity edema  GI: Soft, non-tender, non-distended  NEURO: Awake, alert, conversant, left sided facial droop apparent when smiling, RUE and RLE strength 5/5, LUE strength 0/5 with impaired hand , LLE strength 1/5, unable to assess for pronator drift  PSYCH: Calm, cooperative  SKIN: No rash or lesions, warm and dry

## 2022-12-05 NOTE — ED ADULT NURSE NOTE - ED STAT RN HANDOFF DETAILS
Report given to Rolo ELLIOTT. Pt remains alert and O x2. NAD noted. Resp E/U. Hooked pt to telemetry box.

## 2022-12-05 NOTE — ED ADULT NURSE NOTE - NSFALLRSKINDICTYPE_ED_ALL_ED
TELEHEALTH VISIT -- Audio/Visual (During ZDMHR-41 public health emergency)  }  Pursuant to the emergency declaration under the [address] [place] [address] [facility] authority and the [employer] Act, this Virtual Visit was conducted, with patient's consent, to reduce the patient's risk of exposure to COVID-19 and provide continuity of care for an established patient. Services were provided through a video synchronous discussion virtually to substitute for in-person clinic visit. Pt gave verbal informed consent to participate in telehealth services. Conducted a risk-benefit analysis and determined that the patient's presenting problems are consistent with the use of telepsychology. Determined that the patient has sufficient knowledge and skills in the use of technology enabling them to adequately benefit from telepsychology. It was determined that this patient was able to be properly treated without an in-person session. Patient verified that they were currently located at the [facility] address that was provided during registration or another [facility] address, if noted below.     Verified the following information:  Patient's identification: Yes  Patient location: [place] [id number].   Patient's call back number: [phone]   Patient's emergency contact's name and number, as well as permission to contact them if needed: Extended Emergency Contact Information  Primary Emergency Contact: Omidbaldevleonie [address] Phone: [phone]  Mobile Phone: [phone]  Relation: Parent  Secondary Emergency Contact: Petrona Arizmendi  Address: [address] [place] [address] Phone: [phone]  Relation: Parent     Provider location: [place], [address] Consultation  Low Vergara, Ph.D.  Psychologist  6/18/2020  11:26 AM      Time spent with Patient: 28 minutes  This is patient's 27th  [facility] appointment. Reason for Consult:    Chief Complaint   Patient presents with    Depression       Feedback given to PCP. S:  Pt seen for f/u of depression and anxiety. Pt reported \"alright\" mood and sxs. Started taking medication consistently again, sleeping schedules continues to be shifted to going to sleep around 4am. Pt wasn't able to find a friend to go with her to protest, so mom discouraged her from going d/t her anxiety, but was in favor of the cause. Been visiting somewhat w a friend. Mom is encouraging her to be more productive at home and be more active. Been painting and drawing for her portfolio. Focused on thought-emotion-behavior cycle and set bx activation plan.      When not depressed or anxious:  Not in room as much  Drawing frequently  More communication w friends via texts  Wear clothes that wouldn't be considered PJs    Goals:  4x/wk get out of sweat pants or PJ pants  3x/wk doing something active for at least 20 minutes     O:  MSE:    Appearance    alert, cooperative  Appetite normal  Sleep disturbance Yes  Fatigue Yes  Loss of pleasure Yes  Impulsive behavior No  Speech    spontaneous, normal rate, normal volume and well articulated  Mood    Depressed  Affect    depressed affect  Thought Content    intact and cognitive distortions  Thought Process    linear, goal directed and coherent  Associations    logical connections  Insight    Fair  Judgment    Intact  Orientation    oriented to person, place, time, and general circumstances  Memory    recent and remote memory intact  Attention/Concentration    intact  Morbid ideation No  Suicide Assessment    no suicidal ideation    History:  Social History:   Social History     Socioeconomic History    Marital status: Single     Spouse name: Not on file    Number of children: Not on file    Years of education: Not on file    Highest education level: Not on file   Occupational History    Not on file   Social Needs    Financial resource strain: Not hard at all   [employer] insecurity     Worry: Never true     Inability: Never true   [employer] needs     Medical: No     Non-medical: No   Tobacco Use    Smoking status: Never Smoker    Smokeless tobacco: Never Used   Substance and Sexual Activity    Alcohol use: No    Drug use: No    Sexual activity: Not on file   Lifestyle    Physical activity     Days per week: Not on file     Minutes per session: Not on file    Stress: Not on file   Relationships    Social connections     Talks on phone: Not on file     Gets together: Not on file     Attends Congregation service: Not on file     Active member of club or organization: Not on file     Attends meetings of clubs or organizations: Not on file     Relationship status: Not on file    Intimate partner violence     Fear of current or ex partner: Not on file     Emotionally abused: Not on file     Physically abused: Not on file     Forced sexual activity: Not on file   Other Topics Concern    Not on file   Social History Narrative    Not on file     TOBACCO:   reports that she has never smoked. She has never used smokeless tobacco.  ETOH:   reports no history of alcohol use. Diagnosis:  1. Current moderate episode of major depressive disorder without prior episode ([address]    2. Social anxiety disorder    3. MENDEL (generalized anxiety disorder)          Plan:  Pt interventions:  Trained in strategies for increasing balanced thinking, Discussed and set plan for behavioral activation and CBT to target depression        Documentation was done using voice recognition dragon software. Every effort was made to ensure accuracy; however, inadvertent, unintentional computerized transcription errors may be present. Impaired Gait/Need for Mobility Assisted Device

## 2022-12-05 NOTE — H&P ADULT - NSHPLABSRESULTS_GEN_ALL_CORE
12-05    140  |  99  |  20.6<H>  ----------------------------<  97  2.4<LL>   |  29.0  |  0.71    Ca    9.6      05 Dec 2022 21:30  Mg     1.4     12-05    TPro  6.9  /  Alb  3.7  /  TBili  0.7  /  DBili  x   /  AST  36<H>  /  ALT  17  /  AlkPhos  56  12-05                            7.5    6.16  )-----------( 511      ( 05 Dec 2022 21:30 )             24.2    EKG: NSR, HR 69
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
## 2022-12-05 NOTE — ED PROVIDER NOTE - OBJECTIVE STATEMENT
Pt is a 51 yo F co seizures, incontinence and weakness.  Pt states that she has a hx of seizure disorder and will typically get a seizure every other day. Pt reports compliance with her keppra but still has frequent breakthrough seizures. seizures are a result of prior TBI. Pt states that recently she has been incontinent of urine but is unable report when. Pt also has L-sided weakness but states that it has been worse recently but once again is unable to state when it became worse. no n/v. no headache. no other complaints.

## 2022-12-05 NOTE — ED PROVIDER NOTE - CARE PLAN
Principal Discharge DX:	CVA (cerebrovascular accident)  Secondary Diagnosis:	Acute UTI  Secondary Diagnosis:	Hypokalemia   1

## 2022-12-05 NOTE — H&P ADULT - NSICDXPASTMEDICALHX_GEN_ALL_CORE_FT
PAST MEDICAL HISTORY:  Cocaine use disorder     ETOH abuse     HLD (hyperlipidemia)     Hypothyroid     TBI (traumatic brain injury)

## 2022-12-05 NOTE — ED PROVIDER NOTE - PHYSICAL EXAMINATION
Constitutional - well-developed.   Head - healed scars to face and head. Airway patent.   Eyes - PERRL.   CV - RRR. no murmur. no edema.   Pulm - CTAB.   Abd - soft, nt. no rebound. no guarding.   Neuro - A&Ox3. sensation intact x4. L arm 2/5 strength. L leg 4+/5 strength. RUE and RLE with 5/5. CN II-XII intact.  Skin - No rash. .  MSK - normal ROM.

## 2022-12-05 NOTE — ED ADULT TRIAGE NOTE - BP NONINVASIVE SYSTOLIC (MM HG)
Bed: 003A  Expected date:   Expected time:   Means of arrival: [facility]  Comments:     Ethan Mendoza RN  06/13/20 [id number]

## 2022-12-05 NOTE — H&P ADULT - NSHPADDITIONALINFOADULT_GEN_ALL_CORE
Per RN, pt was difficult IV access, causing delay in administration of IV supplementation of Mg/K.  IV Mg that was ordered by ED at 11PM was still not given by 3AM, but pt has received 2 rounds of IV K+.  Repeat BMP, Mg will be ordered for 6AM.

## 2022-12-05 NOTE — ED PROVIDER NOTE - CLINICAL SUMMARY MEDICAL DECISION MAKING FREE TEXT BOX
labs and imaging reviewed.  CT with concern for subacute infarct, asa given.  PT with UTI rocephin given. pt with hypokalemia repleted. case d/w DR. pearl for admission.

## 2022-12-05 NOTE — ED PROVIDER NOTE - NS ED ROS FT
No fever/chills   No photophobia/eye pain/changes in visio,   No ear pain/sore throat/dysphagia   No chest pain/palpitations  No SOB/cough/wheeze/stridor   No abdominal pain, No N/V/D  No dysuria/frequency/discharge   No neck/back pain,   No rash  neuro as per hpi

## 2022-12-06 LAB
A1C WITH ESTIMATED AVERAGE GLUCOSE RESULT: 4.3 % — SIGNIFICANT CHANGE UP (ref 4–5.6)
ANION GAP SERPL CALC-SCNC: 7 MMOL/L — SIGNIFICANT CHANGE UP (ref 5–17)
BLD GP AB SCN SERPL QL: SIGNIFICANT CHANGE UP
BUN SERPL-MCNC: 20.4 MG/DL — HIGH (ref 8–20)
CALCIUM SERPL-MCNC: 8.6 MG/DL — SIGNIFICANT CHANGE UP (ref 8.4–10.5)
CHLORIDE SERPL-SCNC: 104 MMOL/L — SIGNIFICANT CHANGE UP (ref 96–108)
CHOLEST SERPL-MCNC: 151 MG/DL — SIGNIFICANT CHANGE UP
CO2 SERPL-SCNC: 27 MMOL/L — SIGNIFICANT CHANGE UP (ref 22–29)
CREAT SERPL-MCNC: 0.68 MG/DL — SIGNIFICANT CHANGE UP (ref 0.5–1.3)
EGFR: 104 ML/MIN/1.73M2 — SIGNIFICANT CHANGE UP
ESTIMATED AVERAGE GLUCOSE: 77 MG/DL — SIGNIFICANT CHANGE UP (ref 68–114)
GLUCOSE SERPL-MCNC: 118 MG/DL — HIGH (ref 70–99)
HDLC SERPL-MCNC: 48 MG/DL — LOW
LIPID PNL WITH DIRECT LDL SERPL: 86 MG/DL — SIGNIFICANT CHANGE UP
MAGNESIUM SERPL-MCNC: 1.9 MG/DL — SIGNIFICANT CHANGE UP (ref 1.6–2.6)
NON HDL CHOLESTEROL: 103 MG/DL — SIGNIFICANT CHANGE UP
POTASSIUM SERPL-MCNC: 3.2 MMOL/L — LOW (ref 3.5–5.3)
POTASSIUM SERPL-SCNC: 3.2 MMOL/L — LOW (ref 3.5–5.3)
SARS-COV-2 RNA SPEC QL NAA+PROBE: SIGNIFICANT CHANGE UP
SODIUM SERPL-SCNC: 138 MMOL/L — SIGNIFICANT CHANGE UP (ref 135–145)
TRIGL SERPL-MCNC: 85 MG/DL — SIGNIFICANT CHANGE UP

## 2022-12-06 PROCEDURE — 99233 SBSQ HOSP IP/OBS HIGH 50: CPT

## 2022-12-06 PROCEDURE — 93880 EXTRACRANIAL BILAT STUDY: CPT | Mod: 26

## 2022-12-06 PROCEDURE — 93010 ELECTROCARDIOGRAM REPORT: CPT

## 2022-12-06 PROCEDURE — 95819 EEG AWAKE AND ASLEEP: CPT | Mod: 26

## 2022-12-06 PROCEDURE — 93306 TTE W/DOPPLER COMPLETE: CPT | Mod: 26

## 2022-12-06 PROCEDURE — 70551 MRI BRAIN STEM W/O DYE: CPT | Mod: 26

## 2022-12-06 PROCEDURE — 99223 1ST HOSP IP/OBS HIGH 75: CPT

## 2022-12-06 RX ORDER — FOLIC ACID 0.8 MG
1 TABLET ORAL DAILY
Refills: 0 | Status: DISCONTINUED | OUTPATIENT
Start: 2022-12-06 | End: 2022-12-20

## 2022-12-06 RX ORDER — ALPRAZOLAM 0.25 MG
0.5 TABLET ORAL ONCE
Refills: 0 | Status: DISCONTINUED | OUTPATIENT
Start: 2022-12-06 | End: 2022-12-06

## 2022-12-06 RX ORDER — ASPIRIN/CALCIUM CARB/MAGNESIUM 324 MG
81 TABLET ORAL DAILY
Refills: 0 | Status: DISCONTINUED | OUTPATIENT
Start: 2022-12-06 | End: 2022-12-07

## 2022-12-06 RX ORDER — HALOPERIDOL DECANOATE 100 MG/ML
1 INJECTION INTRAMUSCULAR
Qty: 0 | Refills: 0 | DISCHARGE

## 2022-12-06 RX ORDER — TRAZODONE HCL 50 MG
1 TABLET ORAL
Qty: 0 | Refills: 0 | DISCHARGE

## 2022-12-06 RX ORDER — QUETIAPINE FUMARATE 200 MG/1
50 TABLET, FILM COATED ORAL DAILY
Refills: 0 | Status: DISCONTINUED | OUTPATIENT
Start: 2022-12-06 | End: 2022-12-07

## 2022-12-06 RX ORDER — METHYLPHENIDATE HCL 5 MG
0 TABLET ORAL
Qty: 0 | Refills: 0 | DISCHARGE

## 2022-12-06 RX ORDER — TRAZODONE HCL 50 MG
50 TABLET ORAL AT BEDTIME
Refills: 0 | Status: DISCONTINUED | OUTPATIENT
Start: 2022-12-06 | End: 2022-12-20

## 2022-12-06 RX ORDER — ATORVASTATIN CALCIUM 80 MG/1
80 TABLET, FILM COATED ORAL AT BEDTIME
Refills: 0 | Status: DISCONTINUED | OUTPATIENT
Start: 2022-12-06 | End: 2022-12-07

## 2022-12-06 RX ORDER — POTASSIUM CHLORIDE 20 MEQ
40 PACKET (EA) ORAL ONCE
Refills: 0 | Status: COMPLETED | OUTPATIENT
Start: 2022-12-06 | End: 2022-12-06

## 2022-12-06 RX ORDER — DIVALPROEX SODIUM 500 MG/1
1000 TABLET, DELAYED RELEASE ORAL
Refills: 0 | Status: DISCONTINUED | OUTPATIENT
Start: 2022-12-06 | End: 2022-12-20

## 2022-12-06 RX ORDER — THIAMINE MONONITRATE (VIT B1) 100 MG
100 TABLET ORAL DAILY
Refills: 0 | Status: DISCONTINUED | OUTPATIENT
Start: 2022-12-06 | End: 2022-12-20

## 2022-12-06 RX ORDER — TRAZODONE HCL 50 MG
200 TABLET ORAL AT BEDTIME
Refills: 0 | Status: DISCONTINUED | OUTPATIENT
Start: 2022-12-06 | End: 2022-12-06

## 2022-12-06 RX ORDER — LEVETIRACETAM 250 MG/1
750 TABLET, FILM COATED ORAL
Refills: 0 | Status: DISCONTINUED | OUTPATIENT
Start: 2022-12-06 | End: 2022-12-06

## 2022-12-06 RX ORDER — DIVALPROEX SODIUM 500 MG/1
1 TABLET, DELAYED RELEASE ORAL
Qty: 0 | Refills: 0 | DISCHARGE

## 2022-12-06 RX ORDER — QUETIAPINE FUMARATE 200 MG/1
1 TABLET, FILM COATED ORAL
Qty: 0 | Refills: 0 | DISCHARGE

## 2022-12-06 RX ORDER — ALBUTEROL 90 UG/1
2 AEROSOL, METERED ORAL
Qty: 0 | Refills: 0 | DISCHARGE

## 2022-12-06 RX ORDER — BUDESONIDE AND FORMOTEROL FUMARATE DIHYDRATE 160; 4.5 UG/1; UG/1
2 AEROSOL RESPIRATORY (INHALATION)
Qty: 0 | Refills: 0 | DISCHARGE

## 2022-12-06 RX ORDER — QUETIAPINE FUMARATE 200 MG/1
100 TABLET, FILM COATED ORAL AT BEDTIME
Refills: 0 | Status: DISCONTINUED | OUTPATIENT
Start: 2022-12-06 | End: 2022-12-06

## 2022-12-06 RX ORDER — BENZTROPINE MESYLATE 1 MG
1 TABLET ORAL
Qty: 0 | Refills: 0 | DISCHARGE

## 2022-12-06 RX ORDER — RIVAROXABAN 15 MG-20MG
1 KIT ORAL
Qty: 0 | Refills: 0 | DISCHARGE

## 2022-12-06 RX ORDER — CEFTRIAXONE 500 MG/1
1000 INJECTION, POWDER, FOR SOLUTION INTRAMUSCULAR; INTRAVENOUS EVERY 24 HOURS
Refills: 0 | Status: COMPLETED | OUTPATIENT
Start: 2022-12-06 | End: 2022-12-10

## 2022-12-06 RX ORDER — DIVALPROEX SODIUM 500 MG/1
1000 TABLET, DELAYED RELEASE ORAL
Refills: 0 | Status: DISCONTINUED | OUTPATIENT
Start: 2022-12-06 | End: 2022-12-06

## 2022-12-06 RX ORDER — METHYLPHENIDATE HCL 5 MG
54 TABLET ORAL DAILY
Refills: 0 | Status: DISCONTINUED | OUTPATIENT
Start: 2022-12-06 | End: 2022-12-06

## 2022-12-06 RX ORDER — ESCITALOPRAM OXALATE 10 MG/1
1 TABLET, FILM COATED ORAL
Qty: 0 | Refills: 0 | DISCHARGE

## 2022-12-06 RX ADMIN — Medication 54 MILLIGRAM(S): at 06:12

## 2022-12-06 RX ADMIN — Medication 100 MILLIEQUIVALENT(S): at 00:22

## 2022-12-06 RX ADMIN — Medication 1 TABLET(S): at 11:31

## 2022-12-06 RX ADMIN — QUETIAPINE FUMARATE 50 MILLIGRAM(S): 200 TABLET, FILM COATED ORAL at 22:40

## 2022-12-06 RX ADMIN — ATORVASTATIN CALCIUM 80 MILLIGRAM(S): 80 TABLET, FILM COATED ORAL at 22:43

## 2022-12-06 RX ADMIN — Medication 25 GRAM(S): at 03:05

## 2022-12-06 RX ADMIN — Medication 81 MILLIGRAM(S): at 11:32

## 2022-12-06 RX ADMIN — Medication 50 MILLIGRAM(S): at 22:40

## 2022-12-06 RX ADMIN — Medication 40 MILLIEQUIVALENT(S): at 03:28

## 2022-12-06 RX ADMIN — Medication 100 MILLIGRAM(S): at 11:49

## 2022-12-06 RX ADMIN — LEVETIRACETAM 750 MILLIGRAM(S): 250 TABLET, FILM COATED ORAL at 03:28

## 2022-12-06 RX ADMIN — ATORVASTATIN CALCIUM 80 MILLIGRAM(S): 80 TABLET, FILM COATED ORAL at 03:28

## 2022-12-06 RX ADMIN — LEVETIRACETAM 750 MILLIGRAM(S): 250 TABLET, FILM COATED ORAL at 17:17

## 2022-12-06 RX ADMIN — Medication 0.5 MILLIGRAM(S): at 11:31

## 2022-12-06 RX ADMIN — Medication 1 MILLIGRAM(S): at 11:31

## 2022-12-06 NOTE — SBIRT NOTE ADULT - NSSBIRTALCPOSREINDET_GEN_A_CORE
Patient reports having two beers three days ago but otherwise being sober from alcohol and drugs. HC asked how patient will maintain sobriety and patient states they have sponsor and support systems to turn to. Patient declined all other resources.
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
## 2022-12-06 NOTE — CONSULT NOTE ADULT - NS ATTEND AMEND GEN_ALL_CORE FT
Seen and examined with the PA  Plan discussed with PA and I agree with as written above with modifications as below    Unclear history as she changes it for different providers  has possible right frontotemporal abnormality on CT head which correlates with EEG finding  MRI brain and MRA head/neck  ASA, statin, home AED    will follow with you    Rolo La MD PhD   [id number]

## 2022-12-06 NOTE — PHYSICAL THERAPY INITIAL EVALUATION ADULT - ADDITIONAL COMMENTS
Pt reports ambulating with SAC PTA, uses a wheelchair for longer distances, spouse propels. Pt admits to frequent falls, difficulty managing at home.
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
## 2022-12-06 NOTE — PHYSICAL THERAPY INITIAL EVALUATION ADULT - PLANNED THERAPY INTERVENTIONS, PT EVAL
balance training/gait training/neuromuscular re-education/postural re-education/ROM/strengthening/transfer training

## 2022-12-06 NOTE — ED ADULT NURSE REASSESSMENT NOTE - NS ED NURSE REASSESS COMMENT FT1
OT at bedside
PT @ bedside for consult
received pt from off going shift. pt a&ox3, denies any pain/discomfort. neuro @ bedside for consult, neuro exam as documented. pending admission and further texting. updated on plan of care, verbalize understanding. call bell in reach
Pt and oriented with periods of confusion, unable to move left side, keep asking for decaf coffee and stating she have to have a BM. Assisted pt on bed pain multiple of times but did nothing. IV potassium and mag given and PO meds given. Repeat blood work sent to lab. Pt went for MRI but was unable to complete due anxiety and keep asking for food and coffee, Dr. Lundberg made aware and will have pt attempt MRI later on today. US of carotid done. Will continue to monitor.
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
## 2022-12-06 NOTE — ED ADULT NURSE REASSESSMENT NOTE - NIH STROKE SCALE: 4. FACIAL PALSY, QM
(1) Minor paralysis (flattened nasolabial fold, asymmetry on smiling)
(2) Partial paralysis (total or near-total paralysis of lower face)
(2) Partial paralysis (total or near-total paralysis of lower face)

## 2022-12-06 NOTE — ED ADULT NURSE REASSESSMENT NOTE - COMFORT CARE
assisted to bedpan/plan of care explained/po fluids offered/repositioned/warm blanket provided
assisted to bedpan/po fluids offered
assisted to bedpan/plan of care explained/repositioned/side rails up/wait time explained/warm blanket provided
assisted to bedpan/meal provided/plan of care explained/po fluids offered/repositioned/wait time explained/warm blanket provided
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
## 2022-12-06 NOTE — EEG REPORT - NS EEG TEXT BOX
[facility]  Comprehensive Epilepsy [facility]  Report of Routine EEG with Video    [facility]: [address], [place] [address], Phone [phone]  [place] Office: [address], [address], [place] [address] Phone [phone]    [facility]: [address] [place] [address], Phone [phone]  [place] Office: [address] [place] [address], Phone [phone]    Patient Name: Bhumika Olson    Age: 52 year  : 1970  Patient ID: -, MRN #: -, Location: -[address]  Referring Physician: Gertrudis Harper  EEG #:     Study Date: 2022     Technical Information:					  On Instrument: -  Placement and Labeling of Electrodes:  The EEG was performed utilizing 20 channels referential EEG connections (coronal over temporal over parasagittal montage) using all standard 10-20 electrode placements with EKG.  Recording was at a sampling rate of 256 samples per second per channel.  Time synchronized digital video recording was done simultaneously with EEG recording.  A low light infrared camera was used for low light recording.  Kieran and seizure detection algorithms were utilized.    History:  51y/o female p/w seizure disorder. H/O cocaine abuse, HLD, TBI, Hypothyroid.   -    Pertinent Medication  lipitor  concerta  vitamin b  seroquel  dyseryl  Ativan  Keppra    Study Interpretation:  Findings: The background was continuous and reactive. During wakefulness, the posterior dominant rhythm consisted of, well-modulated 9 Hz activity, with amplitude to 30 uV, that attenuated to eye opening.     Background Slowing:  No generalized background slowing was present.    Focal Slowing:   Abundant intermittent theta/delta slowing in the right fronto-temporal region.    Sleep Background:  Drowsiness was characterized by fragmentation, attenuation, and slowing of the background activity.    Stage II sleep transients were not recorded.    Other Non-Epileptiform Findings:  None were present.    Interictal Epileptiform Activity:   None were present.    Events:  No event or seizure recorded.    Activation Procedures:   Hyperventilation was not performed.    Photic stimulation was performed and did not elicit any abnormalities.      Artifacts:  Intermittent myogenic and movement artifacts were noted.    EEG Summary / Classification:  Abnormal EEG   - Right fronto-temporal slowing.    EEG Impression / Clinical Correlate:  Abnormal EEG study.  Structural or functional abnormality in the right fronto-temporal region.  No epileptiform pattern or seizure seen.    ________________________________________    Dionisio Ordonez MD  Attending Physician, [facility] [place]

## 2022-12-07 LAB
ALBUMIN SERPL ELPH-MCNC: 3.8 G/DL — SIGNIFICANT CHANGE UP (ref 3.3–5.2)
ALP SERPL-CCNC: 59 U/L — SIGNIFICANT CHANGE UP (ref 40–120)
ALT FLD-CCNC: 16 U/L — SIGNIFICANT CHANGE UP
ANION GAP SERPL CALC-SCNC: 14 MMOL/L — SIGNIFICANT CHANGE UP (ref 5–17)
AST SERPL-CCNC: 26 U/L — SIGNIFICANT CHANGE UP
BILIRUB SERPL-MCNC: 0.5 MG/DL — SIGNIFICANT CHANGE UP (ref 0.4–2)
BUN SERPL-MCNC: 13.9 MG/DL — SIGNIFICANT CHANGE UP (ref 8–20)
CALCIUM SERPL-MCNC: 9.5 MG/DL — SIGNIFICANT CHANGE UP (ref 8.4–10.5)
CHLORIDE SERPL-SCNC: 100 MMOL/L — SIGNIFICANT CHANGE UP (ref 96–108)
CO2 SERPL-SCNC: 26 MMOL/L — SIGNIFICANT CHANGE UP (ref 22–29)
CREAT SERPL-MCNC: 0.53 MG/DL — SIGNIFICANT CHANGE UP (ref 0.5–1.3)
CULTURE RESULTS: SIGNIFICANT CHANGE UP
EGFR: 111 ML/MIN/1.73M2 — SIGNIFICANT CHANGE UP
FERRITIN SERPL-MCNC: 344 NG/ML — HIGH (ref 15–150)
FOLATE RBC-MCNC: 2500 NG/ML — HIGH (ref 499–1504)
FOLATE SERPL-MCNC: 16 NG/ML — SIGNIFICANT CHANGE UP
GLUCOSE SERPL-MCNC: 95 MG/DL — SIGNIFICANT CHANGE UP (ref 70–99)
HCT VFR BLD CALC: 24.2 % — LOW (ref 34.5–45)
HCT VFR BLD CALC: 30.5 % — LOW (ref 34.5–45)
HGB BLD-MCNC: 9.4 G/DL — LOW (ref 11.5–15.5)
IRON SATN MFR SERPL: 14 % — SIGNIFICANT CHANGE UP (ref 14–50)
IRON SATN MFR SERPL: 44 UG/DL — SIGNIFICANT CHANGE UP (ref 37–145)
MCHC RBC-ENTMCNC: 29.6 PG — SIGNIFICANT CHANGE UP (ref 27–34)
MCHC RBC-ENTMCNC: 30.8 GM/DL — LOW (ref 32–36)
MCV RBC AUTO: 95.9 FL — SIGNIFICANT CHANGE UP (ref 80–100)
PLATELET # BLD AUTO: 495 K/UL — HIGH (ref 150–400)
POTASSIUM SERPL-MCNC: 3.4 MMOL/L — LOW (ref 3.5–5.3)
POTASSIUM SERPL-SCNC: 3.4 MMOL/L — LOW (ref 3.5–5.3)
PROT SERPL-MCNC: 7.5 G/DL — SIGNIFICANT CHANGE UP (ref 6.6–8.7)
RBC # BLD: 3.18 M/UL — LOW (ref 3.8–5.2)
RBC # FLD: 30.3 % — HIGH (ref 10.3–14.5)
SODIUM SERPL-SCNC: 140 MMOL/L — SIGNIFICANT CHANGE UP (ref 135–145)
SPECIMEN SOURCE: SIGNIFICANT CHANGE UP
TIBC SERPL-MCNC: 315 UG/DL — SIGNIFICANT CHANGE UP (ref 220–430)
TRANSFERRIN SERPL-MCNC: 220 MG/DL — SIGNIFICANT CHANGE UP (ref 192–382)
VIT B12 SERPL-MCNC: 1163 PG/ML — SIGNIFICANT CHANGE UP (ref 232–1245)
WBC # BLD: 4.81 K/UL — SIGNIFICANT CHANGE UP (ref 3.8–10.5)
WBC # FLD AUTO: 4.81 K/UL — SIGNIFICANT CHANGE UP (ref 3.8–10.5)

## 2022-12-07 PROCEDURE — 70450 CT HEAD/BRAIN W/O DYE: CPT | Mod: 26

## 2022-12-07 PROCEDURE — 99233 SBSQ HOSP IP/OBS HIGH 50: CPT

## 2022-12-07 PROCEDURE — 95720 EEG PHY/QHP EA INCR W/VEEG: CPT

## 2022-12-07 PROCEDURE — 99223 1ST HOSP IP/OBS HIGH 75: CPT

## 2022-12-07 RX ORDER — THIAMINE MONONITRATE (VIT B1) 100 MG
500 TABLET ORAL EVERY 8 HOURS
Refills: 0 | Status: COMPLETED | OUTPATIENT
Start: 2022-12-07 | End: 2022-12-10

## 2022-12-07 RX ORDER — THIAMINE MONONITRATE (VIT B1) 100 MG
500 TABLET ORAL EVERY 8 HOURS
Refills: 0 | Status: DISCONTINUED | OUTPATIENT
Start: 2022-12-07 | End: 2022-12-07

## 2022-12-07 RX ORDER — ATORVASTATIN CALCIUM 80 MG/1
40 TABLET, FILM COATED ORAL AT BEDTIME
Refills: 0 | Status: DISCONTINUED | OUTPATIENT
Start: 2022-12-07 | End: 2022-12-20

## 2022-12-07 RX ORDER — ENOXAPARIN SODIUM 100 MG/ML
40 INJECTION SUBCUTANEOUS EVERY 24 HOURS
Refills: 0 | Status: DISCONTINUED | OUTPATIENT
Start: 2022-12-07 | End: 2022-12-20

## 2022-12-07 RX ORDER — QUETIAPINE FUMARATE 200 MG/1
50 TABLET, FILM COATED ORAL
Refills: 0 | Status: DISCONTINUED | OUTPATIENT
Start: 2022-12-07 | End: 2022-12-20

## 2022-12-07 RX ORDER — INFLUENZA VIRUS VACCINE 15; 15; 15; 15 UG/.5ML; UG/.5ML; UG/.5ML; UG/.5ML
0.5 SUSPENSION INTRAMUSCULAR ONCE
Refills: 0 | Status: DISCONTINUED | OUTPATIENT
Start: 2022-12-07 | End: 2022-12-20

## 2022-12-07 RX ORDER — OLANZAPINE 15 MG/1
5 TABLET, FILM COATED ORAL ONCE
Refills: 0 | Status: COMPLETED | OUTPATIENT
Start: 2022-12-07 | End: 2022-12-08

## 2022-12-07 RX ADMIN — QUETIAPINE FUMARATE 50 MILLIGRAM(S): 200 TABLET, FILM COATED ORAL at 12:32

## 2022-12-07 RX ADMIN — DIVALPROEX SODIUM 1000 MILLIGRAM(S): 500 TABLET, DELAYED RELEASE ORAL at 10:34

## 2022-12-07 RX ADMIN — CEFTRIAXONE 1000 MILLIGRAM(S): 500 INJECTION, POWDER, FOR SOLUTION INTRAMUSCULAR; INTRAVENOUS at 06:06

## 2022-12-07 RX ADMIN — Medication 50 MILLIGRAM(S): at 22:07

## 2022-12-07 RX ADMIN — ATORVASTATIN CALCIUM 40 MILLIGRAM(S): 80 TABLET, FILM COATED ORAL at 22:07

## 2022-12-07 RX ADMIN — QUETIAPINE FUMARATE 50 MILLIGRAM(S): 200 TABLET, FILM COATED ORAL at 20:09

## 2022-12-07 RX ADMIN — DIVALPROEX SODIUM 1000 MILLIGRAM(S): 500 TABLET, DELAYED RELEASE ORAL at 20:08

## 2022-12-07 RX ADMIN — Medication 2 MILLIGRAM(S): at 01:09

## 2022-12-07 RX ADMIN — Medication 100 MILLIGRAM(S): at 12:32

## 2022-12-07 RX ADMIN — Medication 2 MILLIGRAM(S): at 22:07

## 2022-12-07 RX ADMIN — Medication 2 MILLIGRAM(S): at 09:10

## 2022-12-07 RX ADMIN — Medication 1 MILLIGRAM(S): at 12:32

## 2022-12-07 RX ADMIN — Medication 1 TABLET(S): at 12:32

## 2022-12-07 RX ADMIN — Medication 105 MILLIGRAM(S): at 22:12

## 2022-12-07 NOTE — BH CONSULTATION LIAISON ASSESSMENT NOTE - NSBHMSEKNOW_PSY_A_CORE
CC:  Greg Grimaldo is here today for low blood sugar reading.  Readings have been between 49 to 79 the last 2 days. She stopped taking the Glipizide 3 days ago.    Medications: medications verified and updated  Refills needed today?  NO  denies Latex allergy or sensitivity  Tobacco history: verified  Health Maintenance Due   Topic Date Due   • Diabetes Eye Exam  10/11/2017     Patient is due for topics as listed above, she wishes to defer to PCP.     Impaired
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
## 2022-12-07 NOTE — PATIENT PROFILE ADULT - FUNCTIONAL ASSESSMENT - BASIC MOBILITY 6.
3-calculated by average/Not able to assess (calculate score using [facility] averaging method)

## 2022-12-07 NOTE — BH CONSULTATION LIAISON ASSESSMENT NOTE - NSBHCHARTREVIEWLAB_PSY_A_CORE FT
Basic Metabolic Panel in AM (09.13.22 @ 06:40)    Sodium, Serum: 138 mmol/L    Potassium, Serum: 4.3 mmol/L    Chloride, Serum: 103 mmol/L    Carbon Dioxide, Serum: 23.0 mmol/L    Anion Gap, Serum: 12 mmol/L    Blood Urea Nitrogen, Serum: 13.4 mg/dL    Creatinine, Serum: 0.62 mg/dL    Glucose, Serum: 82 mg/dL    Calcium, Total Serum: 9.7: Prior Reference Range of 8.6 – 10.2 was amended as of 7/26/2022 to 8.4 –  10.5. mg/dL    eGFR: 106: The estimated glomerular filtration rate (eGFR) is calculated using the  2021 CKD-EPI creatinine equation, which does not have a coefficient for  race and is validated in individuals 18 years of age and older (N Engl J  Med 2021; 385:3578-5045). Creatinine-based eGFR may be inaccurate in  various situations including but not limited to extremes of muscle mass,  altered dietary protein intake, or medications that affect renal tubular  creatinine secretion. mL/min/1.73m2

## 2022-12-07 NOTE — BH CONSULTATION LIAISON ASSESSMENT NOTE - CURRENT MEDICATION
MEDICATIONS  (STANDING):  atorvastatin 80 milliGRAM(s) Oral at bedtime  cefTRIAXone Injectable. 1000 milliGRAM(s) IV Push every 24 hours  divalproex DR 1000 milliGRAM(s) Oral <User Schedule>  folic acid 1 milliGRAM(s) Oral daily  influenza   Vaccine 0.5 milliLiter(s) IntraMuscular once  multivitamin 1 Tablet(s) Oral daily  QUEtiapine 50 milliGRAM(s) Oral daily  thiamine 100 milliGRAM(s) Oral daily  traZODone 50 milliGRAM(s) Oral at bedtime    MEDICATIONS  (PRN):  LORazepam   Injectable 2 milliGRAM(s) IV Push every 1 hour PRN CIWA-Ar score 8 or greater  OLANZapine Injectable 5 milliGRAM(s) IntraMuscular once PRN agitation
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
## 2022-12-07 NOTE — PATIENT PROFILE ADULT - FALL HARM RISK - HARM RISK INTERVENTIONS
Assistance with ambulation/Assistance OOB with selected safe patient handling equipment/Communicate Risk of Fall with Harm to all staff/Monitor for mental status changes/Monitor gait and stability/Reinforce activity limits and safety measures with patient and family/Tailored Fall Risk Interventions/Toileting schedule using arm’s reach rule for commode and bathroom/Use of alarms - bed, chair and/or voice tab/Visual Cue: Yellow wristband and red socks/Bed in lowest position, wheels locked, appropriate side rails in place/Call bell, personal items and telephone in reach/Instruct patient to call for assistance before getting out of bed or chair/Non-slip footwear when patient is out of bed/[place] to call system/Physically safe environment - no spills, clutter or unnecessary equipment/Purposeful Proactive Rounding/Room/bathroom lighting operational, light cord in reach
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
## 2022-12-07 NOTE — CHART NOTE - NSCHARTNOTEFT_GEN_A_CORE
Exam similar to earlier    No acute neurosurgical intervention  Neurology work up for left hemiparesis    Reconsult should there be other neurosurgical findings
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
## 2022-12-07 NOTE — PROGRESS NOTE ADULT - SUBJECTIVE AND OBJECTIVE BOX
[facility] Division of Hospital Medicine    Chief Complaint:  L side weakness, seizure disorder    SUBJECTIVE:  today pt appears dellirius,  confused and restless, aggravated by constant observation     OVERNIGHT EVENTS: none reported     Patient denies chest pain, SOB, abd pain, N/V, fever, chills, dysuria or any other complaints. All remainder ROS negative.     MEDICATIONS  (STANDING):  atorvastatin 80 milliGRAM(s) Oral at bedtime  cefTRIAXone Injectable. 1000 milliGRAM(s) IV Push every 24 hours  divalproex DR 1000 milliGRAM(s) Oral <User Schedule>  enoxaparin Injectable 40 milliGRAM(s) SubCutaneous every 24 hours  folic acid 1 milliGRAM(s) Oral daily  influenza   Vaccine 0.5 milliLiter(s) IntraMuscular once  multivitamin 1 Tablet(s) Oral daily  QUEtiapine 50 milliGRAM(s) Oral two times a day  thiamine 100 milliGRAM(s) Oral daily  thiamine Injectable 500 milliGRAM(s) IV Push every 8 hours  traZODone 50 milliGRAM(s) Oral at bedtime    MEDICATIONS  (PRN):  LORazepam   Injectable 2 milliGRAM(s) IV Push every 1 hour PRN CIWA-Ar score 8 or greater  OLANZapine Injectable 5 milliGRAM(s) IntraMuscular once PRN agitation      I&O's Summary      PHYSICAL EXAM:  Vital Signs Last 24 Hrs  T(C): 36.4 (07 Dec 2022 17:06), Max: 36.7 (06 Dec 2022 19:17)  T(F): 97.5 (07 Dec 2022 17:06), Max: 98 (06 Dec 2022 19:17)  HR: 73 (07 Dec 2022 17:06) (58 - 74)  BP: 133/85 (07 Dec 2022 17:06) (100/67 - 139/73)  BP(mean): --  RR: 18 (07 Dec 2022 17:06) (16 - 18)  SpO2: 96% (07 Dec 2022 17:06) (96% - 100%)    Parameters below as of 07 Dec 2022 17:06  Patient On (Oxygen Delivery Method): room air      CONSTITUTIONAL: NAD, malnourished somewhat disheveled   ENMT: Moist oral mucosa, no pharyngeal injection or exudates; normal dentition; No JVD  RESPIRATORY: Normal respiratory effort; lungs are clear to auscultation bilaterally  CARDIOVASCULAR: Regular rate and rhythm, normal S1 and S2, no murmur/rub/gallop; No lower extremity edema; Peripheral pulses are 2+ bilaterally  ABDOMEN: Nontender to palpation, normoactive bowel sounds, no rebound/guarding; No hepatosplenomegaly  MUSCLOSKELETAL:  no clubbing or cyanosis of digits; no joint swelling or tenderness to palpation  PSYCH: A+O to person, place, but not time; affect labile, odd behavior  NEUROLOGY:  delirious, CN 2-12 are intact and symmetric; no gross sensory deficits;  RUE, RLE 5/5, LLE 4+/5, lue 4+/5, no apparent tremor or fasciculation or other signs of withdraw   SKIN: multiple on different stages of healing scabs and scraches over bue ext and shins    LABS:                        9.4    4.81  )-----------( 495      ( 07 Dec 2022 07:17 )             30.5     12-07    140  |  100  |  13.9  ----------------------------<  95  3.4<L>   |  26.0  |  0.53    Ca    9.5      07 Dec 2022 07:17  Mg     1.9     12-06    TPro  7.5  /  Alb  3.8  /  TBili  0.5  /  DBili  x   /  AST  26  /  ALT  16  /  AlkPhos  59  12-07          Urinalysis Basic - ( 05 Dec 2022 21:35 )    Color: Yellow / Appearance: Slightly Turbid / S.015 / pH: x  Gluc: x / Ketone: Trace  / Bili: Small / Urobili: 4   Blood: x / Protein: Negative / Nitrite: Positive   Leuk Esterase: Moderate / RBC: 0-2 /HPF / WBC 11-25 /HPF   Sq Epi: x / Non Sq Epi: Occasional / Bacteria: Moderate        Culture - Urine (collected 05 Dec 2022 21:25)  Source: Catheterized Catheterized  Final Report (07 Dec 2022 16:01):    >100,000 CFU/ml Coag Negative Staphylococcus "Susceptibilities not    performed"      CAPILLARY BLOOD GLUCOSE            RADIOLOGY & ADDITIONAL TESTS:  Results Reviewed:   Imaging Personally Reviewed:  Electrocardiogram Personally Reviewed:

## 2022-12-07 NOTE — CHART NOTE - NSCHARTNOTEFT_GEN_A_CORE
Pt was recommended CT angio HEAD and Neck to better assess vasculature given her visual changes which was recommended by Neurology team.   Spoke with pt spouse Dionisio - discussed risk and benefit  of contrast, and was given verbal consent.

## 2022-12-07 NOTE — BH CONSULTATION LIAISON ASSESSMENT NOTE - NSBHCHARTREVIEWVS_PSY_A_CORE FT
Vital Signs Last 24 Hrs  T(C): 36.6 (07 Dec 2022 08:13), Max: 36.7 (06 Dec 2022 16:16)  T(F): 97.9 (07 Dec 2022 08:13), Max: 98.1 (06 Dec 2022 16:16)  HR: 74 (07 Dec 2022 08:13) (61 - 74)  BP: 130/79 (07 Dec 2022 08:13) (100/67 - 139/73)  BP(mean): --  RR: 18 (07 Dec 2022 08:13) (18 - 18)  SpO2: 100% (07 Dec 2022 08:13) (99% - 100%)    Parameters below as of 07 Dec 2022 08:13  Patient On (Oxygen Delivery Method): room air
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
## 2022-12-08 LAB
ANION GAP SERPL CALC-SCNC: 11 MMOL/L — SIGNIFICANT CHANGE UP (ref 5–17)
BUN SERPL-MCNC: 11 MG/DL — SIGNIFICANT CHANGE UP (ref 8–20)
CALCIUM SERPL-MCNC: 9.3 MG/DL — SIGNIFICANT CHANGE UP (ref 8.4–10.5)
CHLORIDE SERPL-SCNC: 103 MMOL/L — SIGNIFICANT CHANGE UP (ref 96–108)
CO2 SERPL-SCNC: 26 MMOL/L — SIGNIFICANT CHANGE UP (ref 22–29)
CREAT SERPL-MCNC: 0.48 MG/DL — LOW (ref 0.5–1.3)
EGFR: 113 ML/MIN/1.73M2 — SIGNIFICANT CHANGE UP
GLUCOSE SERPL-MCNC: 62 MG/DL — LOW (ref 70–99)
HCT VFR BLD CALC: 29.3 % — LOW (ref 34.5–45)
HGB BLD-MCNC: 9.2 G/DL — LOW (ref 11.5–15.5)
MAGNESIUM SERPL-MCNC: 1.4 MG/DL — LOW (ref 1.6–2.6)
MCHC RBC-ENTMCNC: 29.8 PG — SIGNIFICANT CHANGE UP (ref 27–34)
MCHC RBC-ENTMCNC: 31.4 GM/DL — LOW (ref 32–36)
MCV RBC AUTO: 94.8 FL — SIGNIFICANT CHANGE UP (ref 80–100)
PHOSPHATE SERPL-MCNC: 3.8 MG/DL — SIGNIFICANT CHANGE UP (ref 2.4–4.7)
PLATELET # BLD AUTO: 404 K/UL — HIGH (ref 150–400)
POTASSIUM SERPL-MCNC: 3.6 MMOL/L — SIGNIFICANT CHANGE UP (ref 3.5–5.3)
POTASSIUM SERPL-SCNC: 3.6 MMOL/L — SIGNIFICANT CHANGE UP (ref 3.5–5.3)
RBC # BLD: 3.09 M/UL — LOW (ref 3.8–5.2)
RBC # FLD: 29.2 % — HIGH (ref 10.3–14.5)
SODIUM SERPL-SCNC: 140 MMOL/L — SIGNIFICANT CHANGE UP (ref 135–145)
WBC # BLD: 4.35 K/UL — SIGNIFICANT CHANGE UP (ref 3.8–10.5)
WBC # FLD AUTO: 4.35 K/UL — SIGNIFICANT CHANGE UP (ref 3.8–10.5)

## 2022-12-08 PROCEDURE — 95813 EEG EXTND MNTR 61-119 MIN: CPT | Mod: 26

## 2022-12-08 PROCEDURE — 99233 SBSQ HOSP IP/OBS HIGH 50: CPT

## 2022-12-08 PROCEDURE — 99232 SBSQ HOSP IP/OBS MODERATE 35: CPT

## 2022-12-08 RX ORDER — MAGNESIUM SULFATE 500 MG/ML
2 VIAL (ML) INJECTION
Refills: 0 | Status: COMPLETED | OUTPATIENT
Start: 2022-12-08 | End: 2022-12-08

## 2022-12-08 RX ADMIN — Medication 1 MILLIGRAM(S): at 12:08

## 2022-12-08 RX ADMIN — QUETIAPINE FUMARATE 50 MILLIGRAM(S): 200 TABLET, FILM COATED ORAL at 17:51

## 2022-12-08 RX ADMIN — Medication 50 MILLIGRAM(S): at 22:15

## 2022-12-08 RX ADMIN — Medication 25 GRAM(S): at 14:12

## 2022-12-08 RX ADMIN — Medication 105 MILLIGRAM(S): at 06:18

## 2022-12-08 RX ADMIN — OLANZAPINE 5 MILLIGRAM(S): 15 TABLET, FILM COATED ORAL at 15:38

## 2022-12-08 RX ADMIN — Medication 25 GRAM(S): at 12:08

## 2022-12-08 RX ADMIN — DIVALPROEX SODIUM 1000 MILLIGRAM(S): 500 TABLET, DELAYED RELEASE ORAL at 18:59

## 2022-12-08 RX ADMIN — DIVALPROEX SODIUM 1000 MILLIGRAM(S): 500 TABLET, DELAYED RELEASE ORAL at 10:52

## 2022-12-08 RX ADMIN — Medication 25 GRAM(S): at 13:46

## 2022-12-08 RX ADMIN — CEFTRIAXONE 1000 MILLIGRAM(S): 500 INJECTION, POWDER, FOR SOLUTION INTRAMUSCULAR; INTRAVENOUS at 06:17

## 2022-12-08 RX ADMIN — Medication 100 MILLIGRAM(S): at 12:08

## 2022-12-08 RX ADMIN — QUETIAPINE FUMARATE 50 MILLIGRAM(S): 200 TABLET, FILM COATED ORAL at 06:17

## 2022-12-08 RX ADMIN — Medication 105 MILLIGRAM(S): at 15:22

## 2022-12-08 RX ADMIN — ENOXAPARIN SODIUM 40 MILLIGRAM(S): 100 INJECTION SUBCUTANEOUS at 06:17

## 2022-12-08 RX ADMIN — ATORVASTATIN CALCIUM 40 MILLIGRAM(S): 80 TABLET, FILM COATED ORAL at 22:15

## 2022-12-08 RX ADMIN — Medication 1 TABLET(S): at 12:08

## 2022-12-08 RX ADMIN — Medication 105 MILLIGRAM(S): at 22:15
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
## 2022-12-08 NOTE — EEG REPORT - NS EEG TEXT BOX
[facility]   COMPREHENSIVE EPILEPSY CENTER   REPORT OF CONTINUOUS VIDEO EEG     [facility]: [address], [address], [place] [address], Ph#: [phone]  [facility]: [id number], [place] [address], Ph#: [phone]  [facility]: [address] [place] [address], Ph#: [phone]    Patient Name: LOTUS HOUSE  Age and : 52y ([birth year])  MRN #: [id number]  Location: [facility] 5TWR 5215 02  Referring Physician: Josep Zamudio    2022 1345 to 2022 0800  Duration 18H 15min    _____________________________________________________________  STUDY INFORMATION    EEG Recording Technique:  The patient underwent continuous Video-EEG monitoring, using Telemetry System hardware on the XLTek Digital System. EEG and video data were stored on a computer hard drive with important events saved in digital archive files. The material was reviewed by a physician (electroencephalographer / epileptologist) on a daily basis. Kieran and seizure detection algorithms were utilized and reviewed. An EEG Technician attended to the patient, and was available throughout daytime work hours.  The epilepsy center neurologist was available in person or on call 24-hours per day.    EEG Placement and Labeling of Electrodes:  The EEG was performed utilizing 20 channel referential EEG connections (coronal over temporal over parasagittal montage) using all standard 10-20 electrode placements with EKG, with additional electrodes placed in the inferior temporal region using the modified 10-10 montage electrode placements for elective admissions, or if deemed necessary. Recording was at a sampling rate of 256 samples per second per channel. Time synchronized digital video recording was done simultaneously with EEG recording. A low light infrared camera was used for low light recording.     _____________________________________________________________  HISTORY    Patient is a 52y old  Female who presents with a chief complaint of Left sided weakness due to subacute CVA (08 Dec 2022 07:04)      PERTINENT MEDICATION:  MEDICATIONS  (STANDING):  atorvastatin 40 milliGRAM(s) Oral at bedtime  cefTRIAXone Injectable. 1000 milliGRAM(s) IV Push every 24 hours  divalproex DR 1000 milliGRAM(s) Oral <User Schedule>  enoxaparin Injectable 40 milliGRAM(s) SubCutaneous every 24 hours  folic acid 1 milliGRAM(s) Oral daily  influenza   Vaccine 0.5 milliLiter(s) IntraMuscular once  magnesium sulfate  IVPB 2 Gram(s) IV Intermittent every 2 hours  multivitamin 1 Tablet(s) Oral daily  QUEtiapine 50 milliGRAM(s) Oral two times a day  thiamine 100 milliGRAM(s) Oral daily  thiamine IVPB 500 milliGRAM(s) IV Intermittent every 8 hours  traZODone 50 milliGRAM(s) Oral at bedtime    _____________________________________________________________  STUDY INTERPRETATION    Findings: The background was continuous, spontaneously variable and reactive. During wakefulness, the posterior dominant rhythm consisted of 8Hz activity, with amplitude to 30 uV, that attenuated to eye opening.  Low amplitude frontal beta was noted in wakefulness.    Background Slowing:  Diffuse theta and polymorphic delta slowing, right greater than left.    Sleep Background:  Drowsiness was characterized by fragmentation, attenuation, and slowing of the background activity.    Sleep was characterized by the presence of vertex waves, symmetric sleep spindles and K-complexes.    Other Non-Epileptiform Findings:  None were present.    Interictal Epileptiform Activity:   None were present.    Events:  Clinical events: None recorded.  Seizures: None recorded.    Activation Procedures:   Hyperventilation was not performed.    Photic stimulation was not performed.     Artifacts:  Intermittent myogenic and movement artifacts were noted.    ECG:  The heart rate on single channel ECG was predominantly between xx BPM.    _____________________________________________________________  EEG SUMMARY/CLASSIFICATION    Abnormal EEG  - Mild generalized slowing, right greater than left.    _____________________________________________________________  EEG IMPRESSION/CLINICAL CORRELATE    Abnormal EEG study.  Mild nonspecific diffuse or multifocal cerebral dysfunction, right worse than left.   No epileptiform pattern or seizure seen.    Dionisio Ordonez MD  Attending Physician, [facility] Epilepsy [place]     [facility]   COMPREHENSIVE EPILEPSY CENTER   REPORT OF CONTINUOUS VIDEO EEG     [facility]: [address], [place] [address], Ph#: [phone]  [facility]: [id number], [place] [address], Ph#: [phone]  [facility]: [address] [place] [address], Ph#: [phone]    Patient Name: LOTUS HOUSE  Age and : 52y (68)  MRN #: [id number]  Location: [facility] 5TWR 5215 02  Referring Physician: Josep Zamudio    2022 1345 to 2022 1120  Duration 21H 35min    _____________________________________________________________  STUDY INFORMATION    EEG Recording Technique:  The patient underwent continuous Video-EEG monitoring, using Telemetry System hardware on the XLTek Digital System. EEG and video data were stored on a computer hard drive with important events saved in digital archive files. The material was reviewed by a physician (electroencephalographer / epileptologist) on a daily basis. Kieran and seizure detection algorithms were utilized and reviewed. An EEG Technician attended to the patient, and was available throughout daytime work hours.  The epilepsy center neurologist was available in person or on call 24-hours per day.    EEG Placement and Labeling of Electrodes:  The EEG was performed utilizing 20 channel referential EEG connections (coronal over temporal over parasagittal montage) using all standard 10-20 electrode placements with EKG, with additional electrodes placed in the inferior temporal region using the modified 10-10 montage electrode placements for elective admissions, or if deemed necessary. Recording was at a sampling rate of 256 samples per second per channel. Time synchronized digital video recording was done simultaneously with EEG recording. A low light infrared camera was used for low light recording.     _____________________________________________________________  HISTORY    Patient is a 52y old  Female who presents with a chief complaint of Left sided weakness due to subacute CVA (08 Dec 2022 07:04)      PERTINENT MEDICATION:  MEDICATIONS  (STANDING):  atorvastatin 40 milliGRAM(s) Oral at bedtime  cefTRIAXone Injectable. 1000 milliGRAM(s) IV Push every 24 hours  divalproex DR 1000 milliGRAM(s) Oral <User Schedule>  enoxaparin Injectable 40 milliGRAM(s) SubCutaneous every 24 hours  folic acid 1 milliGRAM(s) Oral daily  influenza   Vaccine 0.5 milliLiter(s) IntraMuscular once  magnesium sulfate  IVPB 2 Gram(s) IV Intermittent every 2 hours  multivitamin 1 Tablet(s) Oral daily  QUEtiapine 50 milliGRAM(s) Oral two times a day  thiamine 100 milliGRAM(s) Oral daily  thiamine IVPB 500 milliGRAM(s) IV Intermittent every 8 hours  traZODone 50 milliGRAM(s) Oral at bedtime    _____________________________________________________________  STUDY INTERPRETATION    Findings: The background was continuous, spontaneously variable and reactive. During wakefulness, the posterior dominant rhythm consisted of 8Hz activity, with amplitude to 30 uV, that attenuated to eye opening.  Low amplitude frontal beta was noted in wakefulness.    Background Slowing:  Diffuse theta and polymorphic delta slowing, right greater than left.    Sleep Background:  Drowsiness was characterized by fragmentation, attenuation, and slowing of the background activity.    Sleep was characterized by the presence of vertex waves, symmetric sleep spindles and K-complexes.    Other Non-Epileptiform Findings:  None were present.    Interictal Epileptiform Activity:   None were present.    Events:  Clinical events: None recorded.  Seizures: None recorded.    Activation Procedures:   Hyperventilation was not performed.    Photic stimulation was not performed.     Artifacts:  Intermittent myogenic and movement artifacts were noted.    ECG:  The heart rate on single channel ECG was predominantly between xx BPM.    _____________________________________________________________  EEG SUMMARY/CLASSIFICATION    Abnormal EEG  - Mild generalized slowing, right greater than left.    _____________________________________________________________  EEG IMPRESSION/CLINICAL CORRELATE    Abnormal EEG study.  Mild nonspecific diffuse or multifocal cerebral dysfunction, right worse than left.   No epileptiform pattern or seizure seen.    Dionisio Ordonez MD  Attending Physician, [facility] Epilepsy [place]

## 2022-12-08 NOTE — PROGRESS NOTE ADULT - SUBJECTIVE AND OBJECTIVE BOX
[facility] Division of Hospital Medicine    Chief Complaint:  L side weakness, seizure disorder    SUBJECTIVE:  lethargic, mildly confused however answering questions appropriately, slurring speech    OVERNIGHT EVENTS: none reported     Patient denies chest pain, SOB, abd pain, N/V, fever, chills, dysuria or any other complaints. All remainder ROS negative.     MEDICATIONS  (STANDING):  atorvastatin 40 milliGRAM(s) Oral at bedtime  cefTRIAXone Injectable. 1000 milliGRAM(s) IV Push every 24 hours  divalproex DR 1000 milliGRAM(s) Oral <User Schedule>  enoxaparin Injectable 40 milliGRAM(s) SubCutaneous every 24 hours  folic acid 1 milliGRAM(s) Oral daily  influenza   Vaccine 0.5 milliLiter(s) IntraMuscular once  magnesium sulfate  IVPB 2 Gram(s) IV Intermittent every 2 hours  multivitamin 1 Tablet(s) Oral daily  QUEtiapine 50 milliGRAM(s) Oral two times a day  thiamine 100 milliGRAM(s) Oral daily  thiamine IVPB 500 milliGRAM(s) IV Intermittent every 8 hours  traZODone 50 milliGRAM(s) Oral at bedtime    MEDICATIONS  (PRN):  LORazepam   Injectable 2 milliGRAM(s) IV Push every 1 hour PRN CIWA-Ar score 8 or greater  OLANZapine Injectable 5 milliGRAM(s) IntraMuscular once PRN agitation        I&O's Summary      PHYSICAL EXAM:  Vital Signs Last 24 Hrs  T(C): 36.7 (08 Dec 2022 10:48), Max: 36.7 (08 Dec 2022 10:48)  T(F): 98.1 (08 Dec 2022 10:48), Max: 98.1 (08 Dec 2022 10:48)  HR: 81 (08 Dec 2022 10:48) (58 - 81)  BP: 109/71 (08 Dec 2022 10:48) (109/71 - 133/85)  BP(mean): --  RR: 18 (08 Dec 2022 10:48) (16 - 18)  SpO2: 96% (08 Dec 2022 10:48) (96% - 99%)    Parameters below as of 08 Dec 2022 10:48  Patient On (Oxygen Delivery Method): room air        CONSTITUTIONAL: NAD, malnourished somewhat disheveled   ENMT: Moist oral mucosa, no pharyngeal injection or exudates; normal dentition; No JVD  RESPIRATORY: Normal respiratory effort; lungs are clear to auscultation bilaterally  CARDIOVASCULAR: Regular rate and rhythm, normal S1 and S2, no murmur/rub/gallop; No lower extremity edema; Peripheral pulses are 2+ bilaterally  ABDOMEN: Nontender to palpation, normoactive bowel sounds, no rebound/guarding; No hepatosplenomegaly  MUSCLOSKELETAL:  no clubbing or cyanosis of digits; no joint swelling or tenderness to palpation  PSYCH: A+O to person, place, but not time; affect labile, odd behavior  NEUROLOGY:  delirious, CN 2-12 are intact and symmetric; no gross sensory deficits;  RUE, RLE 5/5, LLE 4+/5, lue 4+/5, no apparent tremor or fasciculation or other signs of withdraw   SKIN: multiple on different stages of healing scabs and scraches over bue ext and shins    LABS:                        9.2    4.35  )-----------( 404      ( 08 Dec 2022 06:01 )             29.3     12-08    140  |  103  |  11.0  ----------------------------<  62<L>  3.6   |  26.0  |  0.48<L>    Ca    9.3      08 Dec 2022 06:01  Phos  3.8     12-08  Mg     1.4     12-08    TPro  7.5  /  Alb  3.8  /  TBili  0.5  /  DBili  x   /  AST  26  /  ALT  16  /  AlkPhos  59  12-07              Culture - Urine (collected 05 Dec 2022 21:25)  Source: Catheterized Catheterized  Final Report (07 Dec 2022 16:01):    >100,000 CFU/ml Coag Negative Staphylococcus "Susceptibilities not    performed"      CAPILLARY BLOOD GLUCOSE            RADIOLOGY & ADDITIONAL TESTS:  Results Reviewed: y  Imaging Personally Reviewed: y  Electrocardiogram Personally Reviewed: nikita
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
## 2022-12-08 NOTE — PROGRESS NOTE ADULT - SUBJECTIVE AND OBJECTIVE BOX
[facility] Division of Hospital Medicine    Chief Complaint:  L side weakness, seizure disorder    SUBJECTIVE:  today patient is less aggravated, still confused however answering questions     OVERNIGHT EVENTS: none reported     Patient denies chest pain, SOB, abd pain, N/V, fever, chills, dysuria or any other complaints. All remainder ROS negative.     MEDICATIONS  (STANDING):  atorvastatin 40 milliGRAM(s) Oral at bedtime  cefTRIAXone Injectable. 1000 milliGRAM(s) IV Push every 24 hours  divalproex DR 1000 milliGRAM(s) Oral <User Schedule>  enoxaparin Injectable 40 milliGRAM(s) SubCutaneous every 24 hours  folic acid 1 milliGRAM(s) Oral daily  influenza   Vaccine 0.5 milliLiter(s) IntraMuscular once  magnesium sulfate  IVPB 2 Gram(s) IV Intermittent every 2 hours  multivitamin 1 Tablet(s) Oral daily  QUEtiapine 50 milliGRAM(s) Oral two times a day  thiamine 100 milliGRAM(s) Oral daily  thiamine IVPB 500 milliGRAM(s) IV Intermittent every 8 hours  traZODone 50 milliGRAM(s) Oral at bedtime    MEDICATIONS  (PRN):  LORazepam   Injectable 2 milliGRAM(s) IV Push every 1 hour PRN CIWA-Ar score 8 or greater  OLANZapine Injectable 5 milliGRAM(s) IntraMuscular once PRN agitation        I&O's Summary      PHYSICAL EXAM:  Vital Signs Last 24 Hrs  T(C): 36.7 (08 Dec 2022 10:48), Max: 36.7 (08 Dec 2022 10:48)  T(F): 98.1 (08 Dec 2022 10:48), Max: 98.1 (08 Dec 2022 10:48)  HR: 81 (08 Dec 2022 10:48) (58 - 81)  BP: 109/71 (08 Dec 2022 10:48) (109/71 - 133/85)  BP(mean): --  RR: 18 (08 Dec 2022 10:48) (16 - 18)  SpO2: 96% (08 Dec 2022 10:48) (96% - 99%)    Parameters below as of 08 Dec 2022 10:48  Patient On (Oxygen Delivery Method): room air              CONSTITUTIONAL: NAD, malnourished somewhat disheveled   ENMT: Moist oral mucosa, no pharyngeal injection or exudates; normal dentition; No JVD  RESPIRATORY: Normal respiratory effort; lungs are clear to auscultation bilaterally  CARDIOVASCULAR: Regular rate and rhythm, normal S1 and S2, no murmur/rub/gallop; No lower extremity edema; Peripheral pulses are 2+ bilaterally  ABDOMEN: Nontender to palpation, normoactive bowel sounds, no rebound/guarding; No hepatosplenomegaly  MUSCLOSKELETAL:  no clubbing or cyanosis of digits; no joint swelling or tenderness to palpation  PSYCH: A+O to person, place, but not time; affect labile, odd behavior  NEUROLOGY:  lethargic, CN 2-12 are intact and symmetric; no gross sensory deficits;  RUE, RLE 5/5, LLE 4+/5, lue 4+/5, no apparent tremor or fasciculation or other signs of withdraw   SKIN: multiple on different stages of healing scabs and scraches over bue ext and shins    LABS:                        9.2    4.35  )-----------( 404      ( 08 Dec 2022 06:01 )             29.3     12-08    140  |  103  |  11.0  ----------------------------<  62<L>  3.6   |  26.0  |  0.48<L>    Ca    9.3      08 Dec 2022 06:01  Phos  3.8     12-08  Mg     1.4     12-08    TPro  7.5  /  Alb  3.8  /  TBili  0.5  /  DBili  x   /  AST  26  /  ALT  16  /  AlkPhos  59  12-07              Culture - Urine (collected 05 Dec 2022 21:25)  Source: Catheterized Catheterized  Final Report (07 Dec 2022 16:01):    >100,000 CFU/ml Coag Negative Staphylococcus "Susceptibilities not    performed"      CAPILLARY BLOOD GLUCOSE            RADIOLOGY & ADDITIONAL TESTS:  Results Reviewed: y  Imaging Personally Reviewed: y  Electrocardiogram Personally Reviewed: nikita
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
## 2022-12-09 LAB
ANION GAP SERPL CALC-SCNC: 12 MMOL/L — SIGNIFICANT CHANGE UP (ref 5–17)
BUN SERPL-MCNC: 13 MG/DL — SIGNIFICANT CHANGE UP (ref 8–20)
CALCIUM SERPL-MCNC: 9.1 MG/DL — SIGNIFICANT CHANGE UP (ref 8.4–10.5)
CHLORIDE SERPL-SCNC: 105 MMOL/L — SIGNIFICANT CHANGE UP (ref 96–108)
CO2 SERPL-SCNC: 21 MMOL/L — LOW (ref 22–29)
CREAT SERPL-MCNC: 0.52 MG/DL — SIGNIFICANT CHANGE UP (ref 0.5–1.3)
EGFR: 111 ML/MIN/1.73M2 — SIGNIFICANT CHANGE UP
GLUCOSE SERPL-MCNC: 72 MG/DL — SIGNIFICANT CHANGE UP (ref 70–99)
HCT VFR BLD CALC: 30.5 % — LOW (ref 34.5–45)
HGB BLD-MCNC: 9.2 G/DL — LOW (ref 11.5–15.5)
MCHC RBC-ENTMCNC: 29.1 PG — SIGNIFICANT CHANGE UP (ref 27–34)
MCHC RBC-ENTMCNC: 30.2 GM/DL — LOW (ref 32–36)
MCV RBC AUTO: 96.5 FL — SIGNIFICANT CHANGE UP (ref 80–100)
PLATELET # BLD AUTO: 316 K/UL — SIGNIFICANT CHANGE UP (ref 150–400)
POTASSIUM SERPL-MCNC: 4.4 MMOL/L — SIGNIFICANT CHANGE UP (ref 3.5–5.3)
POTASSIUM SERPL-SCNC: 4.4 MMOL/L — SIGNIFICANT CHANGE UP (ref 3.5–5.3)
RBC # BLD: 3.16 M/UL — LOW (ref 3.8–5.2)
RBC # FLD: 28.9 % — HIGH (ref 10.3–14.5)
SODIUM SERPL-SCNC: 138 MMOL/L — SIGNIFICANT CHANGE UP (ref 135–145)
WBC # BLD: 5.65 K/UL — SIGNIFICANT CHANGE UP (ref 3.8–10.5)
WBC # FLD AUTO: 5.65 K/UL — SIGNIFICANT CHANGE UP (ref 3.8–10.5)

## 2022-12-09 PROCEDURE — 36000 PLACE NEEDLE IN VEIN: CPT

## 2022-12-09 PROCEDURE — 99232 SBSQ HOSP IP/OBS MODERATE 35: CPT

## 2022-12-09 RX ORDER — ASPIRIN/CALCIUM CARB/MAGNESIUM 324 MG
81 TABLET ORAL DAILY
Refills: 0 | Status: DISCONTINUED | OUTPATIENT
Start: 2022-12-09 | End: 2022-12-20

## 2022-12-09 RX ORDER — MAGNESIUM SULFATE 500 MG/ML
2 VIAL (ML) INJECTION ONCE
Refills: 0 | Status: COMPLETED | OUTPATIENT
Start: 2022-12-09 | End: 2022-12-09

## 2022-12-09 RX ORDER — POTASSIUM CHLORIDE 20 MEQ
40 PACKET (EA) ORAL ONCE
Refills: 0 | Status: COMPLETED | OUTPATIENT
Start: 2022-12-09 | End: 2022-12-09

## 2022-12-09 RX ADMIN — Medication 100 MILLIGRAM(S): at 12:58

## 2022-12-09 RX ADMIN — Medication 105 MILLIGRAM(S): at 12:59

## 2022-12-09 RX ADMIN — ENOXAPARIN SODIUM 40 MILLIGRAM(S): 100 INJECTION SUBCUTANEOUS at 05:55

## 2022-12-09 RX ADMIN — DIVALPROEX SODIUM 1000 MILLIGRAM(S): 500 TABLET, DELAYED RELEASE ORAL at 09:12

## 2022-12-09 RX ADMIN — Medication 105 MILLIGRAM(S): at 05:55

## 2022-12-09 RX ADMIN — DIVALPROEX SODIUM 1000 MILLIGRAM(S): 500 TABLET, DELAYED RELEASE ORAL at 18:22

## 2022-12-09 RX ADMIN — QUETIAPINE FUMARATE 50 MILLIGRAM(S): 200 TABLET, FILM COATED ORAL at 05:55

## 2022-12-09 RX ADMIN — ATORVASTATIN CALCIUM 40 MILLIGRAM(S): 80 TABLET, FILM COATED ORAL at 22:27

## 2022-12-09 RX ADMIN — QUETIAPINE FUMARATE 50 MILLIGRAM(S): 200 TABLET, FILM COATED ORAL at 18:22

## 2022-12-09 RX ADMIN — Medication 40 MILLIEQUIVALENT(S): at 18:22

## 2022-12-09 RX ADMIN — Medication 1 TABLET(S): at 12:58

## 2022-12-09 RX ADMIN — Medication 1 MILLIGRAM(S): at 12:58

## 2022-12-09 RX ADMIN — CEFTRIAXONE 1000 MILLIGRAM(S): 500 INJECTION, POWDER, FOR SOLUTION INTRAMUSCULAR; INTRAVENOUS at 05:55

## 2022-12-09 RX ADMIN — Medication 50 MILLIGRAM(S): at 22:27

## 2022-12-09 RX ADMIN — Medication 105 MILLIGRAM(S): at 22:26

## 2022-12-09 RX ADMIN — Medication 25 GRAM(S): at 12:59
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
## 2022-12-09 NOTE — PROCEDURE NOTE - ADDITIONAL PROCEDURE DETAILS
Tourniquet removed, bed lowered, sharps disposed of. Patient tolerated well.
18g IV placed in the left and right upper arm w/o complications.

## 2022-12-09 NOTE — PROCEDURE NOTE - NSPROCDETAILS_GEN_ALL_CORE
dressing applied/flushes easily/secured in place
x 2/blood seen on insertion/dressing applied/flushes easily/secured in place

## 2022-12-09 NOTE — PROGRESS NOTE ADULT - SUBJECTIVE AND OBJECTIVE BOX
[facility]    [id number]    52y      Female    INTERVAL HPI/OVERNIGHT EVENTS: patient seen at bedside and complains of anxiety      REVIEW OF SYSTEMS:    CONSTITUTIONAL: anxiety  RESPIRATORY: No cough, wheezing, hemoptysis; No shortness of breath  CARDIOVASCULAR: No chest pain, palpitations  GASTROINTESTINAL: No abdominal or epigastric pain. No nausea, vomiting  NEUROLOGICAL: No headaches, memory loss, loss of strength.  MISCELLANEOUS:      Vital Signs Last 24 Hrs  T(C): 36.4 (09 Dec 2022 09:13), Max: 36.4 (08 Dec 2022 22:00)  T(F): 97.5 (09 Dec 2022 09:13), Max: 97.6 (08 Dec 2022 22:00)  HR: 64 (09 Dec 2022 09:13) (64 - 94)  BP: 142/90 (09 Dec 2022 09:13) (132/72 - 142/90)  BP(mean): --  RR: 18 (09 Dec 2022 09:13) (18 - 18)  SpO2: 99% (09 Dec 2022 09:13) (96% - 99%)    Parameters below as of 09 Dec 2022 09:13  Patient On (Oxygen Delivery Method): room air        PHYSICAL EXAM:    CONSTITUTIONAL: NAD, malnourished somewhat disheveled   ENMT: Moist oral mucosa, no pharyngeal injection or exudates; normal dentition; No JVD  RESPIRATORY: Normal respiratory effort; lungs are clear to auscultation bilaterally  CARDIOVASCULAR: Regular rate and rhythm, normal S1 and S2, no murmur/rub/gallop; No lower extremity edema; Peripheral pulses are 2+ bilaterally  ABDOMEN: Nontender to palpation, normoactive bowel sounds, no rebound/guarding; No hepatosplenomegaly  MUSCLOSKELETAL:  no clubbing or cyanosis of digits; no joint swelling or tenderness to palpation  PSYCH: A+O to person, place, but not time; affect labile, odd behavior  NEUROLOGY:  lethargic, CN 2-12 are intact and symmetric; no gross sensory deficits;  RUE, RLE 5/5, LLE 4+/5, lue 4+/5, no apparent tremor or fasciculation or other signs of withdraw       LABS:                        9.2    5.65  )-----------( 316      ( 09 Dec 2022 06:32 )             30.5     12-09    138  |  105  |  13.0  ----------------------------<  72  4.4   |  21.0<L>  |  0.52    Ca    9.1      09 Dec 2022 06:32  Phos  3.8     12-08  Mg     1.4     12-08        MEDICATIONS  (STANDING):  atorvastatin 40 milliGRAM(s) Oral at bedtime  cefTRIAXone Injectable. 1000 milliGRAM(s) IV Push every 24 hours  divalproex DR 1000 milliGRAM(s) Oral <User Schedule>  enoxaparin Injectable 40 milliGRAM(s) SubCutaneous every 24 hours  folic acid 1 milliGRAM(s) Oral daily  influenza   Vaccine 0.5 milliLiter(s) IntraMuscular once  multivitamin 1 Tablet(s) Oral daily  potassium chloride   Powder 40 milliEquivalent(s) Oral once  QUEtiapine 50 milliGRAM(s) Oral two times a day  thiamine 100 milliGRAM(s) Oral daily  thiamine IVPB 500 milliGRAM(s) IV Intermittent every 8 hours  traZODone 50 milliGRAM(s) Oral at bedtime    MEDICATIONS  (PRN):  LORazepam   Injectable 2 milliGRAM(s) IV Push every 1 hour PRN CIWA-Ar score 8 or greater      RADIOLOGY & ADDITIONAL TESTS:

## 2022-12-09 NOTE — PROGRESS NOTE ADULT - NS ATTEND AMEND GEN_ALL_CORE FT
Seen and examined with the PA  Plan discussed with PA and I agree with as written above with modifications as below    agree with above  will follow with you    Rolo La MD PhD   [id number]
Seen and examined with the PA  Plan discussed with PA and I agree with as written above with modifications as below    MRI brain does not show stroke  remains left sided weakness  await CTA head and neck    EEG did not show seizures    PT/OT  will follow with you    Rolo La MD PhD   [id number]
Seen and examined with the PA  Plan discussed with PA and I agree with as written above with modifications as below    will check 24 hour eeg to r/o NCSE  check extracranial/intracranial vasculature  discussed with Dr Campbell    will follow with you    Rolo La MD PhD   [id number]

## 2022-12-10 PROCEDURE — 99232 SBSQ HOSP IP/OBS MODERATE 35: CPT

## 2022-12-10 RX ADMIN — Medication 105 MILLIGRAM(S): at 18:05

## 2022-12-10 RX ADMIN — DIVALPROEX SODIUM 1000 MILLIGRAM(S): 500 TABLET, DELAYED RELEASE ORAL at 10:06

## 2022-12-10 RX ADMIN — CEFTRIAXONE 1000 MILLIGRAM(S): 500 INJECTION, POWDER, FOR SOLUTION INTRAMUSCULAR; INTRAVENOUS at 05:46

## 2022-12-10 RX ADMIN — ENOXAPARIN SODIUM 40 MILLIGRAM(S): 100 INJECTION SUBCUTANEOUS at 05:46

## 2022-12-10 RX ADMIN — ATORVASTATIN CALCIUM 40 MILLIGRAM(S): 80 TABLET, FILM COATED ORAL at 21:43

## 2022-12-10 RX ADMIN — Medication 1 MILLIGRAM(S): at 12:08

## 2022-12-10 RX ADMIN — QUETIAPINE FUMARATE 50 MILLIGRAM(S): 200 TABLET, FILM COATED ORAL at 18:04

## 2022-12-10 RX ADMIN — Medication 81 MILLIGRAM(S): at 12:08

## 2022-12-10 RX ADMIN — Medication 105 MILLIGRAM(S): at 05:46

## 2022-12-10 RX ADMIN — Medication 50 MILLIGRAM(S): at 21:44

## 2022-12-10 RX ADMIN — Medication 1 TABLET(S): at 12:08

## 2022-12-10 RX ADMIN — DIVALPROEX SODIUM 1000 MILLIGRAM(S): 500 TABLET, DELAYED RELEASE ORAL at 18:04

## 2022-12-10 RX ADMIN — Medication 100 MILLIGRAM(S): at 12:08

## 2022-12-10 NOTE — PROGRESS NOTE ADULT - SUBJECTIVE AND OBJECTIVE BOX
[facility]    [id number]    52y      Female    INTERVAL HPI/OVERNIGHT EVENTS: patient being seen for subacute cva,. patient seen at bedsdide with . patient states feeling a little better    REVIEW OF SYSTEMS:    CONSTITUTIONAL:  fatigue  RESPIRATORY: No cough, wheezing, hemoptysis; No shortness of breath  CARDIOVASCULAR: No chest pain, palpitations  GASTROINTESTINAL: No abdominal or epigastric pain. No nausea, vomiting  NEUROLOGICAL: No headaches, memory loss, loss of strength.  MISCELLANEOUS:      Vital Signs Last 24 Hrs  T(C): 36.6 (10 Dec 2022 10:09), Max: 36.7 (09 Dec 2022 20:52)  T(F): 97.8 (10 Dec 2022 10:09), Max: 98.1 (09 Dec 2022 20:52)  HR: 66 (10 Dec 2022 10:09) (66 - 68)  BP: 138/91 (10 Dec 2022 10:09) (102/70 - 138/91)  BP(mean): --  RR: 18 (10 Dec 2022 10:09) (16 - 18)  SpO2: 96% (10 Dec 2022 10:09) (95% - 100%)    Parameters below as of 10 Dec 2022 10:09  Patient On (Oxygen Delivery Method): room air        PHYSICAL EXAM:  CONSTITUTIONAL: NAD, malnourished somewhat disheveled   ENMT: Moist oral mucosa, no pharyngeal injection or exudates; normal dentition; No JVD  RESPIRATORY: Normal respiratory effort; lungs are clear to auscultation bilaterally  CARDIOVASCULAR: Regular rate and rhythm, normal S1 and S2, no murmur/rub/gallop; No lower extremity edema; Peripheral pulses are 2+ bilaterally  ABDOMEN: Nontender to palpation, normoactive bowel sounds, no rebound/guarding; No hepatosplenomegaly  MUSCLOSKELETAL:  no clubbing or cyanosis of digits; no joint swelling or tenderness to palpation  PSYCH: A+O to person, place, but not time; affect labile, odd behavior  NEUROLOGY:  lethargic, CN 2-12 are intact and symmetric; no gross sensory deficits;  RUE, RLE 5/5, LLE 4+/5, lue 4+/5, no apparent tremor or fasciculation or other signs of withdraw       LABS:                        9.2    5.65  )-----------( 316      ( 09 Dec 2022 06:32 )             30.5     12-09    138  |  105  |  13.0  ----------------------------<  72  4.4   |  21.0<L>  |  0.52    Ca    9.1      09 Dec 2022 06:32      MEDICATIONS  (STANDING):  aspirin  chewable 81 milliGRAM(s) Oral daily  atorvastatin 40 milliGRAM(s) Oral at bedtime  divalproex DR 1000 milliGRAM(s) Oral <User Schedule>  enoxaparin Injectable 40 milliGRAM(s) SubCutaneous every 24 hours  folic acid 1 milliGRAM(s) Oral daily  influenza   Vaccine 0.5 milliLiter(s) IntraMuscular once  multivitamin 1 Tablet(s) Oral daily  QUEtiapine 50 milliGRAM(s) Oral two times a day  thiamine 100 milliGRAM(s) Oral daily  thiamine IVPB 500 milliGRAM(s) IV Intermittent every 8 hours  traZODone 50 milliGRAM(s) Oral at bedtime    MEDICATIONS  (PRN):  LORazepam   Injectable 2 milliGRAM(s) IV Push every 1 hour PRN CIWA-Ar score 8 or greater      RADIOLOGY & ADDITIONAL TESTS:
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
## 2022-12-11 LAB
ACANTHOCYTES BLD QL SMEAR: SLIGHT — SIGNIFICANT CHANGE UP
ALBUMIN SERPL ELPH-MCNC: 3.4 G/DL — SIGNIFICANT CHANGE UP (ref 3.3–5.2)
ALP SERPL-CCNC: 83 U/L — SIGNIFICANT CHANGE UP (ref 40–120)
ALT FLD-CCNC: 13 U/L — SIGNIFICANT CHANGE UP
ANION GAP SERPL CALC-SCNC: 12 MMOL/L — SIGNIFICANT CHANGE UP (ref 5–17)
ANISOCYTOSIS BLD QL: SIGNIFICANT CHANGE UP
AST SERPL-CCNC: 28 U/L — SIGNIFICANT CHANGE UP
BASOPHILS # BLD AUTO: 0.03 K/UL — SIGNIFICANT CHANGE UP (ref 0–0.2)
BASOPHILS NFR BLD AUTO: 0.6 % — SIGNIFICANT CHANGE UP (ref 0–2)
BILIRUB SERPL-MCNC: 0.3 MG/DL — LOW (ref 0.4–2)
BUN SERPL-MCNC: 13.5 MG/DL — SIGNIFICANT CHANGE UP (ref 8–20)
BURR CELLS BLD QL SMEAR: PRESENT — SIGNIFICANT CHANGE UP
CALCIUM SERPL-MCNC: 9.3 MG/DL — SIGNIFICANT CHANGE UP (ref 8.4–10.5)
CHLORIDE SERPL-SCNC: 101 MMOL/L — SIGNIFICANT CHANGE UP (ref 96–108)
CO2 SERPL-SCNC: 21 MMOL/L — LOW (ref 22–29)
CREAT SERPL-MCNC: 0.59 MG/DL — SIGNIFICANT CHANGE UP (ref 0.5–1.3)
DACRYOCYTES BLD QL SMEAR: SLIGHT — SIGNIFICANT CHANGE UP
EGFR: 108 ML/MIN/1.73M2 — SIGNIFICANT CHANGE UP
ELLIPTOCYTES BLD QL SMEAR: SLIGHT — SIGNIFICANT CHANGE UP
EOSINOPHIL # BLD AUTO: 0.04 K/UL — SIGNIFICANT CHANGE UP (ref 0–0.5)
EOSINOPHIL NFR BLD AUTO: 0.8 % — SIGNIFICANT CHANGE UP (ref 0–6)
GLUCOSE SERPL-MCNC: 84 MG/DL — SIGNIFICANT CHANGE UP (ref 70–99)
HCT VFR BLD CALC: 35.2 % — SIGNIFICANT CHANGE UP (ref 34.5–45)
HGB BLD-MCNC: 10.8 G/DL — LOW (ref 11.5–15.5)
IMM GRANULOCYTES NFR BLD AUTO: 0.4 % — SIGNIFICANT CHANGE UP (ref 0–0.9)
LYMPHOCYTES # BLD AUTO: 1.25 K/UL — SIGNIFICANT CHANGE UP (ref 1–3.3)
LYMPHOCYTES # BLD AUTO: 25.2 % — SIGNIFICANT CHANGE UP (ref 13–44)
MACROCYTES BLD QL: SIGNIFICANT CHANGE UP
MAGNESIUM SERPL-MCNC: 1.4 MG/DL — LOW (ref 1.8–2.6)
MANUAL SMEAR VERIFICATION: SIGNIFICANT CHANGE UP
MCHC RBC-ENTMCNC: 29.1 PG — SIGNIFICANT CHANGE UP (ref 27–34)
MCHC RBC-ENTMCNC: 30.7 GM/DL — LOW (ref 32–36)
MCV RBC AUTO: 94.9 FL — SIGNIFICANT CHANGE UP (ref 80–100)
MONOCYTES # BLD AUTO: 0.53 K/UL — SIGNIFICANT CHANGE UP (ref 0–0.9)
MONOCYTES NFR BLD AUTO: 10.7 % — SIGNIFICANT CHANGE UP (ref 2–14)
NEUTROPHILS # BLD AUTO: 3.1 K/UL — SIGNIFICANT CHANGE UP (ref 1.8–7.4)
NEUTROPHILS NFR BLD AUTO: 62.3 % — SIGNIFICANT CHANGE UP (ref 43–77)
OVALOCYTES BLD QL SMEAR: SLIGHT — SIGNIFICANT CHANGE UP
PLAT MORPH BLD: NORMAL — SIGNIFICANT CHANGE UP
PLATELET # BLD AUTO: 288 K/UL — SIGNIFICANT CHANGE UP (ref 150–400)
POIKILOCYTOSIS BLD QL AUTO: SIGNIFICANT CHANGE UP
POLYCHROMASIA BLD QL SMEAR: SIGNIFICANT CHANGE UP
POTASSIUM SERPL-MCNC: 4.6 MMOL/L — SIGNIFICANT CHANGE UP (ref 3.5–5.3)
POTASSIUM SERPL-SCNC: 4.6 MMOL/L — SIGNIFICANT CHANGE UP (ref 3.5–5.3)
PROT SERPL-MCNC: 7 G/DL — SIGNIFICANT CHANGE UP (ref 6.6–8.7)
RBC # BLD: 3.71 M/UL — LOW (ref 3.8–5.2)
RBC # FLD: 26.8 % — HIGH (ref 10.3–14.5)
RBC BLD AUTO: ABNORMAL
SCHISTOCYTES BLD QL AUTO: SLIGHT — SIGNIFICANT CHANGE UP
SODIUM SERPL-SCNC: 134 MMOL/L — LOW (ref 135–145)
WBC # BLD: 4.97 K/UL — SIGNIFICANT CHANGE UP (ref 3.8–10.5)
WBC # FLD AUTO: 4.97 K/UL — SIGNIFICANT CHANGE UP (ref 3.8–10.5)

## 2022-12-11 PROCEDURE — 70498 CT ANGIOGRAPHY NECK: CPT | Mod: 26

## 2022-12-11 PROCEDURE — 99232 SBSQ HOSP IP/OBS MODERATE 35: CPT

## 2022-12-11 PROCEDURE — 70496 CT ANGIOGRAPHY HEAD: CPT | Mod: 26

## 2022-12-11 RX ORDER — MAGNESIUM SULFATE 500 MG/ML
2 VIAL (ML) INJECTION ONCE
Refills: 0 | Status: COMPLETED | OUTPATIENT
Start: 2022-12-11 | End: 2022-12-12

## 2022-12-11 RX ADMIN — QUETIAPINE FUMARATE 50 MILLIGRAM(S): 200 TABLET, FILM COATED ORAL at 17:10

## 2022-12-11 RX ADMIN — QUETIAPINE FUMARATE 50 MILLIGRAM(S): 200 TABLET, FILM COATED ORAL at 06:48

## 2022-12-11 RX ADMIN — DIVALPROEX SODIUM 1000 MILLIGRAM(S): 500 TABLET, DELAYED RELEASE ORAL at 08:27

## 2022-12-11 RX ADMIN — Medication 1 MILLIGRAM(S): at 17:09

## 2022-12-11 RX ADMIN — Medication 81 MILLIGRAM(S): at 17:09

## 2022-12-11 RX ADMIN — ATORVASTATIN CALCIUM 40 MILLIGRAM(S): 80 TABLET, FILM COATED ORAL at 21:36

## 2022-12-11 RX ADMIN — Medication 1 TABLET(S): at 17:10

## 2022-12-11 RX ADMIN — DIVALPROEX SODIUM 1000 MILLIGRAM(S): 500 TABLET, DELAYED RELEASE ORAL at 17:10

## 2022-12-11 RX ADMIN — ENOXAPARIN SODIUM 40 MILLIGRAM(S): 100 INJECTION SUBCUTANEOUS at 06:48

## 2022-12-11 RX ADMIN — Medication 50 MILLIGRAM(S): at 21:36

## 2022-12-11 RX ADMIN — Medication 100 MILLIGRAM(S): at 17:10
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
## 2022-12-11 NOTE — PROGRESS NOTE ADULT - SUBJECTIVE AND OBJECTIVE BOX
[facility]    [id number]    52y      Female    INTERVAL HPI/OVERNIGHT EVENTS: patient being seen for cva. patient is for ct head and neck today.      REVIEW OF SYSTEMS:    CONSTITUTIONAL: No fever, weight loss, or fatigue  RESPIRATORY: No cough, wheezing, hemoptysis; No shortness of breath  CARDIOVASCULAR: No chest pain, palpitations  GASTROINTESTINAL: No abdominal or epigastric pain. No nausea, vomiting  NEUROLOGICAL: No headaches, memory loss, loss of strength.  MISCELLANEOUS:      Vital Signs Last 24 Hrs  T(C): 36.8 (11 Dec 2022 10:50), Max: 36.8 (11 Dec 2022 10:50)  T(F): 98.3 (11 Dec 2022 10:50), Max: 98.3 (11 Dec 2022 10:50)  HR: 72 (11 Dec 2022 10:50) (68 - 72)  BP: 116/77 (11 Dec 2022 10:50) (116/77 - 138/72)  BP(mean): --  RR: 17 (11 Dec 2022 10:50) (17 - 18)  SpO2: 100% (11 Dec 2022 10:50) (96% - 100%)    Parameters below as of 11 Dec 2022 04:41  Patient On (Oxygen Delivery Method): room air        PHYSICAL EXAM:    CONSTITUTIONAL: NAD,   ENMT: Moist oral mucosa, no pharyngeal injection or exudates; normal dentition; No JVD  RESPIRATORY: Normal respiratory effort; lungs are clear to auscultation bilaterally  CARDIOVASCULAR: Regular rate and rhythm, normal S1 and S2, no murmur/rub/gallop; No lower extremity edema; Peripheral pulses are 2+ bilaterally  ABDOMEN: Nontender to palpation, normoactive bowel sounds, no rebound/guarding; No hepatosplenomegaly  MUSCLOSKELETAL:  no clubbing or cyanosis of digits; no joint swelling or tenderness to palpation  PSYCH: A+O to person, place, but not time; affect labile, odd behavior  NEUROLOGY:  lethargic, CN 2-12 are intact and symmetric; no gross sensory deficits;  RUE, RLE 5/5, LLE 4+/5, lue 4+/5, no apparent tremor or fasciculation or other signs of withdraw       LABS:                        10.8   4.97  )-----------( 288      ( 11 Dec 2022 07:13 )             35.2     12-11    134<L>  |  101  |  13.5  ----------------------------<  84  4.6   |  21.0<L>  |  0.59    Ca    9.3      11 Dec 2022 07:13  Mg     1.4     12-11    TPro  7.0  /  Alb  3.4  /  TBili  0.3<L>  /  DBili  x   /  AST  28  /  ALT  13  /  AlkPhos  83  12-11            MEDICATIONS  (STANDING):  aspirin  chewable 81 milliGRAM(s) Oral daily  atorvastatin 40 milliGRAM(s) Oral at bedtime  divalproex DR 1000 milliGRAM(s) Oral <User Schedule>  enoxaparin Injectable 40 milliGRAM(s) SubCutaneous every 24 hours  folic acid 1 milliGRAM(s) Oral daily  influenza   Vaccine 0.5 milliLiter(s) IntraMuscular once  magnesium sulfate  IVPB 2 Gram(s) IV Intermittent once  multivitamin 1 Tablet(s) Oral daily  QUEtiapine 50 milliGRAM(s) Oral two times a day  thiamine 100 milliGRAM(s) Oral daily  traZODone 50 milliGRAM(s) Oral at bedtime    MEDICATIONS  (PRN):  LORazepam   Injectable 0.5 milliGRAM(s) IV Push once PRN for ct scan      RADIOLOGY & ADDITIONAL TESTS:

## 2022-12-12 ENCOUNTER — TRANSCRIPTION ENCOUNTER (OUTPATIENT)
Age: 52
End: 2022-12-12

## 2022-12-12 LAB
ALBUMIN SERPL ELPH-MCNC: 2.7 G/DL — LOW (ref 3.3–5.2)
ALP SERPL-CCNC: 68 U/L — SIGNIFICANT CHANGE UP (ref 40–120)
ALT FLD-CCNC: 11 U/L — SIGNIFICANT CHANGE UP
ANION GAP SERPL CALC-SCNC: 10 MMOL/L — SIGNIFICANT CHANGE UP (ref 5–17)
AST SERPL-CCNC: 24 U/L — SIGNIFICANT CHANGE UP
BILIRUB SERPL-MCNC: <0.2 MG/DL — LOW (ref 0.4–2)
BUN SERPL-MCNC: 18 MG/DL — SIGNIFICANT CHANGE UP (ref 8–20)
CALCIUM SERPL-MCNC: 8.8 MG/DL — SIGNIFICANT CHANGE UP (ref 8.4–10.5)
CHLORIDE SERPL-SCNC: 102 MMOL/L — SIGNIFICANT CHANGE UP (ref 96–108)
CO2 SERPL-SCNC: 23 MMOL/L — SIGNIFICANT CHANGE UP (ref 22–29)
CREAT SERPL-MCNC: 0.57 MG/DL — SIGNIFICANT CHANGE UP (ref 0.5–1.3)
EGFR: 109 ML/MIN/1.73M2 — SIGNIFICANT CHANGE UP
GLUCOSE SERPL-MCNC: 88 MG/DL — SIGNIFICANT CHANGE UP (ref 70–99)
MAGNESIUM SERPL-MCNC: 1.4 MG/DL — LOW (ref 1.6–2.6)
POTASSIUM SERPL-MCNC: 4.8 MMOL/L — SIGNIFICANT CHANGE UP (ref 3.5–5.3)
POTASSIUM SERPL-SCNC: 4.8 MMOL/L — SIGNIFICANT CHANGE UP (ref 3.5–5.3)
PROT SERPL-MCNC: 5.9 G/DL — LOW (ref 6.6–8.7)
SARS-COV-2 RNA SPEC QL NAA+PROBE: SIGNIFICANT CHANGE UP
SODIUM SERPL-SCNC: 135 MMOL/L — SIGNIFICANT CHANGE UP (ref 135–145)

## 2022-12-12 PROCEDURE — 99232 SBSQ HOSP IP/OBS MODERATE 35: CPT

## 2022-12-12 RX ORDER — FOLIC ACID 0.8 MG
1 TABLET ORAL
Qty: 0 | Refills: 0 | DISCHARGE
Start: 2022-12-12

## 2022-12-12 RX ORDER — ASPIRIN/CALCIUM CARB/MAGNESIUM 324 MG
1 TABLET ORAL
Qty: 0 | Refills: 0 | DISCHARGE
Start: 2022-12-12

## 2022-12-12 RX ORDER — DIVALPROEX SODIUM 500 MG/1
2 TABLET, DELAYED RELEASE ORAL
Qty: 0 | Refills: 0 | DISCHARGE
Start: 2022-12-12

## 2022-12-12 RX ORDER — TRAZODONE HCL 50 MG
1 TABLET ORAL
Qty: 0 | Refills: 0 | DISCHARGE
Start: 2022-12-12

## 2022-12-12 RX ORDER — QUETIAPINE FUMARATE 200 MG/1
1 TABLET, FILM COATED ORAL
Qty: 0 | Refills: 0 | DISCHARGE

## 2022-12-12 RX ORDER — THIAMINE MONONITRATE (VIT B1) 100 MG
1 TABLET ORAL
Qty: 0 | Refills: 0 | DISCHARGE
Start: 2022-12-12

## 2022-12-12 RX ORDER — HALOPERIDOL DECANOATE 100 MG/ML
5 INJECTION INTRAMUSCULAR ONCE
Refills: 0 | Status: COMPLETED | OUTPATIENT
Start: 2022-12-12 | End: 2022-12-12

## 2022-12-12 RX ORDER — QUETIAPINE FUMARATE 200 MG/1
1 TABLET, FILM COATED ORAL
Qty: 0 | Refills: 0 | DISCHARGE
Start: 2022-12-12

## 2022-12-12 RX ORDER — HALOPERIDOL DECANOATE 100 MG/ML
5 INJECTION INTRAMUSCULAR ONCE
Refills: 0 | Status: COMPLETED | OUTPATIENT
Start: 2022-12-12 | End: 2022-12-13

## 2022-12-12 RX ORDER — MAGNESIUM SULFATE 500 MG/ML
2 VIAL (ML) INJECTION ONCE
Refills: 0 | Status: COMPLETED | OUTPATIENT
Start: 2022-12-12 | End: 2022-12-12

## 2022-12-12 RX ORDER — ATORVASTATIN CALCIUM 80 MG/1
1 TABLET, FILM COATED ORAL
Qty: 0 | Refills: 0 | DISCHARGE
Start: 2022-12-12

## 2022-12-12 RX ORDER — METHYLPHENIDATE HCL 5 MG
1 TABLET ORAL
Qty: 0 | Refills: 0 | DISCHARGE

## 2022-12-12 RX ADMIN — Medication 100 MILLIGRAM(S): at 12:31

## 2022-12-12 RX ADMIN — QUETIAPINE FUMARATE 50 MILLIGRAM(S): 200 TABLET, FILM COATED ORAL at 05:38

## 2022-12-12 RX ADMIN — DIVALPROEX SODIUM 1000 MILLIGRAM(S): 500 TABLET, DELAYED RELEASE ORAL at 17:44

## 2022-12-12 RX ADMIN — Medication 1 MILLIGRAM(S): at 12:31

## 2022-12-12 RX ADMIN — Medication 1 TABLET(S): at 12:31

## 2022-12-12 RX ADMIN — ENOXAPARIN SODIUM 40 MILLIGRAM(S): 100 INJECTION SUBCUTANEOUS at 05:38

## 2022-12-12 RX ADMIN — Medication 100 GRAM(S): at 05:38

## 2022-12-12 RX ADMIN — DIVALPROEX SODIUM 1000 MILLIGRAM(S): 500 TABLET, DELAYED RELEASE ORAL at 08:45

## 2022-12-12 RX ADMIN — QUETIAPINE FUMARATE 50 MILLIGRAM(S): 200 TABLET, FILM COATED ORAL at 17:44

## 2022-12-12 RX ADMIN — Medication 100 GRAM(S): at 12:31

## 2022-12-12 RX ADMIN — Medication 81 MILLIGRAM(S): at 12:31

## 2022-12-12 RX ADMIN — HALOPERIDOL DECANOATE 5 MILLIGRAM(S): 100 INJECTION INTRAMUSCULAR at 00:35
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
## 2022-12-12 NOTE — DISCHARGE NOTE NURSING/CASE MANAGEMENT/SOCIAL WORK - NSDCVIVACCINE_GEN_ALL_CORE_FT
Tdap; 30-May-2021 03:06; KIMBERLEE Yuan (RN); [employer]; [id number] (Exp. Date: 22-Nov-2022); IntraMuscular; Deltoid Right.; 0.5 milliLiter(s); VIS (VIS Published: 09-May-2013, VIS Presented: 30-May-2021);

## 2022-12-12 NOTE — DISCHARGE NOTE NURSING/CASE MANAGEMENT/SOCIAL WORK - NSDCPEFALRISK_GEN_ALL_CORE
For information on Fall & Injury Prevention, visit: [email or web address] OR  [email or web address] OR  https://www.cdc.gov/steadi/patient.html

## 2022-12-12 NOTE — DISCHARGE NOTE PROVIDER - NSDCCPCAREPLAN_GEN_ALL_CORE_FT
PRINCIPAL DISCHARGE DIAGNOSIS  Diagnosis: CVA (cerebrovascular accident)  Assessment and Plan of Treatment:       SECONDARY DISCHARGE DIAGNOSES  Diagnosis: Acute UTI  Assessment and Plan of Treatment:     Diagnosis: Hypokalemia  Assessment and Plan of Treatment:     Diagnosis: History of seizure  Assessment and Plan of Treatment:     Diagnosis: H/O: depression  Assessment and Plan of Treatment:     Diagnosis: History of alcohol use  Assessment and Plan of Treatment:     Diagnosis: Thiamine deficiency  Assessment and Plan of Treatment:      PRINCIPAL DISCHARGE DIAGNOSIS  Diagnosis: CVA (cerebrovascular accident)  Assessment and Plan of Treatment: Continue asa/statin      SECONDARY DISCHARGE DIAGNOSES  Diagnosis: Acute UTI  Assessment and Plan of Treatment: Completed antibiotics    Diagnosis: Hypokalemia  Assessment and Plan of Treatment: Resolved    Diagnosis: History of seizure  Assessment and Plan of Treatment: Continue valproic acid    Diagnosis: History of alcohol use  Assessment and Plan of Treatment: MVI, thiamine, folic acid    Diagnosis: Thiamine deficiency  Assessment and Plan of Treatment: Continue Thiamine     PRINCIPAL DISCHARGE DIAGNOSIS  Diagnosis: Left-sided weakness  Assessment and Plan of Treatment: Seen by neurology. Stroke imaging completed, including CAT scan and echocardiogram, workup has been negative. To continue aspirin and statin. Continue medications as prescribed. Please follow up with your primary care physician and neurologist within 1 week.      SECONDARY DISCHARGE DIAGNOSES  Diagnosis: Acute UTI  Assessment and Plan of Treatment: Completed antibiotics    Diagnosis: Hypokalemia  Assessment and Plan of Treatment: Resolved    Diagnosis: History of seizure  Assessment and Plan of Treatment: Continue valproic acid    Diagnosis: History of alcohol use  Assessment and Plan of Treatment: MVI, thiamine, folic acid. Do not contninue to drink alcohol    Diagnosis: Thiamine deficiency  Assessment and Plan of Treatment: Continue Thiamine
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
## 2022-12-12 NOTE — DISCHARGE NOTE NURSING/CASE MANAGEMENT/SOCIAL WORK - PATIENT PORTAL LINK FT
You can access the FollowMyHealth Patient Portal offered by [facility] by registering at the following website: [email or web address]. By joining [facility]’s FollowMyHealth portal, you will also be able to view your health information using other applications (apps) compatible with our system.

## 2022-12-12 NOTE — DISCHARGE NOTE PROVIDER - PROVIDER TOKENS
FREE:[LAST:[primary care],PHONE:[(   )    -],FAX:[(   )    -],ADDRESS:[pcp]],PROVIDER:[TOKEN:[[id number]]]

## 2022-12-12 NOTE — DISCHARGE NOTE PROVIDER - NSDCMRMEDTOKEN_GEN_ALL_CORE_FT
Albuterol (Eqv-ProAir HFA) 90 mcg/inh inhalation aerosol: 2 puff(s) inhaled every 6 hours  aspirin 81 mg oral tablet, chewable: 1 tab(s) orally once a day  atorvastatin 40 mg oral tablet: 1 tab(s) orally once a day (at bedtime)  divalproex sodium 500 mg oral delayed release tablet: 2 tab(s) orally 2 times a day  folic acid 1 mg oral tablet: 1 tab(s) orally once a day  Multiple Vitamins oral tablet: 1 tab(s) orally once a day  pantoprazole 40 mg oral delayed release tablet: 1 tab(s) orally once a day (before a meal)  QUEtiapine 50 mg oral tablet: 1 tab(s) orally 2 times a day  Symbicort 80 mcg-4.5 mcg/inh inhalation aerosol: 2 puff(s) inhaled 2 times a day  thiamine 100 mg oral tablet: 1 tab(s) orally once a day  traZODone 50 mg oral tablet: 1 tab(s) orally once a day (at bedtime)

## 2022-12-12 NOTE — DISCHARGE NOTE PROVIDER - CARE PROVIDER_API CALL
primary care,   pcp  Phone: (   )    -  Fax: (   )    -  Follow Up Time:     Rolo La; PhD)  Neurology; Vascular Neurology  [address], [facility] [address]  [place] [address]  Phone: [phone]  Fax: [phone]  Follow Up Time:
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
## 2022-12-12 NOTE — PROGRESS NOTE ADULT - SUBJECTIVE AND OBJECTIVE BOX
LOTUS [facility]SE    [id number]    52y      Female    INTERVAL HPI/OVERNIGHT EVENTS: patient beign seen for cva. patient is awaiting claudio,. patient is mildly confused, but has periods of lucidity    REVIEW OF SYSTEMS:    CONSTITUTIONAL: No fever, weight loss, or fatigue  RESPIRATORY: No cough, wheezing, hemoptysis; No shortness of breath  CARDIOVASCULAR: No chest pain, palpitations  GASTROINTESTINAL: No abdominal or epigastric pain. No nausea, vomiting  NEUROLOGICAL: No headaches, memory loss, loss of strength.  MISCELLANEOUS:      Vital Signs Last 24 Hrs  T(C): 36.7 (13 Dec 2022 04:45), Max: 36.7 (12 Dec 2022 16:18)  T(F): 98.1 (13 Dec 2022 04:45), Max: 98.1 (12 Dec 2022 16:18)  HR: 61 (13 Dec 2022 04:45) (61 - 84)  BP: 100/67 (13 Dec 2022 04:45) (100/67 - 145/80)  BP(mean): --  RR: 18 (13 Dec 2022 04:45) (18 - 18)  SpO2: 95% (13 Dec 2022 04:45) (95% - 99%)    Parameters below as of 13 Dec 2022 04:45  Patient On (Oxygen Delivery Method): room air        PHYSICAL EXAM:      CONSTITUTIONAL: NAD,   ENMT: Moist oral mucosa, no pharyngeal injection or exudates; normal dentition; No JVD  RESPIRATORY: Normal respiratory effort; lungs are clear to auscultation bilaterally  CARDIOVASCULAR: Regular rate and rhythm, normal S1 and S2, no murmur/rub/gallop; No lower extremity edema; Peripheral pulses are 2+ bilaterally  ABDOMEN: Nontender to palpation, normoactive bowel sounds, no rebound/guarding; No hepatosplenomegaly  MUSCLOSKELETAL:  no clubbing or cyanosis of digits; no joint swelling or tenderness to palpation  PSYCH: A+O to person, place, but not time; affect labile, odd behavior  NEUROLOGY:  lethargic, CN 2-12 are intact and symmetric; no gross sensory deficits;  RUE, RLE 5/5, LLE 4+/5, lue 3/5      LABS:    12-12    135  |  102  |  18.0  ----------------------------<  88  4.8   |  23.0  |  0.57    Ca    8.8      12 Dec 2022 05:43  Mg     1.4     12-12    TPro  5.9<L>  /  Alb  2.7<L>  /  TBili  <0.2<L>  /  DBili  x   /  AST  24  /  ALT  11  /  AlkPhos  68  12-12            MEDICATIONS  (STANDING):  aspirin  chewable 81 milliGRAM(s) Oral daily  atorvastatin 40 milliGRAM(s) Oral at bedtime  divalproex DR 1000 milliGRAM(s) Oral <User Schedule>  enoxaparin Injectable 40 milliGRAM(s) SubCutaneous every 24 hours  folic acid 1 milliGRAM(s) Oral daily  influenza   Vaccine 0.5 milliLiter(s) IntraMuscular once  multivitamin 1 Tablet(s) Oral daily  QUEtiapine 50 milliGRAM(s) Oral two times a day  thiamine 100 milliGRAM(s) Oral daily  traZODone 50 milliGRAM(s) Oral at bedtime    MEDICATIONS  (PRN):  haloperidol    Injectable 5 milliGRAM(s) IntraMuscular once PRN Agitation  LORazepam   Injectable 0.5 milliGRAM(s) IV Push once PRN for ct scan      RADIOLOGY & ADDITIONAL TESTS:

## 2022-12-12 NOTE — DISCHARGE NOTE PROVIDER - ATTENDING DISCHARGE PHYSICAL EXAMINATION:
CONSTITUTIONAL: NAD,   ENMT: Moist oral mucosa, no pharyngeal injection or exudates; normal dentition; No JVD  RESPIRATORY: Normal respiratory effort; lungs are clear to auscultation bilaterally  CARDIOVASCULAR: Regular rate and rhythm, normal S1 and S2, no murmur/rub/gallop; No lower extremity edema; Peripheral pulses are 2+ bilaterally  ABDOMEN: Nontender to palpation, normoactive bowel sounds, no rebound/guarding; No hepatosplenomegaly  MUSCLOSKELETAL:  no clubbing or cyanosis of digits; no joint swelling or tenderness to palpation  PSYCH: A+O to person, place, but not time; affect labile, odd behavior  NEUROLOGY:  aaox3

## 2022-12-12 NOTE — DISCHARGE NOTE PROVIDER - CARE PROVIDERS DIRECT ADDRESSES
,DirectAddress_Unknown,[email or web address]

## 2022-12-13 PROCEDURE — 99232 SBSQ HOSP IP/OBS MODERATE 35: CPT

## 2022-12-13 RX ADMIN — QUETIAPINE FUMARATE 50 MILLIGRAM(S): 200 TABLET, FILM COATED ORAL at 17:57

## 2022-12-13 RX ADMIN — QUETIAPINE FUMARATE 50 MILLIGRAM(S): 200 TABLET, FILM COATED ORAL at 06:44

## 2022-12-13 RX ADMIN — Medication 1 MILLIGRAM(S): at 17:57

## 2022-12-13 RX ADMIN — DIVALPROEX SODIUM 1000 MILLIGRAM(S): 500 TABLET, DELAYED RELEASE ORAL at 17:57

## 2022-12-13 RX ADMIN — Medication 81 MILLIGRAM(S): at 17:57

## 2022-12-13 RX ADMIN — ENOXAPARIN SODIUM 40 MILLIGRAM(S): 100 INJECTION SUBCUTANEOUS at 06:44

## 2022-12-13 RX ADMIN — Medication 50 MILLIGRAM(S): at 21:25

## 2022-12-13 RX ADMIN — HALOPERIDOL DECANOATE 5 MILLIGRAM(S): 100 INJECTION INTRAMUSCULAR at 18:42

## 2022-12-13 RX ADMIN — Medication 1 TABLET(S): at 17:57

## 2022-12-13 RX ADMIN — DIVALPROEX SODIUM 1000 MILLIGRAM(S): 500 TABLET, DELAYED RELEASE ORAL at 08:17

## 2022-12-13 RX ADMIN — ATORVASTATIN CALCIUM 40 MILLIGRAM(S): 80 TABLET, FILM COATED ORAL at 21:25

## 2022-12-13 RX ADMIN — Medication 100 MILLIGRAM(S): at 17:57

## 2022-12-13 NOTE — DIETITIAN INITIAL EVALUATION ADULT - ADD RECOMMEND
Ensure TID to optimize po intake and provide an additional 350 kcal, 20g protein per serving   Continue MVI, thiamine, folic acid
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
## 2022-12-13 NOTE — DIETITIAN INITIAL EVALUATION ADULT - NSICDXPASTSURGICALHX_GEN_ALL_CORE_FT
PAST SURGICAL HISTORY:  H/O tracheostomy     H/O tracheostomy     PEG (percutaneous endoscopic gastrostomy) status removed

## 2022-12-13 NOTE — DIETITIAN INITIAL EVALUATION ADULT - NSICDXPASTMEDICALHX_GEN_ALL_CORE_FT
PAST MEDICAL HISTORY:  Cocaine use disorder     ETOH abuse     ETOH abuse     HLD (hyperlipidemia)     Hypertension     Hypothyroid     Seizure     TBI (traumatic brain injury)     Tobacco dependence

## 2022-12-13 NOTE — PROGRESS NOTE ADULT - SUBJECTIVE AND OBJECTIVE BOX
LOTUS HOUSE    [id number]    52y      Female    INTERVAL HPI/OVERNIGHT EVENTS: patient being seen for cva. patient seen at bedside and denies any complaints      REVIEW OF SYSTEMS:    CONSTITUTIONAL: No fever, weight loss, or fatigue  RESPIRATORY: No cough, wheezing, hemoptysis; No shortness of breath  CARDIOVASCULAR: No chest pain, palpitations  GASTROINTESTINAL: No abdominal or epigastric pain. No nausea, vomiting  NEUROLOGICAL: No headaches, memory loss, loss of strength.  MISCELLANEOUS:      Vital Signs Last 24 Hrs  T(C): 36.5 (13 Dec 2022 09:05), Max: 36.7 (12 Dec 2022 16:18)  T(F): 97.7 (13 Dec 2022 09:05), Max: 98.1 (12 Dec 2022 16:18)  HR: 67 (13 Dec 2022 09:05) (61 - 84)  BP: 99/67 (13 Dec 2022 09:05) (99/67 - 145/80)  BP(mean): --  RR: 17 (13 Dec 2022 09:05) (17 - 18)  SpO2: 100% (13 Dec 2022 09:05) (95% - 100%)    Parameters below as of 13 Dec 2022 09:05  Patient On (Oxygen Delivery Method): room air        PHYSICAL EXAM:    CONSTITUTIONAL: NAD,   ENMT: Moist oral mucosa, no pharyngeal injection or exudates; normal dentition; No JVD  RESPIRATORY: Normal respiratory effort; lungs are clear to auscultation bilaterally  CARDIOVASCULAR: Regular rate and rhythm, normal S1 and S2, no murmur/rub/gallop; No lower extremity edema; Peripheral pulses are 2+ bilaterally  ABDOMEN: Nontender to palpation, normoactive bowel sounds, no rebound/guarding; No hepatosplenomegaly  MUSCLOSKELETAL:  no clubbing or cyanosis of digits; no joint swelling or tenderness to palpation  PSYCH: A+O to person, place, but not time; affect labile, odd behavior  NEUROLOGY:  lethargic, CN 2-12 are intact and symmetric; no gross sensory deficits;  RUE, RLE 5/5, LLE 4+/5, lue 3/5        LABS:    12-12    135  |  102  |  18.0  ----------------------------<  88  4.8   |  23.0  |  0.57    Ca    8.8      12 Dec 2022 05:43  Mg     1.4     12-12    TPro  5.9<L>  /  Alb  2.7<L>  /  TBili  <0.2<L>  /  DBili  x   /  AST  24  /  ALT  11  /  AlkPhos  68  12-12            MEDICATIONS  (STANDING):  aspirin  chewable 81 milliGRAM(s) Oral daily  atorvastatin 40 milliGRAM(s) Oral at bedtime  divalproex DR 1000 milliGRAM(s) Oral <User Schedule>  enoxaparin Injectable 40 milliGRAM(s) SubCutaneous every 24 hours  folic acid 1 milliGRAM(s) Oral daily  influenza   Vaccine 0.5 milliLiter(s) IntraMuscular once  multivitamin 1 Tablet(s) Oral daily  QUEtiapine 50 milliGRAM(s) Oral two times a day  thiamine 100 milliGRAM(s) Oral daily  traZODone 50 milliGRAM(s) Oral at bedtime    MEDICATIONS  (PRN):  haloperidol    Injectable 5 milliGRAM(s) IntraMuscular once PRN Agitation  LORazepam   Injectable 0.5 milliGRAM(s) IV Push once PRN for ct scan      RADIOLOGY & ADDITIONAL TESTS:

## 2022-12-13 NOTE — DIETITIAN INITIAL EVALUATION ADULT - PERTINENT MEDS FT
MEDICATIONS  (STANDING):  aspirin  chewable 81 milliGRAM(s) Oral daily  atorvastatin 40 milliGRAM(s) Oral at bedtime  divalproex DR 1000 milliGRAM(s) Oral <User Schedule>  enoxaparin Injectable 40 milliGRAM(s) SubCutaneous every 24 hours  folic acid 1 milliGRAM(s) Oral daily  influenza   Vaccine 0.5 milliLiter(s) IntraMuscular once  multivitamin 1 Tablet(s) Oral daily  QUEtiapine 50 milliGRAM(s) Oral two times a day  thiamine 100 milliGRAM(s) Oral daily  traZODone 50 milliGRAM(s) Oral at bedtime    MEDICATIONS  (PRN):  haloperidol    Injectable 5 milliGRAM(s) IntraMuscular once PRN Agitation  LORazepam   Injectable 0.5 milliGRAM(s) IV Push once PRN for ct scan
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
## 2022-12-13 NOTE — DIETITIAN INITIAL EVALUATION ADULT - PERTINENT LABORATORY DATA
12-12    135  |  102  |  18.0  ----------------------------<  88  4.8   |  23.0  |  0.57    Ca    8.8      12 Dec 2022 05:43  Mg     1.4     12-12    TPro  5.9<L>  /  Alb  2.7<L>  /  TBili  <0.2<L>  /  DBili  x   /  AST  24  /  ALT  11  /  AlkPhos  68  12-12  A1C with Estimated Average Glucose Result: 4.3 % (12-06-22 @ 06:17)

## 2022-12-14 PROCEDURE — 99232 SBSQ HOSP IP/OBS MODERATE 35: CPT

## 2022-12-14 RX ORDER — SODIUM CHLORIDE 9 MG/ML
500 INJECTION INTRAMUSCULAR; INTRAVENOUS; SUBCUTANEOUS ONCE
Refills: 0 | Status: DISCONTINUED | OUTPATIENT
Start: 2022-12-14 | End: 2022-12-14

## 2022-12-14 RX ADMIN — ATORVASTATIN CALCIUM 40 MILLIGRAM(S): 80 TABLET, FILM COATED ORAL at 21:23

## 2022-12-14 RX ADMIN — DIVALPROEX SODIUM 1000 MILLIGRAM(S): 500 TABLET, DELAYED RELEASE ORAL at 08:41

## 2022-12-14 RX ADMIN — ENOXAPARIN SODIUM 40 MILLIGRAM(S): 100 INJECTION SUBCUTANEOUS at 05:23

## 2022-12-14 RX ADMIN — Medication 50 MILLIGRAM(S): at 21:23

## 2022-12-14 RX ADMIN — QUETIAPINE FUMARATE 50 MILLIGRAM(S): 200 TABLET, FILM COATED ORAL at 17:28

## 2022-12-14 RX ADMIN — QUETIAPINE FUMARATE 50 MILLIGRAM(S): 200 TABLET, FILM COATED ORAL at 05:23

## 2022-12-14 RX ADMIN — DIVALPROEX SODIUM 1000 MILLIGRAM(S): 500 TABLET, DELAYED RELEASE ORAL at 17:27

## 2022-12-14 RX ADMIN — Medication 100 MILLIGRAM(S): at 17:28

## 2022-12-14 RX ADMIN — Medication 1 TABLET(S): at 17:27

## 2022-12-14 RX ADMIN — Medication 81 MILLIGRAM(S): at 17:28

## 2022-12-14 RX ADMIN — Medication 1 MILLIGRAM(S): at 17:27
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
## 2022-12-14 NOTE — PROGRESS NOTE ADULT - SUBJECTIVE AND OBJECTIVE BOX
LOT[facility]SE    [id number]    52y      Female    INTERVAL HPI/OVERNIGHT EVENTS: patient being seen for cva. patient awaiting bed in [facility]\    REVIEW OF SYSTEMS:    CONSTITUTIONAL: No fever, weight loss, or fatigue  RESPIRATORY: No cough, wheezing, hemoptysis; No shortness of breath  CARDIOVASCULAR: No chest pain, palpitations  GASTROINTESTINAL: No abdominal or epigastric pain. No nausea, vomiting  NEUROLOGICAL: No headaches, memory loss, loss of strength.  MISCELLANEOUS:      Vital Signs Last 24 Hrs  T(C): 36.6 (14 Dec 2022 10:14), Max: 36.7 (13 Dec 2022 20:47)  T(F): 97.8 (14 Dec 2022 10:14), Max: 98.1 (13 Dec 2022 20:47)  HR: 63 (14 Dec 2022 10:14) (62 - 70)  BP: 128/86 (14 Dec 2022 10:14) (116/79 - 153/99)  BP(mean): --  RR: 18 (14 Dec 2022 10:14) (18 - 18)  SpO2: 99% (14 Dec 2022 10:14) (96% - 99%)    Parameters below as of 14 Dec 2022 10:14  Patient On (Oxygen Delivery Method): room air        PHYSICAL EXAM:    CONSTITUTIONAL: NAD,   ENMT: Moist oral mucosa, no pharyngeal injection or exudates; normal dentition; No JVD  RESPIRATORY: Normal respiratory effort; lungs are clear to auscultation bilaterally  CARDIOVASCULAR: Regular rate and rhythm, normal S1 and S2, no murmur/rub/gallop; No lower extremity edema; Peripheral pulses are 2+ bilaterally  ABDOMEN: Nontender to palpation, normoactive bowel sounds, no rebound/guarding; No hepatosplenomegaly  MUSCLOSKELETAL:  no clubbing or cyanosis of digits; no joint swelling or tenderness to palpation  PSYCH: A+O to person, place, but not time; affect labile, odd behavior  NEUROLOGY:  lethargic, CN 2-12 are intact and symmetric; no gross sensory deficits;  RUE, RLE 5/5, LLE 4+/5, lue 3/5    LABS:        MEDICATIONS  (STANDING):  aspirin  chewable 81 milliGRAM(s) Oral daily  atorvastatin 40 milliGRAM(s) Oral at bedtime  divalproex DR 1000 milliGRAM(s) Oral <User Schedule>  enoxaparin Injectable 40 milliGRAM(s) SubCutaneous every 24 hours  folic acid 1 milliGRAM(s) Oral daily  influenza   Vaccine 0.5 milliLiter(s) IntraMuscular once  multivitamin 1 Tablet(s) Oral daily  QUEtiapine 50 milliGRAM(s) Oral two times a day  thiamine 100 milliGRAM(s) Oral daily  traZODone 50 milliGRAM(s) Oral at bedtime    MEDICATIONS  (PRN):  LORazepam   Injectable 0.5 milliGRAM(s) IV Push once PRN for ct scan      RADIOLOGY & ADDITIONAL TESTS:

## 2022-12-15 PROCEDURE — 99232 SBSQ HOSP IP/OBS MODERATE 35: CPT

## 2022-12-15 RX ADMIN — Medication 1 MILLIGRAM(S): at 13:23

## 2022-12-15 RX ADMIN — ATORVASTATIN CALCIUM 40 MILLIGRAM(S): 80 TABLET, FILM COATED ORAL at 21:11

## 2022-12-15 RX ADMIN — Medication 100 MILLIGRAM(S): at 13:23

## 2022-12-15 RX ADMIN — DIVALPROEX SODIUM 1000 MILLIGRAM(S): 500 TABLET, DELAYED RELEASE ORAL at 18:57

## 2022-12-15 RX ADMIN — Medication 50 MILLIGRAM(S): at 21:13

## 2022-12-15 RX ADMIN — DIVALPROEX SODIUM 1000 MILLIGRAM(S): 500 TABLET, DELAYED RELEASE ORAL at 09:17

## 2022-12-15 RX ADMIN — Medication 81 MILLIGRAM(S): at 13:22

## 2022-12-15 RX ADMIN — ENOXAPARIN SODIUM 40 MILLIGRAM(S): 100 INJECTION SUBCUTANEOUS at 05:04

## 2022-12-15 RX ADMIN — Medication 1 TABLET(S): at 13:22

## 2022-12-15 RX ADMIN — QUETIAPINE FUMARATE 50 MILLIGRAM(S): 200 TABLET, FILM COATED ORAL at 05:04

## 2022-12-15 RX ADMIN — QUETIAPINE FUMARATE 50 MILLIGRAM(S): 200 TABLET, FILM COATED ORAL at 18:57

## 2022-12-15 NOTE — PROGRESS NOTE ADULT - SUBJECTIVE AND OBJECTIVE BOX
[facility]    [id number]    52y      Female    INTERVAL HPI/OVERNIGHT EVENTS: patient being seen for cva. patient in nad.     REVIEW OF SYSTEMS:    CONSTITUTIONAL: No fever, weight loss, or fatigue  RESPIRATORY: No cough, wheezing, hemoptysis; No shortness of breath  CARDIOVASCULAR: No chest pain, palpitations  GASTROINTESTINAL: No abdominal or epigastric pain. No nausea, vomiting  NEUROLOGICAL: No headaches, memory loss, loss of strength.  MISCELLANEOUS:      Vital Signs Last 24 Hrs  T(C): 36.6 (16 Dec 2022 04:53), Max: 36.9 (15 Dec 2022 20:03)  T(F): 97.9 (16 Dec 2022 04:53), Max: 98.4 (15 Dec 2022 20:03)  HR: 70 (16 Dec 2022 04:53) (70 - 84)  BP: 101/68 (16 Dec 2022 04:53) (101/68 - 131/86)  BP(mean): --  RR: 18 (16 Dec 2022 04:53) (18 - 19)  SpO2: 98% (16 Dec 2022 04:53) (95% - 98%)    Parameters below as of 16 Dec 2022 04:53  Patient On (Oxygen Delivery Method): room air        PHYSICAL EXAM:  CONSTITUTIONAL: NAD,   ENMT: Moist oral mucosa, no pharyngeal injection or exudates; normal dentition; No JVD  RESPIRATORY: Normal respiratory effort; lungs are clear to auscultation bilaterally  CARDIOVASCULAR: Regular rate and rhythm, normal S1 and S2, no murmur/rub/gallop; No lower extremity edema; Peripheral pulses are 2+ bilaterally  ABDOMEN: Nontender to palpation, normoactive bowel sounds, no rebound/guarding; No hepatosplenomegaly  MUSCLOSKELETAL:  no clubbing or cyanosis of digits; no joint swelling or tenderness to palpation  PSYCH: A+O to person, place, but not time; affect labile, odd behavior  NEUROLOGY:  lethargic, CN 2-12 are intact and symmetric; no gross sensory deficits;  RUE, RLE 5/5, LLE 4+/5, lue 3/5      LABS:                  MEDICATIONS  (STANDING):  aspirin  chewable 81 milliGRAM(s) Oral daily  atorvastatin 40 milliGRAM(s) Oral at bedtime  divalproex DR 1000 milliGRAM(s) Oral <User Schedule>  enoxaparin Injectable 40 milliGRAM(s) SubCutaneous every 24 hours  folic acid 1 milliGRAM(s) Oral daily  influenza   Vaccine 0.5 milliLiter(s) IntraMuscular once  multivitamin 1 Tablet(s) Oral daily  QUEtiapine 50 milliGRAM(s) Oral two times a day  thiamine 100 milliGRAM(s) Oral daily  traZODone 50 milliGRAM(s) Oral at bedtime    MEDICATIONS  (PRN):  LORazepam   Injectable 0.5 milliGRAM(s) IV Push once PRN for ct scan      RADIOLOGY & ADDITIONAL TESTS:
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
## 2022-12-16 LAB — SARS-COV-2 RNA SPEC QL NAA+PROBE: SIGNIFICANT CHANGE UP

## 2022-12-16 PROCEDURE — 99232 SBSQ HOSP IP/OBS MODERATE 35: CPT

## 2022-12-16 RX ADMIN — Medication 81 MILLIGRAM(S): at 12:35

## 2022-12-16 RX ADMIN — ENOXAPARIN SODIUM 40 MILLIGRAM(S): 100 INJECTION SUBCUTANEOUS at 05:27

## 2022-12-16 RX ADMIN — Medication 50 MILLIGRAM(S): at 21:39

## 2022-12-16 RX ADMIN — DIVALPROEX SODIUM 1000 MILLIGRAM(S): 500 TABLET, DELAYED RELEASE ORAL at 18:07

## 2022-12-16 RX ADMIN — QUETIAPINE FUMARATE 50 MILLIGRAM(S): 200 TABLET, FILM COATED ORAL at 18:07

## 2022-12-16 RX ADMIN — QUETIAPINE FUMARATE 50 MILLIGRAM(S): 200 TABLET, FILM COATED ORAL at 05:27

## 2022-12-16 RX ADMIN — ATORVASTATIN CALCIUM 40 MILLIGRAM(S): 80 TABLET, FILM COATED ORAL at 21:40

## 2022-12-16 RX ADMIN — Medication 100 MILLIGRAM(S): at 12:34

## 2022-12-16 RX ADMIN — DIVALPROEX SODIUM 1000 MILLIGRAM(S): 500 TABLET, DELAYED RELEASE ORAL at 08:24

## 2022-12-16 RX ADMIN — Medication 1 MILLIGRAM(S): at 12:34

## 2022-12-16 RX ADMIN — Medication 1 TABLET(S): at 12:34
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
## 2022-12-16 NOTE — PROGRESS NOTE ADULT - SUBJECTIVE AND OBJECTIVE BOX
[facility]    [id number]    52y      Female    INTERVAL HPI/OVERNIGHT EVENTS: patient being seen for cva. patient awaiting bed in [facility]    REVIEW OF SYSTEMS:    CONSTITUTIONAL: No fever, weight loss, or fatigue  RESPIRATORY: No cough, wheezing, hemoptysis; No shortness of breath  CARDIOVASCULAR: No chest pain, palpitations  GASTROINTESTINAL: No abdominal or epigastric pain. No nausea, vomiting  NEUROLOGICAL: No headaches, memory loss, loss of strength.  MISCELLANEOUS:      Vital Signs Last 24 Hrs  T(C): 36.6 (17 Dec 2022 05:00), Max: 37 (16 Dec 2022 10:40)  T(F): 97.9 (17 Dec 2022 05:00), Max: 98.6 (16 Dec 2022 10:40)  HR: 63 (17 Dec 2022 05:00) (63 - 72)  BP: 120/77 (17 Dec 2022 05:00) (106/70 - 120/77)  BP(mean): --  RR: 18 (17 Dec 2022 05:00) (18 - 18)  SpO2: 97% (17 Dec 2022 05:00) (97% - 100%)    Parameters below as of 17 Dec 2022 05:00  Patient On (Oxygen Delivery Method): room air        PHYSICAL EXAM:    CONSTITUTIONAL: NAD,   ENMT: Moist oral mucosa, no pharyngeal injection or exudates; normal dentition; No JVD  RESPIRATORY: Normal respiratory effort; lungs are clear to auscultation bilaterally  CARDIOVASCULAR: Regular rate and rhythm, normal S1 and S2, no murmur/rub/gallop; No lower extremity edema; Peripheral pulses are 2+ bilaterally  ABDOMEN: Nontender to palpation, normoactive bowel sounds, no rebound/guarding; No hepatosplenomegaly  MUSCLOSKELETAL:  no clubbing or cyanosis of digits; no joint swelling or tenderness to palpation  PSYCH: A+O to person, place, but not time; affect labile, odd behavior  NEUROLOGY:  lethargic, CN 2-12 are intact and symmetric; no gross sensory deficits;  RUE, RLE 5/5, LLE 4+/5, lue 3/5    LABS:        MEDICATIONS  (STANDING):  aspirin  chewable 81 milliGRAM(s) Oral daily  atorvastatin 40 milliGRAM(s) Oral at bedtime  divalproex DR 1000 milliGRAM(s) Oral <User Schedule>  enoxaparin Injectable 40 milliGRAM(s) SubCutaneous every 24 hours  folic acid 1 milliGRAM(s) Oral daily  influenza   Vaccine 0.5 milliLiter(s) IntraMuscular once  multivitamin 1 Tablet(s) Oral daily  QUEtiapine 50 milliGRAM(s) Oral two times a day  thiamine 100 milliGRAM(s) Oral daily  traZODone 50 milliGRAM(s) Oral at bedtime    MEDICATIONS  (PRN):  LORazepam   Injectable 0.5 milliGRAM(s) IV Push once PRN for ct scan      RADIOLOGY & ADDITIONAL TESTS:

## 2022-12-17 PROCEDURE — 99232 SBSQ HOSP IP/OBS MODERATE 35: CPT

## 2022-12-17 RX ORDER — ALPRAZOLAM 0.25 MG
0.25 TABLET ORAL ONCE
Refills: 0 | Status: DISCONTINUED | OUTPATIENT
Start: 2022-12-17 | End: 2022-12-17

## 2022-12-17 RX ADMIN — DIVALPROEX SODIUM 1000 MILLIGRAM(S): 500 TABLET, DELAYED RELEASE ORAL at 17:19

## 2022-12-17 RX ADMIN — Medication 1 MILLIGRAM(S): at 08:39

## 2022-12-17 RX ADMIN — Medication 100 MILLIGRAM(S): at 08:40

## 2022-12-17 RX ADMIN — DIVALPROEX SODIUM 1000 MILLIGRAM(S): 500 TABLET, DELAYED RELEASE ORAL at 08:39

## 2022-12-17 RX ADMIN — ENOXAPARIN SODIUM 40 MILLIGRAM(S): 100 INJECTION SUBCUTANEOUS at 06:09

## 2022-12-17 RX ADMIN — Medication 0.25 MILLIGRAM(S): at 15:41

## 2022-12-17 RX ADMIN — ATORVASTATIN CALCIUM 40 MILLIGRAM(S): 80 TABLET, FILM COATED ORAL at 22:29

## 2022-12-17 RX ADMIN — QUETIAPINE FUMARATE 50 MILLIGRAM(S): 200 TABLET, FILM COATED ORAL at 17:19

## 2022-12-17 RX ADMIN — Medication 1 TABLET(S): at 08:40

## 2022-12-17 RX ADMIN — QUETIAPINE FUMARATE 50 MILLIGRAM(S): 200 TABLET, FILM COATED ORAL at 06:09

## 2022-12-17 RX ADMIN — Medication 81 MILLIGRAM(S): at 08:39

## 2022-12-17 NOTE — PROGRESS NOTE ADULT - SUBJECTIVE AND OBJECTIVE BOX
[facility]    [id number]    52y      Female    INTERVAL HPI/OVERNIGHT EVENTS: patient being seen for cva. Patient awaiting bed in [facility]    REVIEW OF SYSTEMS:    CONSTITUTIONAL: No fever, weight loss, or fatigue  RESPIRATORY: No cough, wheezing, hemoptysis; No shortness of breath  CARDIOVASCULAR: No chest pain, palpitations  GASTROINTESTINAL: No abdominal or epigastric pain. No nausea, vomiting  NEUROLOGICAL: No headaches, memory loss, loss of strength.  MISCELLANEOUS:      Vital Signs Last 24 Hrs  T(C): 36.6 (17 Dec 2022 05:00), Max: 36.9 (16 Dec 2022 21:32)  T(F): 97.9 (17 Dec 2022 05:00), Max: 98.4 (16 Dec 2022 21:32)  HR: 63 (17 Dec 2022 05:00) (63 - 72)  BP: 120/77 (17 Dec 2022 05:00) (113/75 - 120/77)  BP(mean): --  RR: 18 (17 Dec 2022 05:00) (18 - 18)  SpO2: 97% (17 Dec 2022 05:00) (97% - 98%)    Parameters below as of 17 Dec 2022 05:00  Patient On (Oxygen Delivery Method): room air        PHYSICAL EXAM:    CONSTITUTIONAL: NAD,   ENMT: Moist oral mucosa, no pharyngeal injection or exudates; normal dentition; No JVD  RESPIRATORY: Normal respiratory effort; lungs are clear to auscultation bilaterally  CARDIOVASCULAR: Regular rate and rhythm, normal S1 and S2, no murmur/rub/gallop; No lower extremity edema; Peripheral pulses are 2+ bilaterally  ABDOMEN: Nontender to palpation, normoactive bowel sounds, no rebound/guarding; No hepatosplenomegaly  MUSCLOSKELETAL:  no clubbing or cyanosis of digits; no joint swelling or tenderness to palpation  PSYCH: A+O to person, place, but not time; affect labile, odd behavior  NEUROLOGY:  lethargic, CN 2-12 are intact and symmetric; no gross sensory deficits;  RUE, RLE 5/5, LLE 4+/5, lue 3/5      LABS:            MEDICATIONS  (STANDING):  aspirin  chewable 81 milliGRAM(s) Oral daily  atorvastatin 40 milliGRAM(s) Oral at bedtime  divalproex DR 1000 milliGRAM(s) Oral <User Schedule>  enoxaparin Injectable 40 milliGRAM(s) SubCutaneous every 24 hours  folic acid 1 milliGRAM(s) Oral daily  influenza   Vaccine 0.5 milliLiter(s) IntraMuscular once  multivitamin 1 Tablet(s) Oral daily  QUEtiapine 50 milliGRAM(s) Oral two times a day  thiamine 100 milliGRAM(s) Oral daily  traZODone 50 milliGRAM(s) Oral at bedtime    MEDICATIONS  (PRN):  LORazepam   Injectable 0.5 milliGRAM(s) IV Push once PRN for ct scan      RADIOLOGY & ADDITIONAL TESTS:
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
## 2022-12-18 PROCEDURE — 99233 SBSQ HOSP IP/OBS HIGH 50: CPT

## 2022-12-18 RX ORDER — MAGNESIUM SULFATE 500 MG/ML
2 VIAL (ML) INJECTION ONCE
Refills: 0 | Status: COMPLETED | OUTPATIENT
Start: 2022-12-18 | End: 2022-12-18

## 2022-12-18 RX ADMIN — QUETIAPINE FUMARATE 50 MILLIGRAM(S): 200 TABLET, FILM COATED ORAL at 18:00

## 2022-12-18 RX ADMIN — DIVALPROEX SODIUM 1000 MILLIGRAM(S): 500 TABLET, DELAYED RELEASE ORAL at 11:21

## 2022-12-18 RX ADMIN — ATORVASTATIN CALCIUM 40 MILLIGRAM(S): 80 TABLET, FILM COATED ORAL at 21:25

## 2022-12-18 RX ADMIN — Medication 25 GRAM(S): at 23:32

## 2022-12-18 RX ADMIN — Medication 81 MILLIGRAM(S): at 11:20

## 2022-12-18 RX ADMIN — DIVALPROEX SODIUM 1000 MILLIGRAM(S): 500 TABLET, DELAYED RELEASE ORAL at 18:00

## 2022-12-18 RX ADMIN — Medication 100 MILLIGRAM(S): at 11:20

## 2022-12-18 RX ADMIN — Medication 1 MILLIGRAM(S): at 11:21

## 2022-12-18 RX ADMIN — Medication 1 TABLET(S): at 11:21

## 2022-12-18 RX ADMIN — Medication 1 MILLIGRAM(S): at 18:00

## 2022-12-18 RX ADMIN — ENOXAPARIN SODIUM 40 MILLIGRAM(S): 100 INJECTION SUBCUTANEOUS at 06:16

## 2022-12-18 RX ADMIN — Medication 0.5 MILLIGRAM(S): at 13:13

## 2022-12-18 RX ADMIN — Medication 50 MILLIGRAM(S): at 21:26

## 2022-12-18 RX ADMIN — QUETIAPINE FUMARATE 50 MILLIGRAM(S): 200 TABLET, FILM COATED ORAL at 06:15

## 2022-12-18 RX ADMIN — Medication 50 MILLIGRAM(S): at 00:22
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
## 2022-12-19 PROCEDURE — 99233 SBSQ HOSP IP/OBS HIGH 50: CPT

## 2022-12-19 RX ORDER — ALPRAZOLAM 0.25 MG
0.25 TABLET ORAL ONCE
Refills: 0 | Status: DISCONTINUED | OUTPATIENT
Start: 2022-12-19 | End: 2022-12-19

## 2022-12-19 RX ADMIN — Medication 1 MILLIGRAM(S): at 11:51

## 2022-12-19 RX ADMIN — QUETIAPINE FUMARATE 50 MILLIGRAM(S): 200 TABLET, FILM COATED ORAL at 05:28

## 2022-12-19 RX ADMIN — Medication 0.25 MILLIGRAM(S): at 11:50

## 2022-12-19 RX ADMIN — QUETIAPINE FUMARATE 50 MILLIGRAM(S): 200 TABLET, FILM COATED ORAL at 16:57

## 2022-12-19 RX ADMIN — Medication 50 MILLIGRAM(S): at 21:45

## 2022-12-19 RX ADMIN — ENOXAPARIN SODIUM 40 MILLIGRAM(S): 100 INJECTION SUBCUTANEOUS at 05:28

## 2022-12-19 RX ADMIN — Medication 1 TABLET(S): at 11:50

## 2022-12-19 RX ADMIN — DIVALPROEX SODIUM 1000 MILLIGRAM(S): 500 TABLET, DELAYED RELEASE ORAL at 16:57

## 2022-12-19 RX ADMIN — Medication 100 MILLIGRAM(S): at 11:51

## 2022-12-19 RX ADMIN — Medication 81 MILLIGRAM(S): at 11:51

## 2022-12-19 RX ADMIN — DIVALPROEX SODIUM 1000 MILLIGRAM(S): 500 TABLET, DELAYED RELEASE ORAL at 10:28

## 2022-12-19 RX ADMIN — ATORVASTATIN CALCIUM 40 MILLIGRAM(S): 80 TABLET, FILM COATED ORAL at 21:45
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
## 2022-12-20 VITALS
RESPIRATION RATE: 18 BRPM | OXYGEN SATURATION: 96 % | TEMPERATURE: 98 F | SYSTOLIC BLOOD PRESSURE: 126 MMHG | DIASTOLIC BLOOD PRESSURE: 63 MMHG | HEART RATE: 84 BPM

## 2022-12-20 LAB — SARS-COV-2 RNA SPEC QL NAA+PROBE: SIGNIFICANT CHANGE UP

## 2022-12-20 PROCEDURE — 99285 EMERGENCY DEPT VISIT HI MDM: CPT | Mod: 25

## 2022-12-20 PROCEDURE — 82746 ASSAY OF FOLIC ACID SERUM: CPT

## 2022-12-20 PROCEDURE — 83036 HEMOGLOBIN GLYCOSYLATED A1C: CPT

## 2022-12-20 PROCEDURE — 87086 URINE CULTURE/COLONY COUNT: CPT

## 2022-12-20 PROCEDURE — 95714 VEEG EA 12-26 HR UNMNTR: CPT

## 2022-12-20 PROCEDURE — 97530 THERAPEUTIC ACTIVITIES: CPT

## 2022-12-20 PROCEDURE — 86900 BLOOD TYPING SEROLOGIC ABO: CPT

## 2022-12-20 PROCEDURE — 97116 GAIT TRAINING THERAPY: CPT

## 2022-12-20 PROCEDURE — 85025 COMPLETE CBC W/AUTO DIFF WBC: CPT

## 2022-12-20 PROCEDURE — 86901 BLOOD TYPING SEROLOGIC RH(D): CPT

## 2022-12-20 PROCEDURE — 82747 ASSAY OF FOLIC ACID RBC: CPT

## 2022-12-20 PROCEDURE — 80048 BASIC METABOLIC PNL TOTAL CA: CPT

## 2022-12-20 PROCEDURE — 93005 ELECTROCARDIOGRAM TRACING: CPT

## 2022-12-20 PROCEDURE — 80053 COMPREHEN METABOLIC PANEL: CPT

## 2022-12-20 PROCEDURE — 85027 COMPLETE CBC AUTOMATED: CPT

## 2022-12-20 PROCEDURE — 97167 OT EVAL HIGH COMPLEX 60 MIN: CPT

## 2022-12-20 PROCEDURE — 95700 EEG CONT REC W/VID EEG TECH: CPT

## 2022-12-20 PROCEDURE — 83735 ASSAY OF MAGNESIUM: CPT

## 2022-12-20 PROCEDURE — 81001 URINALYSIS AUTO W/SCOPE: CPT

## 2022-12-20 PROCEDURE — 86850 RBC ANTIBODY SCREEN: CPT

## 2022-12-20 PROCEDURE — 95813 EEG EXTND MNTR 61-119 MIN: CPT

## 2022-12-20 PROCEDURE — 70496 CT ANGIOGRAPHY HEAD: CPT

## 2022-12-20 PROCEDURE — G1004: CPT

## 2022-12-20 PROCEDURE — 70551 MRI BRAIN STEM W/O DYE: CPT

## 2022-12-20 PROCEDURE — 83540 ASSAY OF IRON: CPT

## 2022-12-20 PROCEDURE — 95819 EEG AWAKE AND ASLEEP: CPT

## 2022-12-20 PROCEDURE — 80061 LIPID PANEL: CPT

## 2022-12-20 PROCEDURE — 70498 CT ANGIOGRAPHY NECK: CPT

## 2022-12-20 PROCEDURE — 36415 COLL VENOUS BLD VENIPUNCTURE: CPT

## 2022-12-20 PROCEDURE — U0005: CPT

## 2022-12-20 PROCEDURE — 70450 CT HEAD/BRAIN W/O DYE: CPT

## 2022-12-20 PROCEDURE — 83550 IRON BINDING TEST: CPT

## 2022-12-20 PROCEDURE — 99239 HOSP IP/OBS DSCHRG MGMT >30: CPT

## 2022-12-20 PROCEDURE — 93306 TTE W/DOPPLER COMPLETE: CPT

## 2022-12-20 PROCEDURE — 84466 ASSAY OF TRANSFERRIN: CPT

## 2022-12-20 PROCEDURE — 84100 ASSAY OF PHOSPHORUS: CPT

## 2022-12-20 PROCEDURE — U0003: CPT

## 2022-12-20 PROCEDURE — 71045 X-RAY EXAM CHEST 1 VIEW: CPT

## 2022-12-20 PROCEDURE — 93880 EXTRACRANIAL BILAT STUDY: CPT

## 2022-12-20 PROCEDURE — 82607 VITAMIN B-12: CPT

## 2022-12-20 PROCEDURE — 82728 ASSAY OF FERRITIN: CPT

## 2022-12-20 RX ADMIN — DIVALPROEX SODIUM 1000 MILLIGRAM(S): 500 TABLET, DELAYED RELEASE ORAL at 09:04

## 2022-12-20 RX ADMIN — QUETIAPINE FUMARATE 50 MILLIGRAM(S): 200 TABLET, FILM COATED ORAL at 05:18

## 2022-12-20 RX ADMIN — Medication 1 TABLET(S): at 09:04

## 2022-12-20 RX ADMIN — ENOXAPARIN SODIUM 40 MILLIGRAM(S): 100 INJECTION SUBCUTANEOUS at 05:18

## 2022-12-20 RX ADMIN — Medication 81 MILLIGRAM(S): at 09:03

## 2022-12-20 RX ADMIN — Medication 1 MILLIGRAM(S): at 09:04

## 2022-12-20 RX ADMIN — Medication 100 MILLIGRAM(S): at 09:04

## 2022-12-20 NOTE — PROGRESS NOTE ADULT - SUBJECTIVE AND OBJECTIVE BOX
Chief complaint: CVA    Patient seen and examined at bedside. No acute overnight events reported. No fever, chills, cough, chest pain, nausea or vomiting.     Vital Signs Last 24 Hrs  T(F): 99.1 (20 Dec 2022 04:57), Max: 99.1 (20 Dec 2022 04:57)  HR: 82 (20 Dec 2022 04:57) (77 - 89)  BP: 122/71 (20 Dec 2022 04:57) (96/64 - 122/71)  RR: 18 (20 Dec 2022 04:57) (18 - 18)  SpO2: 97% (20 Dec 2022 04:57) (95% - 97%)    Physical Exam:  Constitutional: alert and oriented, in no acute distress   Neck: Soft and supple  Respiratory: Clear to auscultation bilaterally  Cardiovascular: Regular rate and rhyhtm  Gastrointestinal: Soft, non-tender to palpation, +bs  Musculoskeletal: 5/5 strength b/l upper and lower extremities, no lower extremity edema bilaterally
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
## 2022-12-20 NOTE — PROGRESS NOTE ADULT - REASON FOR ADMISSION
Left sided weakness due to subacute CVA

## 2022-12-20 NOTE — PROGRESS NOTE ADULT - PROVIDER SPECIALTY LIST ADULT
Neurology
Hospitalist
Internal Medicine
Neurology
Hospitalist
Internal Medicine
Neurology
Hospitalist
Internal Medicine
Internal Medicine
Neurology
Hospitalist
Hospitalist

## 2023-04-06 NOTE — ED ADULT TRIAGE NOTE - IDEAL BODY WEIGHT(KG)
Ok to have pt come to [place] to do another membrane sweep per Dr Goodman.  
Yesterday, Rafaela states she met with Dr. Goodman and had a membrane sweep.  She called to let you know she has been experiencing irregular contractions and is wondering if she should come in for a 2nd sweep?  Rafaela would like you to call her at: [phone] to discuss  
64

## 2023-07-17 PROBLEM — R56.9 UNSPECIFIED CONVULSIONS: Chronic | Status: ACTIVE | Noted: 2020-06-09

## 2023-07-17 PROBLEM — E03.9 HYPOTHYROIDISM, UNSPECIFIED: Chronic | Status: ACTIVE | Noted: 2021-05-30

## 2023-07-17 PROBLEM — F17.200 NICOTINE DEPENDENCE, UNSPECIFIED, UNCOMPLICATED: Chronic | Status: ACTIVE | Noted: 2020-06-10

## 2023-07-17 PROBLEM — F14.10 COCAINE ABUSE, UNCOMPLICATED: Chronic | Status: ACTIVE | Noted: 2022-12-06

## 2023-07-17 PROBLEM — S06.9X9A UNSPECIFIED INTRACRANIAL INJURY WITH LOSS OF CONSCIOUSNESS OF UNSPECIFIED DURATION, INITIAL ENCOUNTER: Chronic | Status: ACTIVE | Noted: 2021-05-30

## 2023-07-17 PROBLEM — I10 ESSENTIAL (PRIMARY) HYPERTENSION: Chronic | Status: ACTIVE | Noted: 2021-12-20

## 2023-07-17 PROBLEM — F10.10 ALCOHOL ABUSE, UNCOMPLICATED: Chronic | Status: ACTIVE | Noted: 2021-05-30

## 2023-07-17 PROBLEM — E78.5 HYPERLIPIDEMIA, UNSPECIFIED: Chronic | Status: ACTIVE | Noted: 2021-05-30

## 2023-07-17 PROBLEM — F10.10 ALCOHOL ABUSE, UNCOMPLICATED: Chronic | Status: ACTIVE | Noted: 2020-06-10
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
## 2023-08-22 ENCOUNTER — OFFICE (OUTPATIENT)
Dept: URBAN - METROPOLITAN AREA CLINIC 94 | Facility: CLINIC | Age: 53
Setting detail: OPHTHALMOLOGY
End: 2023-08-22
Payer: MEDICARE

## 2023-08-22 DIAGNOSIS — H25.13: ICD-10-CM

## 2023-08-22 DIAGNOSIS — I63.50: ICD-10-CM

## 2023-08-22 PROCEDURE — 92014 COMPRE OPH EXAM EST PT 1/>: CPT | Performed by: OPHTHALMOLOGY

## 2023-08-22 ASSESSMENT — TONOMETRY
OS_IOP_MMHG: 19
OD_IOP_MMHG: 19

## 2023-08-22 ASSESSMENT — CONFRONTATIONAL VISUAL FIELD TEST (CVF)
OD_FINDINGS: FULL
OS_FINDINGS: FULL

## 2023-08-22 ASSESSMENT — REFRACTION_MANIFEST
OS_CYLINDER: -0.75
OS_SPHERE: -0.25
OD_ADD: +2.00
OD_CYLINDER: -0.75
OD_SPHERE: -0.75
OD_AXIS: 130
OS_ADD: +2.00
OS_AXIS: 65

## 2023-08-22 ASSESSMENT — SPHEQUIV_DERIVED
OS_SPHEQUIV: -0.75
OD_SPHEQUIV: -1.25
OS_SPHEQUIV: -0.625
OD_SPHEQUIV: -1.125

## 2023-08-22 ASSESSMENT — REFRACTION_AUTOREFRACTION
OS_AXIS: 67
OS_SPHERE: -0.25
OD_SPHERE: -0.75
OS_CYLINDER: -1.00
OD_CYLINDER: -1.00
OD_AXIS: 130

## 2023-08-22 ASSESSMENT — VISUAL ACUITY
OS_BCVA: 20/40+1
OD_BCVA: 20/50-1

## 2023-09-13 NOTE — DISCHARGE NOTE PROVIDER - NSDCQMCOGNITION_NEU_ALL_CORE
Problem: Discharge Planning  Goal: Discharge to home or other facility with appropriate resources  9/13/2023 0250 by Tricia Ganser, RN  Outcome: Progressing  Flowsheets (Taken 9/12/2023 2000)  Discharge to home or other facility with appropriate resources: Identify barriers to discharge with patient and caregiver     Problem: Safety - Adult  Goal: Free from fall injury  9/13/2023 0250 by Tricia Ganser, RN  Outcome: Progressing  Flowsheets (Taken 9/13/2023 0250)  Free From Fall Injury: Instruct family/caregiver on patient safety     Problem: Neurosensory - Adult  Goal: Achieves maximal functionality and self care  9/13/2023 0250 by Tricia Ganser, RN  Outcome: Progressing  Flowsheets (Taken 9/12/2023 2000)  Achieves maximal functionality and self care: Monitor swallowing and airway patency with patient fatigue and changes in neurological status     Problem: Cardiovascular - Adult  Goal: Maintains optimal cardiac output and hemodynamic stability  9/13/2023 0250 by Tricia Ganser, RN  Outcome: Progressing  Flowsheets (Taken 9/12/2023 2000)  Maintains optimal cardiac output and hemodynamic stability: Monitor blood pressure and heart rate     Problem: Cardiovascular - Adult  Goal: Absence of cardiac dysrhythmias or at baseline  9/13/2023 0250 by Tricia Ganser, RN  Outcome: Progressing  Flowsheets (Taken 9/12/2023 2000)  Absence of cardiac dysrhythmias or at baseline: Monitor cardiac rate and rhythm     Problem: Skin/Tissue Integrity - Adult  Goal: Skin integrity remains intact  9/13/2023 0250 by Tricia Ganser, RN  Outcome: Progressing  Flowsheets  Taken 9/13/2023 0250  Skin Integrity Remains Intact: Monitor for areas of redness and/or skin breakdown  Taken 9/13/2023 0249  Skin Integrity Remains Intact: Monitor for areas of redness and/or skin breakdown  Taken 9/12/2023 2000  Skin Integrity Remains Intact: Monitor for areas of redness and/or skin breakdown     Problem: Infection - Adult  Goal: Absence of infection
reflux disease)      PAST SURGICAL HISTORY   Past Surgical History:   Procedure Laterality Date    BACK SURGERY      COLONOSCOPY      ENDOSCOPY, COLON, DIAGNOSTIC       SOCIAL HISTORY   Social History     Socioeconomic History    Marital status: Single     Spouse name: Not on file    Number of children: 2    Years of education: 13    Highest education level: Not on file   Occupational History    Not on file   Social Needs    Financial resource strain: Not on file    Food insecurity:     Worry: Not on file     Inability: Not on file    Transportation needs:     Medical: Not on file     Non-medical: Not on file   Tobacco Use    Smoking status: Never Smoker    Smokeless tobacco: Never Used   Substance and Sexual Activity    Alcohol use: Yes     Comment: occasionally    Drug use: No    Sexual activity: Not Currently     Partners: Male   Lifestyle    Physical activity:     Days per week: Not on file     Minutes per session: Not on file    Stress: Not on file   Relationships    Social connections:     Talks on phone: Not on file     Gets together: Not on file     Attends Latter day service: Not on file     Active member of club or organization: Not on file     Attends meetings of clubs or organizations: Not on file     Relationship status: Not on file    Intimate partner violence:     Fear of current or ex partner: Not on file     Emotionally abused: Not on file     Physically abused: Not on file     Forced sexual activity: Not on file   Other Topics Concern    Not on file   Social History Narrative    Not on file     FAMILY HISTORY   Family History   Problem Relation Age of Onset    Arthritis Mother     Stroke Mother     Heart Failure Father     Depression Father     Depression Brother     Bipolar Disorder Brother      ALLERGIES AND DRUG REACTIONS   No Known Allergies    CURRENT MEDICATIONS     Current Outpatient Medications   Medication Sig Dispense Refill    Multiple Vitamins-Minerals
(MULTIVITAMIN ADULTS PO) Take by mouth daily      SYNTHROID 25 MCG tablet Take 1 tablet by mouth Daily 90 tablet 5    desvenlafaxine succinate (PRISTIQ) 50 MG TB24 extended release tablet Take 50 mg by mouth daily      Cholecalciferol (VITAMIN D3) 5000 units TABS Take by mouth daily      clonazePAM (KLONOPIN) 0.5 MG tablet Take 0.5 mg by mouth nightly. Nile Dark estradiol (ESTRACE) 0.1 MG/GM vaginal cream Place 2 g vaginally once a week       No current facility-administered medications for this visit. Review of Systems  Constitutional: + generalized weakness. HEENT: No blurred vision, No sore throat, no ear pain, no hair loss  Neck: denied any neck swelling, difficulty swallowing,   Cadrdiopulomary: No CP, SOB or palpitation, No orthopnea or PND. No cough or wheezing. GI: No N/V/D, no constipation, No abdominal pain, no melena or hematochezia   : Denied any dysuria, hematuria, flank pain, discharge, or incontinence. Skin: dry skin   MSK: denied any joint deformity, joint pain/swelling, muscle pain, or back pain. Neuro: no numbess, no tingling, no weakness,     OBJECTIVE    /80   Pulse 64   Resp 16   Ht 5' 4\" (1.626 m)   Wt 133 lb 3.2 oz (60.4 kg)   LMP 10/07/2007 (Approximate)   SpO2 98%   BMI 22.86 kg/m²   BP Readings from Last 4 Encounters:   04/09/19 126/80   09/24/18 138/82     Wt Readings from Last 6 Encounters:   04/09/19 133 lb 3.2 oz (60.4 kg)   09/24/18 139 lb 11.2 oz (63.4 kg)       Physical examination:  General: awake alert, oriented x3, no abnormal position or movements. HEENT: normocephalic non traumatic  Neck: supple, no LN enlargement, no thyromegaly, no thyroid tenderness, no JVD. Pulm: Clear equal air entry no added sounds, no wheezing or rhonchi    CVS: S1 + S2, no murmur, no heave. Dorsalis pedis pulse palpable   Abd: soft lax, no tenderness, no organomegaly, audible bowel sounds. Skin: warm, no lesions, no rash.   Neuro: CN intact, sensation normal , muscle power
in the care of this patient. Please do not hesitate to contact us with any additional questions. Diagnosis Orders   1. Central hypothyroidism  T4, Free    T4   2.  Vitamin D deficiency  Vitamin D 25 Hydrox, D2 & D3    Basic Metabolic Panel      Richard Velasco MD  Endocrinologist, [facility])   [address] [place] [address]   Phone: [phone]  Fax: [phone]
78
No difficulties

## 2024-02-19 ENCOUNTER — APPOINTMENT (OUTPATIENT)
Dept: GASTROENTEROLOGY | Facility: CLINIC | Age: 54
End: 2024-02-19

## 2024-02-20 ENCOUNTER — APPOINTMENT (OUTPATIENT)
Dept: GASTROENTEROLOGY | Facility: CLINIC | Age: 54
End: 2024-02-20

## 2024-03-01 NOTE — DISCHARGE NOTE PROVIDER - NSDCDCMDCOMP_GEN_ALL_CORE
Hello,    An order was received for a Colonoscopy Screening. This appears to be a recall, last performed  12/7/2016  by Dr. Arriaga with a 3 year recall. Previous prep for case done with Dr. Mcbride, will route to provider's pool.      Thank you,    GI Pre-Admit / OAC Chart Review Dept.  [facility]        
This document is complete and the patient is ready for discharge.

## 2024-03-30 NOTE — PATIENT PROFILE ADULT - NSPROGENPREVTRANSF_GEN_A_NUR
Assessment: patients only complaint is left lower back pain; rest of assessment is benign     Alla Sprague RN  03/30/24 6867     no

## 2024-08-12 NOTE — DIETITIAN INITIAL EVALUATION ADULT. - ENTER TO (CAL/KG)
having a problem with your medicine.     If your doctor recommends aspirin, take the amount directed each day. Make sure you take aspirin and not another kind of pain reliever, such as acetaminophen (Tylenol).   When should you call for help?   Call 911 if you have symptoms of a heart attack. These may include:    Chest pain or pressure, or a strange feeling in the chest.     Sweating.     Shortness of breath.     Pain, pressure, or a strange feeling in the back, neck, jaw, or upper belly or in one or both shoulders or arms.     Lightheadedness or sudden weakness.     A fast or irregular heartbeat.   After you call 911, the  may tell you to chew 1 adult-strength or 2 to 4 low-dose aspirin. Wait for an ambulance. Do not try to drive yourself.  Watch closely for changes in your health, and be sure to contact your doctor if you have any problems.  Where can you learn more?  Go to https://www.[employer].net/patientEd and enter F075 to learn more about \"A Healthy Heart: Care Instructions.\"  Current as of: June 24, 2023  Content Version: 14.1  © 4769-5941 [employer].   Care instructions adapted under license by [employer]. If you have questions about a medical condition or this instruction, always ask your healthcare professional. [employer] disclaims any warranty or liability for your use of this information.      Personalized Preventive Plan for Cristina Menchaca - 8/12/2024  Medicare offers a range of preventive health benefits. Some of the tests and screenings are paid in full while other may be subject to a deductible, co-insurance, and/or copay.    Some of these benefits include a comprehensive review of your medical history including lifestyle, illnesses that may run in your family, and various assessments and screenings as appropriate.    After reviewing your medical record and screening and assessments performed today your provider may have ordered immunizations, labs, imaging, 
30

## 2024-09-23 NOTE — ED ADULT NURSE REASSESSMENT NOTE - NS ED NURSE REASSESS COMMENT FT1
Pt brought to main ed b8r. Report given to linda ELLIOTT
received report from dalia RN, assumed care of pt.  pt moved to b8r, sating % on roomair. +etoh.  ecchymosis noted to various areas of patient's body, dr. wilcox notified and came to bedside.  plan to DC when sober
87

## 2025-01-29 NOTE — ED ADULT NURSE NOTE - DRUG PRE-SCREENING (DAST -1)
Paige Pierre presents to the Unity Miller Hewitt,    melatonin 10 MG  Statement Selected

## 2025-02-14 NOTE — PROGRESS NOTE ADULT - PROVIDER SPECIALTY LIST ADULT
CASE MANAGEMENT.... Chart reviewed. Per sx, patient is feeling better after having 2 bms overnight. Will advance to regular diabetic diet and if tolerates then she can be discharged today. Plan is home with Savannah MALIK-Jo notified. Orders in place. Will follow.    Epilepsy

## 2025-05-05 ENCOUNTER — EMERGENCY (EMERGENCY)
Facility: HOSPITAL | Age: 55
LOS: 1 days | End: 2025-05-05
Payer: MEDICARE

## 2025-05-05 VITALS
OXYGEN SATURATION: 95 % | SYSTOLIC BLOOD PRESSURE: 117 MMHG | WEIGHT: 132.06 LBS | TEMPERATURE: 98 F | RESPIRATION RATE: 20 BRPM | HEIGHT: 70 IN | DIASTOLIC BLOOD PRESSURE: 60 MMHG | HEART RATE: 69 BPM

## 2025-05-05 DIAGNOSIS — Z98.890 OTHER SPECIFIED POSTPROCEDURAL STATES: Chronic | ICD-10-CM

## 2025-05-05 DIAGNOSIS — Z93.1 GASTROSTOMY STATUS: Chronic | ICD-10-CM

## 2025-05-05 PROCEDURE — L9991: CPT

## 2025-05-05 NOTE — ED ADULT TRIAGE NOTE - CHIEF COMPLAINT QUOTE
pt arrives to ED in wheelchair w/family c/o low HGB and poor PO intake, family unsure of HGB level, denies V/D/CP/SOB, history of anemia/seizures

## 2025-07-01 NOTE — PROGRESS NOTE ADULT - NS ATTEST RISK GEN_ALL_CORE
----- Message from Scott Howe MD sent at 6/30/2025  6:42 AM CDT -----  Regarding: RE: ED Follow up  STOP  ASA  7 DAYS  AFTER  STENT  SINCE ON  BRILINTA + ELIQUIS.     THANKS  Scott Howe MD  ----- Message -----  From: Yariel Dominguez MD  Sent: 6/29/2025   5:30 PM CDT  To: Rory English MD; Scott Howe MD  Subject: ED Follow up                                     Patient presented to the ED on Sunday for evolution of bruise related to hematoma from cath.  On-call cardiologist was okay with her being discharged as it is likely hematoma and not pseudoaneurysm.  She was mostly noting the evolution of her hematoma bruise.  Hemoglobin went from 10.5-9.2 in 36 hours, but she was hemodynamically stable and did not show other signs of reaccumulation of hematoma or symptomatic anemia.  But she is on Brilinta, Eliquis, aspirin.  Would like H/H recheck either tomorrow or Tuesday to ensure that her hemoglobin is not further downtrending.    Thanks,  Yariel Dominguez, DO  [facility]  
Called patient and reminded her to stop ASA on 7/4/25. She states the ER encouraged her to be seen in two weeks. She would like to be added to the wait list for a sooner appointment if one is available, but is comfortable with keeping the 7/31 appointment at this time. She is agreeable to call with updates if she notices any changes in the cath site. No further questions at this time.  
Noted. ER Note reviewed  Discharged home- no pseudoaneurysm   Stable    [place] [facility]  
Secure chat received from Jaime SMITH, from [email or web address]. She states that the patient has abdominal pain and believes the hematoma may be spreading. She has the patient on the line and states she will be recommending the patient present to the ER if she can not be seen in clinic today. Writer informed the RN that there are no openings for today.    Attempted to call the patient, busy tone present. Will require follow-up.   
Risk Statement (NON-critical care)
Risk Statement (NON-critical care)

## 2025-07-07 NOTE — ED PROVIDER NOTE - NS_BEDUNITTYPES_ED_ALL_ED
Discharge Planning Assessment  [facility]     Patient Name: Monica Scherer  MRN: [id number]  Today's Date: 7/7/2025    Admit Date: 6/25/2025    Plan: Admitted to a [facility] scattered bed on 7/7/25. SOWMYA Danielson, RN, CCP.   Discharge Needs Assessment    No documentation.                  Discharge Plan       Row Name 07/07/25 1354       Plan    Plan Admitted to a [facility] scattered bed on 7/7/25. SOWMYA Danielson RN, CCP.    Plan Comments Admitted to a [facility] scattered bed on 7/7/25. SOWMYA Danielson RN, CCP.    Final Discharge Disposition Code 51 - hospice medical facility    Final Note Admitted to a [facility] scattered bed on 7/7/25. SOWMYA Danielson RN, CCP.                  Continued Care and Services - Admitted Since 6/25/2025       Destination Coordination complete.      Service Provider Request Status Services Address Phone Fax Patient Preferred    [facility]  Selected Inpatient Hospice [address] [phone] --    [facility] Pending - Request Sent -- [address] [address] [phone] --    [facility] Declined -- [address] [address] [phone] --                  Selected Continued Care - Episodes Includes continued care and service providers with selected services from the active episodes listed below      High Risk Care Management Episode start date: 10/18/2023 (Paused)   There are no active outsourced providers for this episode.                 Selected Continued Care - Prior Encounters Includes continued care and service providers with selected services from prior encounters from 3/27/2025 to 7/7/2025      Discharged on 6/9/2025 Admission date: 5/27/2025 - Discharge disposition: Skilled Nursing Facility (DC - External)      Destination       Service Provider Services Address Phone Fax Patient [facility]  [address] [address] [phone] --                          Expected Discharge Date and Time       Expected Discharge Date Expected Discharge Time    Jul 7, 2025            Demographic Summary    No documentation.                  Functional Status    No documentation.                  Psychosocial    No documentation.                  Abuse/Neglect    No documentation.                  Legal    No documentation.                  Substance Abuse    No documentation.                  Patient Forms    No documentation.                     Eneida Danielson RN     STEPDOWN
